# Patient Record
Sex: MALE | Race: WHITE | NOT HISPANIC OR LATINO | Employment: FULL TIME | URBAN - METROPOLITAN AREA
[De-identification: names, ages, dates, MRNs, and addresses within clinical notes are randomized per-mention and may not be internally consistent; named-entity substitution may affect disease eponyms.]

---

## 2018-08-08 ENCOUNTER — OFFICE VISIT CONVERTED (OUTPATIENT)
Dept: CARDIOLOGY | Facility: CLINIC | Age: 63
End: 2018-08-08
Attending: INTERNAL MEDICINE

## 2018-09-21 ENCOUNTER — CONVERSION ENCOUNTER (OUTPATIENT)
Dept: SURGERY | Facility: CLINIC | Age: 63
End: 2018-09-21

## 2018-09-21 ENCOUNTER — OFFICE VISIT CONVERTED (OUTPATIENT)
Dept: UROLOGY | Facility: CLINIC | Age: 63
End: 2018-09-21
Attending: UROLOGY

## 2018-10-04 ENCOUNTER — OFFICE VISIT CONVERTED (OUTPATIENT)
Dept: ONCOLOGY | Facility: HOSPITAL | Age: 63
End: 2018-10-04
Attending: INTERNAL MEDICINE

## 2018-10-04 ENCOUNTER — OFFICE VISIT CONVERTED (OUTPATIENT)
Dept: SURGERY | Facility: CLINIC | Age: 63
End: 2018-10-04
Attending: PHYSICIAN ASSISTANT

## 2018-10-05 ENCOUNTER — CONVERSION ENCOUNTER (OUTPATIENT)
Dept: SURGERY | Facility: CLINIC | Age: 63
End: 2018-10-05

## 2018-10-05 ENCOUNTER — OFFICE VISIT CONVERTED (OUTPATIENT)
Dept: SURGERY | Facility: CLINIC | Age: 63
End: 2018-10-05
Attending: SURGERY

## 2018-10-24 ENCOUNTER — OFFICE VISIT CONVERTED (OUTPATIENT)
Dept: ONCOLOGY | Facility: HOSPITAL | Age: 63
End: 2018-10-24
Attending: INTERNAL MEDICINE

## 2018-11-07 ENCOUNTER — OFFICE VISIT CONVERTED (OUTPATIENT)
Dept: ONCOLOGY | Facility: HOSPITAL | Age: 63
End: 2018-11-07
Attending: INTERNAL MEDICINE

## 2018-11-16 ENCOUNTER — OFFICE VISIT CONVERTED (OUTPATIENT)
Dept: ONCOLOGY | Facility: HOSPITAL | Age: 63
End: 2018-11-16
Attending: NURSE PRACTITIONER

## 2018-11-27 ENCOUNTER — OFFICE VISIT CONVERTED (OUTPATIENT)
Dept: ONCOLOGY | Facility: HOSPITAL | Age: 63
End: 2018-11-27
Attending: INTERNAL MEDICINE

## 2018-12-03 ENCOUNTER — CONVERSION ENCOUNTER (OUTPATIENT)
Dept: CARDIOLOGY | Facility: CLINIC | Age: 63
End: 2018-12-03

## 2018-12-03 ENCOUNTER — OFFICE VISIT CONVERTED (OUTPATIENT)
Dept: CARDIOLOGY | Facility: CLINIC | Age: 63
End: 2018-12-03
Attending: INTERNAL MEDICINE

## 2019-02-01 ENCOUNTER — HOSPITAL ENCOUNTER (OUTPATIENT)
Dept: OTHER | Facility: HOSPITAL | Age: 64
Discharge: HOME | End: 2019-02-01
Attending: INTERNAL MEDICINE

## 2019-02-13 ENCOUNTER — OFFICE VISIT CONVERTED (OUTPATIENT)
Dept: ONCOLOGY | Facility: HOSPITAL | Age: 64
End: 2019-02-13
Attending: INTERNAL MEDICINE

## 2019-02-13 ENCOUNTER — HOSPITAL ENCOUNTER (OUTPATIENT)
Dept: OTHER | Facility: HOSPITAL | Age: 64
Discharge: HOME | End: 2019-02-13
Attending: INTERNAL MEDICINE

## 2019-03-22 ENCOUNTER — HOSPITAL ENCOUNTER (OUTPATIENT)
Dept: OTHER | Facility: HOSPITAL | Age: 64
Discharge: HOME | End: 2019-03-22
Attending: NURSE PRACTITIONER

## 2019-04-10 ENCOUNTER — HOSPITAL ENCOUNTER (OUTPATIENT)
Dept: OTHER | Facility: HOSPITAL | Age: 64
Discharge: HOME | End: 2019-04-10
Attending: NURSE PRACTITIONER

## 2019-04-10 LAB
ANION GAP SERPL CALC-SCNC: 19 MMOL/L (ref 8–19)
BUN SERPL-MCNC: 32 MG/DL (ref 5–25)
BUN/CREAT SERPL: 16 {RATIO} (ref 6–20)
CALCIUM SERPL-MCNC: 9.4 MG/DL (ref 8.7–10.4)
CHLORIDE SERPL-SCNC: 96 MMOL/L (ref 99–111)
CONV CO2: 27 MMOL/L (ref 22–32)
CREAT UR-MCNC: 1.98 MG/DL (ref 0.7–1.2)
GFR SERPLBLD BASED ON 1.73 SQ M-ARVRAT: 35 ML/MIN/{1.73_M2}
GLUCOSE SERPL-MCNC: 90 MG/DL (ref 70–99)
OSMOLALITY SERPL CALC.SUM OF ELEC: 290 MOSM/KG (ref 273–304)
POTASSIUM SERPL-SCNC: 4.9 MMOL/L (ref 3.5–5.3)
SODIUM SERPL-SCNC: 137 MMOL/L (ref 135–147)

## 2019-04-12 ENCOUNTER — HOSPITAL ENCOUNTER (OUTPATIENT)
Dept: PET IMAGING | Facility: HOSPITAL | Age: 64
Discharge: HOME | End: 2019-04-12
Attending: INTERNAL MEDICINE

## 2019-04-17 ENCOUNTER — OFFICE VISIT CONVERTED (OUTPATIENT)
Dept: CARDIOLOGY | Facility: CLINIC | Age: 64
End: 2019-04-17
Attending: INTERNAL MEDICINE

## 2019-04-18 ENCOUNTER — HOSPITAL ENCOUNTER (OUTPATIENT)
Dept: OTHER | Facility: HOSPITAL | Age: 64
Discharge: HOME | End: 2019-04-18
Attending: NURSE PRACTITIONER

## 2019-04-18 LAB
BASOPHILS # BLD AUTO: 0.04 10*3/UL (ref 0–0.2)
BASOPHILS NFR BLD AUTO: 0.6 % (ref 0–3)
CONV ABS IMM GRAN: 0.02 10*3/UL (ref 0–0.2)
CONV IMMATURE GRAN: 0.3 % (ref 0–1.8)
DEPRECATED RDW RBC AUTO: 50.3 FL (ref 35.1–43.9)
EOSINOPHIL # BLD AUTO: 0.1 10*3/UL (ref 0–0.7)
EOSINOPHIL # BLD AUTO: 1.4 % (ref 0–7)
ERYTHROCYTE [DISTWIDTH] IN BLOOD BY AUTOMATED COUNT: 14.5 % (ref 11.6–14.4)
HBA1C MFR BLD: 10.7 G/DL (ref 14–18)
HCT VFR BLD AUTO: 33.4 % (ref 42–52)
LYMPHOCYTES # BLD AUTO: 1.88 10*3/UL (ref 1–5)
MCH RBC QN AUTO: 30.1 PG (ref 27–31)
MCHC RBC AUTO-ENTMCNC: 32 G/DL (ref 33–37)
MCV RBC AUTO: 93.8 FL (ref 80–96)
MONOCYTES # BLD AUTO: 0.72 10*3/UL (ref 0.2–1.2)
MONOCYTES NFR BLD AUTO: 10.2 % (ref 3–10)
NEUTROPHILS # BLD AUTO: 4.3 10*3/UL (ref 2–8)
NEUTROPHILS NFR BLD AUTO: 60.9 % (ref 30–85)
NRBC CBCN: 0 % (ref 0–0.7)
PLATELET # BLD AUTO: 262 10*3/UL (ref 130–400)
PMV BLD AUTO: 10.5 FL (ref 9.4–12.4)
RBC # BLD AUTO: 3.56 10*6/UL (ref 4.7–6.1)
T4 FREE SERPL-MCNC: 1.5 NG/DL (ref 0.9–1.8)
TSH SERPL-ACNC: 1.38 M[IU]/L (ref 0.27–4.2)
VARIANT LYMPHS NFR BLD MANUAL: 26.6 % (ref 20–45)
WBC # BLD AUTO: 7.06 10*3/UL (ref 4.8–10.8)

## 2019-04-26 ENCOUNTER — HOSPITAL ENCOUNTER (OUTPATIENT)
Dept: OTHER | Facility: HOSPITAL | Age: 64
Discharge: HOME | End: 2019-04-26
Attending: NURSE PRACTITIONER

## 2019-04-29 ENCOUNTER — HOSPITAL ENCOUNTER (OUTPATIENT)
Dept: ULTRASOUND IMAGING | Facility: HOSPITAL | Age: 64
Discharge: HOME | End: 2019-04-29
Attending: NURSE PRACTITIONER

## 2019-05-01 LAB — CONV LYMPHOMA/LEUKEMIA PROFILE SOLID TISSUE OR FLUID: NORMAL

## 2019-05-06 ENCOUNTER — OFFICE VISIT CONVERTED (OUTPATIENT)
Dept: ONCOLOGY | Facility: HOSPITAL | Age: 64
End: 2019-05-06
Attending: INTERNAL MEDICINE

## 2019-05-06 ENCOUNTER — HOSPITAL ENCOUNTER (OUTPATIENT)
Dept: OTHER | Facility: HOSPITAL | Age: 64
Discharge: HOME | End: 2019-05-06
Attending: INTERNAL MEDICINE

## 2019-06-07 ENCOUNTER — HOSPITAL ENCOUNTER (OUTPATIENT)
Dept: OTHER | Facility: HOSPITAL | Age: 64
Discharge: HOME | End: 2019-06-07
Attending: NURSE PRACTITIONER

## 2019-07-12 ENCOUNTER — HOSPITAL ENCOUNTER (OUTPATIENT)
Dept: OTHER | Facility: HOSPITAL | Age: 64
Discharge: HOME | End: 2019-07-12
Attending: NURSE PRACTITIONER

## 2019-07-26 ENCOUNTER — HOSPITAL ENCOUNTER (OUTPATIENT)
Dept: CT IMAGING | Facility: HOSPITAL | Age: 64
Discharge: HOME | End: 2019-07-26
Attending: UROLOGY

## 2019-07-26 LAB
CREAT BLD-MCNC: 1.5 MG/DL (ref 0.6–1.4)
GFR SERPLBLD BASED ON 1.73 SQ M-ARVRAT: 49 ML/MIN/{1.73_M2}

## 2019-08-16 ENCOUNTER — HOSPITAL ENCOUNTER (OUTPATIENT)
Dept: OTHER | Facility: HOSPITAL | Age: 64
Discharge: HOME | End: 2019-08-16
Attending: NURSE PRACTITIONER

## 2019-08-16 ENCOUNTER — HOSPITAL ENCOUNTER (OUTPATIENT)
Dept: OTHER | Facility: HOSPITAL | Age: 64
Discharge: HOME | End: 2019-08-16
Attending: INTERNAL MEDICINE

## 2019-08-16 LAB
ALBUMIN SERPL-MCNC: 4.1 G/DL (ref 3.5–5)
ALBUMIN/GLOB SERPL: 1.5 {RATIO} (ref 1.4–2.6)
ALP SERPL-CCNC: 80 U/L (ref 56–155)
ALT SERPL-CCNC: 11 U/L (ref 10–40)
ANION GAP SERPL CALC-SCNC: 15 MMOL/L (ref 8–19)
AST SERPL-CCNC: 18 U/L (ref 15–50)
BILIRUB SERPL-MCNC: 0.39 MG/DL (ref 0.2–1.3)
BUN SERPL-MCNC: 19 MG/DL (ref 5–25)
BUN/CREAT SERPL: 15 {RATIO} (ref 6–20)
CALCIUM SERPL-MCNC: 9.1 MG/DL (ref 8.7–10.4)
CHLORIDE SERPL-SCNC: 100 MMOL/L (ref 99–111)
CHOLEST SERPL-MCNC: 130 MG/DL (ref 107–200)
CHOLEST/HDLC SERPL: 2.2 {RATIO} (ref 3–6)
CONV CO2: 30 MMOL/L (ref 22–32)
CONV TOTAL PROTEIN: 6.8 G/DL (ref 6.3–8.2)
CREAT UR-MCNC: 1.25 MG/DL (ref 0.7–1.2)
GFR SERPLBLD BASED ON 1.73 SQ M-ARVRAT: >60 ML/MIN/{1.73_M2}
GLOBULIN UR ELPH-MCNC: 2.7 G/DL (ref 2–3.5)
GLUCOSE SERPL-MCNC: 107 MG/DL (ref 70–99)
HDLC SERPL-MCNC: 59 MG/DL (ref 40–60)
LDLC SERPL CALC-MCNC: 60 MG/DL (ref 70–100)
OSMOLALITY SERPL CALC.SUM OF ELEC: 293 MOSM/KG (ref 273–304)
POTASSIUM SERPL-SCNC: 5.2 MMOL/L (ref 3.5–5.3)
SODIUM SERPL-SCNC: 140 MMOL/L (ref 135–147)
TRIGL SERPL-MCNC: 55 MG/DL (ref 40–150)
VLDLC SERPL-MCNC: 11 MG/DL (ref 5–37)

## 2019-09-20 ENCOUNTER — HOSPITAL ENCOUNTER (OUTPATIENT)
Dept: OTHER | Facility: HOSPITAL | Age: 64
Discharge: HOME | End: 2019-09-20
Attending: NURSE PRACTITIONER

## 2019-10-25 ENCOUNTER — HOSPITAL ENCOUNTER (OUTPATIENT)
Dept: OTHER | Facility: HOSPITAL | Age: 64
Discharge: HOME | End: 2019-10-25
Attending: NURSE PRACTITIONER

## 2019-10-28 ENCOUNTER — HOSPITAL ENCOUNTER (OUTPATIENT)
Dept: CT IMAGING | Facility: HOSPITAL | Age: 64
Discharge: HOME | End: 2019-10-28
Attending: NURSE PRACTITIONER

## 2019-10-28 LAB
ALBUMIN SERPL-MCNC: 4.6 G/DL (ref 3.5–5)
ALBUMIN/GLOB SERPL: 1.5 {RATIO} (ref 1.4–2.6)
ALP SERPL-CCNC: 78 U/L (ref 56–155)
ALT SERPL-CCNC: 11 U/L (ref 10–40)
ANION GAP SERPL CALC-SCNC: 19 MMOL/L (ref 8–19)
AST SERPL-CCNC: 18 U/L (ref 15–50)
BASOPHILS # BLD AUTO: 0.05 10*3/UL (ref 0–0.2)
BASOPHILS NFR BLD AUTO: 0.6 % (ref 0–3)
BILIRUB SERPL-MCNC: 0.26 MG/DL (ref 0.2–1.3)
BUN SERPL-MCNC: 23 MG/DL (ref 5–25)
BUN/CREAT SERPL: 17 {RATIO} (ref 6–20)
CALCIUM SERPL-MCNC: 10 MG/DL (ref 8.7–10.4)
CHLORIDE SERPL-SCNC: 97 MMOL/L (ref 99–111)
CONV ABS IMM GRAN: 0.02 10*3/UL (ref 0–0.2)
CONV CO2: 30 MMOL/L (ref 22–32)
CONV IMMATURE GRAN: 0.2 % (ref 0–1.8)
CONV TOTAL PROTEIN: 7.6 G/DL (ref 6.3–8.2)
CREAT BLD-MCNC: 1.5 MG/DL (ref 0.6–1.4)
CREAT UR-MCNC: 1.36 MG/DL (ref 0.7–1.2)
DEPRECATED RDW RBC AUTO: 45.5 FL (ref 35.1–43.9)
EOSINOPHIL # BLD AUTO: 0.08 10*3/UL (ref 0–0.7)
EOSINOPHIL # BLD AUTO: 0.9 % (ref 0–7)
ERYTHROCYTE [DISTWIDTH] IN BLOOD BY AUTOMATED COUNT: 12.8 % (ref 11.6–14.4)
GFR SERPLBLD BASED ON 1.73 SQ M-ARVRAT: 49 ML/MIN/{1.73_M2}
GFR SERPLBLD BASED ON 1.73 SQ M-ARVRAT: 55 ML/MIN/{1.73_M2}
GLOBULIN UR ELPH-MCNC: 3 G/DL (ref 2–3.5)
GLUCOSE SERPL-MCNC: 88 MG/DL (ref 70–99)
HCT VFR BLD AUTO: 38.6 % (ref 42–52)
HGB BLD-MCNC: 12.4 G/DL (ref 14–18)
LYMPHOCYTES # BLD AUTO: 2.25 10*3/UL (ref 1–5)
LYMPHOCYTES NFR BLD AUTO: 26.2 % (ref 20–45)
MCH RBC QN AUTO: 31 PG (ref 27–31)
MCHC RBC AUTO-ENTMCNC: 32.1 G/DL (ref 33–37)
MCV RBC AUTO: 96.5 FL (ref 80–96)
MONOCYTES # BLD AUTO: 0.76 10*3/UL (ref 0.2–1.2)
MONOCYTES NFR BLD AUTO: 8.8 % (ref 3–10)
NEUTROPHILS # BLD AUTO: 5.43 10*3/UL (ref 2–8)
NEUTROPHILS NFR BLD AUTO: 63.3 % (ref 30–85)
NRBC CBCN: 0 % (ref 0–0.7)
OSMOLALITY SERPL CALC.SUM OF ELEC: 295 MOSM/KG (ref 273–304)
PLATELET # BLD AUTO: 219 10*3/UL (ref 130–400)
PMV BLD AUTO: 10 FL (ref 9.4–12.4)
POTASSIUM SERPL-SCNC: 4.7 MMOL/L (ref 3.5–5.3)
RBC # BLD AUTO: 4 10*6/UL (ref 4.7–6.1)
SODIUM SERPL-SCNC: 141 MMOL/L (ref 135–147)
WBC # BLD AUTO: 8.59 10*3/UL (ref 4.8–10.8)

## 2019-11-18 ENCOUNTER — HOSPITAL ENCOUNTER (OUTPATIENT)
Dept: OTHER | Facility: HOSPITAL | Age: 64
Discharge: HOME | End: 2019-11-18
Attending: INTERNAL MEDICINE

## 2019-11-18 ENCOUNTER — OFFICE VISIT CONVERTED (OUTPATIENT)
Dept: ONCOLOGY | Facility: HOSPITAL | Age: 64
End: 2019-11-18
Attending: INTERNAL MEDICINE

## 2019-11-22 ENCOUNTER — OFFICE VISIT CONVERTED (OUTPATIENT)
Dept: SURGERY | Facility: CLINIC | Age: 64
End: 2019-11-22
Attending: SURGERY

## 2019-12-26 ENCOUNTER — HOSPITAL ENCOUNTER (OUTPATIENT)
Dept: PERIOP | Facility: HOSPITAL | Age: 64
Setting detail: HOSPITAL OUTPATIENT SURGERY
Discharge: HOME | End: 2019-12-26
Attending: SURGERY

## 2020-02-05 ENCOUNTER — OFFICE VISIT CONVERTED (OUTPATIENT)
Dept: CARDIOLOGY | Facility: CLINIC | Age: 65
End: 2020-02-05
Attending: INTERNAL MEDICINE

## 2020-02-05 ENCOUNTER — CONVERSION ENCOUNTER (OUTPATIENT)
Dept: CARDIOLOGY | Facility: CLINIC | Age: 65
End: 2020-02-05

## 2020-02-21 ENCOUNTER — CONVERSION ENCOUNTER (OUTPATIENT)
Dept: CARDIOLOGY | Facility: CLINIC | Age: 65
End: 2020-02-21
Attending: INTERNAL MEDICINE

## 2020-02-24 ENCOUNTER — HOSPITAL ENCOUNTER (OUTPATIENT)
Dept: GENERAL RADIOLOGY | Facility: HOSPITAL | Age: 65
Discharge: HOME | End: 2020-02-24
Attending: FAMILY MEDICINE

## 2020-02-26 ENCOUNTER — HOSPITAL ENCOUNTER (OUTPATIENT)
Dept: OTHER | Facility: HOSPITAL | Age: 65
Discharge: HOME | End: 2020-02-26
Attending: INTERNAL MEDICINE

## 2020-02-26 ENCOUNTER — OFFICE VISIT CONVERTED (OUTPATIENT)
Dept: ONCOLOGY | Facility: HOSPITAL | Age: 65
End: 2020-02-26
Attending: INTERNAL MEDICINE

## 2020-02-26 LAB
BASE EXCESS BLD CALC-SCNC: 6.1 MMOL/L
COHGB MFR BLD: 1.1 % (ref 0–1.5)
CONV ALLEN'S TEST: ABNORMAL
CONV FHHB: 9 % (ref 0–5)
CONV FIO2: 21 % (ref 21–100)
CONV SITE: ABNORMAL
HBA1C MFR BLD: 12.7 % (ref 13.8–16.4)
HCO3 BLDA-SCNC: 31.2 MMOL/L (ref 22–26)
LABORATORY COMMENT REPORT: ABNORMAL
LITERS PER MINUTE: 0 L/MIN
Lab: ABNORMAL
METHGB MFR BLD: 0.3 % (ref 0–1.5)
OXYHGB MFR BLD: 89.6 % (ref 94–98)
PCO2 BLD: 47 MM[HG] (ref 35–45)
PH UR: 7.44 [PH] (ref 7.35–7.45)
PO2 BLD: 270 MM[HG] (ref 0–500)
PO2 BLD: 56.8 MM[HG] (ref 80–100)
SAO2 % BLDCOA: 90.9 % (ref 95–99)
SPECIMEN SOURCE: ABNORMAL
SPO2: 90 MM[HG] (ref 21–100)

## 2020-02-27 LAB — IGE SERPL-ACNC: 384 K[IU]/ML (ref 0–24)

## 2020-03-11 ENCOUNTER — CONVERSION ENCOUNTER (OUTPATIENT)
Dept: CARDIOLOGY | Facility: CLINIC | Age: 65
End: 2020-03-11
Attending: INTERNAL MEDICINE

## 2020-05-15 ENCOUNTER — HOSPITAL ENCOUNTER (OUTPATIENT)
Dept: CT IMAGING | Facility: HOSPITAL | Age: 65
Discharge: HOME | End: 2020-05-15
Attending: INTERNAL MEDICINE

## 2020-05-15 LAB
ALBUMIN SERPL-MCNC: 3.7 G/DL (ref 3.5–5)
ALBUMIN/GLOB SERPL: 1.7 {RATIO} (ref 1.4–2.6)
ALP SERPL-CCNC: 60 U/L (ref 56–155)
ALT SERPL-CCNC: 11 U/L (ref 10–40)
ANION GAP SERPL CALC-SCNC: 14 MMOL/L (ref 8–19)
AST SERPL-CCNC: 18 U/L (ref 15–50)
BASOPHILS # BLD AUTO: 0.02 10*3/UL (ref 0–0.2)
BASOPHILS NFR BLD AUTO: 0.4 % (ref 0–3)
BILIRUB SERPL-MCNC: 0.44 MG/DL (ref 0.2–1.3)
BUN SERPL-MCNC: 21 MG/DL (ref 5–25)
BUN/CREAT SERPL: 15 {RATIO} (ref 6–20)
CALCIUM SERPL-MCNC: 8.9 MG/DL (ref 8.7–10.4)
CHLORIDE SERPL-SCNC: 100 MMOL/L (ref 99–111)
CONV ABS IMM GRAN: 0.02 10*3/UL (ref 0–0.2)
CONV CO2: 27 MMOL/L (ref 22–32)
CONV IMMATURE GRAN: 0.4 % (ref 0–1.8)
CONV TOTAL PROTEIN: 5.9 G/DL (ref 6.3–8.2)
CREAT BLD-MCNC: 1.4 MG/DL (ref 0.6–1.4)
CREAT UR-MCNC: 1.36 MG/DL (ref 0.7–1.2)
DEPRECATED RDW RBC AUTO: 46.6 FL (ref 35.1–43.9)
EOSINOPHIL # BLD AUTO: 0.06 10*3/UL (ref 0–0.7)
EOSINOPHIL # BLD AUTO: 1.1 % (ref 0–7)
ERYTHROCYTE [DISTWIDTH] IN BLOOD BY AUTOMATED COUNT: 13.3 % (ref 11.6–14.4)
GFR SERPLBLD BASED ON 1.73 SQ M-ARVRAT: 53 ML/MIN/{1.73_M2}
GFR SERPLBLD BASED ON 1.73 SQ M-ARVRAT: 55 ML/MIN/{1.73_M2}
GLOBULIN UR ELPH-MCNC: 2.2 G/DL (ref 2–3.5)
GLUCOSE SERPL-MCNC: 95 MG/DL (ref 70–99)
HCT VFR BLD AUTO: 31.7 % (ref 42–52)
HGB BLD-MCNC: 10.4 G/DL (ref 14–18)
LYMPHOCYTES # BLD AUTO: 1.32 10*3/UL (ref 1–5)
LYMPHOCYTES NFR BLD AUTO: 24.3 % (ref 20–45)
MCH RBC QN AUTO: 31.1 PG (ref 27–31)
MCHC RBC AUTO-ENTMCNC: 32.8 G/DL (ref 33–37)
MCV RBC AUTO: 94.9 FL (ref 80–96)
MONOCYTES # BLD AUTO: 0.62 10*3/UL (ref 0.2–1.2)
MONOCYTES NFR BLD AUTO: 11.4 % (ref 3–10)
NEUTROPHILS # BLD AUTO: 3.39 10*3/UL (ref 2–8)
NEUTROPHILS NFR BLD AUTO: 62.4 % (ref 30–85)
NRBC CBCN: 0 % (ref 0–0.7)
OSMOLALITY SERPL CALC.SUM OF ELEC: 285 MOSM/KG (ref 273–304)
PLATELET # BLD AUTO: 176 10*3/UL (ref 130–400)
PMV BLD AUTO: 10.5 FL (ref 9.4–12.4)
POTASSIUM SERPL-SCNC: 4.6 MMOL/L (ref 3.5–5.3)
RBC # BLD AUTO: 3.34 10*6/UL (ref 4.7–6.1)
SODIUM SERPL-SCNC: 136 MMOL/L (ref 135–147)
WBC # BLD AUTO: 5.43 10*3/UL (ref 4.8–10.8)

## 2020-05-19 ENCOUNTER — HOSPITAL ENCOUNTER (OUTPATIENT)
Dept: PREADMISSION TESTING | Facility: HOSPITAL | Age: 65
Discharge: HOME | End: 2020-05-19
Attending: INTERNAL MEDICINE

## 2020-05-20 LAB — SARS-COV-2 RNA SPEC QL NAA+PROBE: NOT DETECTED

## 2020-05-22 ENCOUNTER — HOSPITAL ENCOUNTER (OUTPATIENT)
Dept: CARDIOLOGY | Facility: HOSPITAL | Age: 65
Discharge: HOME | End: 2020-05-22
Attending: INTERNAL MEDICINE

## 2020-05-28 ENCOUNTER — OFFICE VISIT CONVERTED (OUTPATIENT)
Dept: ONCOLOGY | Facility: HOSPITAL | Age: 65
End: 2020-05-28
Attending: INTERNAL MEDICINE

## 2020-05-28 ENCOUNTER — HOSPITAL ENCOUNTER (OUTPATIENT)
Dept: ONCOLOGY | Facility: HOSPITAL | Age: 65
Discharge: HOME | End: 2020-05-28
Attending: INTERNAL MEDICINE

## 2020-06-17 ENCOUNTER — HOSPITAL ENCOUNTER (OUTPATIENT)
Dept: FAMILY MEDICINE CLINIC | Facility: CLINIC | Age: 65
Discharge: HOME | End: 2020-06-17
Attending: FAMILY MEDICINE

## 2020-08-08 ENCOUNTER — HOSPITAL ENCOUNTER (OUTPATIENT)
Dept: OTHER | Facility: HOSPITAL | Age: 65
Discharge: HOME | End: 2020-08-08
Attending: NURSE PRACTITIONER

## 2020-08-08 LAB
ALBUMIN SERPL-MCNC: 4.2 G/DL (ref 3.5–5)
ALBUMIN/GLOB SERPL: 1.7 {RATIO} (ref 1.4–2.6)
ALP SERPL-CCNC: 66 U/L (ref 56–155)
ALT SERPL-CCNC: 14 U/L (ref 10–40)
ANION GAP SERPL CALC-SCNC: 15 MMOL/L (ref 8–19)
AST SERPL-CCNC: 20 U/L (ref 15–50)
BASOPHILS # BLD AUTO: 0.03 10*3/UL (ref 0–0.2)
BASOPHILS NFR BLD AUTO: 0.5 % (ref 0–3)
BILIRUB SERPL-MCNC: 0.4 MG/DL (ref 0.2–1.3)
BUN SERPL-MCNC: 23 MG/DL (ref 5–25)
BUN/CREAT SERPL: 15 {RATIO} (ref 6–20)
CALCIUM SERPL-MCNC: 9.7 MG/DL (ref 8.7–10.4)
CHLORIDE SERPL-SCNC: 101 MMOL/L (ref 99–111)
CHOLEST SERPL-MCNC: 128 MG/DL (ref 107–200)
CHOLEST/HDLC SERPL: 2.6 {RATIO} (ref 3–6)
CONV ABS IMM GRAN: 0.01 10*3/UL (ref 0–0.2)
CONV CO2: 29 MMOL/L (ref 22–32)
CONV IMMATURE GRAN: 0.2 % (ref 0–1.8)
CONV TOTAL PROTEIN: 6.7 G/DL (ref 6.3–8.2)
CREAT UR-MCNC: 1.53 MG/DL (ref 0.7–1.2)
DEPRECATED RDW RBC AUTO: 43.3 FL (ref 35.1–43.9)
EOSINOPHIL # BLD AUTO: 0.11 10*3/UL (ref 0–0.7)
EOSINOPHIL # BLD AUTO: 2 % (ref 0–7)
ERYTHROCYTE [DISTWIDTH] IN BLOOD BY AUTOMATED COUNT: 12.5 % (ref 11.6–14.4)
GFR SERPLBLD BASED ON 1.73 SQ M-ARVRAT: 47 ML/MIN/{1.73_M2}
GLOBULIN UR ELPH-MCNC: 2.5 G/DL (ref 2–3.5)
GLUCOSE SERPL-MCNC: 109 MG/DL (ref 70–99)
HCT VFR BLD AUTO: 36.8 % (ref 42–52)
HDLC SERPL-MCNC: 49 MG/DL (ref 40–60)
HGB BLD-MCNC: 12.3 G/DL (ref 14–18)
LDLC SERPL CALC-MCNC: 64 MG/DL (ref 70–100)
LYMPHOCYTES # BLD AUTO: 1.41 10*3/UL (ref 1–5)
LYMPHOCYTES NFR BLD AUTO: 25.4 % (ref 20–45)
MCH RBC QN AUTO: 31.7 PG (ref 27–31)
MCHC RBC AUTO-ENTMCNC: 33.4 G/DL (ref 33–37)
MCV RBC AUTO: 94.8 FL (ref 80–96)
MONOCYTES # BLD AUTO: 0.66 10*3/UL (ref 0.2–1.2)
MONOCYTES NFR BLD AUTO: 11.9 % (ref 3–10)
NEUTROPHILS # BLD AUTO: 3.33 10*3/UL (ref 2–8)
NEUTROPHILS NFR BLD AUTO: 60 % (ref 30–85)
NRBC CBCN: 0 % (ref 0–0.7)
OSMOLALITY SERPL CALC.SUM OF ELEC: 294 MOSM/KG (ref 273–304)
PLATELET # BLD AUTO: 192 10*3/UL (ref 130–400)
PMV BLD AUTO: 10.1 FL (ref 9.4–12.4)
POTASSIUM SERPL-SCNC: 4.7 MMOL/L (ref 3.5–5.3)
RBC # BLD AUTO: 3.88 10*6/UL (ref 4.7–6.1)
SODIUM SERPL-SCNC: 140 MMOL/L (ref 135–147)
TRIGL SERPL-MCNC: 73 MG/DL (ref 40–150)
VLDLC SERPL-MCNC: 15 MG/DL (ref 5–37)
WBC # BLD AUTO: 5.55 10*3/UL (ref 4.8–10.8)

## 2020-08-12 ENCOUNTER — OFFICE VISIT CONVERTED (OUTPATIENT)
Dept: CARDIOLOGY | Facility: CLINIC | Age: 65
End: 2020-08-12
Attending: INTERNAL MEDICINE

## 2020-09-11 ENCOUNTER — OFFICE VISIT CONVERTED (OUTPATIENT)
Dept: PULMONOLOGY | Facility: CLINIC | Age: 65
End: 2020-09-11
Attending: INTERNAL MEDICINE

## 2020-10-14 ENCOUNTER — HOSPITAL ENCOUNTER (OUTPATIENT)
Dept: URGENT CARE | Facility: CLINIC | Age: 65
Discharge: HOME | End: 2020-10-14
Attending: FAMILY MEDICINE

## 2020-10-16 LAB — SARS-COV-2 RNA SPEC QL NAA+PROBE: DETECTED

## 2020-10-21 ENCOUNTER — HOSPITAL ENCOUNTER (OUTPATIENT)
Dept: URGENT CARE | Facility: CLINIC | Age: 65
Discharge: HOME | End: 2020-10-21
Attending: FAMILY MEDICINE

## 2020-10-23 LAB — SARS-COV-2 RNA SPEC QL NAA+PROBE: NOT DETECTED

## 2020-11-13 ENCOUNTER — HOSPITAL ENCOUNTER (OUTPATIENT)
Dept: FAMILY MEDICINE CLINIC | Facility: CLINIC | Age: 65
Discharge: HOME | End: 2020-11-13
Attending: FAMILY MEDICINE

## 2020-11-13 LAB
BASOPHILS # BLD AUTO: 0.03 10*3/UL (ref 0–0.2)
BASOPHILS NFR BLD AUTO: 0.5 % (ref 0–3)
CONV ABS IMM GRAN: 0.02 10*3/UL (ref 0–0.2)
CONV IMMATURE GRAN: 0.3 % (ref 0–1.8)
DEPRECATED RDW RBC AUTO: 47.5 FL (ref 35.1–43.9)
EOSINOPHIL # BLD AUTO: 0.05 10*3/UL (ref 0–0.7)
EOSINOPHIL # BLD AUTO: 0.8 % (ref 0–7)
ERYTHROCYTE [DISTWIDTH] IN BLOOD BY AUTOMATED COUNT: 13.2 % (ref 11.6–14.4)
HCT VFR BLD AUTO: 38.3 % (ref 42–52)
HGB BLD-MCNC: 12.2 G/DL (ref 14–18)
LYMPHOCYTES # BLD AUTO: 1.26 10*3/UL (ref 1–5)
LYMPHOCYTES NFR BLD AUTO: 20.3 % (ref 20–45)
MCH RBC QN AUTO: 31.2 PG (ref 27–31)
MCHC RBC AUTO-ENTMCNC: 31.9 G/DL (ref 33–37)
MCV RBC AUTO: 98 FL (ref 80–96)
MONOCYTES # BLD AUTO: 0.53 10*3/UL (ref 0.2–1.2)
MONOCYTES NFR BLD AUTO: 8.5 % (ref 3–10)
NEUTROPHILS # BLD AUTO: 4.31 10*3/UL (ref 2–8)
NEUTROPHILS NFR BLD AUTO: 69.6 % (ref 30–85)
NRBC CBCN: 0 % (ref 0–0.7)
PLATELET # BLD AUTO: 226 10*3/UL (ref 130–400)
PMV BLD AUTO: 10.3 FL (ref 9.4–12.4)
RBC # BLD AUTO: 3.91 10*6/UL (ref 4.7–6.1)
WBC # BLD AUTO: 6.2 10*3/UL (ref 4.8–10.8)

## 2020-11-14 LAB
ALBUMIN SERPL-MCNC: 4.4 G/DL (ref 3.5–5)
ALBUMIN/GLOB SERPL: 1.7 {RATIO} (ref 1.4–2.6)
ALP SERPL-CCNC: 77 U/L (ref 56–155)
ALT SERPL-CCNC: 17 U/L (ref 10–40)
ANION GAP SERPL CALC-SCNC: 17 MMOL/L (ref 8–19)
AST SERPL-CCNC: 23 U/L (ref 15–50)
BILIRUB SERPL-MCNC: 0.36 MG/DL (ref 0.2–1.3)
BUN SERPL-MCNC: 21 MG/DL (ref 5–25)
BUN/CREAT SERPL: 15 {RATIO} (ref 6–20)
CALCIUM SERPL-MCNC: 9.2 MG/DL (ref 8.7–10.4)
CHLORIDE SERPL-SCNC: 97 MMOL/L (ref 99–111)
CONV CO2: 28 MMOL/L (ref 22–32)
CONV TOTAL PROTEIN: 7 G/DL (ref 6.3–8.2)
CREAT UR-MCNC: 1.38 MG/DL (ref 0.7–1.2)
GFR SERPLBLD BASED ON 1.73 SQ M-ARVRAT: 53 ML/MIN/{1.73_M2}
GLOBULIN UR ELPH-MCNC: 2.6 G/DL (ref 2–3.5)
GLUCOSE SERPL-MCNC: 103 MG/DL (ref 70–99)
OSMOLALITY SERPL CALC.SUM OF ELEC: 287 MOSM/KG (ref 273–304)
POTASSIUM SERPL-SCNC: 4.5 MMOL/L (ref 3.5–5.3)
SODIUM SERPL-SCNC: 137 MMOL/L (ref 135–147)
TSH SERPL-ACNC: 0.89 M[IU]/L (ref 0.27–4.2)

## 2020-12-04 ENCOUNTER — HOSPITAL ENCOUNTER (OUTPATIENT)
Dept: CT IMAGING | Facility: HOSPITAL | Age: 65
Discharge: HOME | End: 2020-12-04
Attending: INTERNAL MEDICINE

## 2020-12-04 ENCOUNTER — LAB REQUISITION (OUTPATIENT)
Dept: LAB | Facility: HOSPITAL | Age: 65
End: 2020-12-04

## 2020-12-04 DIAGNOSIS — Z00.00 ROUTINE GENERAL MEDICAL EXAMINATION AT A HEALTH CARE FACILITY: ICD-10-CM

## 2020-12-04 LAB
ALBUMIN SERPL-MCNC: 4.3 G/DL (ref 3.5–5)
ALBUMIN/GLOB SERPL: 1.6 {RATIO} (ref 1.4–2.6)
ALP SERPL-CCNC: 73 U/L (ref 56–155)
ALT SERPL-CCNC: 14 U/L (ref 10–40)
ANION GAP SERPL CALC-SCNC: 12 MMOL/L (ref 8–19)
AST SERPL-CCNC: 20 U/L (ref 15–50)
BASOPHILS # BLD AUTO: 0.03 10*3/UL (ref 0–0.2)
BASOPHILS NFR BLD AUTO: 0.5 % (ref 0–3)
BILIRUB SERPL-MCNC: 0.4 MG/DL (ref 0.2–1.3)
BUN SERPL-MCNC: 23 MG/DL (ref 5–25)
BUN/CREAT SERPL: 18 {RATIO} (ref 6–20)
CALCIUM SERPL-MCNC: 9.4 MG/DL (ref 8.7–10.4)
CHLORIDE SERPL-SCNC: 98 MMOL/L (ref 99–111)
CONV ABS IMM GRAN: 0.01 10*3/UL (ref 0–0.2)
CONV CO2: 32 MMOL/L (ref 22–32)
CONV IMMATURE GRAN: 0.2 % (ref 0–1.8)
CONV TOTAL PROTEIN: 7 G/DL (ref 6.3–8.2)
CREAT UR-MCNC: 1.25 MG/DL (ref 0.7–1.2)
DEPRECATED RDW RBC AUTO: 43.8 FL (ref 35.1–43.9)
EOSINOPHIL # BLD AUTO: 0.04 10*3/UL (ref 0–0.7)
EOSINOPHIL # BLD AUTO: 0.7 % (ref 0–7)
ERYTHROCYTE [DISTWIDTH] IN BLOOD BY AUTOMATED COUNT: 12.6 % (ref 11.6–14.4)
FOLATE SERPL-MCNC: 17.4 NG/ML (ref 4.78–24.2)
GFR SERPLBLD BASED ON 1.73 SQ M-ARVRAT: >60 ML/MIN/{1.73_M2}
GLOBULIN UR ELPH-MCNC: 2.7 G/DL (ref 2–3.5)
GLUCOSE SERPL-MCNC: 113 MG/DL (ref 70–99)
HCT VFR BLD AUTO: 37 % (ref 42–52)
HGB BLD-MCNC: 12.3 G/DL (ref 14–18)
LYMPHOCYTES # BLD AUTO: 1.29 10*3/UL (ref 1–5)
LYMPHOCYTES NFR BLD AUTO: 22.2 % (ref 20–45)
MCH RBC QN AUTO: 31.1 PG (ref 27–31)
MCHC RBC AUTO-ENTMCNC: 33.2 G/DL (ref 33–37)
MCV RBC AUTO: 93.7 FL (ref 80–96)
MONOCYTES # BLD AUTO: 0.63 10*3/UL (ref 0.2–1.2)
MONOCYTES NFR BLD AUTO: 10.9 % (ref 3–10)
NEUTROPHILS # BLD AUTO: 3.8 10*3/UL (ref 2–8)
NEUTROPHILS NFR BLD AUTO: 65.5 % (ref 30–85)
NRBC CBCN: 0 % (ref 0–0.7)
OSMOLALITY SERPL CALC.SUM OF ELEC: 288 MOSM/KG (ref 273–304)
PLATELET # BLD AUTO: 215 10*3/UL (ref 130–400)
PMV BLD AUTO: 10.1 FL (ref 9.4–12.4)
POTASSIUM SERPL-SCNC: 4.8 MMOL/L (ref 3.5–5.3)
RBC # BLD AUTO: 3.95 10*6/UL (ref 4.7–6.1)
SODIUM SERPL-SCNC: 137 MMOL/L (ref 135–147)
VIT B12 BLD-MCNC: 215 PG/ML (ref 211–946)
WBC # BLD AUTO: 5.8 10*3/UL (ref 4.8–10.8)

## 2020-12-04 PROCEDURE — 82607 VITAMIN B-12: CPT

## 2020-12-04 PROCEDURE — 82746 ASSAY OF FOLIC ACID SERUM: CPT

## 2020-12-07 ENCOUNTER — HOSPITAL ENCOUNTER (OUTPATIENT)
Dept: ONCOLOGY | Facility: HOSPITAL | Age: 65
Discharge: HOME | End: 2020-12-07
Attending: INTERNAL MEDICINE

## 2020-12-07 ENCOUNTER — OFFICE VISIT CONVERTED (OUTPATIENT)
Dept: PULMONOLOGY | Facility: CLINIC | Age: 65
End: 2020-12-07
Attending: INTERNAL MEDICINE

## 2021-03-13 ENCOUNTER — HOSPITAL ENCOUNTER (OUTPATIENT)
Dept: OTHER | Facility: HOSPITAL | Age: 66
Discharge: HOME | End: 2021-03-13
Attending: INTERNAL MEDICINE

## 2021-03-13 LAB
25(OH)D3 SERPL-MCNC: 45.4 NG/ML (ref 30–100)
ALBUMIN SERPL-MCNC: 4.1 G/DL (ref 3.5–5)
ALBUMIN/GLOB SERPL: 1.5 {RATIO} (ref 1.4–2.6)
ALP SERPL-CCNC: 66 U/L (ref 56–155)
ALT SERPL-CCNC: 15 U/L (ref 10–40)
ANION GAP SERPL CALC-SCNC: 11 MMOL/L (ref 8–19)
AST SERPL-CCNC: 19 U/L (ref 15–50)
BASOPHILS # BLD AUTO: 0.04 10*3/UL (ref 0–0.2)
BASOPHILS NFR BLD AUTO: 0.7 % (ref 0–3)
BILIRUB SERPL-MCNC: 0.31 MG/DL (ref 0.2–1.3)
BNP SERPL-MCNC: 274 PG/ML (ref 0–900)
BUN SERPL-MCNC: 23 MG/DL (ref 5–25)
BUN/CREAT SERPL: 17 {RATIO} (ref 6–20)
CALCIUM SERPL-MCNC: 9.7 MG/DL (ref 8.7–10.4)
CHLORIDE SERPL-SCNC: 99 MMOL/L (ref 99–111)
CHOLEST SERPL-MCNC: 126 MG/DL (ref 107–200)
CHOLEST/HDLC SERPL: 2.4 {RATIO} (ref 3–6)
CONV ABS IMM GRAN: 0.01 10*3/UL (ref 0–0.2)
CONV CO2: 33 MMOL/L (ref 22–32)
CONV IMMATURE GRAN: 0.2 % (ref 0–1.8)
CONV TOTAL PROTEIN: 6.8 G/DL (ref 6.3–8.2)
CREAT UR-MCNC: 1.37 MG/DL (ref 0.7–1.2)
DEPRECATED RDW RBC AUTO: 45.3 FL (ref 35.1–43.9)
EOSINOPHIL # BLD AUTO: 0.07 10*3/UL (ref 0–0.7)
EOSINOPHIL # BLD AUTO: 1.2 % (ref 0–7)
ERYTHROCYTE [DISTWIDTH] IN BLOOD BY AUTOMATED COUNT: 12.7 % (ref 11.6–14.4)
GFR SERPLBLD BASED ON 1.73 SQ M-ARVRAT: 54 ML/MIN/{1.73_M2}
GLOBULIN UR ELPH-MCNC: 2.7 G/DL (ref 2–3.5)
GLUCOSE SERPL-MCNC: 122 MG/DL (ref 70–99)
HCT VFR BLD AUTO: 38.3 % (ref 42–52)
HDLC SERPL-MCNC: 53 MG/DL (ref 40–60)
HGB BLD-MCNC: 12.6 G/DL (ref 14–18)
LDLC SERPL CALC-MCNC: 61 MG/DL (ref 70–100)
LYMPHOCYTES # BLD AUTO: 1.18 10*3/UL (ref 1–5)
LYMPHOCYTES NFR BLD AUTO: 19.6 % (ref 20–45)
MAGNESIUM SERPL-MCNC: 1.85 MG/DL (ref 1.6–2.3)
MCH RBC QN AUTO: 31.7 PG (ref 27–31)
MCHC RBC AUTO-ENTMCNC: 32.9 G/DL (ref 33–37)
MCV RBC AUTO: 96.2 FL (ref 80–96)
MONOCYTES # BLD AUTO: 0.69 10*3/UL (ref 0.2–1.2)
MONOCYTES NFR BLD AUTO: 11.5 % (ref 3–10)
NEUTROPHILS # BLD AUTO: 4.03 10*3/UL (ref 2–8)
NEUTROPHILS NFR BLD AUTO: 66.8 % (ref 30–85)
NRBC CBCN: 0 % (ref 0–0.7)
OSMOLALITY SERPL CALC.SUM OF ELEC: 291 MOSM/KG (ref 273–304)
PLATELET # BLD AUTO: 193 10*3/UL (ref 130–400)
PMV BLD AUTO: 9.9 FL (ref 9.4–12.4)
POTASSIUM SERPL-SCNC: 4.8 MMOL/L (ref 3.5–5.3)
PSA SERPL-MCNC: <0.01 NG/ML (ref 0–4)
RBC # BLD AUTO: 3.98 10*6/UL (ref 4.7–6.1)
SODIUM SERPL-SCNC: 138 MMOL/L (ref 135–147)
T4 FREE SERPL-MCNC: 1.2 NG/DL (ref 0.9–1.8)
TRIGL SERPL-MCNC: 60 MG/DL (ref 40–150)
TSH SERPL-ACNC: 0.7 M[IU]/L (ref 0.27–4.2)
VLDLC SERPL-MCNC: 12 MG/DL (ref 5–37)
WBC # BLD AUTO: 6.02 10*3/UL (ref 4.8–10.8)

## 2021-03-16 LAB — T3FREE SERPL-MCNC: 3.1 PG/ML (ref 2–4.4)

## 2021-03-19 ENCOUNTER — HOSPITAL ENCOUNTER (OUTPATIENT)
Dept: VACCINE CLINIC | Facility: HOSPITAL | Age: 66
Discharge: HOME | End: 2021-03-19
Attending: INTERNAL MEDICINE

## 2021-03-31 ENCOUNTER — OFFICE VISIT CONVERTED (OUTPATIENT)
Dept: CARDIOLOGY | Facility: CLINIC | Age: 66
End: 2021-03-31
Attending: NURSE PRACTITIONER

## 2021-04-16 ENCOUNTER — HOSPITAL ENCOUNTER (OUTPATIENT)
Dept: VACCINE CLINIC | Facility: HOSPITAL | Age: 66
Discharge: HOME | End: 2021-04-16
Attending: INTERNAL MEDICINE

## 2021-04-29 ENCOUNTER — OFFICE VISIT CONVERTED (OUTPATIENT)
Dept: PULMONOLOGY | Facility: CLINIC | Age: 66
End: 2021-04-29
Attending: NURSE PRACTITIONER

## 2021-05-11 NOTE — H&P
History and Physical      Patient Name: Toribio Hayes   Patient ID: [id number]   Sex: Male   YOB: 1955    Primary Care Provider: Rex Hayden MD   Referring Provider: Yasmany Rodrigues MD    Visit Date: November 22, 2019    Provider: Yasmany Rodrigues MD   Location[facility]   Location Address: [address] [place] [address]  [id number]   Location Phone: [phone]          Chief Complaint  · Outpatient History & Physical / Surgical Orders  · port removal consult      History Of Present Illness     Mr. Hayes came in today for evaluation. He is doing well from his chemotherapy for bladder cancer. He is without complaints today and would like to go ahead and have the port removed.       Past Medical History  Asthma; Cancer; Chronic Obstructive Pulmonary Disease; Congestive heart failure; Hypertension; Lung disease; Right knee osteoarthritis; Right medial meniscus tear; Shortness of Breath         Past Surgical History  Colonoscopy; Knee arthroscopy, diagnostic         Medication List  Advair Diskus 250-50 mcg/dose inhalation blister with device; Benicar 40 mg oral tablet; Crestor 5 mg oral tablet; Eliquis 5 mg oral tablet; Singulair 10 mg oral tablet; spironolacton-hydrochlorothiaz 25-25 mg oral tablet; Toprol XL 50 mg oral tablet extended release 24 hr         Allergy List  NO KNOWN DRUG ALLERGIES         Family Medical History  Cancer, Unspecified         Social History  Alcohol Use (Never); lives with spouse; .; Recreational Drug Use (Never); Tobacco (Former); Working         Review of Systems  · Constitutional  o Denies  o : fever  · Eyes  o Denies  o : yellowish discoloration of eyes  · HENT  o Denies  o : difficulty swallowing  · Cardiovascular  o Denies  o : chest pain on exertion  · Respiratory  o Admits  o : shortness of breath  · Gastrointestinal  o Denies  o : nausea, vomiting, diarrhea, constipation  · Integument  o Denies  o : rash  · Neurologic  o Denies  o : tingling or  "numbness  · Musculoskeletal  o Denies  o : joint pain  · Endocrine  o Denies  o : weight gain, weight loss      Vitals  Date Time BP Position Site L\R Cuff Size HR RR TEMP (F) WT  HT  BMI kg/m2 BSA m2 O2 Sat        11/22/2019 10:42 AM       18  200lbs 0oz 5'  11\" 27.89 2.13           Physical Examination  · Constitutional  o Appearance  o : well-nourished, well developed, alert, in no acute distress  · Head and Face  o Head  o :   § Inspection  § : atraumatic, normocephalic  · Neck  o Inspection/Palpation  o : supple, normal range of motion  · Respiratory  o Inspection of Chest  o : normal inspection  o Auscultation of Lungs  o : breath sounds normal, no distress, clear to ascultate bilaterally  · Cardiovascular  o Heart  o :   § Auscultation of Heart  § : regular rate and rhythm, no murmur, gallop or rub  · Gastrointestinal  o Abdominal Examination  o : normal bowel sounds, non-tender, soft          Assessment  · Pre-Surgical Orders     V72.84  · Port-A-Cath in place     V45.89/Z95.828       Bladder cancer. He is now doing well following treatment and would like to have his port removed.       Plan  · Orders  o Surgery Order (GENOR) - V72.84, V45.89/Z95.828 - 12/26/2019  · Medications  o Medications have been Reconciled  o Transition of Care or Provider Policy  · Instructions  o ****Surgical Orders****  o Outpatient  o RISK AND BENEFITS:  o Consent for surgery: Given these options, the patient has verbally expressed an understanding of the risks of surgery and finds these risks acceptable. We will proceed with surgery as soon as possible.  o Consult Anesthesia for any post-operative block, or any pain management procedure deemed necessary by the anestesiologist for adequate post-operative pain control.   o O.R. PREP: Per protocol  o PLEASE SIGN PERMIT FOR: Powerport removal  o *___The above History and Physical Examination has been completed within 30 days of admission.     We are going to go ahead and remove his " port. I have described the procedure to him as well as the risks and benefits and he is agreeable to proceeding.             Electronically Signed by: Valencia Dumont-, -Author on November 22, 2019 03:19:56 PM  Electronically Co-signed by: Yasmany Rodrigues MD -Reviewer on November 25, 2019 02:00:14 PM

## 2021-05-11 NOTE — PROCEDURES
"   Procedure Note      Patient Name: Toribio Hayes   Patient ID: [id number]   Sex: Male   YOB: 1955    Primary Care Provider: Rex Hayden MD   Referring Provider: Yasmany Rodrigues MD    Visit Date: February 21, 2020    Provider: Noelle Resendiz MD   Location: [facility]   Location Address: [address] [place] [address], [address]   [place]  [id number]   Location Phone: [phone]          FINAL REPORT   TRANSTHORACIC ECHOCARDIOGRAM REPORT    Diagnosis: Shortness of breath   Height: 5'11\" Weight: 204 B/P: 146/76 BSA: 2.1   Tech: JTW   MEASUREMENTS:  RVID (Diastole) : RVID. (NORMAL: 0.7 to 2.4 cm max)   LVID (Systole): 3.8 cm (Diastole): 5.6 cm . (NORMAL: 3.7 - 5.4 cm)   Posterior Wall Thickness (Diastole): 1.1 cm. (NORMAL: 0.8 - 1.1 cm)   Septal Thickness (Diastole): 1.1 cm. (NORMAL: 0.7 - 1.2 cm)   LAID (Systole): 3.3 cm. (NORMAL: 1.9 - 3.8 cm)   Aortic Root Diameter (Diastole): 3.8 cm. (NORMAL: 2.0 - 3.7 cm)   COMMENTS:  COMMENT: The patient underwent 2-D, M-Mode, and Doppler examination, including pulse-wave, continuous-wave, and color-flow Doppler analysis; the study is technically adequate. The following was observed:   FINDINGS:  MITRAL VALVE: Mild fibrocalcific changes noted with adequate opening of the leaflets.   AORTIC VALVE: Normal with three cusps. Normal central closure. No evidence of any obstruction.   TRICUSPID VALVE: Normal.   PULMONIC VALVE: Not well seen.   LEFT ATRIUM: Mildly enlarged, no masses seen. LA volume is 37 mL/m2.   AORTIC ROOT: Mildly dilated.   LEFT VENTRICLE: The left ventricular chamber size is normal. The left ventricular wall thickness is normal. The left ventricular systolic function is borderline with an estimated EF of 50%. No significant regional wall motion abnormalities are identified.   RIGHT ATRIUM: Normal.   RIGHT VENTRICLE: Normal size and function.   PERICARDIUM: No effusion.   INFERIOR VENA CAVA: Diameter is 2.1 cm with greater than 50% reduction " with inspiration.   DOPPLER: Pulse-wave, continuous wave, and color-flow Doppler evaluation was performed. E/A ratio is 1.2. DT= 176 msec. IVRT is 106 msec. E/E' is 13. There is mild mitral valve regurgitation present. There is tricuspid regurgitation present but the envelope is not complete and hence the estimated pulmonary artery systolic pressure could not be calculated.   Faxed: 03/03/2020      CONCLUSION:  1.  Borderline enlarged left ventricular chamber size with borderline left ventricular systolic function with an        estimated ejection fraction of 50%. No significant regional wall motion abnormalities are identified.   2.  Mild mitral valve regurgitation present.  3.  Mild left atrial enlargement.      Noelle Resendiz MD, [facility]  PM/pap    This note was transcribed by Sybil Vazquez.  pap/pm  The above service was transcribed by Sybil Vazquez, and I attest to the accuracy of the note.  PM                 Electronically Signed by: Emily Vazquez-, Other -Author on March 3, 2020 11:30:46 AM  Electronically Co-signed by: Noelle Resendiz MD -Reviewer on March 10, 2020 06:55:33 PM

## 2021-05-11 NOTE — PROCEDURES
Procedure Note      Patient Name: Toribio Hayes   Patient ID: [id number]   Sex: Male   YOB: 1955    Primary Care Provider: Rex Hayden MD   Referring Provider: Yasmany Rodrigues MD    Visit Date: March 11, 2020    Provider: Noelle Resendiz MD   Location: [facility]   Location Address: [address] [place] [address], [facility]   [place]  [id number]   Location Phone: [phone]          FINAL REPORT   CARDIAC MONITOR REPORT     CARDIAC EVENT MONITOR REPORT       MONITORING PERIOD:  02/07/2020 through 03/07/2020     INDICATION:  Paroxysmal atrial fibrillation    The patient underwent 30 days of cardiac event monitoring.  Minimum heart rate of 43 beats per minute occurred on 03/06/2020 at 2:01 PM.  Maximum heart rate of 53 beats per minute occurred on 02/27/2020 at 10:19 AM.  Average heart rate was 82 beats per minute.  There were no 3-second pauses noted.  The underlying rhythm was sinus. There were two episodes of sustained atrial flutter with rapid ventricular response of 150 beats per minute.  One of the episodes lasted over 10 minutes and occurred at 6:44 AM on 03/022020.  The other episode occurred on 02/17/2020 at 11:53 AM.      CONCLUSION:  Thirty days of event monitoring reveals sinus rhythm with episodes of sinus tachycardia.  Two episodes of sustained atrial flutter with rapid ventricular response, one of them lasting greater than 10 minutes.        Noelle Resendiz MD, [facility]  PM/pap    This note was transcribed by Sybil Vazquez.  pap/pm  The above service was transcribed by Sybil Vazquez, and I attest to the accuracy of the note.  PM                 Electronically Signed by: Emily Vazquez-, Other -Author on March 24, 2020 08:04:56 AM  Electronically Co-signed by: Noelle Resendiz MD -Reviewer on March 24, 2020 04:03:29 PM

## 2021-05-14 VITALS
SYSTOLIC BLOOD PRESSURE: 132 MMHG | HEART RATE: 66 BPM | WEIGHT: 223 LBS | HEIGHT: 72 IN | DIASTOLIC BLOOD PRESSURE: 68 MMHG | BODY MASS INDEX: 30.2 KG/M2

## 2021-05-14 NOTE — PROGRESS NOTES
Progress Note      Patient Name: Toribio Hayes   Patient ID: [id number]   Sex: Male   YOB: 1955    Primary Care Provider: Rex Hayden MD   Referring Provider: aYsmany Rodrigues MD    Visit Date: March 31, 2021    Provider: SARAH Tyler   Location: [facility]   Location Address: [address] [place] [address], [address]   [place]  [id number]   Location Phone: [phone]          Chief Complaint     Shortness of breath.       History Of Present Illness  REFERRING PROVIDER: Collin Cannon MD   Toribio Hayes is a 65 year old /White male who says he had COVID back in October and had one day when he was short of breath but that was it. He says shortness of breath overall is better; it is still mild with mild exertion and that is unchanged overall. He denies any chest pain. No palpitations, swelling, dizziness, syncope, PND, or orthopnea. He has had his first COVID vaccine and is due to have the next one in a few weeks. He has gained 5 pounds since his last visit.   PAST MEDICAL HISTORY: Positive for chronic diastolic heart failure; previous cardiomyopathy with chronic combined heart failure, resolved; hypertension; hyperlipidemia; chronic PVCs; previous atrial arrhythmias converted to sinus.   PSYCHOSOCIAL HISTORY: Denies alcohol use. Previous use of tobacco, but quit.   CURRENT MEDICATIONS: Olmesartan 5 mg daily; rosuvastatin 5 mg daily; carvedilol 25 mg b.i.d.; montelukast 10 mg daily; Eliquis 5 mg b.i.d.; Stiolto Respimat; QVAR inhaler.      ALLERGIES: No known drug allergies.       Review of Systems  · Cardiovascular  o Admits  o : shortness of breath while walking or lying flat  o Denies  o : palpitations (fast, fluttering, or skipping beats), swelling (feet, ankles, hands), chest pain or angina pectoris   · Respiratory  o Denies  o : chronic or frequent cough      Vitals  Date Time BP Position Site L\R Cuff Size HR RR TEMP (F) WT  HT  BMI kg/m2 BSA m2 O2 Sat FR L/min FiO2 HC        03/31/2021 12:14 /68 Sitting    66 - R   222lbs 16oz 6'   30.24 2.27             Physical Examination  · Constitutional  o Appearance  o : Awake, alert, in no acute distress.  · Eyes  o Conjunctivae  o : Conjunctivae normal.  · Ears, Nose, Mouth and Throat  o Oral Cavity  o :   § Oral Mucosa  § : Normal.  · Neck  o Jugular Veins  o : No JVD. Good carotid upstroke. No bruits noted.  · Respiratory  o Respiratory  o : Increased A-P diameter with diminished breath sounds. Prolonged expiration. Negative rales or rhonchi.   · Cardiovascular  o Heart  o : PMI is not well felt. S1, S2 regular. No S3. +S4. Negative systolic/diastolic murmur. No ectopic beats today.  o Peripheral Vascular System  o :   § Extremities  § : Good femoral and pedal pulses. No pedal edema.  · Gastrointestinal  o Abdominal Examination  o : Soft. No tenderness or masses felt. No hepatosplenomegaly. Abdominal aorta is not palpable.  · Labs  o Labs  o : . Hemoglobin 12.6, hematocrit 38.3, which is stable for him. BUN 23, creatinine 1.37, which is down for him. Total cholesterol 126, triglycerides 60, HDL 53, LDL 61. Thyroid was normal.           Assessment     1.  Paroxysmal atrial fibrillation, currently in sinus.   2.  Shortness of breath, stable.   3.  Chronic diastolic heart failure, stable.   4.  Hypertension, controlled.   5.  Hyperlipidemia, at goal.   6.  Chronic kidney disease, improving.          Plan     1.  Continue his carvedilol and Eliquis in view of his paroxysmal atrial fibrillation.   2.  Continue his rosuvastatin in view of his hyperlipidemia.   3.  Follow up in 8-9 months with EKG and labs or earlier if needed.     SARAH Herbert  JF/rt    CC: Darren Mejía             Electronically Signed by: Marimar Tay-, Other -Author on April 10, 2021 03:26:50 PM  Electronically Co-signed by: SARAH Tyler -Reviewer on April 12, 2021 07:50:25 AM

## 2021-05-14 NOTE — PROGRESS NOTES
Progress Note      Patient Name: Toribio Hayes   Patient ID: [id number]   Sex: Male   YOB: 1955    Primary Care Provider: Rex Hayden MD   Referring Provider: Yasmany Rodrigues MD    Visit Date: August 12, 2020    Provider: Noelle Resendiz MD   Location: [facility]   Location Address: [address] [place] [address], [facility]   [place]  [id number]   Location Phone: [phone]          Chief Complaint  · Follow-up of congestive heart failure       History Of Present Illness  REFERRING PROVIDER: Rex Hayden MD   Toribio Hayes is a 65-year-old gentleman with congestive heart failure, paroxysmal atrial fibrillation, hypertension, hyperlipidemia, who is doing well. He is denying any chest pain, palpitations, dizziness or syncope. He has mild shortness of breath on moderate exertion, which is stable over time.   PAST MEDICAL HISTORY: Positive for chronic diastolic heart failure; previous cardiomyopathy with chronic combined heart failure, resolved; hypertension; hyperlipidemia; chronic PVCs; previous atrial arrhythmias converted to sinus.   FAMILY HISTORY: Negative for diabetes, hypertension and heart disease.   PSYCHOSOCIAL HISTORY: No history of mood changes or depression. He never used alcohol. He previously used tobacco but quit.   CURRENT MEDICATIONS: include Olmesartan 5 mg daily; Rosuvastatin 5 mg daily; Carvedilol 12.5 mg 1 tablet in the morning, 2 at night; Montelukast 10 mg daily; Eliquis 5 mg b.i.d.; 2 new inhalers. The dosage and frequency of the medications were reviewed with the patient.       Review of Systems  · Cardiovascular  o Admits  o : shortness of breath while walking or lying flat  o Denies  o : palpitations (fast, fluttering, or skipping beats), swelling (feet, ankles, hands), chest pain or angina pectoris   · Respiratory  o Admits  o : asthma or wheezing  o Denies  o : chronic or frequent cough      Vitals  Date Time BP Position Site L\R Cuff Size HR RR TEMP (F) WT  HT   "BMI kg/m2 BSA m2 O2 Sat        08/12/2020 03:30 /64 Sitting    82 - R   217lbs 0oz 5'  11\" 30.27 2.22     08/12/2020 03:30 /66 Sitting    87 - R                 Physical Examination  · Constitutional  o Appearance  o : Awake, alert, in no acute distress.  · Eyes  o Conjunctivae  o : Conjunctivae normal.  · Ears, Nose, Mouth and Throat  o Oral Cavity  o :   § Oral Mucosa  § : Normal.  · Neck  o Jugular Veins  o : No JVD. Good carotid upstroke. No bruits noted.  · Respiratory  o Respiratory  o : Increased AP diameter with diminished breath sounds. Prolonged expiration. Negative rales or rhonchi.  · Cardiovascular  o Heart  o : PMI is not well felt. S1, S2 regular. No S3. +S4. Negative systolic/diastolic murmur. No ectopic beats today.   o Peripheral Vascular System  o :   § Extremities  § : Good femoral and pedal pulses. No pedal edema.  · Gastrointestinal  o Abdominal Examination  o : Soft. No tenderness or masses felt. No hepatosplenomegaly. Abdominal aorta is not palpable.     EKG was performed in the office today.  Indication:       paroxysmal atrial fibrillation, congestive heart failure.  Results:          sinus rhythm, non-specific intraventricular conduction delay, borderline ST elevation anterior                        leads.  Comparison:   No change in EKG.  The heart rate is faster than before.    CBC is normal, except for slightly lower hemoglobin of 12.3.  Chemistry panel:  BUN 23, creatinine 1.5.  HDL 49, LDL 64, TRG 73.           Assessment     1.  Chronic combined congestive heart failure with recovery of left ventricular function.  2.  Hypertension.  3.  Hyperlipidemia.  4.  Paroxysmal atrial fibrillation, currently in sinus rhythm, on anti-coagulation therapy because of        asymptomatic paroxysmal atrial fibrillation.         Plan     1.  Increase Carvedilol to 25 mg b.i.d.  2.  Start a 2-week blood pressure log starting next week.  3.  Follow up in 6 months.    Noelle Resendiz, " M.D., [facility]  pmm/dmd           This note was transcribed by Velvet Gutierrez.  dmd/pmm  The above service was transcribed by Velvet Gutierrez, and I attest to the accuracy of the note.  PMM               Electronically Signed by: Velvet Gutierrez-, -Author on August 13, 2020 11:02:42 AM  Electronically Co-signed by: Noelle Resendiz MD -Reviewer on August 14, 2020 04:53:13 PM

## 2021-05-14 NOTE — PROGRESS NOTES
Progress Note      Patient Name: Toribio Hayes   Patient ID: [id number]   Sex: Male   YOB: 1955    Primary Care Provider: Rex Hayden MD   Referring Provider: Rex Hayden MD    Visit Date: April 17, 2019    Provider: Noelle Resendiz MD   Location: [facility]   Location Address: [address] [place] [address], [facility]   [place]  [id number]   Location Phone: [phone]          Chief Complaint  · Fatigue.      History Of Present Illness  REFERRING PROVIDER: Rex Hayden MD   Toribio Hayes is a 63 year old male who states since he was the hospital in November, he has felt tired. He admits he has gone through a lot with his bladder cancer and chemotherapy. He also has a history of anemia. Shortness of breath is mild with moderate exertion and that is unchanged. Denies any chest pain, palpitations, swelling, dizziness, syncope, PND, or orthopnea. He has lost about 13 pounds since his last visit. He also states that he has some issues with his last scan with some of his lymph nodes. [facility].   PAST MEDICAL HISTORY: Chronic diastolic heart failure; previous cardiomyopathy with chronic combined heart failure, resolved; hypertension; hyperlipidemia; chronic PVCs.   FAMILY HISTORY: Negative for diabetes mellitus, hypertension, or heart disease.   PSYCHOSOCIAL HISTORY: Denies mood changes or depression. Denies alcohol use. Previously smoked, but quit.   CURRENT MEDICATIONS: include Eliquis 5 mg b.i.d.; olmesartan 5 mg daily; montelukast 10 mg daily; rosuvastatin 5 mg daily; carvedilol 12.5 mg 1.5 tablets b.i.d. The dosage and frequency of the medications were reviewed with the patient.       Review of Systems  · Cardiovascular  o Admits  o : shortness of breath while walking or lying flat  o Denies  o : palpitations (fast, fluttering, or skipping beats), swelling (feet, ankles, hands), chest pain or angina pectoris   · Respiratory  o Admits  o :  "asthma or wheezing  o Denies  o : chronic or frequent cough      Vitals  Date Time BP Position Site L\R Cuff Size HR RR TEMP (F) WT  HT  BMI kg/m2 BSA m2 O2 Sat HC       04/17/2019 03:24 /76 Sitting    74 - R   201lbs 0oz 5'  11\" 28.03 2.14           Physical Examination  · Constitutional  o Appearance  o : Awake, alert, in no acute distress.  · Eyes  o Conjunctivae  o : Conjunctivae normal.  · Ears, Nose, Mouth and Throat  o Oral Cavity  o :   § Oral Mucosa  § : Normal.  · Neck  o Jugular Veins  o : No JVD. Good carotid upstroke. No bruits noted.  · Respiratory  o Respiratory  o : Increased AP diameter with diminished breath sounds. Prolonged expiration. Negative rales or rhonchi.  · Cardiovascular  o Heart  o : PMI is not well felt. S1, S2 regular. No S3. Positive S4. Negative systolic/diastolic murmur. No ectopic beats today.  o Peripheral Vascular System  o :   § Extremities  § : Good femoral and pedal pulses. No pedal edema.  · Gastrointestinal  o Abdominal Examination  o : Soft. No tenderness or masses felt. No hepatosplenomegaly. Abdominal aorta is not palpable.  · Labs  o Labs  o : His last CBC that we have from December: His hemoglobin was 8.49 with a hematocrit of 24.7.          Assessment     1.  Fatigue.  2.  Anemia.  3.  Previous atrial arrhythmia, currently in sinus.  4.  Chronic diastolic heart failure, stage III, stable.  5.  Hypertension, controlled.  6.  Hyperlipidemia, unknown control.       Plan     1.  Instructed to take his carvedilol 12.5 mg one in the morning and two at night to see if it helps his fatigue.  2.  Check a CBC and a thyroid to see if the anemia or if hypothyroidism could be causing his fatigue.  3.  Follow up at his regular appointment in 2-3 months.          SARAH Herbert/Noelle Resendiz MD, [facility]  LEFTY/PMM    This note was transcribed by Arti Farley.  [facility]/LEFTY/PMM  The above service was transcribed by Arti Farley on my behalf and I attest to the accuracy of the " note.  JF/PMM             Electronically Signed by: Arti Farley-, Other -Author on April 23, 2019 11:33:13 AM  Electronically Co-signed by: SARAH Tyler -Reviewer on April 23, 2019 02:17:16 PM  Electronically Co-signed by: Noelle Resendiz MD -Reviewer on April 26, 2019 11:27:57 AM

## 2021-05-14 NOTE — PROGRESS NOTES
Progress Note      Patient Name: Toribio Hayes   Patient ID: [id number]   Sex: Male   YOB: 1955    Primary Care Provider: Rex Hayden MD   Referring Provider: Yasmany Rodrigues MD    Visit Date: February 5, 2020    Provider: Noelle Resendiz MD   Location: [facility]   Location Address: [address] [place] [address], [facility]   [address] [address]  [id number]   Location Phone: [phone]          Chief Complaint  · Shortness of breath       History Of Present Illness  REFERRING PROVIDER: Rex Hayden MD   Toribio Hayes is a 64 year old /White male who continues to be short of breath. It is mild with moderate exertion; that is long term and normal. He says his fatigue is greatly improved. He denies any chest pain. No palpitations, swelling, dizziness, syncope, PND or orthopnea. He is done with all his chemotherapy for his bladder cancer, and he gained 3 pounds since his last visit.   PAST MEDICAL HISTORY: Positive for chronic diastolic heart failure; previous cardiomyopathy with chronic combined heart failure, resolved; hypertension; hyperlipidemia; chronic PVCs; previous atrial arrhythmias converted to sinus.   FAMILY HISTORY: Positive for hypertension. Negative for diabetes and heart disease.   PSYCHOSOCIAL HISTORY: No history of mood changes or depression. He never used alcohol. He previously used tobacco but quit.   CURRENT MEDICATIONS: include Olmesartan 5 mg daily; Rosuvastatin 5 mg daily; Carvedilol 12.5 mg 1 tablet in the morning, 2 at night; Montelukast 10 mg daily; Eliquis 5 mg b.i.d.; Advair. The dosage and frequency of the medications were reviewed with the patient.       Review of Systems  · Cardiovascular  o Admits  o : shortness of breath while walking or lying flat  o Denies  o : palpitations (fast, fluttering, or skipping beats), swelling (feet, ankles, hands), chest pain or angina pectoris   · Respiratory  o Admits  o : asthma or wheezing  o Denies  o : chronic or frequent  "cough      Vitals  Date Time BP Position Site L\R Cuff Size HR RR TEMP (F) WT  HT  BMI kg/m2 BSA m2 O2 Sat HC       02/05/2020 03:04 /76 Sitting    80 - R   203lbs 16oz 5'  11\" 28.45 2.15     02/05/2020 03:04 /78 Sitting                     Physical Examination  · Constitutional  o Appearance  o : Awake, alert, in no acute distress.  · Eyes  o Conjunctivae  o : Conjunctivae normal.  · Ears, Nose, Mouth and Throat  o Oral Cavity  o :   § Oral Mucosa  § : Normal.  · Neck  o Jugular Veins  o : No JVD. Good carotid upstroke. No bruits noted.  · Respiratory  o Respiratory  o : Increased AP diameter with diminished breath sounds. Prolonged expiration. Negative rales or rhonchi.  · Cardiovascular  o Heart  o : PMI is not well felt. S1, S2 regular. No S3. +S4. Negative systolic/diastolic murmur. No ectopic beats today.   o Peripheral Vascular System  o :   § Extremities  § : Good femoral and pedal pulses. No pedal edema.  · Gastrointestinal  o Abdominal Examination  o : Soft. No tenderness or masses felt. No hepatosplenomegaly. Abdominal aorta is not palpable.     Labs last done in October:  Hemoglobin was back up to 12.4 with a hematocrit of 38.6.  BUN 23, creatinine 1.35.    In August:  , TRG 55, LDL 60, HDL 59.           Assessment     1.  Paroxysmal atrial fibrillation, currently in sinus.  2.  Anemia much improved.  3.  Chronic diastolic heart failure, Stage II, improving.  4.  Hypertension, fair control.  5.  Hyperlipidemia controlled in June.       Plan     1.  Will do an echocardiogram in the next 2 weeks to evaluate his heart failure.  2.  Will do a 30-day Event Monitor to evaluate for any atrial fibrillation.  With this information, we can decide       whether we can stop his Eliquis.  3.  He will do a blood pressure log, and we will adjust hypertensive medications if needed.  4.  Will follow up in 6 months with an EKG and labs or earlier if needed.    Noelle Pagan M.D., " FACC  Lefty/pmm/danial           This note was transcribed by Velvet Gutierrez.  dmd/lefty/leatha  The above service was transcribed by Velvet Gutierrez, and I attest to the accuracy of the note.  LEFTY/PMM               Electronically Signed by: Velvet Gutierrez-, -Author on March 4, 2020 07:16:23 AM  Electronically Co-signed by: Noelle Resendiz MD -Reviewer on March 6, 2020 11:06:00 PM

## 2021-05-14 NOTE — PROGRESS NOTES
Progress Note      Patient Name: Toribio Hayes   Patient ID: [id number]   Sex: Male   YOB: 1955    Primary Care Provider: Rex Hayden MD   Referring Provider: Rex Hayden MD    Visit Date: December 3, 2018    Provider: Noelle Resendiz MD   Location: [facility]   Location Address: [address] [place] [address], [facility]   [place]  [id number]   Location Phone: [phone]          Chief Complaint  · Follow up of recent hospitalization with sepsis and atrial fibrillation.      History Of Present Illness  REFERRING PROVIDER: Rex Hayden MD   Toribio Hayes is a 63 year old gentleman with congestive heart failure, hypertension, hyperlipidemia who was recently hospitalized with neutropenia and sepsis. He had supraventricular tachycardia, and subsequently, atrial flutter/fibrillation. He chemically cardioverted in the hospital. His renal function has not completely recovered to normal; however, he feels a lot better and denies any chest pain. He has mild palpitations and mild shortness of breath on exertion, but improved.   PAST MEDICAL HISTORY: Positive for chronic diastolic heart failure, previous cardiomyopathy with chronic combined heart failure resolved, hypertension, hyperlipidemia, chronic PVCs.   FAMILY HISTORY: Negative for diabetes mellitus, hypertension, or heart disease.   PSYCHO/SOCIAL HISTORY: Denies mood changes or depression. Denies alcohol use. Previously used tobacco, but quit.   CURRENT MEDICATIONS: include Crestor 5 mg daily; Singulair 10 mg daily; Eliquis 5 mg b.i.d.; carvedilol 6.25 mg two tablets b.i.d.; Advair inhaler. The dosage and frequency of the medications were reviewed with the patient.       Review of Systems  · Cardiovascular  o Admits  o : palpitations (fast, fluttering, or skipping beats), shortness of breath while walking or lying flat  o Denies  o : swelling (feet, ankles, hands), chest pain or angina pectoris   · Respiratory  o Admits  o : asthma or  "wheezing  o Denies  o : chronic or frequent cough      Vitals  Date Time BP Position Site L\R Cuff Size HR RR TEMP(F) WT  HT  BMI kg/m2 BSA m2 O2 Sat HC       12/03/2018 09:12 /84 Sitting    82 - R   214lbs 0oz 6'  0\" 29.02 2.22      12/03/2018 09:12 /76 Standing                      Physical Examination  · Constitutional  o Appearance  o : Awake, alert, in no acute distress.  · Eyes  o Conjunctivae  o : Conjunctivae normal.  · Ears, Nose, Mouth and Throat  o Oral Cavity  o :   § Oral Mucosa  § : Normal  · Respiratory  o Respiratory  o : Increased AP diameter with diminished breath sounds. Prolonged expiration. Negative rales or rhonchi.   · Cardiovascular  o Heart  o : PMI is not well felt. S1, S2 regular. No S3. Positive S4. Negative systolic/diastolic murmurs. No ectopic beats today.   o Peripheral Vascular System  o :   § Extremities  § : Good femoral and pedal pulses. No pedal edema.   · Gastrointestinal  o Abdominal Examination  o : Soft. No masses or tenderness felt. No hepatosplenomegaly. Abdominal aorta is not palpable.  · EKG  o EKG  o : Performed in the office today.  o Indications  o : Paroxysmal atrial fibrillation.  o Results  o : Sinus rhythm, left axis deviation, minimal ST elevation anterior leads.   o Comparison  o : Atrial flutter/fibrillation has resolved compared to the previous EKG.   · Labs  o Labs  o : CBC: hemoglobin 8.9, white count 10,000. BUN 11, creatinine 1.4, which is not his norm but stable.              Assessment     1.  Congestive heart failure with recovery of ejection fraction.  2.  Chronic diastolic heart failure.  3.  Hypertensive heart disease.  4.  Hyperlipidemia.       Plan     1.  We will restart him on Benicar, but only 5 mg a day, and do a two-week blood pressure log and BMP in two       weeks.  2.  Okay to proceed with surgery as needed.  3.  Home blood pressure and pulse monitoring and bring it to us in two weeks.  4.  May stop Eliquis for 48 hours prior to " the procedure.  5.  Follow up in five months.        Noelle Resendiz MD, [facility]  pm/vlm     This note was transcribed by Arti Farley.  vlm/pm  The above service was transcribed by Arti Farley, and I attest to the accuracy of the note.  PM             Electronically Signed by: Arti Farley-, Other -Author on December 5, 2018 11:10:13 AM  Electronically Co-signed by: Noelle Resendiz MD -Reviewer on December 6, 2018 02:49:01 PM

## 2021-05-15 VITALS
HEIGHT: 71 IN | HEART RATE: 82 BPM | BODY MASS INDEX: 30.38 KG/M2 | SYSTOLIC BLOOD PRESSURE: 140 MMHG | DIASTOLIC BLOOD PRESSURE: 64 MMHG | WEIGHT: 217 LBS

## 2021-05-15 VITALS
HEART RATE: 80 BPM | SYSTOLIC BLOOD PRESSURE: 146 MMHG | HEIGHT: 71 IN | BODY MASS INDEX: 28.56 KG/M2 | DIASTOLIC BLOOD PRESSURE: 76 MMHG | WEIGHT: 204 LBS

## 2021-05-15 VITALS
SYSTOLIC BLOOD PRESSURE: 132 MMHG | DIASTOLIC BLOOD PRESSURE: 76 MMHG | BODY MASS INDEX: 28.14 KG/M2 | HEART RATE: 74 BPM | WEIGHT: 201 LBS | HEIGHT: 71 IN

## 2021-05-15 VITALS — WEIGHT: 200 LBS | HEIGHT: 71 IN | BODY MASS INDEX: 28 KG/M2 | RESPIRATION RATE: 18 BRPM

## 2021-05-16 VITALS
BODY MASS INDEX: 30.61 KG/M2 | HEART RATE: 94 BPM | WEIGHT: 226 LBS | DIASTOLIC BLOOD PRESSURE: 70 MMHG | SYSTOLIC BLOOD PRESSURE: 114 MMHG | HEIGHT: 72 IN

## 2021-05-16 VITALS — HEIGHT: 72 IN | RESPIRATION RATE: 14 BRPM | BODY MASS INDEX: 30.48 KG/M2 | WEIGHT: 225 LBS

## 2021-05-16 VITALS
BODY MASS INDEX: 28.99 KG/M2 | DIASTOLIC BLOOD PRESSURE: 84 MMHG | HEIGHT: 72 IN | SYSTOLIC BLOOD PRESSURE: 148 MMHG | WEIGHT: 214 LBS | HEART RATE: 82 BPM

## 2021-05-16 VITALS — HEIGHT: 72 IN | RESPIRATION RATE: 12 BRPM | WEIGHT: 219.37 LBS | BODY MASS INDEX: 29.71 KG/M2

## 2021-05-16 VITALS — HEIGHT: 72 IN | RESPIRATION RATE: 16 BRPM | WEIGHT: 219 LBS | BODY MASS INDEX: 29.66 KG/M2

## 2021-05-28 VITALS
WEIGHT: 220.9 LBS | WEIGHT: 199.52 LBS | HEIGHT: 70 IN | TEMPERATURE: 97.8 F | BODY MASS INDEX: 29.64 KG/M2 | WEIGHT: 218.26 LBS | HEIGHT: 70 IN | HEIGHT: 70 IN | HEIGHT: 70 IN | BODY MASS INDEX: 31.47 KG/M2 | HEIGHT: 70 IN | TEMPERATURE: 98.2 F | HEART RATE: 72 BPM | BODY MASS INDEX: 30.26 KG/M2 | RESPIRATION RATE: 20 BRPM | HEART RATE: 105 BPM | TEMPERATURE: 97.6 F | OXYGEN SATURATION: 99 % | HEART RATE: 73 BPM | WEIGHT: 218.03 LBS | DIASTOLIC BLOOD PRESSURE: 64 MMHG | SYSTOLIC BLOOD PRESSURE: 114 MMHG | WEIGHT: 219.8 LBS | OXYGEN SATURATION: 99 % | OXYGEN SATURATION: 97 % | HEART RATE: 81 BPM | HEART RATE: 101 BPM | SYSTOLIC BLOOD PRESSURE: 120 MMHG | BODY MASS INDEX: 31.21 KG/M2 | OXYGEN SATURATION: 98 % | BODY MASS INDEX: 31.25 KG/M2 | RESPIRATION RATE: 18 BRPM | OXYGEN SATURATION: 97 % | TEMPERATURE: 97.5 F | DIASTOLIC BLOOD PRESSURE: 77 MMHG | DIASTOLIC BLOOD PRESSURE: 74 MMHG | HEART RATE: 103 BPM | RESPIRATION RATE: 18 BRPM | RESPIRATION RATE: 20 BRPM | OXYGEN SATURATION: 96 % | RESPIRATION RATE: 16 BRPM | SYSTOLIC BLOOD PRESSURE: 150 MMHG | TEMPERATURE: 98.9 F | BODY MASS INDEX: 31.62 KG/M2 | TEMPERATURE: 98.2 F | DIASTOLIC BLOOD PRESSURE: 72 MMHG | DIASTOLIC BLOOD PRESSURE: 67 MMHG | HEIGHT: 70 IN | SYSTOLIC BLOOD PRESSURE: 143 MMHG | HEART RATE: 54 BPM | HEART RATE: 104 BPM | OXYGEN SATURATION: 97 % | OXYGEN SATURATION: 98 % | DIASTOLIC BLOOD PRESSURE: 47 MMHG | HEIGHT: 70 IN | BODY MASS INDEX: 29.38 KG/M2 | DIASTOLIC BLOOD PRESSURE: 79 MMHG | WEIGHT: 205.25 LBS | BODY MASS INDEX: 28.56 KG/M2 | TEMPERATURE: 97.8 F | SYSTOLIC BLOOD PRESSURE: 152 MMHG | SYSTOLIC BLOOD PRESSURE: 149 MMHG | SYSTOLIC BLOOD PRESSURE: 152 MMHG | WEIGHT: 207.01 LBS | SYSTOLIC BLOOD PRESSURE: 136 MMHG | RESPIRATION RATE: 16 BRPM | WEIGHT: 216.93 LBS | DIASTOLIC BLOOD PRESSURE: 75 MMHG | RESPIRATION RATE: 18 BRPM | TEMPERATURE: 96.8 F

## 2021-05-28 VITALS
HEART RATE: 110 BPM | BODY MASS INDEX: 28.45 KG/M2 | DIASTOLIC BLOOD PRESSURE: 64 MMHG | TEMPERATURE: 98.4 F | OXYGEN SATURATION: 87 % | HEIGHT: 71 IN | SYSTOLIC BLOOD PRESSURE: 142 MMHG | WEIGHT: 203.25 LBS | RESPIRATION RATE: 16 BRPM

## 2021-05-28 VITALS
HEART RATE: 133 BPM | DIASTOLIC BLOOD PRESSURE: 84 MMHG | RESPIRATION RATE: 18 BRPM | TEMPERATURE: 98.6 F | SYSTOLIC BLOOD PRESSURE: 130 MMHG | WEIGHT: 207.23 LBS | BODY MASS INDEX: 29.67 KG/M2 | OXYGEN SATURATION: 99 % | HEIGHT: 70 IN

## 2021-05-28 VITALS
DIASTOLIC BLOOD PRESSURE: 64 MMHG | RESPIRATION RATE: 16 BRPM | OXYGEN SATURATION: 97 % | BODY MASS INDEX: 30.1 KG/M2 | WEIGHT: 215 LBS | SYSTOLIC BLOOD PRESSURE: 124 MMHG | HEIGHT: 71 IN | HEART RATE: 51 BPM | TEMPERATURE: 98.2 F

## 2021-05-28 VITALS
HEART RATE: 75 BPM | RESPIRATION RATE: 16 BRPM | OXYGEN SATURATION: 97 % | DIASTOLIC BLOOD PRESSURE: 70 MMHG | TEMPERATURE: 96.7 F | BODY MASS INDEX: 30.75 KG/M2 | WEIGHT: 220.46 LBS | SYSTOLIC BLOOD PRESSURE: 136 MMHG

## 2021-05-28 VITALS
RESPIRATION RATE: 16 BRPM | SYSTOLIC BLOOD PRESSURE: 115 MMHG | BODY MASS INDEX: 30.52 KG/M2 | TEMPERATURE: 97.6 F | WEIGHT: 218 LBS | HEART RATE: 68 BPM | HEIGHT: 71 IN | OXYGEN SATURATION: 97 % | DIASTOLIC BLOOD PRESSURE: 63 MMHG

## 2021-05-28 NOTE — PROGRESS NOTES
Patient: NIALL RIOS     Acct: [id number]     Report: #[id number]  UNIT #: [id number]     : 1955    Encounter Date:10/24/2018  PRIMARY CARE: Rex Hayden  ***Signed***  --------------------------------------------------------------------------------------------------------------------  Visit Type      Established Patient Visit            Chief Complaint      BLADDER CANCER            Referring Provider/Copies To      Referring Provider:  Bartolo Webber            Allergies      Coded Allergies:             NO KNOWN ALLERGIES (Unverified , 10/24/18)            Medications            Polyethylene Glycol (Miralax*) 17 Gm Packet      17 GM PO HS, #30 PKT 0 Refills         Reported         10/24/18       Prochlorperazine Maleate (Prochlorperazine Maleate) 10 Mg Tab      10 MG PO Q6H PRN for NAUSEA AND/OR VOMITING, #60 TAB 3 Refills         Prov: MARIANA KWON         10/4/18       Ondansetron HCl (Zofran) 8 Mg Tablet      8 MG PO Q8H PRN for NAUSEA, #30 TAB 3 Refills         Prov: MARIANA KWON         10/4/18       Rosuvastatin Calcium (Crestor*) 5 Mg Tab      5 MG PO HS, #30 TAB 0 Refills         Reported         10/12/16       Hctz/Spironolactone 25/25 Mg (ALDACTAZIDE 25/25) 1 Tab Tab      1 TAB PO HS         Reported         12       Olmesartan (Benicar) 40 Mg Tab      1 TAB PO QHS, TAB 0 Refills         Reported         09       Mdi-Advair (Advair 100/50 Diskus*) 28 Puff/Inh Inh      1 PUFF INH BID, 0 Refills         Reported         09       Metoprolol Succinate (Toprol XL*) 50 Mg Tabcr      75 TAB PO HS, TAB 0 Refills         Reported         09       Montelukast Sodium (Singulair*) 10 Mg Tab      1 TAB PO QHS, TAB 0 Refills         Reported         09            History and Present Illness      Past Oncology Illness History      Patient with hematuria. Cystoscopy reveals mass in the bladder. 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            HPI - Oncology  Interim      Patient returns today for ongoing therapy of his muscle invasive bladder cancer.    He is receiving dose dense MVAC. He has completed 1 cycle. He states that it     went fairly well. He had 1 episode of vomiting but was taking his anti-emetics     to good effect. He feels like his hematuria has improved, he still occasionally     sees some tinges of blood. He denies suprapubic or pelvic pain. He does report     increased heartburn happening most days. Taking Tums with some improvement but     transient. No new masses or lymphadenopathy. He reports adequate energy level.     He reports good appetite and his weight is maintained. He denies numbness and     tingling in hands or feet. He denies any issues from his port            Cancer Details            Bladder, urothelial carcinoma            Clinical Staging      Clinical stage II (T2, N0, M0)            Clinical Trial Participant      No            ECOG Performance Status      0            Most Recent Lab Findings            Item Value  Date Time             White Blood Count 13.40 10*3/uL H 10/24/18 1345             Red Blood Count 4.27 10*6/uL L 10/24/18 1345             Hemoglobin 13.70 g/dL L 10/24/18 1345             Hematocrit 38.6 % L 10/24/18 1345             Mean Corpuscular Volume 90.4 fL 10/24/18 1345             Mean Corpuscular Hemoglobin 32.1 pg H 10/24/18 1345             Mean Corpuscular Hemoglobin Concent 35.5 % 10/24/18 1345             Red Cell Distribution Width 11.9 % 10/24/18 1345             Platelet Count 226.00 10*3/uL 10/24/18 1345             Sodium Level 134 mmol/L L 10/24/18 1345             Potassium Level 3.9 mmol/L 10/24/18 1345             Chloride Level 92 mmol/L L 10/24/18 1345             Carbon Dioxide Level 32 mmol/L 10/24/18 1345             Anion Gap 14 mmol/L 10/24/18 1345             Blood Urea Nitrogen 20 mg/dL 10/24/18 1345             Creatinine 1.20 mg/dL 10/24/18 1345             BUN/Creatinine Ratio 17  {ratio} 10/24/18 1345             Glucose Level 108 mg/dL H 10/24/18 1345             Total Protein 6.7 g/dL 10/24/18 1345             Albumin 4.1 g/dL 10/24/18 1345             Globulin 2.6 g/dL 10/24/18 1345             Albumin/Globulin Ratio 1.6 {ratio} 10/24/18 1345             Alanine Aminotransferase (ALT/SGPT) 20 U/L 10/24/18 1345             Alkaline Phosphatase 107 U/L 10/24/18 1345             Aspartate Amino Transf (AST/SGOT) 15 U/L 10/24/18 1345            Laboratory Tests      10/11/18 13:45            PAST, FAMILY   Past Medical History      Past Medical History:  No Diabetes Type 1, No Diabetes Type 2, No Thyroid     Disease; COPD; No Emphysema, No Hypertension, No Stroke, No High Cholesterol, No    Heart Attack, No Bleeding Condition, No Low or High RBC Count, No Low or High     WBC Count, No Low or High Platelet Coun, No Hepatitis, No Kidney Disease, No De    pression, No Alzheimer's Disease, No Mental Disease, No Seizures, No Arthritis,     No Osteoporosis, No Osteopenia, No Short of Air, No Sleep apnea, No Liver     Disease, No STD, No Enlarged Prostate, No Other      Hematology/oncology:  REPORTS HX OF: Bladder Cancer, Lung cancer (DR. RAMIRES TOOK    CARE OF THAT); DENIES HX OF: Previous Treatment for CA, Anemia, Blood cancer,     Brain cancer, Breast cancer, Coagulopathy, Colon Cancer, Colorectal cancer,     Endocrine cancer, Eye cancer, GI cancer,  cancer, Kidney cancer, Leukemia,     Leukocytosis, Leukopenia, Liver cancer, Lymphoma, Musculoskeletal cancer,     Myeloma, Neurologic cancer, Oral cancer, Pancreatic Cancer, Prostate cancer,     Skin cancer, Stomach cancer, Testicular cancer, Thrombocytopenia, Thyroid     cancer, Other cancer history, Other hematologic history      Genetic/metabolic:  DENIES HX OF: Cystic fibrosis, Down syndrome, Other genetic     history, Other metabolic history            Past Surgical History      REPORTS HX OF: Lung biopsy, VAD Placement, Other Past Surgical Hx  (UPPER LEFT     LUNG REMOVED); DENIES HX OF: Cataract extraction, Thyroid surgery, CABG surgery,    Coronary stent, Valve replacement, Appendectomy, Cholecystectomy, Splenectomy,     Bladder surgery, Nephrectomy, Joint replacement, Frature repair, Skin cancer     removal, Melanoma excision, Spinal surgery, Breast biopsy, Lumpectomy,     Mastectomy, bilateral, Mastectomy, right, Mastectomy, left, Hysterectomy, Peg     Tube Placement, Biopsy            Family History      REPORTS HX OF: Prostate cancer (BROTHER); DENIES HX OF: Anemia, Blood disorders,    Blood Cancer, Breast cancer, Cervical cancer, Coagulopathy, Colorectal cancer,     Endocrine Cancer, Eye Cancer, GI Cancer,  Cancer, Kidney Cancer, Leukemia,     Leukocytosis, Leukopenia, Liver Cancer, Lung cancer, Lymphoma, Melanoma,     Musculoskeletal Cancer, Myeloma, Neurologic Cancer, Oral Cancer, Ovarian cancer,    Skin Cancer, Stomach Cancer, Testicular Cancer, Thrombocytopenia, Thyroid     cancer, Uterine cancer, Other Cancer History, Other Hematology History            Social History      Lives independently:  No            Tobacco Use      Smoking packs/day:  2      Quit status:  Quit date established (2008)            Alcohol Use      Alcohol intake:  None            Substance Use      Substance use:  Denies use            REVIEW OF SYSTEMS      General:  Denies: Appetite change, Excessive sweating, Fatigue, Fever, Night     sweats, Weight gain, Weight loss, Other      Eyes:  Denies: Blurred vision, Corrective lenses, Diplopia, Eye irritation, Eye     pain, Eye redness, Spots in vision, Vision loss, Other      Ears, nose, mouth, throat:  Denies: Headache, Seizures, Visual Changes, Hearing     loss, Sinus Congestion, Hoarseness, Sore throat, Other      Cardiovascular:  Denies: Chest pain, Irregular heartbeat, Palpitations, Swollen     ankles/legs, Other      Respiratory:  Denies: Chest pain, Shortness of Air, Productive cough, Coughing     blood, Other       Gastrointestinal:  Denies: Nausea, Vomiting, Problem swallowing, Frequent     heartburn, Constipation, Diarrhea, Tarry stools, Bloody stools, Unable to     control bowels, Other      Kidney/Bladder:  Denies: Painful Urination, Blood in Urine, Incontinence, F    requent Urination, Decreased Urine Stream, Other      Musculoskeletal:  Denies: New Back pain, Leg Cramps, Painful Joints, Swollen     Joints, Muscle Pain, Muscle weakness, Other      Skin:  DENIES: Bruises Easily, Hair changes, Lesions/changes in moles, Nail     changes, Pigment changes, Rash, Other      Neurological:  Denies: Dizziness, Fainting, Numbness\Tingling, Paralysis,     Seizures, Other      Psychiatric:  Complains of: AAO X 3; Denies: Anxiety, Panic attacks, Depression,    Memory loss, Other      Endocrine:  DiabetesThyroid DisorderOsteoporosisEndocrine Other      Hematologic/lymphatic:  Denies: Bruising, Bleeding, Enlarged Lymph Nodes,     Recurrent infections, Other            VITAL SIGNS,PAIN/FATIGUE SCORE      Vitals      Height 5 ft 10 in / 177.8 cm      Weight 218 lbs 4.086 oz / 99 kg      BSA 2.17 m2      BMI 31.3 kg/m2      Temperature 96.8 F / 36 C - Temporal      Pulse 101      Respirations 16      Blood Pressure 120/79 Sitting, Left Arm      Pulse Oximetry 97%, RM AIR            Pain Score      Experiencing any pain?:  No      Pain Scale Used:  Numerical      Pain Intensity:  0            Fatigue Score      Experiencing any fatigue?:  Yes      Fatigue (0-10 scale):  2            EXAM      General Appearance:  Alert, Oriented X3, Cooperative, No acute distress      Eyes:  Anicteric Sclerae, Moist Conjunctiva      HEENT:  Orophraynx clear, Other (no mucositis)      Neck:  Supple, No Masses or JVD      Respiratory:  CTAB, Normal Respiratory Effort      Chest:  Port in Place      Abdomen:  Bowel Sounds, Soft;          No Distention, No Hepatosplenomegaly, No Tenderness      Cardio:  RRR, No Murmur, No, Peripheral Edema      Psychiatric:   Appropriate Affect, Intact Judgement      Muscularskeletal:  Normal Gait and Station      Extremities:  No Digital Cyanosis (upper extremities), No Digital Ischemia     (upper extremities)      Lymphatic:  No Axillary, No Cervical, No Infraclavicular, No Supraclavicular            PREVENTION      Hx Influenza Vaccination:  Yes (2018)      Date Influenza Vaccine Given:  Oct 1, 2018      Influenza Vaccine Declined:  No      2 or More Falls Past Year?:  No      Fall Past Year with Injury?:  No      Hx Pneumococcal Vaccination:  Yes (2017)      Encouraged to follow-up with:  PCP regarding preventative exams.      Chart initiated by      CALVIN CACERES CMA            IMPRESSION/PLAN      Impression      Muscle invasive bladder cancer. On neoadjuvant treatment with dose dense MVAC.            Diagnosis      Bladder cancer         Malignant neoplasm of urinary bladder, unspecified site - C67.9         Bladder location: unspecified site      Patient is on neoadjuvant therapy. He is due for cycle 2, day 1 of dose dense     MVAC. Tolerating well. Lab work looks good. Proceed with treatment as planned.     RTC 2 weeks for OV, cycle 3 with labs prior.            Heartburn - R12      Begin Prilosec 20 mg daily.            Notes      New Medications      * OMEPRAZOLE (PriLOSEC) 10 MG CAPSULE.DR: 20 MG PO QDAY 30 Days #60      * OMEPRAZOLE (PriLOSEC*) 20 MG CAPSULE.DR: 20 MG PO QDAY 30 Days #30                 Disclaimer: Converted document may not contain table formatting or lab diagrams. Please see [employer] for the authenticated document.

## 2021-05-28 NOTE — PROGRESS NOTES
Patient: NIALL RIOS     Acct: [id number]     Report: #[id number]  UNIT #: [id number]     : 1955    Encounter Date:2020  PRIMARY CARE: Rex Hayden  ***Signed***  --------------------------------------------------------------------------------------------------------------------  TELEHEALTH NOTE      History of Present Illness      Chief Complaint: f/u, results            Niall Rios is presenting for evaluation via Telehealth visit by phone. Verbal    consent obtained before beginning visit.            Provider spent 18 minutes with the patient during telehealth visit.            The following staff were present during the visit: Ranjana SMITH, Kyara Vee MA            The patient is a 65 year old male patient of Dr. Phan's who has a diagnosis of     lung cancer in  and found to have a left upper lobe bronchial lesion. The     patient underwent a left upper lobectomy and later on had radiation therapy with    Dr. Oconnell and had adjuvant chemotherapy with Dr. Grande.  The patient also had    a diagnosis of prostate and bladder cancer in 2018 and at that time he was     treated with chemotherapy. The patient had a hospitalization for septic shock in    late 2018. The patient presents for Telehealth visit today. The patient states     since his last office visit he has been doing well, he states he does get short     of air that is moderate in severity, worse with exertion, relieved with rest.     The patient states at times he has intermittent wheezing. The patient denies any    cough, fever or chills, night sweats, hemoptysis,  purulent sputum production,     swollen glands in head and neck, unintentional weight loss, chest pain or chest     tightness, abdominal pain, nausea or vomiting or diarrhea. The patient states he    is taking Stiolto and Singulair everyday as prescribed and uses albuterol inha    ler as needed. The patient states he has not had to take any antibiotics  or     steroids since his last office visit and denies any hospitalizations since his     last office visit. The patient states he had a 6 minute walk test performed     since his last office visit and it came back normal not showing any significant     desaturations. The patient had overnight oximetry and had an SPO2 of less than     88% for 4 hours and 44 minutes and 15 seconds. The patient states he has already    been started on overnight oxygen at 2 liters per minute per nasal cannula. The     patient feels the overnight oxygen is helping him. The patient had a pulmonary     function test performed since his last office visit which showed severe     obstructive defect with FEV1 of 39%. No bronchodilator response was appreciated.    The patient's diffusion capacity was also reduced. The patient had an IgE level     drawn since his last office visit and it came back elevated at 384. CBC did not     show any peripheral eosinophilia. The patient states he is able to perform all     his activities of daily living without difficulty.  Repeat CT scan of the chest,    abdomen and pelvis ordered by Dr. Fernando on 05/15/2020 did not show any evidence    of metastatic disease within the chest,  abdomen and pelvis. There was no acute     process identified.             I reviewed the Review of Systems, medical, surgical and family history and agree    with those as entered.               Physical exam is deferred due to Telehealth visit.            I personally reviewed the patient's 6 minute walk test and pulmonary function     test.                                   [facility]                PACS RADIOLOGY REPORT            Patient: NIALL RIOS   Acct: #[id number]   Report: #[id number]            UNIT #: [id number]    DOS: 05/15/20 0945      INSURANCE:BLUE ACCESS NETWORK - O   ORDER #:[id number]      LOCATION:CT     : 1955            PROVIDERS       ADMITTING:     ATTENDING: ANNAMARIA HERNANDEZ      FAMILY:  Rex Hayden   ORDERING:  ANNAMARIA HERNANDEZ         OTHER:    DICTATING:  Jo Trevizo MD            REQ #:[id number]   EXAM:CTACPWC - CT ABD CHEST PEL w CONTR      REASON FOR EXAM:  BLADDER CA      REASON FOR VISIT:  BLADDER CA            *******Signed******         PROCEDURE:   CT ABDOMEN; CT CHEST; CT PELVIS WITH CONTRAST             COMPARISON:   [facility], CT, CT CHEST ABD PEL W CONTRAST,     10/28/2019, 15:26.             INDICATIONS:   BLADDER CA             TECHNIQUE:   After obtaining the patient's consent, CT images were obtained with    intravenous contrast       material.             FINDINGS:         Chest:  The lungs are clear.  No focal consolidations identified.  Posttreatment    changes are seen       within the medial aspect of the left lung extending along the perihilar region     which appears       unchanged as compared to the previous study with volume loss and scarring     present.  Emphysematous       changes are present.  No focal pulmonary nodule identified.  No pleural     effusion.  No focal       airspace consolidations identified.  Atherosclerotic calcifications are present.     Granulomatous       calcifications are present.  No suspicious adenopathy identified.  The     vasculature otherwise       appears grossly unremarkable.  No acute osseous abnormality identified.  The     thyroid gland appears       unremarkable.  No axillary adenopathy identified.             Abdomen and pelvis:             The liver, pancreas and spleen demonstrate no acute process.  The gallbladder     appears normal in       caliber.  Stones are present within the gallbladder lumen.  No inflammatory     changes identified.  No       evidence of biliary dilatation. The adrenal glands appear unremarkable.  The     kidneys appear normal       in size.  No evidence of nephrolithiasis.  No evidence of hydronephrosis.  No     focal lesions        identified.             No dilated  loops of bowel identified.  No evidence of obstruction.  No free     air.  There is       diverticulosis of the sigmoid colon extending to the descending colon.  No     significant inflammatory       changes identified.  Stable scarring is present likely related to previous     inflammatory process.        Significant atherosclerotic calcifications are present.  No mesenteric fluid     collections       identified.  The appendix appears unremarkable.  No suspicious adenopathy     identified.  No       significant free fluid.  Probable postsurgical changes are seen related to     cystectomy with       neobladder creation.  This appears similar as compared to the previous study.      No focal mass or       adenopathy identified.  No significant free fluid identified.  The pelvic organs    demonstrate no       acute process.  No acute osseous abnormality is identified.             CONCLUSION:         1. No evidence of metastatic disease within the chest, abdomen or pelvis.  No     acute process       identified..      2. Ancillary findings as described above.              MASSIMO BRYAN MD             Electronically Signed and Approved By: MASSIMO BRYAN MD on 5/15/2020 at 11:55                        Until signed, this is an unconfirmed preliminary report that may contain      errors and is subject to change.                                              KOGIL:      D:05/15/20 1155                         Past Med History      Emphysema, Bronchitis      Former smoker (1 ppd for 40 years quit 2008)      Flu-current      Pneumonia-current      Overview of Symptoms      Complaints-  none            Most Recent Lab Findings      Laboratory Tests      5/15/20 09:58            Allergies/Medications      Allergies:        Coded Allergies:             NO KNOWN ALLERGIES (Unverified , 2/26/20)      Medications    Last Reconciled on 5/28/20 08:45 by ADEOLA CLEMENT,       Beclomethasone  Dipropionate (Qvar 40 Redihaler 10.6 GM) 10.6 Gm Hfa.aeroba      2 PUFF INH RTBID, #1 INH 5 Refills         Prov: ADEOLA CLEMENT [facility]         5/28/20       Tiotropium Br/Olodaterol HCl (Stiolto Respimat Inhal Spray) 4 Gm Mist.inhal      2 PUFFS INH RTQDAY for 90 Days, #3 INH 0 Refills         Prov: Guanako Phan         5/6/20       MDI-Albuterol (Ventolin HFA) 18 Gm Hfa.aer.ad      2 PUFFS INH Q6H PRN for SHORTNESS OF BREATH, #1 MDI 4 Refills         Prov: Guanako Phan         2/26/20       NEB-Albuterol Sulf (Albuterol Sulfate) 5 Mg/1 Ml Solution      2.5 MG INH RTTID, #2 BOTTLE 0 Refills         Reported         2/26/20       Albuterol/Ipratropium (Duoneb) 3 Ml Ampul.neb      3 ML INH RTTID, #120 NEB 0 Refills         Reported         2/26/20       MDI-Albuterol (Ventolin HFA) 18 Gm Hfa.aer.ad      2 PUFFS INH Q4H PRN for SHORTNESS OF BREATH, #1 INH 0 Refills         Reported         2/26/20       Carvedilol (Coreg) 6.25 Mg Tablet      12.5 MG PO HS, #120 TAB 0 Refills         Reported         12/26/19       Olmesartan (Benicar) 5 Mg Tablet      5 MG PO QDAY, #30 TAB         Reported         12/26/19       Carvedilol (Carvedilol) 6.25 Mg Tablet      6.25 MG PO QDAY, #30 TAB 0 Refills         Reported         5/6/19       Apixaban (Eliquis) 5 Mg Tablet      5 MG PO BID for 30 Days, #60 TAB         Prov: Joseph Burton         11/22/18       Rosuvastatin Calcium (Crestor*) 5 Mg Tab      5 MG PO HS, #30 TAB 0 Refills         Reported         10/12/16       Montelukast Sodium (Singulair*) 10 Mg Tab      10 MG PO QHS, TAB 0 Refills         Reported         7/9/09            Plan/Instructions      Ambulatory Assessment/Plan:        Notes      New Medications      * Beclomethasone Dipropionate (Qvar 40 Redihaler 10.6 GM) 10.6 GM HFA.AEROBA: 2       PUFF INH RTBID #1      Discontinued Medications      * predniSONE (Deltasone) 10 MG TABLET: 10 MG PO ASDIR #45         Instructions: 49aug5s,27mrn2x,03ffi4e,75cmx6i,20hhj6p       * Tiotropium Br/Olodaterol HCl (Stiolto Respimat Inhal Spray) 4 GM MIST.INHAL          Sample - Qty 2         Instructions: 2 puffs qd         Dx: COPD (chronic obstructive pulmonary disease) - J44.9      Plan/Instructions      * Plan Of Care: ()            * Chronic conditions reviewed and taken into consideration for today's treatment       plan.      * Patient instructed to seek medical attention urgently for new or worsening       symptoms.      * Patient was educated/instructed on their diagnosis, treatment and medications       prior to discharge from the clinic today.            ASSESSMENT:      1. Severe chronic obstructive pulmonary disease with FEV1 of 39%.       2. History of recurrent persistent bronchitis.       3. Elevated IgE level of 384.       4. Nocturnal hypoxia. The patient is on oxygen at night at 2 liters per minute     per nasal cannula.       5. Tobacco abuse of cigarettes in remission.       6. History of lung cancer, prostate cancer and bladder cancer under the care of     Dr. Fernando status post surgery and adjuvant chemotherapy and radiation therapy.             PLAN:      1. Continue Stiolto everyday as prescribed.       2. For elevated IgE level, continue Singulair everyday as prescribed. I will     start the patient on qvar 40 mcg 2 puffs twice daily. The patient is advised     rinse his mouth after each use.  I will recheck an IgE level in 3 months.       3. Continue albuterol inhaler and DuoNeb as needed.       4. For nocturnal hypoxia, continue oxygen at bedtime at 2 liters per minute via     nasal cannula.       5. The patient reports he is up to date with  flu, Prevnar and Pneumovax.       6. The patient is advised to call the office, call 911 or go to the ER for any     new or worsening symptoms.       7. Follow up with Dr. Phan in 3 months sooner if needed.      Codes:  Phone Eval 11-20 mi [id number]            Electronically signed by ADEOLA CLEMENT [facility]  05/29/2020 11:16        Disclaimer: Converted document may not contain table formatting or lab diagrams. Please see [employer] for the authenticated document.

## 2021-05-28 NOTE — PROGRESS NOTES
Patient: NIALL HAYES     Acct: [id number]     Report: #[id number]  UNIT #: [id number]     : 1955    Encounter Date:2018  PRIMARY CARE: Rex Hayden  ***Signed***  --------------------------------------------------------------------------------------------------------------------  NURSE INTAKE      Visit Type      Established Patient Visit            Chief Complaint      BLADDER CANER -SICK VISIT            Referring Provider/Copies To      Referring Provider:  Bartolo Webber            History and Present Illness      Past Oncology Illness History      Mr. Hayes is a pleasant 64yo WM with hx hematuria. Cystoscopy revealed mass in     the bladder. 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            HPI - Oncology Interim      Presents with wife for urgent care visit for fevers.  History of stage II     urothelial bladder cancer. Scheduled for cycle 4 on . Started running    fevers yesterday. T max 101.6, /84, , o2 sats 99%.  Reports     thickened plegm production, (Plegm is dark brown and thick in color), increased     oral pain, sore throat, 10 pound weight loss this last week, and slight     dizziness on exam this morning. Received Cycle 3 of dd MVAC last week. He was     noted to elevated creatinine last week up to 1.64 likely related to Cisplatin.     Received Neulasta after last treatment.             Lungs on exam are clear but diminished. Oral exam with thickish sputum to roof     of mouth, reddened oral lesions under tongue and pharynx is reddened. No exudate    is noted. Denies any HA, runny nose, or thickened nasal secretions from nasal     passages. Denies any sinus pain or pressure. Does reports dysuria. Denies any     hematuria. Reports drank a large gatorade yesterday for fluid intake and does     not admit to any additional fluid intake. He does reports increased urination     going like every 30 minutes since diagnosis. Denies any loose stools.              Labs today: WBC 0.40, ANC count 50, Platelet count 82,000. Hemoglobin 11.30. BUN    48, Creatinine 2.20, Magnesium 1.09.            Cancer Details            Bladder, urothelial carcinoma            Clinical Staging      Clinical stage II (T2, N0, M0)            Clinical Trial Participant      No            ECOG Performance Status      0            Most Recent Lab Findings      Laboratory Tests      11/7/18 10:25            PAST, FAMILY   Past Medical History      Past Medical History:  Arthritis, COPD, Hypertension      Hematology/Oncology (M):  Bladder Cancer, Lung Cancer            Past Surgical History      Biopsy (lung left), Fracture Repair, Lung Biopsy            Family History      Family History:  Lung Cancer (father and 5 paternal uncles), Prostate Cancer     (brother)            Social History      Lives independently:  Yes      Occupation:  on leave            Tobacco Use      Tobacco status:  Former smoker      Quit status:  Quit date established (2008)            Alcohol Use      Alcohol intake:  None            Substance Use      Substance use:  Denies use            REVIEW OF SYSTEMS      General:  Admits: Appetite Change, Fever, Weight Loss      Eye:  Denies: Blurred Vision, Corrective Lenses, Diplopia      ENT:  Admits: Sore Throat;          Denies: Headache, Hearing Loss, Hoarseness      Cardiovascular:  Denies: Chest Pain, Edema Ankles, Edema Legs, Irregular     Heartbeat      Respiratory:  Admits: Shortness of Air;          Denies: Coughing Blood, Productive Cough      Gastrointestinal:  Admits: Nausea;          Denies: Bloody Stools, Constipation, Diarrhea      Genitourinary (male):  Admits: Frequent Urination, Painful Urination;          Denies: Blood in Urine, Decrease Urine Stream      Musculoskeletal:  Denies: Back Pain, Leg Cramps, Muscle Pain, Muscle Weakness,     Painful Joints, Swollen Joints      Integumentary:  Denies: Bleeds Easily, Bruises Easily      Neurologic:  Denies:  Dizziness, Fainting      Psychiatric:  Denies: Anxiety      Endocrine:  Denies: Cold Intolerance, Diabetes, Excessive Sweating      Hematologic/Lymphatic:  Denies: Bruising, Bleeding            VITAL SIGNS AND SCORES      Vitals      Height 5 ft 10.00 in / 177.8 cm      Weight 207 lbs 3.718 oz / 94.0 kg      BSA 2.12 m2      BMI 29.7 kg/m2      Temperature 98.6 F / 37 C - Temporal      Pulse 133      Respirations 18      Blood Pressure 130/84 Sitting, Left Arm      Pulse Oximetry 99%, RM AIR            Pain Score      Pain Scale Used:  Numerical      Pain Intensity:  0            Fatigue Score      Experiencing any fatigue?:  Yes      Fatigue (0-10 scale):  10            EXAM      General Appearance:  Positive for: Alert, Oriented x3, Cooperative      Eye:  Positive for: Moist Conjunctiva, PERRLA, Reactive to Light      HEENT:  Positive for: Oropharynx clear, Dentition (dentures), Erythema      Neck:  Positive for: Full ROM      Respiratory:  Positive for: Diminished Breath, Normal Respiratory Effort      Chest:  Port in Place      Abdomen/Gastro:  Positive for: Normal Active Bowel Sounds, Soft;          Negative for: Distention, Rebound, Tenderness      Cardiovascular:  Negative for: Click, Distant Heart Sounds, Gallop, Murmur      Skin:  Positive for: Normal Temperature, Normal Texture and Turgor, Normal Tone      Psychiatric:  Positive for: AAO X 3, Appropriate Affect, Intact Judgement      Neurologic:  Positive for: Cranial Ner II-XII Intact, Dizziness, Dysphagia;          Negative for: Deep Tendon Reflexes, Focal Sensory Deficit      Genitourinary:  Negative for: Bladder Distention, Guajardo Catheter, Prostate     Enlargement, Suprapubie Tenderness      Musculoskeletal:  Positive for: Full ROM Lower Extremety, Full ROM Upper     Extremety, Full Muscle Strength, Full Muscle Tone      Lower Extremities:  Positive for: Normal Gait and Station, Pedal Pulses Intact,     Pedal Pulses Symetrical;          Negative for:  Clubbing, Deformities, Digital Cyanosis, Edema      Lymphatic:  Negative for: Axillary, Cervical, Supraclavicular            PREVENTION      Hx Influenza Vaccination:  Yes (2018)      Date Influenza Vaccine Given:  Oct 1, 2018      Influenza Vaccine Declined:  No      2 or More Falls Past Year?:  No      Fall Past Year with Injury?:  No      Hx Pneumococcal Vaccination:  Yes (2017)      Encouraged to follow-up with:  PCP regarding preventative exams.      Chart initiated by      CALVIN CACERES [facility]            ALLERGY/MEDS      Allergies      Coded Allergies:             NO KNOWN ALLERGIES (Unverified , 11/16/18)            Medications      Last Reconciled on 11/16/18 10:54 by ANNETTE BATRES      Ciprofloxacin HCl (Cipro) 500 Mg Tablet      500 MG PO BID for 10 Days, #20 TAB 0 Refills         Prov: ANNETTE BATRES onc         11/16/18       Amoxicillin/Clavulanic Acid 875/125 (Augmentin 875/125) 1 Each Tablet      875 MG PO BID for 10 Days, #20 TAB 0 Refills         Prov: ANNETTE BATRES         11/16/18       Lidocaine (Xylocaine 2% Viscous) 100 Ml Solution      10 ML PO 6XD for mucositis for 30 Days, #300 ML 5 Refills         Prov: MARIANA KWON         11/14/18       Mouthwash (Magic Mouthwash) 1 Each Bottle      15 ML PO Q4H PRN for MOUTH DISCOMFORT, #240 ML 4 Refills         Prov: MARIANA KWON         10/31/18       Loratadine (Claritin) 10 Mg Tablet      10 MG PO QDAY, #30 TAB 0 Refills         Reported         10/25/18       Omeprazole (PriLOSEC*) 20 Mg Capsule.      20 MG PO QDAY for 30 Days, #30 CAP 3 Refills         Prov: MARIANA KWON         10/24/18       Polyethylene Glycol (Miralax*) 17 Gm Packet      17 GM PO HS, #30 PKT 0 Refills         Reported         10/24/18       Prochlorperazine Maleate (Prochlorperazine Maleate) 10 Mg Tab      10 MG PO Q6H PRN for NAUSEA AND/OR VOMITING, #60 TAB 3 Refills         Prov: MARIANA KWON         10/4/18       Ondansetron HCl (Zofran) 8 Mg  Tablet      8 MG PO Q8H PRN for NAUSEA, #30 TAB 3 Refills         Prov: MARIANA KWON         10/4/18       Rosuvastatin Calcium (Crestor*) 5 Mg Tab      5 MG PO HS, #30 TAB 0 Refills         Reported         10/12/16       Olmesartan (Benicar) 40 Mg Tab      1 TAB PO QHS, TAB 0 Refills         Reported         7/9/09       Mdi-Advair (Advair 100/50 Diskus*) 28 Puff/Inh Inh      1 PUFF INH BID, 0 Refills         Reported         7/9/09       Metoprolol Succinate (Toprol XL*) 50 Mg Tabcr      75 TAB PO HS, TAB 0 Refills         Reported         7/9/09       Montelukast Sodium (Singulair*) 10 Mg Tab      1 TAB PO QHS, TAB 0 Refills         Reported         7/9/09      Medications Reviewed:  Changes made to meds            IMPRESSION/PLAN      Impression      Clinical stage II (T2, N0, M0) bladder cancer.  On neoadjuvant dose dense MVAC            Bladder cancer         Malignant neoplasm of urinary bladder, unspecified site - C67.9         Bladder location: unspecified site      Patient is due for his next cycle of dose dense MVAC.  Tolerating reasonably     well.  He does have some mucositis which is responding to Magic mouthwash as     well as some fatigue but able to perform his normal ADLs.  Lab work demonstrates    an increased creatinine.  Dose reduction will be instituted for cisplatin.  Push    oral fluid intake 2-3 L/day if possible.  RTC 2 weeks for OV, chemo with labs p    rior.            Elevated serum creatinine - R79.89      This is a new finding.  Likely related to his chemotherapy.  Treatment as above.     Recheck next visit             Neutropenic Fevers      -ANC 50, WBC 0.40.       Normal saline 1 liter started.       Blood cultures, UA, pa and lateral CXR ordered.       Sent to ER for evaluation and admission.       Hold chemo cycle 4 for November 19      Return in 1 week for next treatment on November 26.            Diagnosis      Fever - R50.9            Notes      New Medications      *  Amoxicillin/Clavulanic Acid 875/125 (Augmentin 875/125) 1 EACH TABLET: 875 MG       PO BID 10 Days #20      * Ciprofloxacin HCl (Cipro) 500 MG TABLET: 500 MG PO BID 10 Days #20      New Diagnostics      * CMP Comp Metabolic Panel, Routine         Dx: Bladder cancer - C67.9      * Magnesium Serum, Routine         Dx: Bladder cancer - C67.9      * LDH, Routine         Dx: Bladder cancer - C67.9      * CBC With Auto Diff, Routine         Dx: Bladder cancer - C67.9      * Blood Culture, Routine         Dx: Bladder cancer - C67.9      * Urinalyis W/Micro, Routine         Dx: Bladder cancer - C67.9      * Chest 2 View, Routine         Dx: Bladder cancer - C67.9            Plan      Normal saline 1 liter over 2-3 hours today.       Labs today: CBC, CMP, Magnesium      Pa and lateral CXR today to evaluate for pneumonia      UA to evaluate for UTI      Blood cultures x 2.       Return in Monday to see Dr. Fernando for recheck.       Cipro 500 mg BID x 10 days, Augmentin 875 mg BID x 10 days sent to pharmacy.       Lidocaine Viscous 2% (120 ml) decadron (90ml), Hydrocodone/acetominophen     7.5/325/15 ml (90ml). Dispense 300 ml. Use 10 ml 10 minutes TID prior to meals     and swish and swallow. no refills. Signed by Dr. Biswas.       If not neutropenic with fevers, can try to manage at home using MASCC criteria.     Patient is neutropenic.       Patient to go to ER for evaluation and admitting. Spoke to JUANITA Lua.            Patient Education      Patient Education Provided:  Yes            Topics Patient Counseled on      fevers, hydration, worsening symptomatology go to ER.                 Disclaimer: Converted document may not contain table formatting or lab diagrams. Please see [employer] [facility] for the authenticated document.

## 2021-05-28 NOTE — PROGRESS NOTES
Patient: NIALL HAYES     Acct: [id number]     Report: #[id number]  UNIT #: [id number]     : 1955    Encounter Date:2020  PRIMARY CARE: Collin Cannon  ***Signed***  --------------------------------------------------------------------------------------------------------------------  NURSE INTAKE      Visit Type      Established Patient Visit            Chief Complaint      bladder/prostate ca f/u      Intent of Therapy:  Curative            Referring Provider/Copies To      Primary Care Provider:  Rex Hayden      Copies To:   Rex Hayden            History and Present Illness      Past Oncology Illness History      Mr. Hayes is a pleasant 64yo WM with hx hematuria. Cystoscopy revealed mass in     the bladder. 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 20Patient     returns for follow-up of his bladder cancer.            HPI - Oncology Interim      Patient returns for follow-up of his bladder and prostate cancer.  He is status     post treatment as outlined including neoadjuvant chemotherapy followed by     radical cystoprostatectomy with neobladder construction.  He has had his     Port-A-Cath removed.  He states he is feeling well.  He denies new masses     lymphadenopathy.  No unusual aches or pains.  He is eating drinking normally,     his weight is maintained.  He notes good energy and is working full-time.  He     reports normal urinary habits with no pain or hematuria.  Remote history of lung    cancer status post left upper lobectomy.  He denies shortness of breath, chest p    ain, cough or hemoptysis.            Cancer Details            Bladder, urothelial carcinoma      Prostate cancer, adenocarcinoma, 4+3 Eb score            Clinical Staging      Stage II ([id number])            Treatments      Chemotherapy      10/10/18 began DD MVAC      Surgeries      Left upper lobectomy .  Radical cystoprostatectomy with neobladder 2020 PORT REMOVED             Clinical Trial Participant      No            ECOG Performance Status      0            Most Recent Lab Findings      Laboratory Tests      20 07:58            Most Recent Imaging Findings      Patient: NIALL RIOS   Acct: #[id number]   Report: #[id number]            UNIT #: [id number]    DOS: 20 0751      INSURANCE:[employer] - P[employer]   ORDER #:[id number]      LOCATION:CT     : 1955            PROVIDERS      ADMITTING:     ATTENDING: ANNAMARIA HERNANDEZ      FAMILY:  Rex Hayden   ORDERING:  ANNAMARIA HERNANDEZ         OTHER:    DICTATING:  SHENG LARA MD            REQ #:[id number]   EXAM:CTACPWC - CT ABD CHEST PEL w CONTR      REASON FOR EXAM:  BLADDER CA      REASON FOR VISIT:  BLADDER CA            *******Signed******         PROCEDURE:   CT ABDOMEN; CT CHEST; CT PELVIS WITH CONTRAST             COMPARISON:   [facility], CT, CT CHEST ABD PEL W CONTRAST,     5/15/2020, 10:02.             INDICATIONS:   BLADDER CA./LUNG CA.             TECHNIQUE:   After obtaining the patient's consent, CT images were obtained with    intravenous contrast       material.             FINDINGS:         Chest:  Status post left upper lobectomy.  Centrilobular emphysema.  Stable     bandlike fibrotic       opacity in the posterior medial left chest extending through the left hilar     region.  No new opacity       or pulmonary nodule.  No adenopathy in the chest.  No aggressive appearing bone     change.  No       aggressive appearing bone change.             Abdomen:  2.2 cm cyst in the upper spleen.  Liver, adrenal glands, pancreas     unremarkable.        Uncomplicated cholelithiasis.  Bowel loops are nondilated.  Moderate colonic     stool.  Uncomplicated       sigmoid diverticulosis.             Pelvis:  No pelvic mass or fluid.  No aggressive appearing bone change.      Significant       atherosclerotic disease in the major pelvic arteries.             CONCLUSION:   Previous left upper  lobectomy with stable post treatment changes     in the left       hemithorax.             No convincing evidence for active malignancy in the chest, abdomen, or pelvis.              SHENG LARA MD             Electronically Signed and Approved By: SHENG LARA MD on 12/04/2020 at 8:37                               Until signed, this is an unconfirmed preliminary report that may contain      errors and is subject to change.                                              DOWER:      D:12/04/20 0837            PAST, FAMILY   Past Medical History      Past Medical History:  Arthritis, COPD, Hypertension, Kidney Disease      Hematology/Oncology (M):  Bladder Cancer, Lung Cancer, Prostate Cancer            Past Surgical History      Biopsy, Bladder Surgery, Fracture Repair, Lung Biopsy, VAD Placement (AND     REMOVAL)            Lobectomy 2009            Family History      Family History:  Lung Cancer, Prostate Cancer            Social History      Lives independently:  Yes      Number of Children:  3      Occupation:  [employer]            Tobacco Use      Tobacco status:  Former smoker (1 ppd for 40 years quit 2008)            Substance Use      Substance use:  Denies use            REVIEW OF SYSTEMS      General:  Admits: Weight Gain;          Denies: Fatigue      Eye:  Denies Vision Changes      ENT:  Denies Sore Throat      Respiratory:  Denies: Wheezing      Gastrointestinal:  Denies Diarrhea, Denies Problem Swallowing      Musculoskeletal:  Denies Muscle Pain, Denies Painful Joints      Neurologic:  Denies Seizures            VITAL SIGNS AND SCORES      Vitals      Weight 220 lbs 7.360 oz / 100 kg      Temperature 96.7 F / 35.94 C - Temporal      Pulse 75      Respirations 16      Blood Pressure 136/70 Sitting, Left Arm      Pulse Oximetry 97%, RM AIR            Pain Score      Pain Scale Used:  Numerical      Pain Intensity:  0            Fatigue Score      Fatigue (0-10 scale):  0 (none)            EXAM       General Appearance:  Positive for: Alert, Cooperative;          Negative for: Acute Distress      Eye:  Positive for: Anicteric Sclerae, Moist Conjunctiva      Neck:  Positive for: Supple;          Negative for: JVD, Masses      Respiratory:  Positive for: CTAB, Normal Respiratory Effort      Abdomen/Gastro:  Positive for: Normal Active Bowel Sounds, Soft;          Negative for: Distention, Hepatosplenomegaly, Tenderness      Other      Well-healed surgical incision      Cardiovascular:  Positive for: RRR;          Negative for: Gallop, Murmur, Peripheral Edema, Rub      Psychiatric:  Positive for: Appropriate Affect, Intact Judgement      Lymphatic:  Negative for: Cervical, Infraclavicular, Supraclavicular            PREVENTION      Hx Influenza Vaccination:  Yes      Date Influenza Vaccine Given:  Oct 1, 2019      Influenza Vaccine Declined:  No      2 or More Falls in Past Year?:  No      Fall Past Year with Injury?:  No      Hx Pneumococcal Vaccination:  Yes      Encouraged to follow-up with:  PCP regarding preventative exams.      Chart initiated by      EWA WITT MA            ALLERGY/MEDS      Allergies      Coded Allergies:             NO KNOWN ALLERGIES (Unverified , 12/7/20)            Medications      Last Reconciled on 12/7/20 13:04 by ANNAMARIA HERNANDEZ      Tiotropium Br/Olodaterol HCl (Stiolto Respimat Inhal Spray) 4 Gm Mist.inhal      2 PUFFS INH RTQDAY for 90 Days, #3 INH 3 Refills         Prov: Guanako Phan         12/1/20       Beclomethasone Dipropionate (Qvar 40 Redihaler 10.6 GM) 10.6 Gm Hfa.aeroba      2 PUFF INH RTBID, #1 INH 5 Refills         Prov: Guanako Phan         12/1/20       MDI-Albuterol (Ventolin HFA) 18 Gm Hfa.aer.ad      2 PUFFS INH Q6H PRN for SHORTNESS OF BREATH, #1 MDI 4 Refills         Prov: Guanako Phan         12/1/20       Albuterol/Ipratropium (Duoneb) 3 Ml Ampul.neb      3 ML INH RTTID, #120 NEB 0 Refills         Reported         2/26/20       Carvedilol (Coreg) 6.25 Mg  Tablet      12.5 MG PO HS, #120 TAB 0 Refills         Reported         12/26/19       Olmesartan (Benicar) 5 Mg Tablet      5 MG PO QDAY, #30 TAB         Reported         12/26/19       Carvedilol (Carvedilol) 6.25 Mg Tablet      6.25 MG PO QDAY, #30 TAB 0 Refills         Reported         5/6/19       Apixaban (Eliquis) 5 Mg Tablet      5 MG PO BID for 30 Days, #60 TAB         Prov: Badiwala,Anesh         11/22/18       Rosuvastatin Calcium (Crestor*) 5 Mg Tab      5 MG PO HS, #30 TAB 0 Refills         Reported         10/12/16       Montelukast Sodium (Singulair*) 10 Mg Tab      10 MG PO QHS, TAB 0 Refills         Reported         7/9/09      Medications Reviewed:  Changes made to meds            IMPRESSION/PLAN      Diagnosis      Bladder cancer         Malignant neoplasm of urinary bladder, unspecified site         Bladder location: unspecified site      Status post neoadjuvant chemotherapy followed by resection.  Patient is doing     well.  I see no evidence of disease recurrence by his history, physical     examination, lab work or scans.  RTC 6 months for ongoing surveillance with labs    and scans prior.            Prostate cancer - C61      Status post resection.  Doing well.  Check PSA every 6-12 months            History of lung cancer - Z85.118      2009.  Status post surgery.  CE.            Notes      New Diagnostics      * CT Abd/Pelvis/Chest W/Contrast, 6 Months         Dx: Bladder cancer - C67.9      * CBC With Auto Diff, 6 Months         Dx: Bladder cancer - C67.9      * CMP Comp Metabolic Panel, 6 Months         Dx: Bladder cancer - C67.9            Pain      Pain Zero Today            Advanced Care Plan Discussion      Declines Discussion [id number]            Patient Education            Eat Well, Exercise Well, Be Well: Dietary and Fitness Guidelines      Patient Education Provided:  Yes            Electronically signed by ANNAMARIA HERNANDEZ  12/07/2020 13:04       Disclaimer: Converted document may not  contain table formatting or lab diagrams. Please see [employer] for the authenticated document.

## 2021-05-28 NOTE — PROGRESS NOTES
Patient: NIALL HAYES     Acct: [id number]     Report: #[id number]  UNIT #: [id number]     : 1955    Encounter Date:2018  PRIMARY CARE: Rex Hayden  ***Signed***  --------------------------------------------------------------------------------------------------------------------  NURSE INTAKE      Visit Type      Established Patient Visit            Chief Complaint      bladder ca      Intent of Therapy:  Curative            Referring Provider/Copies To      Referring Provider:  Bartolo Webber            History and Present Illness      Past Oncology Illness History      Mr. Hayes is a pleasant 62yo WM with hx hematuria. Cystoscopy revealed mass in     the bladder. 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            HPI - Oncology Interim      Pt returns to clinic for f/u-after last tx developed mouth sores--Magic     Mouthwash helped.  Developed some upset stomach and used anti-emetic-no vomiting    noted.  He is urinating frequently, at times painful; however, denies hematuria     at this time.  Lost a couple of pounds; however, mouth issues resolved and     eating w/o issues.  He denies acute pain or discomfort.  Denies fever,     lymphadenopathy or distress at this time.  Will perform scans after next, 4th     treatment (today is 3).            Cancer Details            Bladder, urothelial carcinoma            Clinical Staging      Clinical stage II (T2, N0, M0)            Treatments      Chemotherapy      10/10/18 began DD [facility] [place]            Clinical Trial Participant      No            ECOG Performance Status      0            Most Recent Lab Findings            Item Value  Date Time             White Blood Count 16.60 10*3/uL H 18 1025             Red Blood Count 4.11 10*6/uL L 18 1025             Hemoglobin 12.70 g/dL L 18 1025             Hematocrit 36.3 % L 18 1025             Mean Corpuscular Volume 88.4 fL 18 1025             Mean Corpuscular  Hemoglobin 31.0 pg 11/7/18 1025             Mean Corpuscular Hemoglobin Concent 35.1 % 11/7/18 1025             Red Cell Distribution Width 12.2 % 11/7/18 1025             Platelet Count 357.00 10*3/uL 11/7/18 1025             Mean Platelet Volume 7.3 fL L 11/7/18 1025             Granulocytes (%) 73.7 % 11/7/18 1025             Lymphocytes (%) (Auto) 15.60 % L 11/7/18 1025             Monocytes (%) (Auto) 10.40 % H 11/7/18 1025             Sodium Level 137 mmol/L 11/7/18 1025             Potassium Level 3.7 mmol/L 11/7/18 1025             Chloride Level 93 mmol/L L 11/7/18 1025             Carbon Dioxide Level 31 mmol/L 11/7/18 1025             Anion Gap 17 mmol/L 11/7/18 1025             Blood Urea Nitrogen 21 mg/dL 11/7/18 1025             Creatinine 1.64 mg/dL H 11/7/18 1025             Glomerular Filtration Rate Calc 44 mL/min/{1.73_m2} L 11/7/18 1025             BUN/Creatinine Ratio 13 {ratio} 11/7/18 1025             Glucose Level 148 mg/dL H 11/7/18 1025             Serum Osmolality 290 11/7/18 1025             Calcium Level 10.2 mg/dL 11/7/18 1025             Total Bilirubin 0.19 mg/dL L 11/7/18 1025             Aspartate Amino Transf (AST/SGOT) 17 U/L 11/7/18 1025             Alanine Aminotransferase (ALT/SGPT) 24 U/L 11/7/18 1025             Alkaline Phosphatase 108 U/L 11/7/18 1025             Total Protein 6.9 g/dL 11/7/18 1025             Albumin 4.0 g/dL 11/7/18 1025             Globulin 2.9 g/dL 11/7/18 1025             Albumin/Globulin Ratio 1.4 {ratio} 11/7/18 1025            Laboratory Tests      10/24/18 13:45            PAST, FAMILY   Past Medical History      Past Medical History:  Arthritis, COPD, Hypertension      Hematology/Oncology (M):  Bladder Cancer, Lung Cancer            Past Surgical History      Biopsy (lung left), Fracture Repair, Lung Biopsy      upper left lung removed            Family History      Family History:  Lung Cancer (father and 5 paternal uncles), Prostate Cancer      (brother)            Social History      Lives independently:  Yes      Occupation:  on leave            Tobacco Use      Tobacco status:  Former smoker      Quit status:  Quit date established (2008)            Alcohol Use      Alcohol intake:  None            Substance Use      Substance use:  Denies use            REVIEW OF SYSTEMS      General:  Admits: Fatigue;          Denies: Appetite Change, Fever, Night Sweats, Weight Gain, Weight Loss      Eye:  Denies: Blurred Vision, Corrective Lenses, Diplopia, Eye Irritation, Eye     Pain, Eye Redness, Spots in Vision, Vision Loss      ENT:  Denies: Headache, Hearing Loss, Hoarseness, Seizures, Sinus Congestion,     Sore Throat      Cardiovascular:  Denies: Chest Pain, Edema Ankles, Edema Legs, Irregular     Heartbeat, Palpitations      Respiratory:  Denies: Coughing Blood, Productive Cough, Shortness of Air,     Wheezing      Gastrointestinal:  Denies: Bloody Stools, Constipation, Diarrhea, Frequent     Heartburn, Nausea, Problem Swallowing, Tarry Stools, Unable to Control Bowels,     Vomiting      Genitourinary (male):  Denies: Blood in Urine, Decrease Urine Stream, Frequent     Urination, Incontinence, Painful Urination      Musculoskeletal:  Denies: Back Pain, Leg Cramps, Muscle Pain, Muscle Weakness,     Painful Joints, Swollen Joints      Integumentary:  Denies: Bleeds Easily, Bruises Easily, Hair Changes, Jaundice,     Lesions, Mole Changes, Nail Changes, Pigment Changes, Rash, Skin Discoloration      Neurologic:  Denies: Dizziness, Fainting, Numbness\Tingling, Paralysis, Seizures      Psychiatric:  Denies: Anxiety, Confused, Depression, Disoriented, Memory Loss      Endocrine:  Denies: Cold Intolerance, Diabetes, Excessive Sweating, Excessive     Thirst, Excessive Urination, Heat Intolerance, Flushing, Hyperthyroidism,     Hypothyroidism      Hematologic/Lymphatic:  Denies: Bruising, Bleeding, Enlarged Lymph Nodes,     Recurrent Infections, Transfusions             VITAL SIGNS AND SCORES      Vitals      Height 5 ft 10 in / 177.8 cm      Weight 218 lbs 0.559 oz / 98.9 kg      BSA 2.17 m2      BMI 31.3 kg/m2      Temperature 98.2 F / 36.78 C - Temporal      Pulse 105      Respirations 16      Blood Pressure 114/47 Sitting      Pulse Oximetry 98%, rm air            Pain Score      Pain Scale Used:  Numerical      Pain Intensity:  1            Fatigue Score      Fatigue (0-10 scale):  5            EXAM      General Appearance:  Positive for: Alert, Oriented x3, Cooperative;          Negative for: Acute Distress      Eye:  Positive for: Anicteric Sclerae, Moist Conjunctiva      Respiratory:  Positive for: CTAB, Normal Respiratory Effort      Other      well healed thoracotomy incision      Chest:  Port in Place      Abdomen/Gastro:  Positive for: Normal Active Bowel Sounds, Soft;          Negative for: Distention, Hepatosplenomegaly, Tenderness      Cardiovascular:  Positive for: RRR;          Negative for: Gallop, Murmur, Peripheral Edema, Rub      Psychiatric:  Positive for: Appropriate Affect, Intact Judgement      Lower Extremities:  Negative for: Edema      Upper Extremities:  Negative for: Clubbing, Digital Cyanosis, Digital Ischemia      Lymphatic:  Negative for: Axillary, Cervical, Infraclavicular, Supraclavicular            PREVENTION      Hx Influenza Vaccination:  Yes (2018)      Date Influenza Vaccine Given:  Oct 1, 2018      Influenza Vaccine Declined:  No      2 or More Falls Past Year?:  No      Fall Past Year with Injury?:  No      Hx Pneumococcal Vaccination:  Yes (2017)      Encouraged to follow-up with:  PCP regarding preventative exams.      Chart initiated by      sarah camarillo ma            ALLERGY/MEDS      Allergies      Coded Allergies:             NO KNOWN ALLERGIES (Unverified , 11/7/18)            Medications            Mouthwash (Magic Mouthwash) 1 Each Bottle      15 ML PO Q4H PRN for MOUTH DISCOMFORT, #240 ML 4 Refills         Prov:  MARIANA KWON         10/31/18       Loratadine (Claritin) 10 Mg Tablet      10 MG PO QDAY, #30 TAB 0 Refills         Reported         10/25/18       Omeprazole (PriLOSEC*) 20 Mg Capsule.      20 MG PO QDAY for 30 Days, #30 CAP 3 Refills         Prov: MARIANA KWON         10/24/18       Polyethylene Glycol (Miralax*) 17 Gm Packet      17 GM PO HS, #30 PKT 0 Refills         Reported         10/24/18       Prochlorperazine Maleate (Prochlorperazine Maleate) 10 Mg Tab      10 MG PO Q6H PRN for NAUSEA AND/OR VOMITING, #60 TAB 3 Refills         Prov: MARIANA KWON         10/4/18       Ondansetron HCl (Zofran) 8 Mg Tablet      8 MG PO Q8H PRN for NAUSEA, #30 TAB 3 Refills         Prov: MARIANA KWON         10/4/18       Rosuvastatin Calcium (Crestor*) 5 Mg Tab      5 MG PO HS, #30 TAB 0 Refills         Reported         10/12/16       Hctz/Spironolactone 25/25 Mg (ALDACTAZIDE 25/25) 1 Tab Tab      1 TAB PO HS         Reported         1/16/12       Olmesartan (Benicar) 40 Mg Tab      1 TAB PO QHS, TAB 0 Refills         Reported         7/9/09       Mdi-Advair (Advair 100/50 Diskus*) 28 Puff/Inh Inh      1 PUFF INH BID, 0 Refills         Reported         7/9/09       Metoprolol Succinate (Toprol XL*) 50 Mg Tabcr      75 TAB PO HS, TAB 0 Refills         Reported         7/9/09       Montelukast Sodium (Singulair*) 10 Mg Tab      1 TAB PO QHS, TAB 0 Refills         Reported         7/9/09      Medications Reviewed:  No Changes made to meds            IMPRESSION/PLAN      Impression      Clinical stage II (T2, N0, M0) bladder cancer.  On neoadjuvant dose dense MVAC            Diagnosis      Bladder cancer         Malignant neoplasm of urinary bladder, unspecified site - C67.9         Bladder location: unspecified site      Patient is due for his next cycle of dose dense MVAC.  Tolerating reasonably     well.  He does have some mucositis which is responding to Magic mouthwash as     well as some fatigue but able to  perform his normal ADLs.  Lab work demonstrates    an increased creatinine.  Dose reduction will be instituted for cisplatin.  Push    oral fluid intake 2-3 L/day if possible.  RTC 2 weeks for OV, chemo with labs     prior.            Elevated serum creatinine - R79.89      This is a new finding.  Likely related to his chemotherapy.  Treatment as above.     Recheck next visit            Patient Education      Patient Education Provided:  Yes                 Disclaimer: Converted document may not contain table formatting or lab diagrams. Please see [employer] for the authenticated document.

## 2021-05-28 NOTE — PROGRESS NOTES
Patient: NIALL RIOS     Acct: [id number]     Report: #[id number]  UNIT #: [id number]     : 1955    Encounter Date:10/04/2018  PRIMARY CARE: Rex Hayden  ***Signed***  --------------------------------------------------------------------------------------------------------------------  Visit Type      New Patient Visit            Chief Complaint      BLADDER CANCER      Intent of Therapy:  Curative            Referring Provider/Copies To      Referring Provider:  Bartolo Webber            Allergies      Coded Allergies:             NO KNOWN ALLERGIES (Unverified , 10/4/18)            Medications            Rosuvastatin Calcium (Crestor*) 5 Mg Tab      5 MG PO HS, #30 TAB 0 Refills         Reported         10/12/16       Hctz/Spironolactone 25/25 Mg (Aldactazide 25/25 Mg) 1 Tab Tab      1 TAB PO HS         Reported         12       Olmesartan (Benicar) 40 Mg Tab      1 TAB PO QHS, TAB 0 Refills         Reported         09       Mdi-Advair (Advair 100/50 Diskus*) 28 Puff/Inh Inh      1 PUFF INH BID, 0 Refills         Reported         09       Metoprolol Succinate (Toprol XL*) 50 Mg Tabcr      75 TAB PO HS, TAB 0 Refills         Reported         09       Montelukast Sodium (Singulair*) 10 Mg Tab      1 TAB PO QHS, TAB 0 Refills         Reported         09      Medications Reviewed:  No Changes made to meds            History and Present Illness      HPI - Oncology Interim      63-year-old white male with history of lung cancer in  for which he underwe    nt resection followed by adjuvant chemotherapy and radiation. He states     approximate 2 months ago he began noticing some redness in his urine as well as     more frequent urination. He denies abdominal or pelvic pain. He denies new     masses or LAD. He reports normal appetite and energy level.       Because of the symptoms, he underwent urinary cytology which revealed malignant     cells. He subsequently was seen by   Akbar with urology and underwent TURBT    which showed approximate 75% of the bladder involved with tumor. 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 He had a catheter in the     postoperative setting which was removed yesterday. He notes that he has had no     further problems with urination thus far.      He presents today for discussion of neoadjuvant therapy            Cancer Details            Bladder, urothelial carcinoma            Clinical Staging      Clinical stage II (T2, N0, M0)            Clinical Trial Participant      No            ECOG Performance Status      0            Most Recent Lab Findings      Laboratory Tests      18 18:25            Most Recent Imaging Findings      Patient: NIALL RIOS   Acct: #[id number]   Report: #[id number]            UNIT #: [id number]    DOS: 18 1810      INSURANCE:BLUE ACCESS NETWORK - [facility]   ORDER #:[id number]      LOCATION:ER     : 1955            PROVIDERS      ADMITTING:     ATTENDING:       FAMILY:  Rex Hayden   ORDERING:  LAURIE FAITH         OTHER:    DICTATING:  Niall Tamayo MD, JR            REQ #:[id number]   EXAM:ABDPELW - CT ABDOMEN  PELVIS w CONTRAST      REASON FOR EXAM:  Abdominal Pain      REASON FOR VISIT:  PRESSURE IN PELVIC AREA            *******Signed******         PROCEDURE:   CT ABDOMEN PELVIS WITH CONTRAST             COMPARISONS:   [facility], CT, CT ABD CHEST PEL W CONTRAST,     2014, 15:37.           Other, CT, ABDOMEN PELVIS W/WO CONTRAST, 2018, 13:23.             INDICATIONS:   LOWER ABDOMINAL PAIN AND PRESSURE X 2 DAYS, PT HAD TURP DONE X 2     DAYS AGO.             TECHNIQUE:   After obtaining the patient's consent, multiplanar CT images were     created with non-ionic       intravenous contrast material.               PROTOCOL:     Standard imaging protocol performed                RADIATION:     DLP: 1332.5mGy*cm          Automated exposure control was  utilized to minimize radiation dose.       CONTRAST:   100cc Isovue 370 I.V.      LABS:     eGFR: >60ml/min/1.73m2             FINDINGS:   There is a urinary bladder catheter in place, new since the prior     9/20/2018 study.  The       urinary bladder is nondistended.  An air-fluid level is seen in the urinary     bladder.  The       previously seen urinary bladder masses are not as well appreciated on the     current CT exam as the       previous outside CT study from 9/20/2018.  There is nonspecific inflammatory     stranding in the lower       pelvic fat, which may be related to the recent surgery.  No pneumoperitoneum or     pneumatosis is       seen.  There are colonic diverticula without acute diverticulitis.  No acute     appendicitis.  No       bowel obstruction.  Cholelithiasis is seen without acute cholecystitis.  No     acute pancreatitis is       seen.  No choledocholithiasis or biliary ductal dilatation is suggested.  No     urinary bladder       calculi are identified.  Nonobstructing renal calyceal stones are seen     bilaterally as on prior       studies.  No obstructive uropathy.  No ureterolithiasis.  Renal cortical     scarring is suspected       bilaterally, as before.  No acute pyelonephritis is seen.  There is diffuse     hepatic steatosis.  No       hepatomegaly.  There is a small hiatal hernia.  There is suspected scarring in     the lung bases,       especially on the left, seen on prior exams.  Minimal, if any, free pelvic fluid    is seen.        Otherwise, no acute findings are seen.  The other findings are as seen on the     recent 9/20/2018       outside CT study.             CONCLUSION:     There is a urinary bladder catheter in place.  The urinary     bladder is nondistended.        There is increased attenuation of the lower pelvic fat, which is new since     9/20/2018.  It may be       related to the recent postoperative changes.  Otherwise, no significant interval    change is  seen       since the 09/20/2018 exam.               NIALL KENYON JR, MD             Electronically Signed and Approved By: NIALL KENYON JR, MD on 9/29/2018 at     19:22                        Until signed, this is an unconfirmed preliminary report that may contain      errors and is subject to change.                                              CHELITA:      D:09/29/18 1922            PAST, FAMILY   Past Medical History      Past Medical History:  No Diabetes Type 1, No Diabetes Type 2, No Thyroid     Disease; COPD; No Emphysema, No Hypertension, No Stroke, No High Cholesterol, No    Heart Attack, No Bleeding Condition, No Low or High RBC Count, No Low or High     WBC Count, No Low or High Platelet Coun, No Hepatitis, No Kidney Disease, No     Depression, No Alzheimer's Disease, No Mental Disease, No Seizures, No     Arthritis, No Osteoporosis, No Osteopenia, No Short of Air, No Sleep apnea, No     Liver Disease, No STD, No Enlarged Prostate, No Other      Hematology/oncology:  REPORTS HX OF: Bladder Cancer, Lung cancer (DR. RAMIRES TOOK    CARE OF THAT); DENIES HX OF: Previous Treatment for CA, Anemia, Blood cancer,     Brain cancer, Breast cancer, Coagulopathy, Colon Cancer, Colorectal cancer,     Endocrine cancer, Eye cancer, GI cancer,  cancer, Kidney cancer, Leukemia,     Leukocytosis, Leukopenia, Liver cancer, Lymphoma, Musculoskeletal cancer,     Myeloma, Neurologic cancer, Oral cancer, Pancreatic Cancer, Prostate cancer,     Skin cancer, Stomach cancer, Testicular cancer, Thrombocytopenia, Thyroid     cancer, Other cancer history, Other hematologic history      Genetic/metabolic:  DENIES HX OF: Cystic fibrosis, Down syndrome, Other genetic     history, Other metabolic history            Past Surgical History      REPORTS HX OF: Lung biopsy, VAD Placement, Other Past Surgical Hx (UPPER LEFT     LUNG REMOVED); DENIES HX OF: Cataract extraction, Thyroid surgery, CABG surgery,    Coronary stent, Valve  replacement, Appendectomy, Cholecystectomy, Splenectomy,     Bladder surgery, Nephrectomy, Joint replacement, Frature repair, Skin cancer     removal, Melanoma excision, Spinal surgery, Breast biopsy, Lumpectomy,     Mastectomy, bilateral, Mastectomy, right, Mastectomy, left, Hysterectomy, Peg     Tube Placement, Biopsy            Family History      REPORTS HX OF: Prostate cancer (BROTHER); DENIES HX OF: Anemia, Blood disorders,    Blood Cancer, Breast cancer, Cervical cancer, Coagulopathy, Colorectal cancer,     Endocrine Cancer, Eye Cancer, GI Cancer,  Cancer, Kidney Cancer, Leukemia,     Leukocytosis, Leukopenia, Liver Cancer, Lung cancer, Lymphoma, Melanoma,     Musculoskeletal Cancer, Myeloma, Neurologic Cancer, Oral Cancer, Ovarian cancer,    Skin Cancer, Stomach Cancer, Testicular Cancer, Thrombocytopenia, Thyroid     cancer, Uterine cancer, Other Cancer History, Other Hematology History            Social History      Lives independently:  No            Tobacco Use      Smoking packs/day:  2      Quit status:  Quit date established (2008)            Alcohol Use      Alcohol intake:  None            Substance Use      Substance use:  Denies use            REVIEW OF SYSTEMS      General:  Denies: Appetite change, Excessive sweating, Fatigue, Fever, Night     sweats, Weight gain, Weight loss, Other      Eyes:  Denies: Blurred vision, Corrective lenses, Diplopia, Eye irritation, Eye     pain, Eye redness, Spots in vision, Vision loss, Other      Ears, nose, mouth, throat:  Denies: Headache, Seizures, Visual Changes, Hearing     loss, Sinus Congestion, Hoarseness, Sore throat, Other      Cardiovascular:  Denies: Chest pain, Irregular heartbeat, Palpitations, Swollen     ankles/legs, Other      Respiratory:  Denies: Chest pain, Shortness of Air, Productive cough, Coughing     blood, Other      Gastrointestinal:  Denies: Nausea, Vomiting, Problem swallowing, Frequent     heartburn, Constipation, Diarrhea, Tarry  stools, Bloody stools, Unable to     control bowels, Other      Kidney/Bladder:  Complains of: Blood in Urine; Denies: Painful Urination,     Incontinence, Frequent Urination, Decreased Urine Stream, Other      Musculoskeletal:  Denies: New Back pain, Leg Cramps, Painful Joints, Swollen     Joints, Muscle Pain, Muscle weakness, Other      Skin:  COMPLAINS OF: Bruises Easily, Rash (ON THE RIGHT SIDE OF FACE); DENIES:     Hair changes, Lesions/changes in moles, Nail changes, Pigment changes, Other      Neurological:  Denies: Dizziness, Fainting, Numbness\Tingling, Paralysis,     Seizures, Other      Psychiatric:  Complains of: AAO X 3; Denies: Anxiety, Panic attacks, Depression,    Memory loss, Other      Endocrine:  DiabetesThyroid DisorderOsteoporosisEndocrine Other      Hematologic/lymphatic:  Denies: Bruising, Bleeding, Enlarged Lymph Nodes,     Recurrent infections, Other            VITAL SIGNS,PAIN/FATIGUE SCORE      Vitals      Height 5 ft 10.47 in / 179 cm      Weight 220 lbs 14.415 oz / 100.2 kg      BSA 2.19 m2      BMI 31.3 kg/m2      Temperature 97.8 F / 36.56 C - Temporal      Pulse 103      Blood Pressure 136/67 Sitting, Left Arm      Pulse Oximetry 99%, ROOM AIR            Pain Score      Experiencing any pain?:  No      Pain Scale Used:  Numerical      Pain Intensity:  0            Fatigue Score      Experiencing any fatigue?:  No      Fatigue (0-10 scale):  0 (none)            EXAM      General Appearance:  Alert, Oriented X3, Cooperative, No acute distress      Eyes:  Anicteric Sclerae, Moist Conjunctiva      HEENT:  Orophraynx clear, No Erythema      Neck:  Supple, No Masses or JVD      Respiratory:  Diminished Breath, Normal Respiratory Effort, Other (well-healed     thoracotomy incision)      Abdomen:  Bowel Sounds, Soft, Other (no suprapubic tenderness);          No Distention, No Hepatosplenomegaly, No Tenderness      Cardio:  RRR, No Murmur, No, Peripheral Edema      Psychiatric:  Appropriate  Affect, Intact Judgement      Muscularskeletal:  Normal Gait and Station      Extremities:  No Digital Cyanosis (upper extremities), No Digital Ischemia     (upper extremities)      Lymphatic:  No Axillary, No Cervical, No Infraclavicular, No Supraclavicular            PREVENTION      Hx Influenza Vaccination:  Yes      Date Influenza Vaccine Given:  Oct 1, 2018      Influenza Vaccine Declined:  No      2 or More Falls Past Year?:  No      Fall Past Year with Injury?:  No      Hx Pneumococcal Vaccination:  Yes      Encouraged to follow-up with:  PCP regarding preventative exams.      Chart initiated by      LUI OLMEDO CMA            IMPRESSION/PLAN      Impression      Clinical stage II urothelial carcinoma of the bladder            Diagnosis      Bladder cancer         Malignant neoplasm of overlapping sites of bladder - C67.8         Bladder location: overlapping sites      Patient is biopsy-proven bladder cancer which is muscle invasive. Based on the     NCCN guidelines, I would recommend neoadjuvant chemotherapy followed by radical     cystectomy. He is in good health with no prior end organ damage. I would     recommend dose dense MVAC consisting of methotrexate 30 mg/m ² on day 1,     vinblastine 3 mg/m ² day 2, Adriamycin 30 mg/m ² day 2, cisplatin 70 mg/m ² day 2     with the cycle repeated every 2 weeks. I would plan 3-4 cycles based upon     response. He will receive Neulasta support given a dose dense regimen. Side     effects, risk and benefits discussed in detail with patient and his wife. He is     agreeable to therapy. Recent lab work looked very good.      Prescriptions for Zofran and Compazine will be provided as needed for nausea and    vomiting. We discussed the need to maximize nutrition and fluid intake. He is     seeing surgery tomorrow for discussion of Port-A-Cath. Tentatively plan his     first cycle next week. I will see him back cycle 2 day 1 with labs prior            Heart failure          Heart failure, unspecified HF chronicity, unspecified heart failure type -        I50.9         Heart failure type: unspecified         Heart failure chronicity: unspecified      Apparently well compensated. Echocardiogram prior to Adriamycin therapy      New Diagnostics      * Echo Complete, SCHEDULED PROCEDURE            History of lung cancer - Z85.118      Status post treatment including surgery and adjuvant chemotherapy RT. CE. Given    the new cancer, I will obtain a formal CT of the chest      New Diagnostics      * Chest W/ Cont CT, SCHEDULED PROCEDURE            Notes      New Medications      * Ondansetron HCl (Zofran) 8 MG TABLET: 8 MG PO Q8H PRN NAUSEA #30      * Prochlorperazine Maleate 10 MG TAB: 10 MG PO Q6H PRN NAUSEA AND/OR VOMITING       #60            Patient Education            Cisplatin Injection      Doxorubicin      Methotrexate      Pegfilgrastim Injection      Vinblastine      Patient Education Provided:  Yes                 Disclaimer: Converted document may not contain table formatting or lab diagrams. Please see [employer] for the authenticated document.

## 2021-05-28 NOTE — PROGRESS NOTES
Patient: NIALL HAYES     Acct: [id number]     Report: #[id number]  UNIT #: [id number]     : 1955    Encounter Date:2019  PRIMARY CARE: Rex Hayden  ***Signed***  --------------------------------------------------------------------------------------------------------------------  NURSE INTAKE      Visit Type      Established Patient Visit            Chief Complaint      BLADDER CA      Intent of Therapy:  Curative            Referring Provider/Copies To      Referring Provider:  Bartolo Webber            History and Present Illness      Past Oncology Illness History      Mr. Hayes is a pleasant 64yo WM with hx hematuria. Cystoscopy revealed mass in     the bladder. 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            HPI - Oncology Interim      Patient returns today for follow-up.  Since his last visit, he has completed     radical cystoprostatectomy with neobladder construction.  Patient states that     the procedure went well.  He did require 1 additional trip to the operating room    for catheter malfunction but states that he is almost back to his normal health.     He is eating and drinking well.  His weight is maintained.  He notes his energy    level is good.  He denies any issues from his Port-A-Cath and is having it     flushed routinely.  He denies any hematuria or changes to the urine.  No new     masses or lymphadenopathy.  Overall he feels great.            Cancer Details            Bladder, urothelial carcinoma            Clinical Staging      Clinical stage II (T2, N0, M0), YPT0, N0, M0 after neoadjuvant chemotherapy     followed by resection            Clinical Trial Participant      No            ECOG Performance Status      0            PAST, FAMILY   Past Medical History      Past Medical History:  Arthritis, COPD, Hypertension, Kidney Disease (kidney     failure)      Hematology/Oncology (M):  Bladder Cancer, Lung Cancer            Past Surgical History      Biopsy  (lung left), Fracture Repair, Lung Biopsy, VAD Placement            Family History      Family History:  Lung Cancer (father and 5 paternal uncles), Prostate Cancer     (brother)            Social History      Lives independently:  Yes      Occupation:  on leave            Tobacco Use      Tobacco status:  Former smoker      Quit status:  Quit date established (2008)            Alcohol Use      Alcohol intake:  None            Substance Use      Substance use:  Denies use            REVIEW OF SYSTEMS      General:  Admits: Fatigue;          Denies: Appetite Change, Fever, Night Sweats, Weight Gain, Weight Loss      Eye:  Denies: Blurred Vision, Corrective Lenses, Diplopia, Eye Irritation, Eye     Pain, Eye Redness, Spots in Vision, Vision Loss      ENT:  Denies: Headache, Hearing Loss, Hoarseness, Seizures, Sinus Congestion,     Sore Throat      Cardiovascular:  Denies: Chest Pain, Edema Ankles, Edema Legs, Irregular     Heartbeat, Palpitations      Respiratory:  Admits: Shortness of Air;          Denies: Coughing Blood, Productive Cough, Wheezing      Gastrointestinal:  Denies: Bloody Stools, Constipation, Diarrhea, Frequent     Heartburn, Nausea, Problem Swallowing, Tarry Stools, Unable to Control Bowels,     Vomiting      Genitourinary (male):  Denies: Blood in Urine, Decrease Urine Stream, Frequent     Urination, Incontinence, Painful Urination      Musculoskeletal:  Denies: Back Pain, Leg Cramps, Muscle Pain, Muscle Weakness,     Painful Joints, Swollen Joints      Integumentary:  Denies: Bleeds Easily, Bruises Easily, Hair Changes, Jaundice,     Lesions, Mole Changes, Nail Changes, Pigment Changes, Rash, Skin Discoloration      Neurologic:  Denies: Dizziness, Fainting, Numbness\Tingling, Paralysis, Seizures      Psychiatric:  Denies: Anxiety, Confused, Depression, Disoriented, Memory Loss      Endocrine:  Denies: Cold Intolerance, Diabetes, Excessive Sweating, Excessive     Thirst, Excessive Urination, Heat  Intolerance, Flushing, Hyperthyroidism,     Hypothyroidism      Hematologic/Lymphatic:  Denies: Bruising, Bleeding, Enlarged Lymph Nodes,     Recurrent Infections, Transfusions            VITAL SIGNS AND SCORES      Vitals      Height 5 ft 10 in / 177.8 cm      Weight 207 lbs 0.191 oz / 93.9 kg      BSA 2.12 m2      BMI 29.7 kg/m2      Temperature 98.2 F / 36.78 C - Temporal      Pulse 104      Respirations 20      Blood Pressure 152/72 Sitting, Left Arm      Pulse Oximetry 96%, ROOM AIR            Pain Score      Pain Scale Used:  Numerical      Pain Intensity:  0            Fatigue Score      Fatigue (0-10 scale):  4            EXAM      General Appearance:  Positive for: Alert, Oriented x3, Cooperative;          Negative for: Acute Distress      Neck:  Positive for: Supple;          Negative for: JVD, Masses      Respiratory:  Positive for: CTAB, Normal Respiratory Effort      Chest:  Port in Place      Abdomen/Gastro:  Positive for: Normal Active Bowel Sounds, Soft;          Negative for: Distention, Hepatosplenomegaly, Tenderness      Cardiovascular:  Positive for: RRR;          Negative for: Gallop, Murmur, Peripheral Edema, Rub      Psychiatric:  Positive for: Appropriate Affect, Intact Judgement      Lymphatic:  Negative for: Axillary, Cervical, Infraclavicular, Supraclavicular            PREVENTION      Hx Influenza Vaccination:  Yes      Date Influenza Vaccine Given:  Oct 1, 2018      Influenza Vaccine Declined:  No      2 or More Falls Past Year?:  No      Fall Past Year with Injury?:  No      Encouraged to follow-up with:  PCP regarding preventative exams.      Chart initiated by      THA GOLDMAN [facility]            ALLERGY/MEDS      Allergies      Coded Allergies:             NO KNOWN ALLERGIES (Unverified , 2/13/19)            Medications      Last Reconciled on 11/27/18 13:01 by SARAH TAVERAS      Apixaban (Eliquis) 5 Mg Tablet      5 MG PO BID for 30 Days, #60 TAB         Prov: Joseph Burton          11/22/18       Carvedilol (Carvedilol) 6.25 Mg Tablet      12.5 MG PO BID for 30 Days, #120 TAB         Prov: Sangeetha Burtonsh         11/22/18       MDI-Advair 250/50 (Advair 250/50 Diskus) 1 Each Blst.w.dev      1 PUFF INH RTBID PRN for COPD, #1 INH 0 Refills         Reported         11/16/18       Polyethylene Glycol (Miralax*) 17 Gm Packet      17 GM PO HS, #30 PKT 0 Refills         Reported         10/24/18       Rosuvastatin Calcium (Crestor*) 5 Mg Tab      5 MG PO HS, #30 TAB 0 Refills         Reported         10/12/16       Montelukast Sodium (Singulair*) 10 Mg Tab      10 MG PO QHS, TAB 0 Refills         Reported         7/9/09      Medications Reviewed:  Changes made to meds            IMPRESSION/PLAN      Impression      Bladder cancer, muscle invasive, stage II status post neoadjuvant chemotherapy     with dose dense MVAC x3 followed by resection with complete pathologic response            Diagnosis      Bladder cancer         Malignant neoplasm of urinary bladder, unspecified site - C67.9         Bladder location: unspecified site      Patient has completed resection.  He had complete pathologic response on his     specimen.  Clinically doing great.  I will see him back in April with PET scan     and lab work prior.  Port flush monthly.  Medical records from his surgery will     be requested            Anemia due to chemotherapy - D64.81, T45.1X5A      Repeat CBC at next visit to ensure resolution            SAMARA (acute kidney injury) - N17.9      At his last visit, creatinine was improving.  I will plan to repeat labs at his     next visit.            Notes      Discontinued Medications      * Prochlorperazine Maleate 10 MG TAB: 10 MG PO Q6H PRN NAUSEA AND/OR VOMITING       #60      * Cephalexin Monohydrate (Keflex*) 500 MG CAPSULE: 500 MG PO Q12HR 7 Days #14      * Nystatin (Nystatin*) 100,000 UNIT/1 ML ORAL.SUSP: 500,000 UNITS SWISH.SWAL QID      #1      * AQUAPHOR (AQUAPHOR WITH NATURAL HEALING)  50 GM OINT...G.: 1 APL TOPICAL BID #1      New Diagnostics      * CBC With Auto Diff, 4 Months         Dx: Bladder cancer - C67.9      * CMP Comp Metabolic Panel, 4 Months         Dx: Bladder cancer - C67.9            Patient Education      Patient Education Provided:  Yes                 Disclaimer: Converted document may not contain table formatting or lab diagrams. Please see [employer] for the authenticated document.

## 2021-05-28 NOTE — PROGRESS NOTES
Patient: NIALL RIOS     Acct: [id number]     Report: #[id number]  UNIT #: [id number]     : 1955    Encounter Date:2020  PRIMARY CARE: Rex Hayden  ***Signed***  --------------------------------------------------------------------------------------------------------------------  Chief Complaint      Encounter Date      2020            Primary Care Provider      Rex Hayden            Referring Provider      Rex Hayden            Patient Complaint      Patient is complaining of      hx of lung cancer/ post lobectomy            VITALS      Height 5 ft 11 in / 180.34 cm      Weight 203 lbs 4 oz / 92.040200 kg      BSA 2.12 m2      BMI 28.3 kg/m2      Temperature 98.4 F / 36.89 C - Oral      Pulse 110      Respirations 16      Blood Pressure 142/64 Sitting, Right Arm      Pulse Oximetry 87%, room air      Initial Exhaled Nitrous Oxide      Date:  2020      Exhaled Nitrous Oxide Results:  17            HPI      The patient is a 64 year old male who has a diagnosis of lung cancer in ,     found to have left upper lobe endobronchial lesion. He underwent left upper     lobectomy and later on he had radiation therapy with Dr. Oconnell and had     adjuvant chemotherapy through Dr. Grande. He also has a diagnosis of prostate     and bladder cancer in 2018. At that time he was treated with chemotherapy. He ha    d hospitalization with septic shock in late 2018. He used to be a smoker, smoked    1 pack per day for 40 years but quit smoking in  when he was diagnosed with     lung cancer. He has shortness of breath with activities, he has cough but dos     not produce much phlegm. He was recently seen in the ER and was diagnosed with     bronchitis when he had fever to 101.5 and was having cough and some phlegm at     that time, his flu swab was negative. He has been on prednisone and is on     nebulizer and Levaquin and his symptoms are slowly improving but not     significantly. His  fever is improving, he has still has some wheezing and chest     tightness.            ROS      Constitutional:  Complains of: Fatigue; Denies: Fever, Weight gain, Weight loss,    Chills, Insomnia, Other      Respiratory/Breathing:  Complains of: Shortness of air, Wheezing, Cough; Denies:    Hemoptysis, Pleuritic pain, Other      Endocrine:  Denies: Polydipsia, Polyuria, Heat/cold intolerance, Diabetes, Other      Eyes:  Denies: Blurred vision, Vision Changes, Other      Ears, nose, mouth, throat:  Denies: Mouth lesions, Thrush, Throat pain,     Hoarseness, Allergies/Hay Fever, Post Nasal Drip, Headaches, Recent Head Injury,    Nose Bleeding, Neck Stiffness, Thyroid Mass, Hearing Loss, Ear Fullness, Dry     Mouth, Nasal or Sinus Pain, Dry Lips, Nasal discharge, Nasal congestion, Other      Cardiovascular:  Denies: Palpitations, Syncope, Claudication, Chest Pain, Wake     up Gasping for air, Leg Swelling, Irregular Heart Rate, Cyanosis, Dyspnea on     Exertion, Other      Gastrointestinal:  Denies: Nausea, Constipation, Diarrhea, Abdominal pain,     Vomiting, Difficulty Swallowing, Reflux/Heartburn, Dysphagia, Jaundice,     Bloating, Melena, Bloody stools, Other      Genitourinary:  Denies: Urinary frequency, Incontinence, Hematuria, Urgency,     Nocturia, Dysuria, Testicular problems, Other      Musculoskeletal:  Denies: Joint Pain, Joint Stiffness, Joint Swelling, Myalgias,    Other      Hematologic/lymphatic:  DENIES: Lymphadenopathy, Bruising, Bleeding tendencies,     Other      Neurological:  Denies: Headache, Numbness, Weakness, Seizures, Other      Psychiatric:  Denies: Anxiety, Appropriate Effect, Depression, Other      Sleep:  No: Excessive daytime sleep, Morning Headache?, Snoring, Insomnia?, Stop    breathing at sleep?, Other      Integumentary:  Denies: Rash, Dry skin, Skin Warm to Touch, Other      Immunologic/Allergic:  Denies: Latex allergy, Seasonal allergies, Asthma,     Urticaria, Eczema, Other       Immunization status:  No: Up to date            FAMILY/SOCIAL/MEDICAL HX      Surgical History:  Yes: Bladder Surgery (TURP /  BLADDER REMOVAL 12/2018), Bowel    Surgery (COLONOSCOPY), Orthopedic Surgery (RIGHT THUMB SX, RIGHT KNEE     ARTHROSCOPY MENISCUS REPAIR ), Vascular Surgery (PORT PLACEMENT/REMOVAL 2008 AND    2018 ), Other Surgeries (RIGHT THUMB INFECTION/DEBRIDEMENT 2004); No: AAA     Repair, Abdominal Surgery, Adenoids, Angioplasty, Appendectomy, Back Surgery,     Breast Surgery, CABG, Carotid Stenosis, Cholecystectomy, Ear Surgery, Eye     Surgery, Head Surgery, Hernia Surgery, Kidney Surgery, Nose Surgery, Oral     Surgery, Prostatectomy, Rectal Surgery, Spinal Surgery, Testicular Surgery,     Throat Surgery, Tonsils, Valve Replacement      Other Family Medical History:  Father (lung)      Is Father Still Living?:  No      Is Mother Still Living?:  No       Family History:  Yes      Social History:  No Tobacco Use, No Alcohol Use, No Recreational Drug use      Smoking status:  Former smoker (1 ppd for 40 years quit 2008)      Anticoagulation Therapy:  No      Antibiotic Prophylaxis:  No      Medical History:  Yes: Arthritis (RIGHT THUMB), Chemotherapy/Cancer ( BLADDER CA    2018, H/O LUNG [id number]), Chronic Bronchitis/COPD (INHALER), Emphysema, Congestive    Heart Failu, High Blood Pressure (CONTROLLED WITH MEDS), Shortness Of Breath (HX    L UPPER LOBECTOMY,2008, SOA WITH EXERTION); No: Alcoholism, Allergies, Anemia,     Asthma, Atrial Fibrillation, Blood Disease, Broken Bones, Cataracts, Chemical     Dependency, Chronic Liver Disease, Colon Trouble, Colitis, Diverticulitis,     Deafness or Ringing Ears, Convulsions, Depression, Anxiety, Bipolar Disorder,     PTSD, Diabetes, Epilepsy, Seizures, Forgetfullness, Glaucoma, Gall Stones, Gout,    Head Injury, Heart Attack, Heart Murmur, GERD, Hemorrhoids/Rectal Prob,     Hepatitis, Hiatal Hernia, High Cholesterol, HIV (Do not ask - volu, Jaundice,      Kidney or Bladder Disease, Kidney Stones, Migrane Headaches, Mitral Valve     Prolapse, Night sweats, Phlebitis, Psychiatric Care, Reflux Disease, Rheumatic     Fever, Sexually Transmitted Dis, Sinus Trouble, Skin Disease/Psoriais/Ecz,     Stroke, Thyroid Problem, Tuberculosis or Pos TB Te, Miscellaneous Medical/oth      Psychiatric History      none            PREVENTION      Hx Influenza Vaccination:  Yes      Date Influenza Vaccine Given:  Oct 1, 2019      Influenza Vaccine Declined:  No      2 or More Falls Past Year?:  No      Fall Past Year with Injury?:  No      Hx Pneumococcal Vaccination:  Yes      Encouraged to follow-up with:  PCP regarding preventative exams.      Chart initiated by      joe katz/ ma            ALLERGIES/MEDICATIONS      Allergies:        Coded Allergies:             NO KNOWN ALLERGIES (Unverified , 2/26/20)      Medications    Last Reconciled on 2/26/20 17:30 by GUANAKO PHAN MD      Tiotropium Br/Olodaterol HCl (Stiolto Respimat Inhal Spray) 4 Gm Mist.inhal               Prov: Guanako Phan         2/26/20       MDI-Albuterol (Ventolin HFA) 18 Gm Hfa.aer.ad      2 PUFFS INH Q6H PRN for SHORTNESS OF BREATH, #1 MDI 4 Refills         Prov: Guanako Phan         2/26/20       Tiotropium Br/Olodaterol HCl (Stiolto Respimat Inhal Spray) 4 Gm Mist.inhal      2 PUFFS INH RTQDAY, #1 INH 4 Refills         Prov: Guanako Phan         2/26/20       predniSONE (Deltasone) 10 Mg Tablet      10 MG PO ASDIR, #45 TAB 0 Refills         Prov: Guanako Phan         2/26/20       NEB-Albuterol Sulf (Albuterol Sulfate) 5 Mg/1 Ml Solution      2.5 MG INH RTTID, #2 BOTTLE 0 Refills         Reported         2/26/20       Albuterol/Ipratropium (Duoneb) 3 Ml Ampul.neb      3 ML INH RTTID, #120 NEB 0 Refills         Reported         2/26/20       methylPREDNISolone (Medrol) 2 Mg Tablet      4 MG PO QID, TAB 0 Refills         Reported         2/26/20       levoFLOXacin (levoFLOXacin) 750 Mg Tablet      750 MG PO  QDAY, TAB 0 Refills         Reported         2/26/20       MDI-Albuterol (Ventolin HFA) 18 Gm Hfa.aer.ad      2 PUFFS INH Q4H PRN for SHORTNESS OF BREATH, #1 INH 0 Refills         Reported         2/26/20       Carvedilol (Coreg) 6.25 Mg Tablet      12.5 MG PO HS, #120 TAB 0 Refills         Reported         12/26/19       Olmesartan (Benicar) 5 Mg Tablet      5 MG PO QDAY, #30 TAB         Reported         12/26/19       Carvedilol (Carvedilol) 6.25 Mg Tablet      6.25 MG PO QDAY, #30 TAB 0 Refills         Reported         5/6/19       Apixaban (Eliquis) 5 Mg Tablet      5 MG PO BID for 30 Days, #60 TAB         Prov: Josephine,Sangeethash         11/22/18       MDI-Advair 250/50 (Advair 250/50 Diskus) 1 Each Blst.w.dev      1 PUFF INH RTBID PRN for COPD, #1 INH 0 Refills         Reported         11/16/18       Rosuvastatin Calcium (Crestor*) 5 Mg Tab      5 MG PO HS, #30 TAB 0 Refills         Reported         10/12/16       Montelukast Sodium (Singulair*) 10 Mg Tab      10 MG PO QHS, TAB 0 Refills         Reported         7/9/09      Current Medications      Current Medications Reviewed 2/26/20            EXAM      CONSTITUTIONAL: Pleasant male in no acute distress,  normal conversant.       EYES : Pink conjunctive, no ptosis, PERRL.       ENMT : Nose and ears appear normal, normal dentition, mild posterior pharyngeal     wall erythema, no sinus tenderness. Mallampati classification II      Neck: Nontender, no masses, no thyromegaly, no nodules.      Resp : Bilateral diminished breath sounds, no wheezing, crackles or rhonchi.      Resonant to percussion bilaterally.      CVS  : No carotid bruits, s1s2 nl, RRR, no murmur, rubs or gallop, no peripheral    edema       Chest wall: Normal rise with inspiration, nontender on palpation.      GI   : Abdomen soft, with no masses, no hepatosplenomegaly, no hernias, BS+      MSK  : Normal gait and station, no digital cyanosis or clubbing       Skin : No rashes, ulcerations or lesions,  normal turgor and temperature      Neuro: CN II - XII intact, no sensory deficits, DTRs intact and symmetrical, no     motor weakness      Psych: Appropriate affect, A   Vtials      Vitals:             Height 5 ft 11 in / 180.34 cm           Weight 203 lbs 4 oz / 92.263349 kg           BSA 2.12 m2           BMI 28.3 kg/m2           Temperature 98.4 F / 36.89 C - Oral           Pulse 110           Respirations 16           Blood Pressure 142/64 Sitting, Right Arm           Pulse Oximetry 87%, room air            REVIEW      Results Reviewed      PCCS Results Reviewed?:  Yes Prev Lab Results, Yes Prev Radiology Results, Yes     Previous Mec[facility] Records      Lab Results      The patient's recent ER visit was reviewed. The patient's recent blood work     showed normal white blood cell and mildly decreased hemoglobin at 11.8 ,     platelet count is 166, normal renal profile.      Radiographic Results               [facility]                PACS RADIOLOGY REPORT            Patient: NIALL RIOS   Acct: #[id number]   Report: #[id number]            UNIT #: [id number]    DOS: 10/28/19 1516      INSURANCE:[facility]   ORDER #:[id number]      LOCATION:CT     : 1955            PROVIDERS      ADMITTING:     ATTENDING: MARIANA KWON      FAMILY:  Rex Hayden   ORDERING:  MARIANA KWON         OTHER:    DICTATING:  Rustam Blair MD            REQ #:[id number]   EXAM:CTACPWC - CT ABD CHEST PEL w CONTR      REASON FOR EXAM:  BLADDER CA      REASON FOR VISIT:  BLADDER CA            *******Signed******         PROCEDURE:   CT ABDOMEN; CT CHEST; CT PELVIS WITH CONTRAST             COMPARISON:   [facility], PET, SKULL BASE TO MID THIGH SUBSEQ,     2019, 12:08.        [facility], CT, ABDOMEN PELVIS W/WO CONTRAST, 2019, 15:41.             INDICATIONS:   BLADDER CA, LUNG CA.             TECHNIQUE:   After  obtaining the patient's consent, CT images were obtained with    intravenous contrast       material.      PROTOCOL:     Standard imaging protocol performed                RADIATION:     DLP: 2144mGy*cm          Automated exposure control was utilized to minimize radiation dose.       CONTRAST:   100cc Isovue 370 I.V.      LABS:     eGFR: >60ml/min/1.73m2             FINDINGS:         Chest:             There are changes from left upper lobectomy, and the remainder of the central     tracheobronchial tree       is clear.  There is moderate centrilobular emphysema.  There is a stable     consolidation in the mid       left lung compatible with post-treatment changes.  No discrete pulmonary nodule     or mass is       identified.  There is no pleural effusion.             A right internal jugular port has its tip at the lower SVC.  The heart size     appears normal, with       partially calcified atherosclerotic disease within the coronary arteries.  The     great vessels are       normal in caliber.  No abnormally enlarged lymph nodes are identified.             No aggressive osseous lesions are identified.             Abdomen/pelvis:             The liver, adrenal glands, kidneys, spleen, and pancreas are unremarkable.      There is a bulky stone       within the gallbladder.             The stomach appears normal.  The small bowel appears normal in caliber and     configuration.  There is       sigmoid diverticulosis.  The appendix appears normal.  There is no ascites or     loculated collection.        No abnormally enlarged lymph nodes are identified.             The rectum is unremarkable.  The prostate and urinary bladder are surgically     absent, with creation       of a neobladder.             No aggressive osseous lesions are identified.             CONCLUSION:         1. No convincing evidence of metastatic disease within chest/abdomen/pelvis.      2. Stable postsurgical changes within left mid lung.       3. Moderate emphysema.      4. Cholelithiasis.      5. Sigmoid diverticulosis.              JOSH HELM MD             Electronically Signed and Approved By: JOSH HELM MD on 10/28/2019 at     16:16                        Until signed, this is an unconfirmed preliminary report that may contain      errors and is subject to change.                                              RODD1:      D:10/28/19 1606                     [facility]                PACS RADIOLOGY REPORT            Patient: NIALL RIOS   Acct: #[id number]   Report: #[id number]            UNIT #: [id number]    DOS: 20 1715      INSURANCE:[employer] [facility]   ORDER #:[id number]      LOCATION:[facility]     : 1955            PROVIDERS      ADMITTING:     ATTENDING: Rex Hayden      FAMILY:  NONE,MD   ORDERING:  Rex Hayden         OTHER:    DICTATING:  Yury Adler MD, IV            REQ #:[id number]   EXAM:CXR2 - CHEST 2V AP PA LAT      REASON FOR EXAM:        REASON FOR VISIT:  DYSPNEA            *******Signed******         PROCEDURE:   CHEST AP/PA AND LATERAL             COMPARISON:   [facility], CT, CT CHEST ABD PEL W CONTRAST,     10/28/2019, 15:26.  [facility], CR, CHEST AP/PA 1 VIEW, 2020, 8:51.             INDICATIONS:   DYSPNEA AND HYPOXIA FOR AB2-3 DAYS.             FINDINGS:                The lungs are well-expanded. The heart and pulmonary vasculature are within     normal limits. No       pleural effusions are identified. There are no active appearing infiltrates.      Stable postoperative       changes in the left hemithorax.  There is a precordial loop recorder.             IMPRESSION: No active disease.             YURY ADLER MD             Electronically Signed and Approved By: YURY ADLER MD on 2020 at 17:28                           Until signed, this is an unconfirmed preliminary  report that may contain      errors and is subject to change.                                              ELADIO:      D:02/24/20 1728            Assessment      COPD (chronic obstructive pulmonary disease)         Centrilobular emphysema - J43.2         Emphysema type: centrilobular            Notes      New Medications      * MDI-Albuterol (Ventolin HFA) 18 GM HFA.AER.AD: 2 PUFFS INH Q4H PRN SHORTNESS       OF BREATH #1      * levoFLOXacin 750 MG TABLET: 750 MG PO QDAY      * methylPREDNISolone (Medrol) 2 MG TABLET: 4 MG PO QID      * Albuterol/Ipratropium (Duoneb) 3 ML AMPUL.NEB: 3 ML INH RTTID #120         Instructions: DIAGNOSIS CODE REQUIRED PRIOR TO PRESCRIBING.      * NEB-Albuterol Sulf (Albuterol Sulfate) 5 MG/1 ML SOLUTION: 2.5 MG INH RTTID #2         Instructions: DIAGNOSIS CODE REQUIRED PRIOR TO PRESCRIBING.      * predniSONE (Deltasone) 10 MG TABLET: 10 MG PO ASDIR #45         Instructions: [id number],59eot9d,07esl4c,78ukn8k,58edm0z      * Tiotropium Br/Olodaterol HCl (Stiolto Respimat Inhal [place]) 4 GM MIST.INHAL: 2      PUFFS INH RTQDAY #1      * MDI-Albuterol (Ventolin HFA) 18 GM HFA.AER.AD: 2 PUFFS INH Q6H PRN SHORTNESS       OF BREATH #1      * Tiotropium Br/Olodaterol HCl (Stiolto Respimat Inhal Spray) 4 GM MIST.INHAL          Sample - Qty 2         Instructions: 2 puffs qd         Dx: COPD (chronic obstructive pulmonary disease) - J44.9      New Diagnostics      * PFT-Comp, PrePost,DLCO,BodyBox, Week         Dx: COPD (chronic obstructive pulmonary disease) - J44.9      * 6 Min Walk w O2 Titration Test, Routine         Dx: COPD (chronic obstructive pulmonary disease) - J44.9      * Immunoglobulin  E (I, Week         Dx: COPD (chronic obstructive pulmonary disease) - J44.9      * Overnight Oximetry, Routine         Dx: COPD (chronic obstructive pulmonary disease) - J44.9      * ABGS, Routine         Dx: COPD (chronic obstructive pulmonary disease) - J44.9      PLAN:      The patient is a 64 year old  male with history of chronic heavy smoking in the     past, quit smoking in 2008 when he was diagnosed with left upper lobe lung     cancer status post surgery and adjuvant chemotherapy and radiation therapy. He     has shortness of breath with exertion and recurrent and persistent bronchitis.             1. Recurrent and persistent bronchitis. I will check 6 minute walk test, serum     IgE level, overnight oximetry. He may need oxygen with sleep. I will check     arterial blood gas. He feels some better but still has chest tightness. I will     give him Stiolto inhaler to use 2 puffs daily and use Ventolin as needed. I have    also started on prednisone taper 50 mg daily and slowly taper 10 mg every 3 days    to finish in 15 days. I have given him levofloxacin for 7-10 days.       2. He is currently active and working doing heavy physical exertion and I     advised him not to do heavy exertion for now.       3. He has hypoxia today with oxygen saturation to 87% after exertion. He may     need to be on oxygen in the future.       4. I will follow up with him in 6-8 weeks, earlier if needed.            Patient Education      Education resources provided:  Yes      Patient Education Provided:  Acute Bronchitis            Patient Education:        Chronic Obstructive Pulmonary Disease            Electronically signed by Guanako Phan  03/22/2020 12:16       Disclaimer: Converted document may not contain table formatting or lab diagrams. Please see [employer] for the authenticated document.

## 2021-05-28 NOTE — PROGRESS NOTES
Patient: NIALL HAYES     Acct: [id number]     Report: #[id number]  UNIT #: [id number]     : 1955    Encounter Date:2018  PRIMARY CARE: Rex Hayden  ***Signed***  --------------------------------------------------------------------------------------------------------------------  NURSE INTAKE      Visit Type      Established Patient Visit            Chief Complaint      bladder ca      Intent of Therapy:  Curative            Referring Provider/Copies To      Referring Provider:  Bartolo Webber            History and Present Illness      Past Oncology Illness History      Mr. Hayes is a pleasant 64yo WM with hx hematuria. Cystoscopy revealed mass in     the bladder. 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            HPI - Oncology Interim      Pt returns to clinic since d/c from acute care for neutropenic fever and SAMARA.      Pt informs he feels much better. He reports his mouth has completely healed and     he is eating/drinking without issues.  He is trying to drink extra water to help    kidneys.  Lab work 18 showed normal WBC, RBC 2.81, HH 8.86/25.4 with plt     ct 559 and creatinine sl improved to 1.43.    No fever, lymphadenopathy or     distress noted.            Cancer Details            Bladder, urothelial carcinoma            Clinical Staging      Clinical stage II (T2, N0, M0)            Treatments      Chemotherapy      10/10/18 began DD MVAC (goal to complete 4)            Clinical Trial Participant      No            ECOG Performance Status      0            Most Recent Lab Findings      Laboratory Tests      18 07:15            Laboratory Tests            Test       18      04:01             Magnesium Level       1.63 mg/dL      (1.60-2.30)            PAST, FAMILY   Past Medical History      Past Medical History:  Arthritis, COPD, Hypertension, Kidney Disease (kidney     failure)      Hematology/Oncology (M):  Bladder Cancer, Lung Cancer            Past  Surgical History      Biopsy (lung left), Fracture Repair, Lung Biopsy, VAD Placement            Family History      Family History:  Lung Cancer (father and 5 paternal uncles), Prostate Cancer     (brother)            Social History      Lives independently:  Yes      Occupation:  on leave            Tobacco Use      Tobacco status:  Former smoker      Quit status:  Quit date established (2008)            Alcohol Use      Alcohol intake:  None            Substance Use      Substance use:  Denies use            REVIEW OF SYSTEMS      General:  Admits: Fatigue;          Denies: Appetite Change, Fever, Night Sweats, Weight Gain, Weight Loss      Eye:  Denies: Blurred Vision, Corrective Lenses, Diplopia, Eye Irritation, Eye     Pain, Eye Redness, Spots in Vision, Vision Loss      ENT:  Denies: Headache, Hearing Loss, Hoarseness, Seizures, Sinus Congestion,     Sore Throat      Cardiovascular:  Denies: Chest Pain, Edema Ankles, Edema Legs, Irregular     Heartbeat, Palpitations      Respiratory:  Admits: Shortness of Air;          Denies: Coughing Blood, Productive Cough, Wheezing      Gastrointestinal:  Denies: Bloody Stools, Constipation, Diarrhea, Frequent     Heartburn, Nausea, Problem Swallowing, Tarry Stools, Unable to Control Bowels,     Vomiting      Genitourinary (male):  Denies: Blood in Urine, Decrease Urine Stream, Frequent     Urination, Incontinence, Painful Urination      Musculoskeletal:  Denies: Back Pain, Leg Cramps, Muscle Pain, Muscle Weakness,     Painful Joints, Swollen Joints      Integumentary:  Denies: Bleeds Easily, Bruises Easily, Hair Changes, Jaundice,     Lesions, Mole Changes, Nail Changes, Pigment Changes, Rash, Skin Discoloration      Neurologic:  Denies: Dizziness, Fainting, Numbness\Tingling, Paralysis, Seizures      Psychiatric:  Denies: Anxiety, Confused, Depression, Disoriented, Memory Loss      Endocrine:  Denies: Cold Intolerance, Diabetes, Excessive Sweating, Excessive     Thirst,  Excessive Urination, Heat Intolerance, Flushing, Hyperthyroidism,     Hypothyroidism            VITAL SIGNS AND SCORES      Vitals      Height 5 ft 10 in / 177.8 cm      Weight 219 lbs 12.778 oz / 99.7 kg      BSA 2.17 m2      BMI 31.5 kg/m2      Temperature 97.5 F / 36.39 C - Temporal      Pulse 73      Respirations 18      Blood Pressure 143/75 Sitting, Left Arm      Pulse Oximetry 97%, rm air            Pain Score      Pain Scale Used:  Numerical      Pain Intensity:  0            Fatigue Score      Fatigue (0-10 scale):  9            EXAM      General Appearance:  Positive for: Alert, Oriented x3, Cooperative;          Negative for: Acute Distress      HEENT:  Positive for: Oropharynx clear      Neck:  Negative for: JVD, Masses, Supple      Respiratory:  Positive for: CTAB, Normal Respiratory Effort      Chest:  Port in Place      Abdomen/Gastro:  Positive for: Normal Active Bowel Sounds, Soft;          Negative for: Distention, Hepatosplenomegaly, Tenderness      Cardiovascular:  Positive for: RRR;          Negative for: Gallop, Murmur, Peripheral Edema, Rub      Psychiatric:  Positive for: Appropriate Affect, Intact Judgement      Upper Extremities:  Negative for: Clubbing, Digital Cyanosis, Digital Ischemia      Lymphatic:  Negative for: Axillary, Cervical, Infraclavicular, Supraclavicular            PREVENTION      Hx Influenza Vaccination:  Yes (2018)      Date Influenza Vaccine Given:  Oct 1, 2018      Influenza Vaccine Declined:  No      2 or More Falls Past Year?:  No      Fall Past Year with Injury?:  No      Hx Pneumococcal Vaccination:  Yes (2017)      Encouraged to follow-up with:  PCP regarding preventative exams.      Chart initiated by      sarah camarillo ma            ALLERGY/MEDS      Allergies      Coded Allergies:             NO KNOWN ALLERGIES (Unverified , 11/27/18)            Medications      Last Reconciled on 11/27/18 13:01 by SARAH TAVERAS      Apixaban (Eliquis) 5 Mg Tablet       5 MG PO BID for 30 Days, #60 TAB         Prov: Joseph Burton         11/22/18       Aquaphor (AQUAPHOR WITH NATURAL HEALING) 50 Gm Oint...g.      1 APL TOPICAL BID, #1 TUBE         Prov: Joseph Burton         11/22/18       Carvedilol (Carvedilol) 6.25 Mg Tablet      12.5 MG PO BID for 30 Days, #120 TAB         Prov: Joseph Burton         11/22/18       Nystatin (Nystatin*) 100,000 Unit/1 Ml Oral.susp      [id number] UNITS SWISH.SWAL QID, #1 BOTTLE         Prov: Joseph Burton         11/22/18       Cephalexin Monohydrate (Keflex*) 500 Mg Capsule      500 MG PO Q12HR for 7 Days, #14 CAP 0 Refills         Prov: Joseph Burton         11/22/18       MDI-Advair 250/50 (Advair 250/50 Diskus) 1 Each Blst.w.dev      1 PUFF INH RTBID PRN for COPD, #1 INH 0 Refills         Reported         11/16/18       Polyethylene Glycol (Miralax*) 17 Gm Packet      17 GM PO HS, #30 PKT 0 Refills         Reported         10/24/18       Prochlorperazine Maleate (Prochlorperazine Maleate) 10 Mg Tab      10 MG PO Q6H PRN for NAUSEA AND/OR VOMITING, #60 TAB 3 Refills         Prov: CHERMARIANA         10/4/18       Rosuvastatin Calcium (Crestor*) 5 Mg Tab      5 MG PO HS, #30 TAB 0 Refills         Reported         10/12/16       Montelukast Sodium (Singulair*) 10 Mg Tab      10 MG PO QHS, TAB 0 Refills         Reported         7/9/09      Medications Reviewed:  No Changes made to meds            IMPRESSION/PLAN      Impression      Muscle invasive bladder cancer            Diagnosis      Bladder cancer         Malignant neoplasm of urinary bladder, unspecified site - C67.9         Bladder location: unspecified site      Patient is on dose dense MVAC in a neoadjuvant fashion.  He has completed 3     cycles, the last complicated by pancytopenia, neutropenic fever and acute kidney    injury.  He is recovering and feeling much better.  I discussed the case with     his urologist, Dr. Webber.  He would like the patient to complete all 4  cycles    of chemotherapy.  Patient needs additional time to recuperate and will certainly    need dose reduction for cycle.  I would plan a 25% dose reduction given the     above problems.  After cycle 4, I would plan restaging scans with anticipation     of resection.  I will plan cycle 4 next week.            SAMARA (acute kidney injury) - N17.9      slowly improving. monitor            Anemia due to chemotherapy - D64.81, T45.1X5A      better after transfusion            Notes      Discontinued Medications      * Ondansetron HCl (Zofran) 8 MG TABLET: 8 MG PO Q8H PRN NAUSEA #30      * OMEPRAZOLE (PriLOSEC*) 20 MG CAPSULE.DR: 20 MG PO QDAY 30 Days #30      * Lidocaine (Xylocaine 2% Viscous) 100 ML SOLUTION: 15 ML SWISH.SPIT TIDAC #1            Patient Education      Patient Education Provided:  Yes                 Disclaimer: Converted document may not contain table formatting or lab diagrams. Please see [employer] for the authenticated document.

## 2021-05-28 NOTE — PROGRESS NOTES
Patient: NIALL HAYES     Acct: [id number]     Report: #[id number]  UNIT #: [id number]     : 1955    Encounter Date:2019  PRIMARY CARE: Rex Hayden  ***Signed***  --------------------------------------------------------------------------------------------------------------------  NURSE INTAKE      Visit Type      Established Patient Visit            Chief Complaint      BLADDER CA HERE FOR 6 MONTH CHECK UP            Referring Provider/Copies To      Referring Provider:  Bartolo Webber      Primary Care Provider:  Rxe Hayden            History and Present Illness      Past Oncology Illness History      Mr. Hayes is a pleasant 64yo WM with hx hematuria. Cystoscopy revealed mass in     the bladder. 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            HPI - Oncology Interim      Patient returns today for ongoing surveillance of his his cancers.  He had lung     cancer in 2900 dense left upper lobectomy.   he had bladder cancer as well     as incidentally identified prostate cancer at the time of radical     cystoprostatectomy.  Patient states he has been doing well since his last visit.     He is back to working full-time.  His energy level continues to improve.  He     denies any masses lymphadenopathy.  No unusual aches or pains.  He reports good     function from his neobladder.  He is eating drinking well, his weight is     maintained.  He states his Port-A-Cath is working well and he has it flushed     routinely.  He did receive a flu shot this year.            Cancer Details            Bladder, urothelial carcinoma      Prostate cancer, adenocarcinoma, 4+3 [place] score            Clinical Staging      Stage II ([id number])            Treatments      Chemotherapy      10/10/18 began DD MVAC      Surgeries      Left upper lobectomy .  Radical cystoprostatectomy with neobladder             Clinical Trial Participant      No            ECOG Performance Status      0             Most Recent Lab Findings            Item Value  Date Time             White Blood Count 8.59 10*3/uL 10/28/19 1520             Red Blood Count 4.00 10*6/uL L 10/28/19 1520             Hemoglobin 12.4 g/dL L 10/28/19 1520             Hematocrit 38.6 % L 10/28/19 1520             Mean Corpuscular Volume 96.5 fL H 10/28/19 1520             Mean Corpuscular Hemoglobin 31.0 pg 10/28/19 1520             Red Cell Distribution Width 12.8 % 10/28/19 1520             Mean Corpuscular Hemoglobin Concent 32.1 L 10/28/19 1520             RDW Standard Deviation 45.5 fL H 10/28/19 1520             Platelet Count 219 10*3/uL 10/28/19 1520             Mean Platelet Volume 10.0 fL 10/28/19 1520             Granulocytes (%) 63.3 % 10/28/19 1520             Sodium Level 141 mmol/L 10/28/19 1520             Potassium Level 4.7 mmol/L 10/28/19 1520             Chloride Level 97 mmol/L L 10/28/19 1520             Carbon Dioxide Level 30 mmol/L 10/28/19 1520             Anion Gap 19 mmol/L 10/28/19 1520             Blood Urea Nitrogen 23 mg/dL 10/28/19 1520             Creatinine 1.36 mg/dL H 10/28/19 1520             Bedside Creatinine 1.5 mg/dL H 10/28/19 1530             Bedside Glomerular Filtration Rate 49 mL/min/{1.73_m2} L 10/28/19 1530             Glomerular Filtration Rate Calc 55 mL/min/{1.73_m2} L 10/28/19 1520             BUN/Creatinine Ratio 17 {ratio} 10/28/19 1520             Glucose Level 88 mg/dL 10/28/19 1520             Serum Osmolality 295 10/28/19 1520             Calcium Level 10.0 mg/dL 10/28/19 1520             Total Bilirubin 0.26 mg/dL 10/28/19 1520             Aspartate Amino Transf (AST/SGOT) 18 U/L 10/28/19 1520             Alanine Aminotransferase (ALT/SGPT) 11 U/L 10/28/19 1520             Alkaline Phosphatase 78 U/L 10/28/19 1520            Most Recent Imaging Findings      Patient: NIALL RIOS   Acct: #[id number]   Report: #[id number]            UNIT #: [id number]    DOS: 10/28/19 1516       INSURANCE:[employer] - P[employer]   ORDER #:[id number]      LOCATION:CT     : 1955            PROVIDERS      ADMITTING:     ATTENDING: MARIANA KWON      FAMILY:  Rex Hayden   ORDERING:  MARIANA KWON         OTHER:    DICTATING:  Rustam Blair MD            REQ #:[id number]   EXAM:CTACPWC - CT ABD CHEST PEL w CONTR      REASON FOR EXAM:  BLADDER CA      REASON FOR VISIT:  BLADDER CA            *******Signed******         PROCEDURE:   CT ABDOMEN; CT CHEST; CT PELVIS WITH CONTRAST             COMPARISON:   [facility], PET, SKULL BASE TO MID THIGH SUBSEQ,     2019, 12:08.        [facility], CT, ABDOMEN PELVIS W/WO CONTRAST, 2019, 15:41.             INDICATIONS:   BLADDER CA, LUNG CA.             TECHNIQUE:   After obtaining the patient's consent, CT images were obtained with    intravenous contrast       material.      PROTOCOL:     Standard imaging protocol performed                RADIATION:     DLP: 2144mGy*cm          Automated exposure control was utilized to minimize radiation dose.       CONTRAST:   100cc Isovue 370 I.V.      LABS:     eGFR: >60ml/min/1.73m2             FINDINGS:         Chest:             There are changes from left upper lobectomy, and the remainder of the central     tracheobronchial tree       is clear.  There is moderate centrilobular emphysema.  There is a stable c    onsolidation in the mid       left lung compatible with post-treatment changes.  No discrete pulmonary nodule     or mass is       identified.  There is no pleural effusion.             A right internal jugular port has its tip at the lower SVC.  The heart size     appears normal, with       partially calcified atherosclerotic disease within the coronary arteries.  The     great vessels are       normal in caliber.  No abnormally enlarged lymph nodes are identified.             No aggressive osseous lesions are identified.             Abdomen/pelvis:             The  liver, adrenal glands, kidneys, spleen, and pancreas are unremarkable.      There is a bulky stone       within the gallbladder.             The stomach appears normal.  The small bowel appears normal in caliber and     configuration.  There is       sigmoid diverticulosis.  The appendix appears normal.  There is no ascites or     loculated collection.        No abnormally enlarged lymph nodes are identified.             The rectum is unremarkable.  The prostate and urinary bladder are surgically     absent, with creation       of a neobladder.             No aggressive osseous lesions are identified.             CONCLUSION:         1. No convincing evidence of metastatic disease within chest/abdomen/pelvis.      2. Stable postsurgical changes within left mid lung.      3. Moderate emphysema.      4. Cholelithiasis.      5. Sigmoid diverticulosis.              JOSH HELM MD             Electronically Signed and Approved By: JOSH HELM MD on 10/28/2019 at     16:16                        Until signed, this is an unconfirmed preliminary report that may contain      errors and is subject to change.                                              RODD1:      D:10/28/19 1606            PAST, FAMILY   Past Medical History      Past Medical History:  Arthritis, COPD, Hypertension, Kidney Disease (kidney     failure)      Hematology/Oncology (M):  Bladder Cancer, Lung Cancer            Past Surgical History      Biopsy (lung left), Bladder Surgery (BLADDER REMOVED), Fracture Repair, Lung B    iopsy, VAD Placement            Lobectomy 2009            Family History      Family History:  Lung Cancer (father and 5 paternal uncles), Prostate Cancer     (brother)            Social History      Marital Status:        Lives independently:  Yes      Number of Children:  3      Occupation:  [employer]            Tobacco Use      Tobacco status:  Former smoker      Quit status:  Quit date established (2008)             Alcohol Use      Alcohol intake:  None            Substance Use      Substance use:  Denies use            REVIEW OF SYSTEMS      General:  Admits: Fatigue      Eye:  Denies Blurred Vision      ENT:  Denies Headache      Cardiovascular:  Denies Chest Pain      Respiratory:  Denies: Productive Cough      Gastrointestinal:  Denies Bloody Stools      Genitourinary:  Denies Blood in Urine      Musculoskeletal:  Denies Back Pain      Integumentary:  Denies Itching      Neurologic:  Denies Dizziness      Psychiatric:  Denies Anxiety      Endocrine:  Denies Cold Intolerance            VITAL SIGNS AND SCORES      Vitals      Height 5 ft 10.00 in / 177.8 cm      Weight 205 lbs 3.972 oz / 93.1 kg      BSA 2.11 m2      BMI 29.5 kg/m2      Temperature 97.6 F / 36.44 C - Temporal      Pulse 72      Respirations 20      Blood Pressure 149/74 Sitting, Left Arm      Pulse Oximetry 98%, ROOM AIR            Pain Score      Pain Scale Used:  Numerical      Pain Intensity:  0            Fatigue Score      Fatigue (0-10 scale):  5            EXAM      General Appearance:  Positive for: Alert, Oriented x3, Cooperative;          Negative for: Acute Distress      Eye:  Positive for: Anicteric Sclerae, Moist Conjunctiva      Neck:  Positive for: Supple;          Negative for: JVD, Masses      Respiratory:  Positive for: CTAB, Normal Respiratory Effort      Chest:  Port in Place      Abdomen/Gastro:  Positive for: Normal Active Bowel Sounds, Soft;          Negative for: Distention, Hepatosplenomegaly, Tenderness      Cardiovascular:  Positive for: RRR;          Negative for: Gallop, Murmur, Peripheral Edema, Rub      Psychiatric:  Positive for: Appropriate Affect, Intact Judgement      Lymphatic:  Negative for: Cervical, Infraclavicular, Supraclavicular            PREVENTION      Hx Influenza Vaccination:  Yes      Date Influenza Vaccine Given:  Oct 1, 2019      Influenza Vaccine Declined:  No      2 or More Falls Past Year?:  No      Fall  Past Year with Injury?:  No      Encouraged to follow-up with:  PCP regarding preventative exams.      Chart initiated by      THA AGUDELO            ALLERGY/MEDS      Allergies      Coded Allergies:             NO KNOWN ALLERGIES (Unverified , 5/6/19)            Medications      Last Reconciled on 11/18/19 08:38 by ANNAMARIA HERNANDEZ      Carvedilol (Carvedilol) 6.25 Mg Tablet      6.25 MG PO QDAY, #30 TAB 0 Refills         Reported         5/6/19       Apixaban (Eliquis) 5 Mg Tablet      5 MG PO BID for 30 Days, #60 TAB         Prov: Badiwala,Anesh         11/22/18       MDI-Advair 250/50 (Advair 250/50 Diskus) 1 Each Blst.w.dev      1 PUFF INH RTBID PRN for COPD, #1 INH 0 Refills         Reported         11/16/18       Rosuvastatin Calcium (Crestor*) 5 Mg Tab      5 MG PO HS, #30 TAB 0 Refills         Reported         10/12/16       Montelukast Sodium (Singulair*) 10 Mg Tab      10 MG PO QHS, TAB 0 Refills         Reported         7/9/09      Medications Reviewed:  No Changes made to meds            IMPRESSION/PLAN      Diagnosis      Bladder cancer         Malignant neoplasm of urinary bladder, unspecified site         Bladder location: unspecified site      Status post neoadjuvant chemotherapy followed by surgery.  Patient is doing     well.  I see no evidence of disease recurrence by his history, physical     examination or recent scans.  Lab work shows only slight increase in the     creatinine at 1.3.  We discussed maximizing fluid intake.  He is interested in     having his Port-A-Cath removed.  He is now more than a year out from     chemotherapy and it would be reasonable to remove the port.  He will follow-up     with a surgeon in that regard.  Follow-up with urology as already scheduled.  I     will see him back in 6 months for ongoing surveillance with labs and scans     prior.            Prostate cancer - C61      Noted incidentally at the time of radical cystoprostatectomy.  Asymptomatic.       Monitor            History of lung cancer - Z85.118      Status post left upper lobectomy in 2009.  Recent CT chest shows postoperative     changes but no evidence of recurrence.            Notes      New Diagnostics      * CT Abd/Pelvis/Chest W/Contrast, 6 Months         Dx: Bladder cancer - C67.9      * CBC With Auto Diff, 6 Months         Dx: Bladder cancer - C67.9      * CMP Comp Metabolic Panel, 6 Months         Dx: Bladder cancer - C67.9      * LDH, 6 Months         Dx: Bladder cancer - C67.9            Patient Education      Patient Education Provided:  Yes            Electronically signed by ANNAMARIA HERNANDEZ  11/18/2019 08:38       Disclaimer: Converted document may not contain table formatting or lab diagrams. Please see [employer] for the authenticated document.

## 2021-05-28 NOTE — PROGRESS NOTES
Patient: NIALL RIOS     Acct: [id number]     Report: #[id number]  UNIT #: [id number]     : 1955    Encounter Date:2020  PRIMARY CARE: Collin Cannon  ***Signed***  --------------------------------------------------------------------------------------------------------------------  Chief Complaint      Encounter Date      Sep 11, 2020            Primary Care Provider      Rex Hayden            Referring Provider      Rex Hayden            Patient Complaint      Patient is complaining of      3 months f/u/soa            VITALS      Height 5 ft 11 in / 180.34 cm      Weight 215 lbs 0 oz / 97.892702 kg      BSA 2.17 m2      BMI 30.0 kg/m2      Temperature 98.2 F / 36.78 C - Temporal      Pulse 51      Respirations 16      Blood Pressure 124/64 Sitting, Right Arm      Pulse Oximetry 97%, room air      Initial Exhaled Nitrous Oxide      Date:  2020            HPI      The patient is a 65 year old male with a history of chronic heavy smoking in the    past who quit smoking in  when he was diagnosed with left upper lobe     adenocarcinoma, status post surgery on adjuvant chemo and radiation. He has     shortness of breath with exertion and recurrent persistent bronchitis. Overall     he has severe COPD, is doing well with Stiolto, Qvar and Ventolin. He has not     needed a rescue inhaler for the last six months. No need of antibiotics or     steroids, no hospitalizations.  He uses oxygen with sleep and overall is doing     well.  He follows Dr. Fernando and had a CT scan of the chest, abdomen and pelvis     for his bladder cancer surveillance back in .  Overall, he is doing well.            ROS      Constitutional:  Complains of: Fatigue; Denies: Fever, Weight gain, Weight loss,    Chills, Insomnia, Other      Respiratory/Breathing:  Complains of: Shortness of air, Wheezing; Denies: Cough,    Hemoptysis, Pleuritic pain, Other      Endocrine:  Denies: Polydipsia, Polyuria, Heat/cold  intolerance, Diabetes, Other      Eyes:  Denies: Blurred vision, Vision Changes, Other      Ears, nose, mouth, throat:  Denies: Mouth lesions, Thrush, Throat pain,     Hoarseness, Allergies/Hay Fever, Post Nasal Drip, Headaches, Recent Head Injury,    Nose Bleeding, Neck Stiffness, Thyroid Mass, Hearing Loss, Ear Fullness, Dry     Mouth, Nasal or Sinus Pain, Dry Lips, Nasal discharge, Nasal congestion, Other      Cardiovascular:  Denies: Palpitations, Syncope, Claudication, Chest Pain, Wake     up Gasping for air, Leg Swelling, Irregular Heart Rate, Cyanosis, Dyspnea on     Exertion, Other      Gastrointestinal:  Denies: Nausea, Constipation, Diarrhea, Abdominal pain,     Vomiting, Difficulty Swallowing, Reflux/Heartburn, Dysphagia, Jaundice,     Bloating, Melena, Bloody stools, Other      Genitourinary:  Denies: Urinary frequency, Incontinence, Hematuria, Urgency,     Nocturia, Dysuria, Testicular problems, Other      Musculoskeletal:  Denies: Joint Pain, Joint Stiffness, Joint Swelling, Myalgias,    Other      Hematologic/lymphatic:  DENIES: Lymphadenopathy, Bruising, Bleeding tendencies,     Other      Neurological:  Denies: Headache, Numbness, Weakness, Seizures, Other      Psychiatric:  Denies: Anxiety, Appropriate Effect, Depression, Other      Sleep:  No: Excessive daytime sleep, Morning Headache?, Snoring, Insomnia?, Stop    breathing at sleep?, Other      Integumentary:  Denies: Rash, Dry skin, Skin Warm to Touch, Other      Immunologic/Allergic:  Denies: Latex allergy, Seasonal allergies, Asthma,     Urticaria, Eczema, Other      Immunization status:  No: Up to date            FAMILY/SOCIAL/MEDICAL HX      Surgical History:  Yes: Bladder Surgery (TURP /  BLADDER REMOVAL 12/2018), Bowel    Surgery (COLONOSCOPY), Orthopedic Surgery (RIGHT THUMB SX, RIGHT KNEE     ARTHROSCOPY MENISCUS REPAIR ), Vascular Surgery (PORT PLACEMENT/REMOVAL 2008 AND    2018 ), Other Surgeries (RIGHT THUMB INFECTION/DEBRIDEMENT  2004); No: AAA     Repair, Abdominal Surgery, Adenoids, Angioplasty, Appendectomy, Back Surgery,     Breast Surgery, CABG, Carotid Stenosis, Cholecystectomy, Ear Surgery, Eye     Surgery, Head Surgery, Hernia Surgery, Kidney Surgery, Nose Surgery, Oral     Surgery, Prostatectomy, Rectal Surgery, Spinal Surgery, Testicular Surgery,     Throat Surgery, Tonsils, Valve Replacement      Other Family Medical History:  Father      Is Father Still Living?:  No      Is Mother Still Living?:  No       Family History:  Yes      Social History:  No Tobacco Use, No Alcohol Use, No Recreational Drug use      Smoking status:  Former smoker (1 ppd for 40 years quit 2008)      Anticoagulation Therapy:  No      Medical History:  Yes: Arthritis (RIGHT THUMB), Chemotherapy/Cancer ( BLADDER CA    2018, H/O LUNG [id number]), Chronic Bronchitis/COPD (INHALER), Emphysema, Congestive    Heart Failu, High Blood Pressure (CONTROLLED WITH MEDS), Shortness Of Breath (HX    L UPPER LOBECTOMY,2008, SOA WITH EXERTION); No: Anemia, Asthma, Blood Disease,     Broken Bones, Cataracts, Chemical Dependency, Chronic Liver Disease, Colon     Trouble, Colitis, Diverticulitis, Deafness or Ringing Ears, Depression, Anxiety,    Bipolar Disorder, PTSD, Diabetes, Epilepsy, Seizures, Glaucoma, Gall Stones,     Gout, Head Injury, Heart Attack, Heart Murmur, GERD, Hemorrhoids/Rectal Prob,     Hepatitis, Hiatal Hernia, HIV (Do not ask - volu, Kidney or Bladder Disease,     Kidney Stones, Migrane Headaches, Mitral Valve Prolapse, Psychiatric Care,     Reflux Disease, Rheumatic Fever, Sexually Transmitted Dis, Sinus Trouble, Skin     Disease/Psoriais/Ecz, Stroke, Thyroid Problem, Tuberculosis or Pos TB Te,     Miscellaneous Medical/oth      Psychiatric History      none            PREVENTION      Hx Influenza Vaccination:  Yes      Date Influenza Vaccine Given:  Oct 1, 2019      Influenza Vaccine Declined:  No      2 or More Falls in Past Year?:  No      Fall Past Year  with Injury?:  No      Hx Pneumococcal Vaccination:  Yes      Encouraged to follow-up with:  PCP regarding preventative exams.      Chart initiated by      joe katz ma            ALLERGIES/MEDICATIONS      Allergies:        Coded Allergies:             NO KNOWN ALLERGIES (Unverified , 9/11/20)      Medications    Last Reconciled on 5/28/20 12:47 by ANNAMARIA HERNANDEZ      Tiotropium Br/Olodaterol HCl (Stiolto Respimat Inhal Spray) 4 Gm Mist.inhal      2 PUFFS INH RTQDAY for 90 Days, #3 INH 3 Refills         Prov: Guanako Phan         7/30/20       Beclomethasone Dipropionate (Qvar 40 Redihaler 10.6 GM) 10.6 Gm Hfa.aeroba      2 PUFF INH RTBID, #1 INH 5 Refills         [facility]         5/28/20       MDI-Albuterol (Ventolin HFA) 18 Gm Hfa.aer.ad      2 PUFFS INH Q6H PRN for SHORTNESS OF BREATH, #1 MDI 4 Refills         Prov: Guanako Phan         2/26/20       NEB-Albuterol Sulf (Albuterol Sulfate) 5 Mg/1 Ml Solution      2.5 MG INH RTTID, #2 BOTTLE 0 Refills         Reported         2/26/20       Albuterol/Ipratropium (Duoneb) 3 Ml Ampul.neb      3 ML INH RTTID, #120 NEB 0 Refills         Reported         2/26/20       MDI-Albuterol (Ventolin HFA) 18 Gm Hfa.aer.ad      2 PUFFS INH Q4H PRN for SHORTNESS OF BREATH, #1 INH 0 Refills         Reported         2/26/20       Carvedilol (Coreg) 6.25 Mg Tablet      12.5 MG PO HS, #120 TAB 0 Refills         Reported         12/26/19       Olmesartan (Benicar) 5 Mg Tablet      5 MG PO QDAY, #30 TAB         Reported         12/26/19       Carvedilol (Carvedilol) 6.25 Mg Tablet      6.25 MG PO QDAY, #30 TAB 0 Refills         Reported         5/6/19       Apixaban (Eliquis) 5 Mg Tablet      5 MG PO BID for 30 Days, #60 TAB         Prov: Joseph Burton         11/22/18       Rosuvastatin Calcium (Crestor*) 5 Mg Tab      5 MG PO HS, #30 TAB 0 Refills         Reported         10/12/16       Montelukast Sodium (Singulair*) 10 Mg Tab      10 MG PO QHS, TAB 0 Refills          Reported         7/9/09      Current Medications      Current Medications Reviewed 9/11/20            EXAM      CONSTITUTIONAL: Pleasant male in no acute distress,  normal conversant.       EYES : Pink conjunctive, no ptosis, PERRL.       ENMT : Nose and ears appear normal, normal dentition, mild posterior pharyngeal     wall erythema, no sinus tenderness. Mallampati classification II      Neck: Nontender, no masses, no thyromegaly, no nodules.      Resp : Bilateral diminished breath sounds, no wheezing, crackles or rhonchi.      Resonant to percussion bilaterally.      CVS  : No carotid bruits, s1s2 nl, RRR, no murmur, rubs or gallop, no peripheral    edema       Chest wall: Normal rise with inspiration, nontender on palpation.      GI   : Abdomen soft, with no masses, no hepatosplenomegaly, no hernias, BS+      MSK  : Normal gait and station, no digital cyanosis or clubbing       Skin : No rashes, ulcerations or lesions, normal turgor and temperature      Neuro: CN II - XII intact, no sensory deficits, DTRs intact and symmetrical, no     motor weakness      Psych: Appropriate affect, A   Vtials      Vitals:             Height 5 ft 11 in / 180.34 cm           Weight 215 lbs 0 oz / 97.223609 kg           BSA 2.17 m2           BMI 30.0 kg/m2           Temperature 98.2 F / 36.78 C - Temporal           Pulse 51           Respirations 16           Blood Pressure 124/64 Sitting, Right Arm           Pulse Oximetry 97%, room air            REVIEW      Results Reviewed      PCCS Results Reviewed?:  Yes Prev Lab Results, Yes Prev Radiology Results, Yes     Previous Mecial Records      Radiographic Results               [facility]                PACS RADIOLOGY REPORT            Patient: NIALL RIOS   Acct: #[id number]   Report: #[id number]            UNIT #: [id number]    DOS: 05/15/20 0945      INSURANCE:[employer]WORK - O   ORDER #:[id number]       LOCATION:CT     : 1955            PROVIDERS      ADMITTING:     ATTENDING: ANNAMARIA HERNANDEZ      FAMILY:  JackelynRex   ORDERING:  ANNAMARIA HERNANDEZ         OTHER:    DICTATING:  Jo Trevizo MD            REQ #:[id number]   EXAM:CTACPWC - CT ABD CHEST PEL w CONTR      REASON FOR EXAM:  BLADDER CA      REASON FOR VISIT:  BLADDER CA            *******Signed******         PROCEDURE:   CT ABDOMEN; CT CHEST; CT PELVIS WITH CONTRAST             COMPARISON:   [facility], CT, CT CHEST ABD PEL W CONTRAST,     10/28/2019, 15:26.             INDICATIONS:   BLADDER CA             TECHNIQUE:   After obtaining the patient's consent, CT images were obtained with    intravenous contrast       material.             FINDINGS:         Chest:  The lungs are clear.  No focal consolidations identified.  Posttreatment    changes are seen       within the medial aspect of the left lung extending along the perihilar region     which appears       unchanged as compared to the previous study with volume loss and scarring     present.  Emphysematous       changes are present.  No focal pulmonary nodule identified.  No pleural     effusion.  No focal       airspace consolidations identified.  Atherosclerotic calcifications are present.     Granulomatous       calcifications are present.  No suspicious adenopathy identified.  The     vasculature otherwise       appears grossly unremarkable.  No acute osseous abnormality identified.  The     thyroid gland appears       unremarkable.  No axillary adenopathy identified.             Abdomen and pelvis:             The liver, pancreas and spleen demonstrate no acute process.  The gallbladder     appears normal in       caliber.  Stones are present within the gallbladder lumen.  No inflammatory     changes identified.  No       evidence of biliary dilatation. The adrenal glands appear unremarkable.  The     kidneys appear normal       in size.  No evidence of nephrolithiasis.  No  evidence of hydronephrosis.  No     focal lesions       identified.             No dilated  loops of bowel identified.  No evidence of obstruction.  No free     air.  There is       diverticulosis of the sigmoid colon extending to the descending colon.  No     significant inflammatory       changes identified.  Stable scarring is present likely related to previous     inflammatory process.        Significant atherosclerotic calcifications are present.  No mesenteric fluid     collections       identified.  The appendix appears unremarkable.  No suspicious adenopathy     identified.  No       significant free fluid.  Probable postsurgical changes are seen related to     cystectomy with       neobladder creation.  This appears similar as compared to the previous study.      No focal mass or       adenopathy identified.  No significant free fluid identified.  The pelvic organs    demonstrate no       acute process.  No acute osseous abnormality is identified.             CONCLUSION:         1. No evidence of metastatic disease within the chest, abdomen or pelvis.  No     acute process       identified..      2. Ancillary findings as described above.              MASSIMO BRYAN MD             Electronically Signed and Approved By: MASSIMO BRYAN MD on 5/15/2020 at 11:55                        Until signed, this is an unconfirmed preliminary report that may contain      errors and is subject to change.                                              KOGIL:      D:05/15/20 1155      PFT Results      Patient: NIALL RIOS   Acct: #[id number]   Report: #[id number]            UNIT #: [id number]    DOS:       LOCATION:[facility]     : 1955            PROVIDERS      ADMITTING:     FAMILY:  Rex Hayden         OTHER:       DICTATING:  Guanako Phan MD            REASON FOR VISIT:  COPD            *******Signed******                                    [facility]                          Health Information Management  [facility]                            [place]  [id number]               __________________________________________________________________________             Patient Name:                   Attending Physician:      Toribio Hayes M.D.             Patient Visit # MR #            Admit Date  Disch Date     Location      [id number]    [id number]      05/22/2020                 CVS- -             Date of Birth      1955      __________________________________________________________________________      0821 - DIAGNOSTIC REPORT             PULMONARY FUNCTION TEST             SPIROMETRY:      Spirometry shows severe obstructive defect.      FEV1/FVC is 47.      FEV1 is 1.40 L, 39% of predicted.      FVC is 3.01 L, 62% of predicted.             BRONCHODILATOR RESPONSE:      There is no significant response to bronchodilator administration.             LUNG VOLUMES:      Lung volumes show air trapping.      Total lung capacity is 7.82 L, 108% of predicted.      Residual volume is 4.15 L, 175% of predicted.             DIFFUSION:      Diffusion capacity is severely decreased, 36% of predicted.             FLOW VOLUME LOOP:      Flow volume loop is suggestive of obstructive process.             CONCLUSION:      Low FEV1/FVC with low FEV1 and FVC with air trapping and low diffusion      capacity.  It suggests severe obstructive airway disease likely emphysema.      Please correlate clinically.             To be electronically signed in [employer]      [id number] GUANAKO PHAN M.D.             NK:ts      D:  06/02/2020 17:03      T:  06/02/2020 17:24      #[id number]             Until signed, this is an unconfirmed preliminary report that may contain      errors and is subject to change.                   Until signed, this is an unconfirmed preliminary report that may contain      errors and is subject to change.                     <Electronically signed by Guanako Phan MD>                 06/04/20 1610               Guanako Phan MD:TRS      D:06/02/20 1698            Assessment      Notes      New Office Procedures      * Flu Vacc Fluarix Quadrivalent, As Soon As Possible         Flu Vacc (6Mos Up)/Pf (Fluarix Quadrivalent Syringe) 60 MCG/0.5 ML SYRINGE:        60 MICROGRAM INTRAMUSCULARLY Qty 1 SYRINGE      PLAN:  The patient is a 65 year old male with severe COPD, chronic hypoxic     respiratory failure, left upper lobe adenocarcinoma, status post adjuvant chemo    therapy and radiation therapy.      1.  Severe COPD.  Continue with Stiolto and Qvar. Continue with albuterol as     needed. Prednisone taper if needed.  COPD exacerbation action plan was discussed    in detail.  He has not needed any antibiotics or steroids since last office     visit, he is currently active and doing heavy physical exertion.      2.  Continue oxygen with sleep.      3. We will give him a flu shot today.      4. Follow up in six months, earlier if needed.       5. He will need yearly low dose lung cancer screening if he was not going to get    any surveillance CT scan, but has repeat CT scan of the chest, abdomen and     pelvis ordered for Dr. Fernando.      6. Follow up in six months, earlier if needed.            Electronically signed by Guanako Phan  09/21/2020 17:46       Disclaimer: Converted document may not contain table formatting or lab diagrams. Please see "[employer] System for the authenticated document.

## 2021-05-28 NOTE — PROGRESS NOTES
Patient: NIALL RIOS     Acct: [id number]     Report: #[id number]  UNIT #: [id number]     : 1955    Encounter Date:2021  PRIMARY CARE: Collin Cannon  ***Signed***  --------------------------------------------------------------------------------------------------------------------  Chief Complaint      Encounter Date      2021            Primary Care Provider      Collin Cannon            Referring Provider      Rex Hayden            Patient Complaint      Patient is complaining of      PT here today for F/U, COPD            VITALS      Height 71 in / 180.34 cm      Weight 218 lbs  / 98.202057 kg      BSA 2.19 m2      BMI 30.4 kg/m2      Temperature 97.6 F / 36.44 C - Temporal      Pulse 68      Respirations 16      Blood Pressure 115/63 Sitting, Left Arm      Pulse Oximetry 97%, room air      Initial Exhaled Nitrous Oxide      Date:  2020            HPI      The patient is a 65 year old male patient of Dr. Rogers with a history of heavy    smoking in the past who quit smoking back in  when he was diagnosed with     left upper lobe adenocarcinoma, status post surgery and adjuvant chemo and     radiation.  The patient also has a history of bladder cancer.  The patient     presents for follow up visit today. The patient states that since last visit his    breathing is doing well.  The patient currently denies any increase in cough,     dyspnea or wheezing.  The patient states that since last visit his breathing is     doing well.  The patient currently denies any increase in cough, dyspnea or     wheezing.  The patient also denies any fever, chills, night sweats, hemoptysis,     purulent sputum production, chest pain, chest tightness, swollen glands in the     head and neck, abdominal pain, nausea, vomiting or diarrhea.   The patient     denies any headaches, myalgias, sore throat, changes in sense of taste and/or     smell or any other coronavirus or flu-like symptoms.  The  patient denies any leg    swelling, paroxysmal nocturnal dyspnea or orthopnea.  The patient also denies     any unintentional weight loss.  The patient states that he has not had to take     any antibiotics or steroids since last visit and denies any hospitalizations     since last visit. The patient states he is on oxygen at bedtime at 2 liters per     minute via nasal cannula.  The patient states he is under the care of Dr. Fernando    and is scheduled to have a follow up CT scan of the chest, abdomen and pelvis in    the beginning of 06/2021.  The patient states he is able to perform ADLs without    difficulty.            I have personally reviewed the review of systems, past family, social, surgical     and medical histories and I agree with those as entered in the chart.      Copies To:   Guanako Phan      Constitutional:  Denies: Fatigue, Fever, Weight gain, Weight loss, Chills,     Insomnia, Other      Respiratory/Breathing:  Complains of: Shortness of air, Wheezing; Denies: Cough,    Hemoptysis, Pleuritic pain, Other      Endocrine:  Denies: Polydipsia, Polyuria, Heat/cold intolerance, Diabetes, Other      Eyes:  Denies: Blurred vision, Vision Changes, Other      Ears, nose, mouth, throat:  Denies: Congestion, Dysphagia, Hearing Changes, Nose    Bleeding, Nasal Discharge, Throat pain, Tinnitus, Other      Cardiovascular:  Denies: Chest Pain, Exertional dyspnea, Peripheral Edema,     Palpitations, Syncope, Wake up Gasping for air, Orthopnea, Tachycardia, Other      Gastrointestinal:  Denies: Abdominal pain/cramping, Bloody stools, Constipation,    Diarrhea, Melena, Nausea, Vomiting, Other      Genitourinary:  Denies: Dysuria, Urinary frequency, Incontinence, Hematuria,     Urgency, Other      Musculoskeletal:  Denies: Joint Pain, Joint Stiffness, Joint Swelling, Myalgias,    Other      Hematologic/lymphatic:  DENIES: Lymphadenopathy, Bruising, Bleeding tendencies,     Other      Neurologic:   Denies: Headache, Numbness, Weakness, Seizures, Other      Psychiatric:  Denies: Anxiety, Appropriate Effect, Depression, Other      Sleep:  No: Excessive daytime sleep, Morning Headache?, Snoring, Insomnia?, Stop    breathing at sleep?, Other      Integumentary:  Denies: Rash, Dry skin, Skin Warm to Touch, Other            FAMILY/SOCIAL/MEDICAL HX      Surgical History:  Yes: Bladder Surgery (TURP /  BLADDER REMOVAL 12/2018), Bowel    Surgery (COLONOSCOPY), Orthopedic Surgery (RIGHT THUMB SX, RIGHT KNEE     ARTHROSCOPY MENISCUS REPAIR ), Vascular Surgery (PORT PLACEMENT/REMOVAL 2008 AND    2018 ), Other Surgeries (RIGHT THUMB INFECTION/DEBRIDEMENT 2004); No: AAA     Repair, Abdominal Surgery, Adenoids, Angioplasty, Appendectomy, Back Surgery,     Breast Surgery, CABG, Carotid Stenosis, Cholecystectomy, Ear Surgery, Eye     Surgery, Head Surgery, Hernia Surgery, Kidney Surgery, Nose Surgery, Oral     Surgery, Prostatectomy, Rectal Surgery, Spinal Surgery, Testicular Surgery,     Throat Surgery, Tonsils, Valve Replacement      Other Family Medical History:  Father      Is Father Still Living?:  No      Is Mother Still Living?:  No       Family History:  Yes      Social History:  No Tobacco Use, No Alcohol Use, No Recreational Drug use      Smoking status:  Former smoker (1 ppd for 40 years quit 2008)      Anticoagulation Therapy:  No      Antibiotic Prophylaxis:  No      Medical History:  Yes: Arthritis (RIGHT THUMB), Chemotherapy/Cancer ( BLADDER CA    2018, H/O LUNG [id number]), Chronic Bronchitis/COPD (INHALER), Emphysema, Congestive    Heart Failu, High Blood Pressure (CONTROLLED WITH MEDS), Shortness Of Breath (HX    L UPPER LOBECTOMY,2008, SOA WITH EXERTION); No: Anemia, Asthma, Blood Disease,     Broken Bones, Cataracts, Chemical Dependency, Chronic Liver Disease, Colon     Trouble, Colitis, Diverticulitis, Deafness or Ringing Ears, Depression, Anxiety,    Bipolar Disorder, PTSD, Diabetes, Epilepsy, Seizures,  Glaucoma, Gall Stones, Go    ut, Head Injury, Heart Attack, Heart Murmur, GERD, Hemorrhoids/Rectal Prob,     Hepatitis, Hiatal Hernia, HIV (Do not ask - volu, Kidney or Bladder Disease,     Kidney Stones, Migrane Headaches, Mitral Valve Prolapse, Psychiatric Care,     Reflux Disease, Rheumatic Fever, Sexually Transmitted Dis, Sinus Trouble, Skin     Disease/Psoriais/Ecz, Stroke, Thyroid Problem, Tuberculosis or Pos TB Te, Mis    cellaneous Medical/oth      Psychiatric History      none            PREVENTION      Hx Influenza Vaccination:  Yes      Date Influenza Vaccine Given:  Oct 1, 2020      Influenza Vaccine Declined:  No      2 or More Falls in Past Year?:  No      Fall Past Year with Injury?:  No      Hx Pneumococcal Vaccination:  Yes      Encouraged to follow-up with:  PCP regarding preventative exams.      Chart initiated by      Araceli Parra CMA            ALLERGIES/MEDICATIONS      Allergies:        Coded Allergies:             NO KNOWN ALLERGIES (Unverified , 4/29/21)      Medications    Last Reconciled on 4/29/21 08:37 by ADEOLA CLEMENT       Albuterol/Ipratropium (Duoneb) 3 Ml Ampul.neb      3 ML INH Q4H PRN for SHORTNESS OF BREATH for 90 Days, #540 NEB 3 Refills         Prov: ADEOLA CLEMENT [facility]         4/29/21       Tiotropium Br/Olodaterol HCl (Stiolto Respimat Inhal Spray) 4 Gm Mist.inhal      2 PUFFS INH RTQDAY for 90 Days, #3 INH 3 Refills         Prov: ADEOLA CLEMENT [facility]         4/29/21       Beclomethasone Dipropionate (Qvar 40 Redihaler 10.6 GM) 10.6 Gm Hfa.aeroba      2 PUFF INH RTBID for 90 Days, #3 INH 3 Refills         Prov: ADEOLA CLEMENT [facility]         4/29/21       MDI-Albuterol (Ventolin HFA) 18 Gm Hfa.aer.ad      2 PUFFS INH Q6H PRN for SHORTNESS OF BREATH for 90 Days, #3 MDI 3 Refills         Prov: ADEOLA CLEMENT [facility]         4/29/21       Albuterol/Ipratropium (Duoneb) 3 Ml Ampul.neb      3 ML INH Q4-6H PRN for COUGH for 90 Days, #360 NEB 0 Refills         Prov:  Guanako Phan         1/22/21       Carvedilol (Coreg) 6.25 Mg Tablet      12.5 MG PO HS, #120 TAB 0 Refills         Reported         12/26/19       Olmesartan (Benicar) 5 Mg Tablet      5 MG PO QDAY, #30 TAB         Reported         12/26/19       Carvedilol (Carvedilol) 6.25 Mg Tablet      6.25 MG PO QDAY, #30 TAB 0 Refills         Reported         5/6/19       Apixaban (Eliquis) 5 Mg Tablet      5 MG PO BID for 30 Days, #60 TAB         Prov: Badiwala,Anesh         11/22/18       Rosuvastatin Calcium (Crestor*) 5 Mg Tab      5 MG PO HS, #30 TAB 0 Refills         Reported         10/12/16       Montelukast Sodium (Singulair*) 10 Mg Tab      10 MG PO QHS, TAB 0 Refills         Reported         7/9/09      Current Medications      Current Medications Reviewed 4/29/21            EXAM      Vital Signs Reviewed.      General:  WDWN, Alert, NAD.      HEENT: PERRL, EOMI.  OP, nares clear, no sinus tenderness.      Neck: Supple, no JVD, no thyromegaly.      Lymph: No axillary, cervical, supraclavicular lymphadenopathy noted bilaterally.      Chest: Bilateral diminished breath sounds, no wheezes, rales or rhonchi     appreciated.  Normal work of breathing noted.  Patient is able to speak full     sentences without difficulty.        CV: RRR, no MGR, pulses 2+, equal.        Abd: Soft, NT, ND, +BS, no HSM.      EXT: No clubbing, no cyanosis, no edema, no joint tenderness.        Neuro:  A  Skin: No rashes or lesions.      Vitals      Vitals:             Height 71 in / 180.34 cm           Weight 218 lbs  / 98.889576 kg           BSA 2.19 m2           BMI 30.4 kg/m2           Temperature 97.6 F / 36.44 C - Temporal           Pulse 68           Respirations 16           Blood Pressure 115/63 Sitting, Left Arm           Pulse Oximetry 97%, room air            REVIEW      Results Reviewed      PCCS Results Reviewed?:  Yes Prev Lab Results, Yes Prev Radiology Results, Yes P    revious [facility] Records      Lab Results      I reviewed  Dr. Phan's last office visit note.            Assessment      COPD (chronic obstructive pulmonary disease) - J44.9            Notes      New Medications      * Albuterol/Ipratropium (Duoneb) 3 ML AMPUL.NEB: 3 ML INH Q4H PRN SHORTNESS OF       BREATH 90 Days #540         Instructions: J44.9         Dx: COPD (chronic obstructive pulmonary disease) - J44.9      Renewed Medications      * MDI-Albuterol (Ventolin HFA) 18 GM HFA.AER.AD:         From: 2 PUFFS INH Q6H PRN SHORTNESS OF BREATH #1         To: 2 PUFFS INH Q6H PRN SHORTNESS OF BREATH 90 Days #3      * Beclomethasone Dipropionate (Qvar 40 Redihaler 10.6 GM) 10.6 GM HFA.AEROBA:         From: 2 PUFF INH RTBID #1         To: 2 PUFF INH RTBID 90 Days #3      * Tiotropium Br/Olodaterol HCl (Stiolto Respimat Inhal [place]) 4 GM MIST.INHAL: 2      PUFFS INH RTQDAY 90 Days #3      ASSESSMENT:       1.  Severe COPD. The patient is on triple inhaler therapy.      2. Chronic hypoxic respiratory failure, patient on oxygen at bedtime.      3.  Left upper lobe adenocarcinoma, status post adjuvant chemotherapy and     radiation therapy, patient under the care of Dr. Fernando.      4. History of bladder cancer.  The patient is under the care of Dr. Fernando.      5. Tobacco abuse with cigarettes in remission.  The patient reports he quit     smoking back in 2008.            PLAN:      1. The patient to continue Stiolto and Qvar everyday as describes and rinse his     mouth out after each use.      2. The patient to continue albuterol inhaler and DuoNebs as needed.      3. The patient to continue oxygen at bedtime with sleep.      4. The patient reports he is up-to-date with his flu and pneumonia and did     receive his COVID vaccines.  The patient is advised to continue to follow CDC     recommendations of social distancing, wearing a mask and washing hands for at     least 20 seconds.        5.  The patient states that he is scheduled to have a repeat CT scan of the     chest,  abdomen and pelvis in the beginning of 06/2021.  The patient is advised     to have a copy of that report sent to our office and to follow up with Dr. Fernando as scheduled.      6. Follow up with Dr. Phan in six months, sooner if needed.            Patient Education      Patient Education Provided:  COPD      Time Spent:  > 50% /Coord Care            Patient Education:        Chronic Obstructive Pulmonary Disease            Electronically signed by ADEOLA CLEMENT [facility]  05/03/2021 14:11       Disclaimer: Converted document may not contain table formatting or lab diagrams. Please see [employer] for the authenticated document.

## 2021-05-28 NOTE — PROGRESS NOTES
Patient: NIALL HAYES     Acct: [id number]     Report: #[id number]  UNIT #: [id number]     : 1955    Encounter Date:2019  PRIMARY CARE: Rex Hayden  ***Signed***  --------------------------------------------------------------------------------------------------------------------  NURSE INTAKE      Visit Type      Established Patient Visit            Chief Complaint      bladder ca      Intent of Therapy:  Curative            Referring Provider/Copies To      Referring Provider:  Bartolo Webber      Primary Care Provider:  Rex Hayden            History and Present Illness      Past Oncology Illness History      Mr. Hayes is a pleasant 64yo WM with hx hematuria. Cystoscopy revealed mass in     the bladder. 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            HPI - Oncology Interim      F/u bladder cancer--bx left inguinal node completed--pt feeling better-he is     working FT [place] [employer].  His stamina is very slow to return.  He reports a    good appetite; wt is increasing.  He denies hematuria or difficulty voiding at     this time.            Cancer Details            Bladder, urothelial carcinoma            Clinical Staging      Stage II ([id number])            Treatments      Chemotherapy      10/10/18 began DD [facility] [place]            Clinical Trial Participant      No            ECOG Performance Status      0            PAST, FAMILY   Past Medical History      Past Medical History:  Arthritis, COPD, Hypertension, Kidney Disease (kidney     failure)      Hematology/Oncology (M):  Bladder Cancer, Lung Cancer            Past Surgical History      Biopsy (lung left), Bladder Surgery (BLADDER REMOVED), Fracture Repair, Lung     Biopsy, VAD Placement      PROSTATECTOMY            Family History      Family History:  Lung Cancer (father and 5 paternal uncles), Prostate Cancer     (brother)            Social History      Marital Status:        Lives independently:  Yes      Number of  Children:  3      Occupation:  [employer]            Tobacco Use      Tobacco status:  Former smoker      Quit status:  Quit date established (2008)            Alcohol Use      Alcohol intake:  None            Substance Use      Substance use:  Denies use            REVIEW OF SYSTEMS      General:  Admits: Fatigue;          Denies: Appetite Change, Fever, Night Sweats, Weight Gain, Weight Loss      Eye:  Denies: Blurred Vision, Corrective Lenses, Diplopia, Eye Irritation, Eye     Pain, Eye Redness, Spots in Vision, Vision Loss      ENT:  Denies: Headache, Hearing Loss, Hoarseness, Seizures, Sinus Congestion,     Sore Throat      Cardiovascular:  Denies: Chest Pain, Edema Ankles, Edema Legs, Irregular     Heartbeat, Palpitations      Respiratory:  Denies: Coughing Blood, Productive Cough, Shortness of Air,     Wheezing      Gastrointestinal:  Denies: Bloody Stools, Constipation, Diarrhea, Frequent     Heartburn, Nausea, Problem Swallowing, Tarry Stools, Unable to Control Bowels,     Vomiting      Genitourinary (male):  Denies: Blood in Urine, Decrease Urine Stream, Frequent     Urination, Incontinence, Painful Urination      Musculoskeletal:  Denies: Back Pain, Leg Cramps, Muscle Pain, Muscle Weakness,     Painful Joints, Swollen Joints      Integumentary:  Denies: Bleeds Easily, Bruises Easily, Hair Changes, Jaundice,     Lesions, Mole Changes, Nail Changes, Pigment Changes, Rash, Skin Discoloration      Neurologic:  Denies: Dizziness, Fainting, Numbness\Tingling, Paralysis, Seizures      Psychiatric:  Denies: Anxiety, Confused, Depression, Disoriented, Memory Loss      Endocrine:  Denies: Cold Intolerance, Diabetes, Excessive Sweating, Excessive     Thirst, Excessive Urination, Heat Intolerance, Flushing, Hyperthyroidism,     Hypothyroidism      Hematologic/Lymphatic:  Denies: Bruising, Bleeding, Enlarged Lymph Nodes,     Recurrent Infections, Transfusions            VITAL SIGNS AND SCORES      Vitals      Height 5 ft  10.00 in / 177.8 cm      Weight 199 lbs 8.261 oz / 90.5 kg      BSA 2.09 m2      BMI 28.6 kg/m2      Temperature 98.9 F / 37.17 C - Temporal      Pulse 81      Respirations 18      Blood Pressure 152/64 Sitting, Left Arm      Pulse Oximetry 97%, RM AIR            Pain Score      Pain Scale Used:  Numerical      Pain Intensity:  0            Fatigue Score      Fatigue (0-10 scale):  8            EXAM      General Appearance:  Positive for: Alert, Oriented x3, Cooperative;          Negative for: Acute Distress      Neck:  Positive for: Supple;          Negative for: JVD, Masses      Respiratory:  Positive for: CTAB, Normal Respiratory Effort      Chest:  Port in Place      Abdomen/Gastro:  Positive for: Normal Active Bowel Sounds, Soft;          Negative for: Distention, Hepatosplenomegaly, Tenderness      Cardiovascular:  Positive for: RRR;          Negative for: Gallop, Murmur, Peripheral Edema, Rub      Psychiatric:  Positive for: Appropriate Affect, Intact Judgement            PREVENTION      Hx Influenza Vaccination:  Yes      Date Influenza Vaccine Given:  Oct 1, 2018      Influenza Vaccine Declined:  No      2 or More Falls Past Year?:  No      Fall Past Year with Injury?:  No      Encouraged to follow-up with:  PCP regarding preventative exams.      Chart initiated by      EWA WITT MA            ALLERGY/MEDS      Allergies      Coded Allergies:             NO KNOWN ALLERGIES (Unverified , 5/6/19)            Medications      Last Reconciled on 5/6/19 13:57 by SARAH TAVERAS      Carvedilol (Carvedilol) 6.25 Mg Tablet      6.25 MG PO QDAY, #30 TAB 0 Refills         Reported         5/6/19       Apixaban (Eliquis) 5 Mg Tablet      5 MG PO BID for 30 Days, #60 TAB         Prov: Joseph Burton         11/22/18       MDI-Advair 250/50 (Advair 250/50 Diskus) 1 Each Blst.w.dev      1 PUFF INH RTBID PRN for COPD, #1 INH 0 Refills         Reported         11/16/18       Rosuvastatin Calcium (Crestor*) 5 Mg  Tab      5 MG PO HS, #30 TAB 0 Refills         Reported         10/12/16       Montelukast Sodium (Singulair*) 10 Mg Tab      10 MG PO QHS, TAB 0 Refills         Reported         7/9/09      Medications Reviewed:  No Changes made to meds            IMPRESSION/PLAN      Impression      Bladder cancer.            Diagnosis      Bladder cancer         Malignant neoplasm of urinary bladder, unspecified site - C67.9         Bladder location: unspecified site      Status post treatment as outlined above.  Recent biopsy of a groin lymph node     demonstrates only benign lymphoid tissue.  No evidence of metastatic or     recurrent disease.  Clinically he is doing well.  He does continue to have some     fatigue.  We discussed physical therapy but he is not interested at this time.      He will see [facility] urology in July for surveillance.  Patient     reports scans at that visit as well.  I will plan to see him back in 6 months     time with lab work and scans prior or based on symptoms.  He should have port     flush every 4 to 6 weeks.      New Diagnostics      * CT Abd/Pelvis/Chest W/Contrast, 6 Months      * CBC With Auto Diff, 6 Months      * CMP Comp Metabolic Panel, 6 Months            Notes      New Medications      * Carvedilol 6.25 MG TABLET: 6.25 MG PO QDAY #30      Discontinued Medications      * Carvedilol 6.25 MG TABLET: 12.5 MG PO BID 30 Days #120            Patient Education      Patient Education Provided:  Yes            Topics Patient Counseled on      Left inguinal node results                 Disclaimer: Converted document may not contain table formatting or lab diagrams. Please see [employer] for the authenticated document.

## 2021-06-02 RX ORDER — PROCHLORPERAZINE MALEATE 10 MG
10 TABLET ORAL EVERY 6 HOURS PRN
COMMUNITY
End: 2021-12-06 | Stop reason: SDUPTHER

## 2021-06-02 RX ORDER — MONTELUKAST SODIUM 10 MG/1
10 TABLET ORAL NIGHTLY
COMMUNITY
End: 2021-10-22 | Stop reason: SDUPTHER

## 2021-06-02 RX ORDER — ONDANSETRON 8 MG/1
8 TABLET, ORALLY DISINTEGRATING ORAL EVERY 8 HOURS PRN
COMMUNITY
End: 2022-01-05 | Stop reason: ALTCHOICE

## 2021-06-02 RX ORDER — ALBUTEROL SULFATE 1.25 MG/3ML
1 SOLUTION RESPIRATORY (INHALATION) EVERY 6 HOURS PRN
COMMUNITY
End: 2023-02-17

## 2021-06-02 RX ORDER — ATORVASTATIN CALCIUM 10 MG/1
10 TABLET, FILM COATED ORAL DAILY
COMMUNITY
End: 2021-06-02 | Stop reason: ALTCHOICE

## 2021-06-02 RX ORDER — FUROSEMIDE 20 MG/1
20 TABLET ORAL 2 TIMES DAILY
COMMUNITY
End: 2021-12-06 | Stop reason: SDUPTHER

## 2021-06-02 RX ORDER — HYDROCODONE BITARTRATE AND ACETAMINOPHEN 5; 325 MG/1; MG/1
1 TABLET ORAL EVERY 6 HOURS PRN
COMMUNITY
End: 2021-12-06 | Stop reason: SDUPTHER

## 2021-06-02 RX ORDER — CARVEDILOL 6.25 MG/1
6.25 TABLET ORAL 2 TIMES DAILY WITH MEALS
COMMUNITY
End: 2021-08-19

## 2021-06-02 RX ORDER — ROSUVASTATIN CALCIUM 5 MG/1
5 TABLET, COATED ORAL DAILY
COMMUNITY
End: 2022-07-06 | Stop reason: SDUPTHER

## 2021-06-02 RX ORDER — ASPIRIN 81 MG/1
81 TABLET, CHEWABLE ORAL DAILY
COMMUNITY
End: 2022-01-05

## 2021-06-02 RX ORDER — OLMESARTAN MEDOXOMIL 5 MG/1
5 TABLET ORAL DAILY
COMMUNITY
End: 2022-01-05 | Stop reason: SDUPTHER

## 2021-06-04 ENCOUNTER — HOSPITAL ENCOUNTER (OUTPATIENT)
Dept: CT IMAGING | Facility: HOSPITAL | Age: 66
Discharge: HOME | End: 2021-06-04
Attending: INTERNAL MEDICINE

## 2021-06-04 LAB
ALBUMIN SERPL-MCNC: 4.4 G/DL (ref 3.5–5)
ALBUMIN/GLOB SERPL: 1.7 {RATIO} (ref 1.4–2.6)
ALP SERPL-CCNC: 59 U/L (ref 56–155)
ALT SERPL-CCNC: 15 U/L (ref 10–40)
ANION GAP SERPL CALC-SCNC: 17 MMOL/L (ref 8–19)
AST SERPL-CCNC: 18 U/L (ref 15–50)
BASOPHILS # BLD AUTO: 0.04 10*3/UL (ref 0–0.2)
BASOPHILS NFR BLD AUTO: 0.7 % (ref 0–3)
BILIRUB SERPL-MCNC: 0.48 MG/DL (ref 0.2–1.3)
BUN SERPL-MCNC: 27 MG/DL (ref 5–25)
BUN/CREAT SERPL: 19 {RATIO} (ref 6–20)
CALCIUM SERPL-MCNC: 9.5 MG/DL (ref 8.7–10.4)
CHLORIDE SERPL-SCNC: 100 MMOL/L (ref 99–111)
CONV ABS IMM GRAN: 0.01 10*3/UL (ref 0–0.2)
CONV CO2: 26 MMOL/L (ref 22–32)
CONV IMMATURE GRAN: 0.2 % (ref 0–1.8)
CONV TOTAL PROTEIN: 7 G/DL (ref 6.3–8.2)
CREAT BLD-MCNC: 1.5 MG/DL (ref 0.6–1.4)
CREAT UR-MCNC: 1.39 MG/DL (ref 0.7–1.2)
DEPRECATED RDW RBC AUTO: 44.4 FL (ref 35.1–43.9)
EOSINOPHIL # BLD AUTO: 0.07 10*3/UL (ref 0–0.7)
EOSINOPHIL # BLD AUTO: 1.1 % (ref 0–7)
ERYTHROCYTE [DISTWIDTH] IN BLOOD BY AUTOMATED COUNT: 12.7 % (ref 11.6–14.4)
GFR SERPLBLD BASED ON 1.73 SQ M-ARVRAT: 48 ML/MIN/{1.73_M2}
GFR SERPLBLD BASED ON 1.73 SQ M-ARVRAT: 53 ML/MIN/{1.73_M2}
GLOBULIN UR ELPH-MCNC: 2.6 G/DL (ref 2–3.5)
GLUCOSE SERPL-MCNC: 111 MG/DL (ref 70–99)
HCT VFR BLD AUTO: 37.2 % (ref 42–52)
HGB BLD-MCNC: 12.6 G/DL (ref 14–18)
LYMPHOCYTES # BLD AUTO: 1.45 10*3/UL (ref 1–5)
LYMPHOCYTES NFR BLD AUTO: 23.6 % (ref 20–45)
MCH RBC QN AUTO: 31.9 PG (ref 27–31)
MCHC RBC AUTO-ENTMCNC: 33.9 G/DL (ref 33–37)
MCV RBC AUTO: 94.2 FL (ref 80–96)
MONOCYTES # BLD AUTO: 0.59 10*3/UL (ref 0.2–1.2)
MONOCYTES NFR BLD AUTO: 9.6 % (ref 3–10)
NEUTROPHILS # BLD AUTO: 3.99 10*3/UL (ref 2–8)
NEUTROPHILS NFR BLD AUTO: 64.8 % (ref 30–85)
NRBC CBCN: 0 % (ref 0–0.7)
OSMOLALITY SERPL CALC.SUM OF ELEC: 294 MOSM/KG (ref 273–304)
PLATELET # BLD AUTO: 171 10*3/UL (ref 130–400)
PMV BLD AUTO: 10.6 FL (ref 9.4–12.4)
POTASSIUM SERPL-SCNC: 4.3 MMOL/L (ref 3.5–5.3)
RBC # BLD AUTO: 3.95 10*6/UL (ref 4.7–6.1)
SODIUM SERPL-SCNC: 139 MMOL/L (ref 135–147)
WBC # BLD AUTO: 6.15 10*3/UL (ref 4.8–10.8)

## 2021-06-07 ENCOUNTER — OFFICE VISIT (OUTPATIENT)
Dept: ONCOLOGY | Facility: HOSPITAL | Age: 66
End: 2021-06-07

## 2021-06-07 VITALS
HEART RATE: 67 BPM | SYSTOLIC BLOOD PRESSURE: 123 MMHG | DIASTOLIC BLOOD PRESSURE: 67 MMHG | TEMPERATURE: 97.2 F | RESPIRATION RATE: 18 BRPM | BODY MASS INDEX: 30.69 KG/M2 | WEIGHT: 220.02 LBS | OXYGEN SATURATION: 95 %

## 2021-06-07 DIAGNOSIS — C67.9 MALIGNANT NEOPLASM OF URINARY BLADDER, UNSPECIFIED SITE (HCC): Primary | ICD-10-CM

## 2021-06-07 PROCEDURE — G0463 HOSPITAL OUTPT CLINIC VISIT: HCPCS | Performed by: INTERNAL MEDICINE

## 2021-06-07 PROCEDURE — 99213 OFFICE O/P EST LOW 20 MIN: CPT | Performed by: INTERNAL MEDICINE

## 2021-06-07 NOTE — ASSESSMENT & PLAN NOTE
Patient is status post neoadjuvant therapy followed by resection.  He is doing well.  I see no evidence of disease recurrence past history, physical examination, lab work or recent scans.  RTC 6 months for ongoing surveillance with labs and scans prior.  Follow-up with urology as scheduled

## 2021-06-07 NOTE — PROGRESS NOTES
Chief Complaint  Bladder Cancer    Collin Cannon MD Houk, Brandon Lyle, MD    Subjective          Toribio Hayes presents to [facility] for follow-up of his bladder cancer.  He status post treatments with neoadjuvant chemotherapy followed by resection.  On return today, he states he is feeling well.  He reports good urine stream without dysuria, hematuria or hesitancy.  He denies masses or adenopathy.  No unusual aches or pains.  He reports great appetite.  His weight is maintained.  He notes good energy level.    Cancer Staging  No matching staging information was found for the patient.    Oncology/Hematology History    No history exists.       Review of Systems   Constitutional: Positive for fatigue (3/10).     Current Outpatient Medications on File Prior to Visit   Medication Sig Dispense Refill   • albuterol (ACCUNEB) 1.25 MG/3ML nebulizer solution Take 1 ampule by nebulization Every 6 (Six) Hours As Needed.     • apixaban (ELIQUIS) 5 MG tablet tablet Take 5 mg by mouth 2 (Two) Times a Day.     • aspirin 81 MG chewable tablet Chew 81 mg Daily.     • carvedilol (COREG) 6.25 MG tablet Take 6.25 mg by mouth 2 (Two) Times a Day With Meals.     • furosemide (LASIX) 20 MG tablet Take 20 mg by mouth 2 (Two) Times a Day.     • HYDROcodone-acetaminophen (NORCO) 5-325 MG per tablet Take 1 tablet by mouth Every 6 (Six) Hours As Needed.     • montelukast (SINGULAIR) 10 MG tablet Take 10 mg by mouth Every Night.     • olmesartan (BENICAR) 5 MG tablet Take 5 mg by mouth Daily.     • ondansetron ODT (ZOFRAN-ODT) 8 MG disintegrating tablet Place 8 mg on the tongue Every 8 (Eight) Hours As Needed.     • prochlorperazine (COMPAZINE) 10 MG tablet Take 10 mg by mouth Every 6 (Six) Hours As Needed.     • rosuvastatin (CRESTOR) 5 MG tablet Take 5 mg by mouth Daily.     • sacubitril-valsartan (ENTRESTO) 24-26 MG tablet Take 1 tablet by mouth 2 (Two) Times a Day.     • sodium chloride  (OCEAN) 0.65 % nasal spray 1 spray into the nostril(s) as directed by provider As Needed.       No current facility-administered medications on file prior to visit.       No Known Allergies  Past Medical History:   Diagnosis Date   • A-fib (CMS/[facility])     DR KHALIL   • Arthritis     RIGHT THUMB   • Bladder cancer (CMS/[facility])        • COPD (chronic obstructive pulmonary disease) (CMS/[facility])     INHALER   • Hyperlipidemia     ON MEDS   • Hypertension     CONTROLLED W MEDS   • Lung cancer (CMS/[facility])     DR RAMIRES TOOK CARE OF THAT ()   • Mouth sore secondary to chemotherapy    • Throat soreness     FROM CHEMO     Past Surgical History:   Procedure Laterality Date   • COLONOSCOPY     • CYSTECTOMY     • FINGER DEBRIDEMENT Right     RIGHT THUMB   • ILEAL LOOP NEOBLADDER      BLADDER MADE FROM HIS INTESTINES   • LUNG REMOVAL, PARTIAL Left     LEFT UPPER -  - SO W EXERTION   • PORTOCAVAL SHUNT PLACEMENT       AND    • TURP / TRANSURETHRAL INCISION / DRAINAGE PROSTATE       Social History     Socioeconomic History   • Marital status:      Spouse name: Not on file   • Number of children: Not on file   • Years of education: Not on file   • Highest education level: Not on file   Tobacco Use   • Smoking status: Former Smoker     Quit date: 10/21/2008     Years since quittin.6     Family History   Problem Relation Age of Onset   • Prostate cancer Brother        Objective   Physical Exam    Vitals:    21 1143   BP: 123/67   Pulse: 67   Resp: 18   Temp: 97.2 °F (36.2 °C)   TempSrc: Temporal   SpO2: 95%   Weight: 99.8 kg (220 lb 0.3 oz)   PainSc: 0-No pain     ECOG score: 0         ECOG: (0) Fully Active - Able to Carry On All Pre-disease Performance Without Restriction  PHQ-9 Total Score: 0       The patient is not  experiencing fatigue. Fatigue score: 0    PT/OT Functional Screening:   Speech Functional Screening:   Rehab to be ordered:         Result Review :   The following data was reviewed by:  Jeff Fernando MD on 06/07/2021:  Lab Results   Component Value Date    HGB 12.6 (L) 06/04/2021    HCT 37.2 (L) 06/04/2021    MCV 94.2 06/04/2021     06/04/2021    WBC 6.15 06/04/2021    NEUTROABS 3.99 06/04/2021    LYMPHSABS 1.45 06/04/2021    MONOSABS 0.59 06/04/2021    EOSABS 0.07 06/04/2021    BASOSABS 0.04 06/04/2021     Lab Results   Component Value Date    BUN 27 (H) 06/04/2021    CREATININE 1.39 (H) 06/04/2021     06/04/2021    K 4.3 06/04/2021     06/04/2021    CO2 26 06/04/2021    CALCIUM 9.5 06/04/2021    PROTEINTOT 7.0 06/04/2021    ALBUMIN 4.4 06/04/2021    BILITOT 0.48 06/04/2021    ALKPHOS 59 06/04/2021    AST 18 06/04/2021    ALT 15 06/04/2021          Assessment and Plan    Diagnoses and all orders for this visit:    1. Malignant neoplasm of urinary bladder, unspecified site (CMS/HCC) (Primary)  Assessment & Plan:  Patient is status post neoadjuvant therapy followed by resection.  He is doing well.  I see no evidence of disease recurrence past history, physical examination, lab work or recent scans.  RTC 6 months for ongoing surveillance with labs and scans prior.  Follow-up with urology as scheduled    Orders:  -     Comprehensive Metabolic Panel; Future  -     CT chest w contrast; Future  -     CT abdomen pelvis w contrast; Future    Other orders  -     CBC & Differential; Standing          Patient Follow Up: RTC 6 months for OV with labs and scans prior    Patient was given instructions and counseling regarding his condition or for health maintenance advice. Please see specific information pulled into the AVS if appropriate.     Jeff Fernando MD    6/7/2021

## 2021-07-16 RX ORDER — APIXABAN 5 MG/1
TABLET, FILM COATED ORAL
Qty: 180 TABLET | Refills: 2 | Status: SHIPPED | OUTPATIENT
Start: 2021-07-16 | End: 2022-06-13

## 2021-08-19 RX ORDER — CARVEDILOL 25 MG/1
TABLET ORAL
Qty: 180 TABLET | Refills: 2 | Status: SHIPPED | OUTPATIENT
Start: 2021-08-19 | End: 2022-07-06 | Stop reason: SDUPTHER

## 2021-09-30 ENCOUNTER — TELEPHONE (OUTPATIENT)
Dept: PULMONOLOGY | Facility: CLINIC | Age: 66
End: 2021-09-30

## 2021-10-05 NOTE — TELEPHONE ENCOUNTER
I'm not sure if this has been addressed or not yet and I am just now seeing this message. I attempted to call this patient back at the number listed on the chart, and I did not get an answer nor was I able to leave a voicemail.

## 2021-10-22 DIAGNOSIS — R06.00 DYSPNEA, UNSPECIFIED TYPE: ICD-10-CM

## 2021-10-22 DIAGNOSIS — R06.00 DYSPNEA, UNSPECIFIED TYPE: Primary | ICD-10-CM

## 2021-10-22 RX ORDER — TIOTROPIUM BROMIDE AND OLODATEROL 3.124; 2.736 UG/1; UG/1
2 SPRAY, METERED RESPIRATORY (INHALATION)
Qty: 4 G | Refills: 5 | Status: SHIPPED | OUTPATIENT
Start: 2021-10-22 | End: 2021-10-22 | Stop reason: SDUPTHER

## 2021-10-22 RX ORDER — MONTELUKAST SODIUM 10 MG/1
10 TABLET ORAL NIGHTLY
Qty: 1 TABLET | Refills: 5 | Status: SHIPPED | OUTPATIENT
Start: 2021-10-22 | End: 2021-10-22 | Stop reason: SDUPTHER

## 2021-10-22 RX ORDER — TIOTROPIUM BROMIDE AND OLODATEROL 3.124; 2.736 UG/1; UG/1
2 SPRAY, METERED RESPIRATORY (INHALATION)
Qty: 4 G | Refills: 5 | Status: SHIPPED | OUTPATIENT
Start: 2021-10-22 | End: 2022-02-09 | Stop reason: SDUPTHER

## 2021-10-22 RX ORDER — MONTELUKAST SODIUM 10 MG/1
10 TABLET ORAL NIGHTLY
Qty: 1 TABLET | Refills: 5 | Status: SHIPPED | OUTPATIENT
Start: 2021-10-22 | End: 2022-01-03

## 2021-11-19 ENCOUNTER — HOSPITAL ENCOUNTER (OUTPATIENT)
Dept: CT IMAGING | Facility: HOSPITAL | Age: 66
Discharge: HOME | End: 2021-11-19

## 2021-11-19 ENCOUNTER — LAB (OUTPATIENT)
Dept: LAB | Facility: HOSPITAL | Age: 66
End: 2021-11-19

## 2021-11-19 DIAGNOSIS — C67.9 MALIGNANT NEOPLASM OF URINARY BLADDER, UNSPECIFIED SITE (HCC): ICD-10-CM

## 2021-11-19 LAB
ALBUMIN SERPL-MCNC: 4.3 G/DL (ref 3.5–5.2)
ALBUMIN/GLOB SERPL: 1.7 G/DL
ALP SERPL-CCNC: 67 U/L (ref 39–117)
ALT SERPL W P-5'-P-CCNC: 15 U/L (ref 1–41)
ANION GAP SERPL CALCULATED.3IONS-SCNC: 5.6 MMOL/L (ref 5–15)
AST SERPL-CCNC: 23 U/L (ref 1–40)
BILIRUB SERPL-MCNC: 0.3 MG/DL (ref 0–1.2)
BUN SERPL-MCNC: 22 MG/DL (ref 8–23)
BUN/CREAT SERPL: 17.1 (ref 7–25)
CALCIUM SPEC-SCNC: 10 MG/DL (ref 8.6–10.5)
CHLORIDE SERPL-SCNC: 98 MMOL/L (ref 98–107)
CO2 SERPL-SCNC: 33.4 MMOL/L (ref 22–29)
CREAT BLDA-MCNC: 1.4 MG/DL
CREAT SERPL-MCNC: 1.29 MG/DL (ref 0.76–1.27)
GFR SERPL CREATININE-BSD FRML MDRD: 56 ML/MIN/1.73
GLOBULIN UR ELPH-MCNC: 2.5 GM/DL
GLUCOSE SERPL-MCNC: 123 MG/DL (ref 65–99)
POTASSIUM SERPL-SCNC: 4.8 MMOL/L (ref 3.5–5.2)
PROT SERPL-MCNC: 6.8 G/DL (ref 6–8.5)
SODIUM SERPL-SCNC: 137 MMOL/L (ref 136–145)

## 2021-11-19 PROCEDURE — 80053 COMPREHEN METABOLIC PANEL: CPT

## 2021-11-19 PROCEDURE — 71260 CT THORAX DX C+: CPT

## 2021-11-19 PROCEDURE — 36415 COLL VENOUS BLD VENIPUNCTURE: CPT

## 2021-11-19 PROCEDURE — 74177 CT ABD & PELVIS W/CONTRAST: CPT

## 2021-11-19 PROCEDURE — 82565 ASSAY OF CREATININE: CPT

## 2021-11-19 PROCEDURE — 0 IOPAMIDOL PER 1 ML: Performed by: INTERNAL MEDICINE

## 2021-11-19 RX ADMIN — IOPAMIDOL 100 ML: 755 INJECTION, SOLUTION INTRAVENOUS at 08:25

## 2021-12-06 ENCOUNTER — OFFICE VISIT (OUTPATIENT)
Dept: ONCOLOGY | Facility: HOSPITAL | Age: 66
End: 2021-12-06

## 2021-12-06 VITALS
SYSTOLIC BLOOD PRESSURE: 145 MMHG | BODY MASS INDEX: 31.36 KG/M2 | OXYGEN SATURATION: 96 % | DIASTOLIC BLOOD PRESSURE: 68 MMHG | HEART RATE: 71 BPM | TEMPERATURE: 97.4 F | WEIGHT: 224.87 LBS | RESPIRATION RATE: 18 BRPM

## 2021-12-06 DIAGNOSIS — C67.9 MALIGNANT NEOPLASM OF URINARY BLADDER, UNSPECIFIED SITE (HCC): Primary | ICD-10-CM

## 2021-12-06 DIAGNOSIS — J43.2 CENTRILOBULAR EMPHYSEMA (HCC): ICD-10-CM

## 2021-12-06 DIAGNOSIS — R79.89 ELEVATED SERUM CREATININE: ICD-10-CM

## 2021-12-06 PROBLEM — J98.4 LUNG DISEASE: Status: ACTIVE | Noted: 2021-12-06

## 2021-12-06 PROBLEM — I50.9 CONGESTIVE HEART FAILURE: Status: ACTIVE | Noted: 2021-12-06

## 2021-12-06 PROBLEM — J45.909 ASTHMA: Status: ACTIVE | Noted: 2021-12-06

## 2021-12-06 PROBLEM — J44.9 CHRONIC OBSTRUCTIVE PULMONARY DISEASE: Status: ACTIVE | Noted: 2021-12-06

## 2021-12-06 PROBLEM — R06.02 SHORTNESS OF BREATH: Status: ACTIVE | Noted: 2021-12-06

## 2021-12-06 PROBLEM — I10 HYPERTENSION: Status: ACTIVE | Noted: 2021-12-06

## 2021-12-06 PROCEDURE — G0463 HOSPITAL OUTPT CLINIC VISIT: HCPCS | Performed by: INTERNAL MEDICINE

## 2021-12-06 PROCEDURE — G0463 HOSPITAL OUTPT CLINIC VISIT: HCPCS

## 2021-12-06 PROCEDURE — 99214 OFFICE O/P EST MOD 30 MIN: CPT | Performed by: INTERNAL MEDICINE

## 2021-12-06 RX ORDER — ROSUVASTATIN CALCIUM 5 MG/1
TABLET, COATED ORAL
COMMUNITY
Start: 2021-05-21 | End: 2021-12-06 | Stop reason: SDUPTHER

## 2021-12-06 RX ORDER — SPIRONOLACTONE AND HYDROCHLOROTHIAZIDE 25; 25 MG/1; MG/1
TABLET ORAL
COMMUNITY
End: 2021-12-06 | Stop reason: SDUPTHER

## 2021-12-06 RX ORDER — BECLOMETHASONE DIPROPIONATE HFA 40 UG/1
AEROSOL, METERED RESPIRATORY (INHALATION)
COMMUNITY
Start: 2021-10-27 | End: 2022-01-05

## 2021-12-06 NOTE — PROGRESS NOTES
Chief Complaint  Bladder Cancer and Follow-up    Collin Cannon MD Houk, Brandon Lyle, MD    Subjective          Toribio Hayes presents to [facility] for follow-up of his bladder cancer.  He status post neoadjuvant chemotherapy followed by resection with neobladder creation.  On return today, he states he is feeling well.  He reports good urinary stream without dysuria, hematuria or hesitancy.  He denies any masses or adenopathy.  No unusual aches or pains.  He continues to work full-time.  He reports good appetite and energy level.    Oncology/Hematology History Overview Note   Bladder, urothelial carcinoma      Prostate cancer, adenocarcinoma, 4+3 Eb score            Clinical Staging      Stage II ([id number])            Treatments      Chemotherapy      10/10/18 began DD MVAC      Surgeries      Left upper lobectomy 2009.  Radical cystoprostatectomy with neobladder 12/18.        Malignant neoplasm of urinary bladder (HCC)   12/29/2020 Initial Diagnosis    Malignant neoplasm of urinary bladder (HCC)         Review of Systems   Constitutional: Negative for appetite change, diaphoresis, fatigue, fever, unexpected weight gain and unexpected weight loss.   HENT: Negative for hearing loss, mouth sores, sore throat, swollen glands, trouble swallowing and voice change.    Eyes: Negative for blurred vision.   Respiratory: Negative for cough, shortness of breath and wheezing.    Cardiovascular: Negative for chest pain and palpitations.   Gastrointestinal: Negative for abdominal pain, blood in stool, constipation, diarrhea, nausea and vomiting.   Endocrine: Negative for cold intolerance and heat intolerance.   Genitourinary: Negative for difficulty urinating, dysuria, frequency, hematuria and urinary incontinence.   Musculoskeletal: Negative for arthralgias, back pain and myalgias.   Skin: Negative for rash, skin lesions and bruise.   Neurological: Negative for dizziness,  seizures, weakness, numbness and headache.   Hematological: Does not bruise/bleed easily.   Psychiatric/Behavioral: Negative for depressed mood. The patient is not nervous/anxious.      Current Outpatient Medications on File Prior to Visit   Medication Sig Dispense Refill   • albuterol (ACCUNEB) 1.25 MG/3ML nebulizer solution Take 1 ampule by nebulization Every 6 (Six) Hours As Needed.     • aspirin 81 MG chewable tablet Chew 81 mg Daily.     • beclomethasone (Qvar) 40 MCG/ACT inhaler Inhale 2 puffs 2 (Two) Times a Day. 8.7 g 6   • carvedilol (COREG) 25 MG tablet TAKE 1 TABLET BY MOUTH TWICE A  tablet 2   • Eliquis 5 MG tablet tablet TAKE 1 TABLET BY MOUTH TWICE A  tablet 2   • fluticasone-salmeterol (Advair Diskus) 250-50 MCG/DOSE DISKUS Advair Diskus 250-50 mcg/dose inhalation blister with device inhale 1 puff by inhalation route 2 times per day in the morning and evening approximately 12 hours apart   Active     • montelukast (SINGULAIR) 10 MG tablet Take 1 tablet by mouth Every Night. 1 tablet 5   • olmesartan (BENICAR) 5 MG tablet Take 5 mg by mouth Daily.     • ondansetron ODT (ZOFRAN-ODT) 8 MG disintegrating tablet Place 8 mg on the tongue Every 8 (Eight) Hours As Needed.     • Qvar RediHaler 40 MCG/ACT inhaler      • rosuvastatin (CRESTOR) 5 MG tablet Take 5 mg by mouth Daily.     • sacubitril-valsartan (ENTRESTO) 24-26 MG tablet Take 1 tablet by mouth 2 (Two) Times a Day.     • sodium chloride (OCEAN) 0.65 % nasal spray 1 spray into the nostril(s) as directed by provider As Needed.     • tiotropium bromide-olodaterol (Stiolto Respimat) 2.5-2.5 MCG/ACT aerosol solution inhaler Inhale 2 puffs Daily. 4 g 5   • [DISCONTINUED] rosuvastatin (CRESTOR) 5 MG tablet ROSUVASTATIN CALCIUM 5 MG TAB TAKE 1 TABLET BY MOUTH AT BEDTIME 5/21/2021  Active     • [DISCONTINUED] furosemide (LASIX) 20 MG tablet Take 20 mg by mouth 2 (Two) Times a Day.     • [DISCONTINUED] HYDROcodone-acetaminophen (NORCO) 5-325 MG  per tablet Take 1 tablet by mouth Every 6 (Six) Hours As Needed.     • [DISCONTINUED] prochlorperazine (COMPAZINE) 10 MG tablet Take 10 mg by mouth Every 6 (Six) Hours As Needed.     • [DISCONTINUED] spironolactone-hydrochlorothiazide (ALDACTAZIDE) 25-25 MG tablet spironolacton-hydrochlorothiaz 25-25 mg oral tablet take 1 tablet by oral route once daily   Active       No current facility-administered medications on file prior to visit.       No Known Allergies  Past Medical History:   Diagnosis Date   • A-fib ([facility])     DR KHALIL   • Arthritis     RIGHT THUMB   • Bladder cancer ([facility])        • COPD (chronic obstructive pulmonary disease) ([facility])     INHALER   • Elevated serum creatinine 2021   • Hyperlipidemia     ON MEDS   • Hypertension     CONTROLLED W MEDS   • Lung cancer ([facility])     DR RAMIRES TOOK CARE OF THAT ()   • Mouth sore secondary to chemotherapy    • Throat soreness     FROM CHEMO     Past Surgical History:   Procedure Laterality Date   • COLONOSCOPY     • CYSTECTOMY     • FINGER DEBRIDEMENT Right     RIGHT THUMB   • ILEAL LOOP NEOBLADDER      BLADDER MADE FROM HIS INTESTINES   • LUNG REMOVAL, PARTIAL Left     LEFT UPPER - 2008 - SOA W EXERTION   • PORTOCAVAL SHUNT PLACEMENT       AND    • TURP / TRANSURETHRAL INCISION / DRAINAGE PROSTATE       Social History     Socioeconomic History   • Marital status:    Tobacco Use   • Smoking status: Former Smoker     Quit date: 10/21/2008     Years since quittin.1     Family History   Problem Relation Age of Onset   • Prostate cancer Brother        Objective   Physical Exam  Vitals reviewed. Exam conducted with a chaperone present.   Constitutional:       General: He is not in acute distress.     Appearance: Normal appearance.   Cardiovascular:      Rate and Rhythm: Normal rate and regular rhythm.      Heart sounds: Normal heart sounds. No murmur heard.  No gallop.    Pulmonary:      Effort: Pulmonary effort is normal.      Breath sounds:  Normal breath sounds.   Abdominal:      General: Abdomen is flat. Bowel sounds are normal. There is no distension.      Palpations: Abdomen is soft.      Tenderness: There is no abdominal tenderness.   Musculoskeletal:      Cervical back: Neck supple.      Right lower leg: No edema.      Left lower leg: No edema.   Lymphadenopathy:      Cervical: No cervical adenopathy.   Neurological:      Mental Status: He is alert and oriented to person, place, and time.   Psychiatric:         Mood and Affect: Mood normal.         Behavior: Behavior normal.         Vitals:    12/06/21 0801   BP: 145/68   Pulse: 71   Resp: 18   Temp: 97.4 °F (36.3 °C)   SpO2: 96%   Weight: 102 kg (224 lb 13.9 oz)   PainSc: 0-No pain     ECOG score: 0         PHQ-9 Total Score: 0                  Result Review :   The following data was reviewed by: Jeff Fernando MD on 12/06/2021:  Lab Results   Component Value Date    HGB 12.6 (L) 06/04/2021    HCT 37.2 (L) 06/04/2021    MCV 94.2 06/04/2021     06/04/2021    WBC 6.15 06/04/2021    NEUTROABS 3.99 06/04/2021    LYMPHSABS 1.45 06/04/2021    MONOSABS 0.59 06/04/2021    EOSABS 0.07 06/04/2021    BASOSABS 0.04 06/04/2021     Lab Results   Component Value Date    GLUCOSE 123 (H) 11/19/2021    BUN 22 11/19/2021    CREATININE 1.40 11/19/2021     11/19/2021    K 4.8 11/19/2021    CL 98 11/19/2021    CO2 33.4 (H) 11/19/2021    CALCIUM 10.0 11/19/2021    PROTEINTOT 6.8 11/19/2021    ALBUMIN 4.30 11/19/2021    BILITOT 0.3 11/19/2021    ALKPHOS 67 11/19/2021    AST 23 11/19/2021    ALT 15 11/19/2021           Assessment and Plan    Diagnoses and all orders for this visit:    1. Malignant neoplasm of urinary bladder, unspecified site (HCC) (Primary)  Assessment & Plan:  Status post neoadjuvant chemotherapy with dose dense MVAC followed by surgery with complete pathologic response.  Patient is doing well.  I see no evidence of disease recurrence by his history, physical examination, lab work or  scans.  RTC 6 months for continued surveillance with lab work and scans prior.    Orders:  -     CBC & Differential; Future  -     Comprehensive Metabolic Panel; Future  -     CT chest w contrast; Future  -     CT abdomen pelvis w contrast; Future    2. Centrilobular emphysema (HCC)  Assessment & Plan:  Noted on recent CT of the chest.  Patient denies symptoms.  He should follow-up with his PCP.        3. Elevated serum creatinine  Assessment & Plan:  Creatinine elevated at 1.2, reviewing his records his creatinine has trended from 1.2-1.5.  Overall fairly stable.  Repeat lab work at next visit.            Patient Follow Up: 6 months    Patient was given instructions and counseling regarding his condition or for health maintenance advice. Please see specific information pulled into the AVS if appropriate.     Jeff Fernando MD    12/6/2021

## 2021-12-06 NOTE — ASSESSMENT & PLAN NOTE
Creatinine elevated at 1.2, reviewing his records his creatinine has trended from 1.2-1.5.  Overall fairly stable.  Repeat lab work at next visit.

## 2021-12-06 NOTE — ASSESSMENT & PLAN NOTE
Status post neoadjuvant chemotherapy with dose dense MVAC followed by surgery with complete pathologic response.  Patient is doing well.  I see no evidence of disease recurrence by his history, physical examination, lab work or scans.  RTC 6 months for continued surveillance with lab work and scans prior.

## 2021-12-29 ENCOUNTER — TELEPHONE (OUTPATIENT)
Dept: CARDIOLOGY | Facility: CLINIC | Age: 66
End: 2021-12-29

## 2021-12-29 DIAGNOSIS — E78.5 HYPERLIPIDEMIA LDL GOAL <70: ICD-10-CM

## 2021-12-29 DIAGNOSIS — Z51.81 MEDICATION MONITORING ENCOUNTER: ICD-10-CM

## 2021-12-29 DIAGNOSIS — I50.9 CONGESTIVE HEART FAILURE, UNSPECIFIED HF CHRONICITY, UNSPECIFIED HEART FAILURE TYPE (HCC): Primary | ICD-10-CM

## 2021-12-29 DIAGNOSIS — I10 HYPERTENSION, UNSPECIFIED TYPE: ICD-10-CM

## 2021-12-30 ENCOUNTER — LAB (OUTPATIENT)
Dept: LAB | Facility: HOSPITAL | Age: 66
End: 2021-12-30

## 2021-12-30 DIAGNOSIS — E78.5 HYPERLIPIDEMIA LDL GOAL <70: ICD-10-CM

## 2021-12-30 DIAGNOSIS — I10 HYPERTENSION, UNSPECIFIED TYPE: ICD-10-CM

## 2021-12-30 DIAGNOSIS — I50.9 CONGESTIVE HEART FAILURE, UNSPECIFIED HF CHRONICITY, UNSPECIFIED HEART FAILURE TYPE: ICD-10-CM

## 2021-12-30 DIAGNOSIS — Z51.81 MEDICATION MONITORING ENCOUNTER: ICD-10-CM

## 2021-12-30 LAB
ALBUMIN SERPL-MCNC: 4.4 G/DL (ref 3.5–5.2)
ALBUMIN/GLOB SERPL: 1.7 G/DL
ALP SERPL-CCNC: 71 U/L (ref 39–117)
ALT SERPL W P-5'-P-CCNC: 21 U/L (ref 1–41)
ANION GAP SERPL CALCULATED.3IONS-SCNC: 10.3 MMOL/L (ref 5–15)
AST SERPL-CCNC: 19 U/L (ref 1–40)
BASOPHILS # BLD AUTO: 0.06 10*3/MM3 (ref 0–0.2)
BASOPHILS NFR BLD AUTO: 0.8 % (ref 0–1.5)
BILIRUB SERPL-MCNC: 0.3 MG/DL (ref 0–1.2)
BUN SERPL-MCNC: 21 MG/DL (ref 8–23)
BUN/CREAT SERPL: 14.8 (ref 7–25)
CALCIUM SPEC-SCNC: 9.9 MG/DL (ref 8.6–10.5)
CHLORIDE SERPL-SCNC: 98 MMOL/L (ref 98–107)
CHOLEST SERPL-MCNC: 137 MG/DL (ref 0–200)
CO2 SERPL-SCNC: 31.7 MMOL/L (ref 22–29)
CREAT SERPL-MCNC: 1.42 MG/DL (ref 0.76–1.27)
DEPRECATED RDW RBC AUTO: 42.2 FL (ref 37–54)
EOSINOPHIL # BLD AUTO: 0.11 10*3/MM3 (ref 0–0.4)
EOSINOPHIL NFR BLD AUTO: 1.5 % (ref 0.3–6.2)
ERYTHROCYTE [DISTWIDTH] IN BLOOD BY AUTOMATED COUNT: 12.5 % (ref 12.3–15.4)
GFR SERPL CREATININE-BSD FRML MDRD: 50 ML/MIN/1.73
GLOBULIN UR ELPH-MCNC: 2.6 GM/DL
GLUCOSE SERPL-MCNC: 119 MG/DL (ref 65–99)
HCT VFR BLD AUTO: 42.2 % (ref 37.5–51)
HDLC SERPL-MCNC: 52 MG/DL (ref 40–60)
HGB BLD-MCNC: 14.1 G/DL (ref 13–17.7)
IMM GRANULOCYTES # BLD AUTO: 0.02 10*3/MM3 (ref 0–0.05)
IMM GRANULOCYTES NFR BLD AUTO: 0.3 % (ref 0–0.5)
LDLC SERPL CALC-MCNC: 68 MG/DL (ref 0–100)
LDLC/HDLC SERPL: 1.3 {RATIO}
LYMPHOCYTES # BLD AUTO: 1.63 10*3/MM3 (ref 0.7–3.1)
LYMPHOCYTES NFR BLD AUTO: 21.6 % (ref 19.6–45.3)
MCH RBC QN AUTO: 31.1 PG (ref 26.6–33)
MCHC RBC AUTO-ENTMCNC: 33.4 G/DL (ref 31.5–35.7)
MCV RBC AUTO: 93.2 FL (ref 79–97)
MONOCYTES # BLD AUTO: 0.8 10*3/MM3 (ref 0.1–0.9)
MONOCYTES NFR BLD AUTO: 10.6 % (ref 5–12)
NEUTROPHILS NFR BLD AUTO: 4.92 10*3/MM3 (ref 1.7–7)
NEUTROPHILS NFR BLD AUTO: 65.2 % (ref 42.7–76)
NRBC BLD AUTO-RTO: 0 /100 WBC (ref 0–0.2)
PLATELET # BLD AUTO: 230 10*3/MM3 (ref 140–450)
PMV BLD AUTO: 10.7 FL (ref 6–12)
POTASSIUM SERPL-SCNC: 4.8 MMOL/L (ref 3.5–5.2)
PROT SERPL-MCNC: 7 G/DL (ref 6–8.5)
RBC # BLD AUTO: 4.53 10*6/MM3 (ref 4.14–5.8)
SODIUM SERPL-SCNC: 140 MMOL/L (ref 136–145)
TRIGL SERPL-MCNC: 87 MG/DL (ref 0–150)
VLDLC SERPL-MCNC: 17 MG/DL (ref 5–40)
WBC NRBC COR # BLD: 7.54 10*3/MM3 (ref 3.4–10.8)

## 2021-12-30 PROCEDURE — 85025 COMPLETE CBC W/AUTO DIFF WBC: CPT

## 2021-12-30 PROCEDURE — 36415 COLL VENOUS BLD VENIPUNCTURE: CPT

## 2021-12-30 PROCEDURE — 80061 LIPID PANEL: CPT

## 2021-12-30 PROCEDURE — 80053 COMPREHEN METABOLIC PANEL: CPT

## 2021-12-31 DIAGNOSIS — R06.00 DYSPNEA, UNSPECIFIED TYPE: ICD-10-CM

## 2022-01-03 RX ORDER — MONTELUKAST SODIUM 10 MG/1
TABLET ORAL
Qty: 90 TABLET | Refills: 2 | Status: SHIPPED | OUTPATIENT
Start: 2022-01-03 | End: 2022-08-25

## 2022-01-05 ENCOUNTER — OFFICE VISIT (OUTPATIENT)
Dept: CARDIOLOGY | Facility: CLINIC | Age: 67
End: 2022-01-05

## 2022-01-05 VITALS
SYSTOLIC BLOOD PRESSURE: 127 MMHG | HEIGHT: 71 IN | HEART RATE: 80 BPM | DIASTOLIC BLOOD PRESSURE: 74 MMHG | BODY MASS INDEX: 31.36 KG/M2 | WEIGHT: 224 LBS

## 2022-01-05 DIAGNOSIS — E78.5 HYPERLIPIDEMIA, UNSPECIFIED HYPERLIPIDEMIA TYPE: ICD-10-CM

## 2022-01-05 DIAGNOSIS — I50.42 CHRONIC COMBINED SYSTOLIC (CONGESTIVE) AND DIASTOLIC (CONGESTIVE) HEART FAILURE: Primary | ICD-10-CM

## 2022-01-05 DIAGNOSIS — I48.0 PAROXYSMAL ATRIAL FIBRILLATION: ICD-10-CM

## 2022-01-05 DIAGNOSIS — N18.31 STAGE 3A CHRONIC KIDNEY DISEASE: ICD-10-CM

## 2022-01-05 DIAGNOSIS — I10 PRIMARY HYPERTENSION: ICD-10-CM

## 2022-01-05 PROBLEM — I50.32 CHRONIC DIASTOLIC CONGESTIVE HEART FAILURE (HCC): Chronic | Status: RESOLVED | Noted: 2021-12-06 | Resolved: 2022-01-05

## 2022-01-05 PROBLEM — I42.9 CARDIOMYOPATHY: Status: ACTIVE | Noted: 2022-01-05

## 2022-01-05 PROBLEM — I50.32 CHRONIC DIASTOLIC CONGESTIVE HEART FAILURE: Chronic | Status: ACTIVE | Noted: 2021-12-06

## 2022-01-05 PROCEDURE — 93000 ELECTROCARDIOGRAM COMPLETE: CPT | Performed by: INTERNAL MEDICINE

## 2022-01-05 PROCEDURE — 99214 OFFICE O/P EST MOD 30 MIN: CPT | Performed by: INTERNAL MEDICINE

## 2022-01-05 RX ORDER — OLMESARTAN MEDOXOMIL 5 MG/1
10 TABLET ORAL DAILY
Qty: 180 TABLET | Refills: 3 | Status: SHIPPED | OUTPATIENT
Start: 2022-01-05 | End: 2022-06-30

## 2022-01-05 NOTE — ASSESSMENT & PLAN NOTE
Patient CHF is stable with class II symptoms and mild reduction in ejection fraction based on his echocardiogram 2 and half years ago.  I am going to increase his Benicar to 10 mg a day and have him monitor his blood pressure.  We will repeat his echocardiogram in the next few weeks

## 2022-01-05 NOTE — PROGRESS NOTES
Office Visit    Chief Complaint  Congestive Heart Failure, Hypertension, and Hyperlipidemia    Subjective            Toribio Hayes presents to [facility] CARDIOLOGY  Toribio is a 66 years old gentleman with congestive heart failure hypertension hyperlipidemia who is doing well.  He states that his shortness of breath is my and occurs only with moderate to heavy exertion.  He denies any chest pain      Past Medical History:   Diagnosis Date   • A-fib ([facility])     DR KHALIL   • Arthritis     RIGHT THUMB   • Bladder cancer ([facility])     2018   • Chronic diastolic congestive heart failure ([facility]) 2021   • COPD (chronic obstructive pulmonary disease) ([facility])     INHALER   • Elevated serum creatinine 2021   • Hyperlipidemia     ON MEDS   • Hypertension     CONTROLLED W MEDS   • Lung cancer ([facility])     DR RAMIRES TOOK CARE OF THAT ()   • Mouth sore secondary to chemotherapy    • Stage 3a chronic kidney disease ([facility]) 2022   • Throat soreness     FROM CHEMO       No Known Allergies     Past Surgical History:   Procedure Laterality Date   • COLONOSCOPY     • CYSTECTOMY     • FINGER DEBRIDEMENT Right     RIGHT THUMB   • ILEAL LOOP NEOBLADDER      BLADDER MADE FROM HIS INTESTINES   • LUNG REMOVAL, PARTIAL Left     LEFT UPPER - 2008 SO W EXERTION   • PORTOCAVAL SHUNT PLACEMENT       AND    • TURP / TRANSURETHRAL INCISION / DRAINAGE PROSTATE          Social History     Tobacco Use   • Smoking status: Former Smoker     Quit date: 10/21/2008     Years since quittin.2   • Smokeless tobacco: Never Used   Vaping Use   • Vaping Use: Never used   Substance Use Topics   • Alcohol use: Never   • Drug use: Never       Family History   Problem Relation Age of Onset   • Prostate cancer Brother         Prior to Admission medications    Medication Sig Start Date End Date Taking? Authorizing Provider   albuterol (ACCUNEB) 1.25 MG/3ML nebulizer solution Take 1 ampule by nebulization Every 6 (Six) Hours As  "Needed.   Yes Ronn Paula MD   beclomethasone (Qvar) 40 MCG/ACT inhaler Inhale 2 puffs 2 (Two) Times a Day. 10/22/21  Yes Guanako Phan MD   carvedilol (COREG) 25 MG tablet TAKE 1 TABLET BY MOUTH TWICE A DAY 8/19/21  Yes Noelle Resendiz MD   Eliquis 5 MG tablet tablet TAKE 1 TABLET BY MOUTH TWICE A DAY 7/16/21  Yes Sveta Guerrero APRN   montelukast (SINGULAIR) 10 MG tablet TAKE 1 TABLET BY MOUTH AT BEDTIME 1/3/22  Yes Guanako Phan MD   olmesartan (BENICAR) 5 MG tablet Take 5 mg by mouth Daily.   Yes Ronn Paula MD   rosuvastatin (CRESTOR) 5 MG tablet Take 5 mg by mouth Daily.   Yes Ronn Paula MD   tiotropium bromide-olodaterol (Stiolto Respimat) 2.5-2.5 MCG/ACT aerosol solution inhaler Inhale 2 puffs Daily. 10/22/21  Yes Guanako Phan MD   aspirin 81 MG chewable tablet Chew 81 mg Daily.    ProviderRonn MD   fluticasone-salmeterol (Advair Diskus) 250-50 MCG/DOSE DISKUS Advair Diskus 250-50 mcg/dose inhalation blister with device inhale 1 puff by inhalation route 2 times per day in the morning and evening approximately 12 hours apart   Active    ProviderRonn MD   ondansetron ODT (ZOFRAN-ODT) 8 MG disintegrating tablet Place 8 mg on the tongue Every 8 (Eight) Hours As Needed.    ProviderRonn MD   sacubitril-valsartan (ENTRESTO) 24-26 MG tablet Take 1 tablet by mouth 2 (Two) Times a Day.    ProviderRonn MD   Qvar RediHaler 40 MCG/ACT inhaler  10/27/21 1/5/22  Ronn Paula MD   sodium chloride (OCEAN) 0.65 % nasal spray 1 spray into the nostril(s) as directed by provider As Needed.  1/5/22  Ronn Paula MD        Review of Systems   Constitutional: Negative for fatigue.   Respiratory: Negative for cough and shortness of breath.    Cardiovascular: Negative for chest pain, palpitations and leg swelling.   Neurological: Negative for dizziness.        Objective     /74   Pulse 80   Ht 180.3 cm (71\")   Wt 102 kg (224 lb)  "  BMI 31.24 kg/m²       Physical Exam  Constitutional:       General: He is awake.      Appearance: Normal appearance.   Neck:      Thyroid: No thyromegaly.      Vascular: No carotid bruit or JVD.   Cardiovascular:      Rate and Rhythm: Normal rate and regular rhythm.      Chest Wall: PMI is not displaced.      Pulses: Normal pulses.      Heart sounds: Normal heart sounds, S1 normal and S2 normal. No murmur heard.  No friction rub. No gallop. No S3 or S4 sounds.    Pulmonary:      Effort: Pulmonary effort is normal.      Breath sounds: Normal breath sounds and air entry. No wheezing, rhonchi or rales.   Abdominal:      General: Bowel sounds are normal.      Palpations: Abdomen is soft. There is no mass.      Tenderness: There is no abdominal tenderness.   Musculoskeletal:      Cervical back: Neck supple.      Right lower leg: No edema.      Left lower leg: No edema.   Neurological:      Mental Status: He is alert and oriented to person, place, and time.   Psychiatric:         Mood and Affect: Mood normal.         Behavior: Behavior is cooperative.       Lab Results   Component Value Date     03/13/2021     02/16/2020     CMP    CMP 6/4/21 11/19/21 11/19/21 12/30/21     0800 0821    Glucose 111 (A) 123 (A)  119 (A)   BUN 27 (A) 22  21   Creatinine 1.39 (A) 1.29 (A) 1.40 1.42 (A)   eGFR Non  Am  56 (A)  50 (A)   Sodium 139 137  140   Potassium 4.3 4.8  4.8   Chloride 100 98  98   Calcium 9.5 10.0  9.9   Albumin 4.4 4.30  4.40   Total Bilirubin 0.48 0.3  0.3   Alkaline Phosphatase 59 67  71   AST (SGOT) 18 23  19   ALT (SGPT) 15 15  21   (A) Abnormal value       Comments are available for some flowsheets but are not being displayed.           CBC w/diff    CBC w/Diff 3/13/21 6/4/21 12/30/21   WBC 6.02 6.15 7.54   RBC 3.98 (A) 3.95 (A) 4.53   Hemoglobin 12.6 (A) 12.6 (A) 14.1   Hematocrit 38.3 (A) 37.2 (A) 42.2   MCV 96.2 (A) 94.2 93.2   MCH 31.7 (A) 31.9 (A) 31.1   MCHC 32.9 (A) 33.9 33.4   RDW  12.7 12.7 12.5   Platelets 193 171 230   Neutrophil Rel % 66.8 64.8 65.2   Immature Granulocyte Rel %   0.3   Lymphocyte Rel % 19.6 (A) 23.6 21.6   Monocyte Rel % 11.5 (A) 9.6 10.6   Eosinophil Rel % 1.2 1.1 1.5   Basophil Rel % 0.7 0.7 0.8   (A) Abnormal value             Lipid Panel    Lipid Panel 3/13/21 12/30/21   Total Cholesterol  137   Total Cholesterol 126    Triglycerides 60 87   HDL Cholesterol 53 52   VLDL Cholesterol 12 17   LDL Cholesterol  61 (A) 68   LDL/HDL Ratio  1.30   (A) Abnormal value       Comments are available for some flowsheets but are not being displayed.            Lab Results   Component Value Date    TSH 0.702 03/13/2021    TSH 0.885 11/13/2020    TSH 1.380 04/18/2019      Lab Results   Component Value Date    FREET4 1.2 03/13/2021    FREET4 1.5 04/18/2019      No results found for: DDIMERQUANT  Magnesium   Date Value Ref Range Status   03/13/2021 1.85 1.60 - 2.30 mg/dL Final      No results found for: DIGOXIN              Result Review :                    ECG 12 Lead    Date/Time: 1/5/2022 9:08 AM  Performed by: Noelle Resendiz MD  Authorized by: Noelle Resendiz MD   Comments: Sinus rhythm, nonspecific interventricular conduction delay, nondiagnostic anterior ST elevation.  No change compared to EKG of 8/12/2020                Assessment and Plan        Diagnoses and all orders for this visit:    1. Chronic combined systolic (congestive) and diastolic (congestive) heart failure (HCC) (Primary)  Assessment & Plan:  Patient CHF is stable with class II symptoms and mild reduction in ejection fraction based on his echocardiogram 2 and half years ago.  I am going to increase his Benicar to 10 mg a day and have him monitor his blood pressure.  We will repeat his echocardiogram in the next few weeks    Orders:  -     Lipid Panel; Future  -     Comprehensive Metabolic Panel; Future  -     Magnesium; Future  -     Adult Transthoracic Echo Complete W/ Cont if Necessary Per Protocol; Future  -      ECG 12 Lead    2. Primary hypertension  -     Lipid Panel; Future  -     Comprehensive Metabolic Panel; Future  -     Magnesium; Future  -     Adult Transthoracic Echo Complete W/ Cont if Necessary Per Protocol; Future  -     ECG 12 Lead    3. Hyperlipidemia, unspecified hyperlipidemia type  -     Lipid Panel; Future  -     Comprehensive Metabolic Panel; Future  -     Magnesium; Future  -     Adult Transthoracic Echo Complete W/ Cont if Necessary Per Protocol; Future  -     ECG 12 Lead    4. Stage 3a chronic kidney disease (HCC)  -     Lipid Panel; Future  -     Comprehensive Metabolic Panel; Future  -     Magnesium; Future  -     Adult Transthoracic Echo Complete W/ Cont if Necessary Per Protocol; Future  -     ECG 12 Lead    5. Paroxysmal atrial fibrillation (HCC)  Assessment & Plan:  He has asymptomatic paroxysmal atrial fibrillation.  He is on Eliquis for prevention of stroke    Orders:  -     ECG 12 Lead    Other orders  -     olmesartan (BENICAR) 5 MG tablet; Take 2 tablets by mouth Daily.  Dispense: 180 tablet; Refill: 3      His lipids are under control and his kidney function is stable.  He will continue rest of his medications in the current dosage    Follow Up     Return in about 6 months (around 7/5/2022) for echo 2-3 weeks.    Patient was given instructions and counseling regarding his condition or for health maintenance advice. Please see specific information pulled into the AVS if appropriate.     Noelle Resendiz MD  01/05/22 08:43 EST

## 2022-02-09 ENCOUNTER — OFFICE VISIT (OUTPATIENT)
Dept: PULMONOLOGY | Facility: CLINIC | Age: 67
End: 2022-02-09

## 2022-02-09 VITALS
HEART RATE: 66 BPM | RESPIRATION RATE: 17 BRPM | SYSTOLIC BLOOD PRESSURE: 153 MMHG | BODY MASS INDEX: 31.36 KG/M2 | OXYGEN SATURATION: 95 % | TEMPERATURE: 98.4 F | DIASTOLIC BLOOD PRESSURE: 64 MMHG | HEIGHT: 71 IN | WEIGHT: 224 LBS

## 2022-02-09 DIAGNOSIS — J43.9 PULMONARY EMPHYSEMA, UNSPECIFIED EMPHYSEMA TYPE: Primary | ICD-10-CM

## 2022-02-09 DIAGNOSIS — R06.00 DYSPNEA, UNSPECIFIED TYPE: ICD-10-CM

## 2022-02-09 DIAGNOSIS — I50.31 DIASTOLIC CHF, ACUTE: ICD-10-CM

## 2022-02-09 DIAGNOSIS — C34.92 ADENOCARCINOMA OF LEFT LUNG: ICD-10-CM

## 2022-02-09 DIAGNOSIS — I10 ESSENTIAL HYPERTENSION: ICD-10-CM

## 2022-02-09 DIAGNOSIS — I48.20 ATRIAL FIBRILLATION, CHRONIC: ICD-10-CM

## 2022-02-09 DIAGNOSIS — N18.31 STAGE 3A CHRONIC KIDNEY DISEASE: ICD-10-CM

## 2022-02-09 PROCEDURE — 99203 OFFICE O/P NEW LOW 30 MIN: CPT | Performed by: INTERNAL MEDICINE

## 2022-02-09 RX ORDER — TIOTROPIUM BROMIDE AND OLODATEROL 3.124; 2.736 UG/1; UG/1
2 SPRAY, METERED RESPIRATORY (INHALATION)
Qty: 4 G | Refills: 5 | Status: SHIPPED | OUTPATIENT
Start: 2022-02-09 | End: 2022-06-24 | Stop reason: SDUPTHER

## 2022-02-10 NOTE — PROGRESS NOTES
Primary Care Provider  Collin Cannon MD   Referring Provider  No ref. provider found      Patient Complaint  Asthma, COPD, Shortness of Breath, and Follow-up (6 Month/FMLA)      Subjective          Toribio Hayes presents to [facility]      History of Presenting Illness  Toribio Hayes is a 66 y.o. male here for follow-up for COPD and lung adenocarcinoma    2021 last pulmonary follow-up    Now, chronic stable dyspnea on exertion mild able to walk 2 flights of stairs and three quarters of a mile flat  No chronic cough no productive phlegm    Oxygen 2 L/minute using only during sleep and not during the day  Albuterol use never  Albuterol nebulizer in the morning seems to help    Adenocarcinoma of lung with left upper lobe resection followed by chemotherapy and radiation therapy in remission followed by oncology      Denies coughing, wheezing, headaches, chest pain, weight loss or hemoptysis  No productive phlegm  No edema, orthopnea, exertional chest pain   Denies fevers, sweats, chills   No sinus congestion  No acid reflux    I have personally reviewed the review of systems, past family, social, medical and surgical histories; and agree with their findings.      Review of Systems  Constitutional symptoms:  Denied complaints   Ear, nose, throat: Denied complaints  Cardiovascular:   Cardiology consultation 2022 okay  Respiratory: Denied complaints  Gastrointestinal: Denied complaints  Musculoskeletal: Denied complaints  Genitourinary: History of bladder cancer  Sleep: Denies any history of snoring, witnessed apneas and no daytime hypersomnolence whatsoever        Family History   Problem Relation Age of Onset   • Prostate cancer Brother    • No Known Problems Mother         Mother    • No Known Problems Father         Father  54 years old lung cancer/smoker        Social History     Socioeconomic History   • Marital status:    Tobacco Use    • Smoking status: Former Smoker     Packs/day: 1.50     Years: 39.00     Pack years: 58.50     Quit date: 10/21/2008     Years since quittin.3   • Smokeless tobacco: Never Used   Vaping Use   • Vaping Use: Never used   Substance and Sexual Activity   • Alcohol use: Never   • Drug use: Never   • Sexual activity: Defer   Smoked 1 to 2 packs/day for 39 years from 15 to 53 years of age quitting in     Past Medical History:   Diagnosis Date   • A-fib ([facility])     DR KHALIL   • Arthritis     RIGHT THUMB   • Bladder cancer ([facility])     2018   • Chronic diastolic congestive heart failure ([facility]) 2021   • COPD (chronic obstructive pulmonary disease) ([facility])     INHALER   • Elevated serum creatinine 2021   • Hyperlipidemia     ON MEDS   • Hypertension     CONTROLLED W MEDS   • Lung cancer ([facility])     DR RAMIRES TOOK CARE OF THAT ()   • Mouth sore secondary to chemotherapy    • Stage 3a chronic kidney disease ([facility]) 2022   • Throat soreness     FROM CHEMO        Immunization History   Administered Date(s) Administered   • COVID-19 (MODERNA) 1st, 2nd, 3rd Dose Only 2021, 2021   • Influenza, Unspecified 2020         No Known Allergies       Current Outpatient Medications:   •  albuterol (ACCUNEB) 1.25 MG/3ML nebulizer solution, Take 1 ampule by nebulization Every 6 (Six) Hours As Needed., Disp: , Rfl:   •  beclomethasone (Qvar) 40 MCG/ACT inhaler, Inhale 2 puffs 2 (Two) Times a Day., Disp: 8.7 g, Rfl: 6  •  carvedilol (COREG) 25 MG tablet, TAKE 1 TABLET BY MOUTH TWICE A DAY, Disp: 180 tablet, Rfl: 2  •  Eliquis 5 MG tablet tablet, TAKE 1 TABLET BY MOUTH TWICE A DAY, Disp: 180 tablet, Rfl: 2  •  montelukast (SINGULAIR) 10 MG tablet, TAKE 1 TABLET BY MOUTH AT BEDTIME, Disp: 90 tablet, Rfl: 2  •  olmesartan (BENICAR) 5 MG tablet, Take 2 tablets by mouth Daily., Disp: 180 tablet, Rfl: 3  •  rosuvastatin (CRESTOR) 5 MG tablet, Take 5 mg by mouth Daily., Disp: , Rfl:   •  tiotropium bromide-olodaterol  "(Stiolto Respimat) 2.5-2.5 MCG/ACT aerosol solution inhaler, Inhale 2 puffs Daily., Disp: 4 g, Rfl: 5         Objective     Vital Signs:   /64 (BP Location: Right arm, Patient Position: Sitting, Cuff Size: Large Adult)   Pulse 66   Temp 98.4 °F (36.9 °C) (Temporal)   Resp 17   Ht 180.3 cm (71\")   Wt 102 kg (224 lb)   SpO2 95% Comment: room air  BMI 31.24 kg/m²     Physical Exam  Vital Signs Reviewed   WDWN, Alert, NAD.    HEENT:  mallampati index 2/4; PERRL, EOMI.  OP, nares clear  Neck:  Supple, no JVD, no thyromegaly  Chest:  good aeration, clear to auscultation bilaterally, moderate prolonged expiration/hyperinflation, no pleural friction rubs, no work of breathing noted  CV: RRR, no MGR, pulses 2+, equal.  Abd:  Soft, NT, ND, + BS, no HSM  EXT:  no clubbing, no cyanosis, no edema  Neuro:  A&O x 3, CN grossly intact, no focal deficits.  Skin: No rashes or lesions noted       Result Review :     Lab 12/30/2021: Hematocrit 42%; WBC 7540; creatinine 1.42 with GFR 50 cc/minute; IgE 384    CXR 2/24/2020: NAD    CT chest/abdomen/pelvis 11/19/2021: Centrilobular emphysema with postoperative thoracotomy findings but no acute disease    Pet scan 4/12/2019:  SUV uptake 4.0 left inguinal node 2.5 cm with subsequent biopsy 4/29/2019 reportedly benign    I have personally reviewed the above    BNP   Date Value Ref Range Status   03/13/2021 274 0 - 900 pg/mL Final     Comment:     For Adults <50 NT-proBNP values >450 pg/ml are consistent  with CHF.  For adults 50 to 75 a diagnostic NT-proBNP cutoff of 900 pg/ml  has been suggested in the absence of renal failure.  For adults over 75 a diagnostic NT-proBNP cutoff of 1800 pg/ml  has been suggested in absence of renal failure.  In addition, a NT-proBNP of <300 pg/mL effectively rules out  acute congestive failure with 99% negative predictive value.  A cutoff of 1200 pg/ml for patients with an eGFR <60 yields a  diagnostic sensitivity and specificity of 89% and 72% " for  acute CHF.          Site   Date Value Ref Range Status   02/26/2020 LEFT Radial NA Final     Gordo's Test   Date Value Ref Range Status   02/26/2020 ACCEPTED NA Final     HCO3, Arterial   Date Value Ref Range Status   02/26/2020 31.2 (H) 22.0 - 26.0 Final     Oxyhemoglobin   Date Value Ref Range Status   02/26/2020 89.6 (L) 94.0 - 98.0 % Final     Carboxyhemoglobin   Date Value Ref Range Status   02/26/2020 1.1 0.0 - 1.5 % Final     Comment:     Non Smoker <1.5% of tHb  Smoker  1.5-5.0% of tHb  Heavy Smoker  5.0-9.0% of tHb       FIO2   Date Value Ref Range Status   02/26/2020 21.00 21.00 - 100.00 % Final                      Pulmonary Functions Testing Results:    PFT 5/22/2020: FEV1 1.40 (39%) and FVC 3.01 (62%) with FEV1 percent 47%; DLCO 36%; RV 75%; %                 Assessment and Plan      Patient Active Problem List   Diagnosis   • Asthma   • Malignant neoplasm of urinary bladder (HCC)   • Chronic combined systolic (congestive) and diastolic (congestive) heart failure (HCC)   • Chronic obstructive pulmonary disease (HCC)   • Hypertension   • Lung disease   • Shortness of breath   • Primary localized osteoarthrosis, lower leg   • Tear of medial cartilage or meniscus of knee, current   • Elevated serum creatinine   • Hyperlipidemia   • Stage 3a chronic kidney disease (HCC)   • Paroxysmal atrial fibrillation (HCC)           Impression:    1.  COPD: 60-pack-year smoking; clinically severe with FEV1 1.40 (39%) and FEV1%=47%  DLCO 36%    2.  Adenocarcinoma of the lung 2008: In remission s/p adjuvant chemotherapy and radiation therapy  BRIEN lobectomy 2008    3.  CHF: Echo data pending    4.  Atrial fibrillation: CVR with chads vasc 3    5.  Chronic renal insufficiency: Creatinine 1.42 with GFR 50 cc/minute probably hypertensive nephropathy    6.  Hypertension    Plan:    PFT  Chest CT follow-up planned 5/19/2022  Echocardiogram schedule 2/11/2022  BNP with next labs  Pneumococcal vaccine if not  done/patient will check on this    Continue Stiolto  Continue albuterol nebulizer treatments as needed    Weight loss    No need for PSG in the absence of symptoms    Pulmonary rehab done 2008    6 months    Medications personally reviewed    Follow Up   No follow-ups on file.  Patient was given instructions and counseling regarding his condition or for health maintenance advice. Please see specific information pulled into the AVS if appropriate.

## 2022-04-22 ENCOUNTER — OFFICE VISIT (OUTPATIENT)
Dept: SURGERY | Facility: CLINIC | Age: 67
End: 2022-04-22

## 2022-04-22 VITALS — RESPIRATION RATE: 15 BRPM | HEIGHT: 71 IN | WEIGHT: 223 LBS | BODY MASS INDEX: 31.22 KG/M2

## 2022-04-22 DIAGNOSIS — C43.72 MALIGNANT MELANOMA OF LEFT LOWER EXTREMITY: Primary | ICD-10-CM

## 2022-04-22 PROCEDURE — 99202 OFFICE O/P NEW SF 15 MIN: CPT | Performed by: SURGERY

## 2022-04-22 RX ORDER — SODIUM HYPOCHLORITE 2.5 MG/ML
SOLUTION TOPICAL ONCE
Qty: 473 ML | Refills: 0 | Status: SHIPPED | OUTPATIENT
Start: 2022-04-22 | End: 2022-04-22

## 2022-04-22 NOTE — PROGRESS NOTES
Chief Complaint:  Melanoma    Primary Care Provider: Collin Cannon MD    Referring Provider: Júnior Ortez MD    History of Present Illness  Toribio Hayes is a 66 y.o. male referred by Júnior Ortez MD after the patient had a shave biopsy of a skin lesion on his left anterior thigh about a week or 2 ago and pathology showed the following: Malignant melanoma, 1.1 mm thick, no ulceration, peripheral margins involved by melanoma in situ, deep margins uninvolved, no lymphovascular invasion, no perineural invasion.      Allergies: Patient has no known allergies.    Outpatient Medications Marked as Taking for the 4/22/22 encounter (Office Visit) with Dominic Ruiz MD   Medication Sig Dispense Refill   • albuterol (ACCUNEB) 1.25 MG/3ML nebulizer solution Take 1 ampule by nebulization Every 6 (Six) Hours As Needed.     • beclomethasone (Qvar) 40 MCG/ACT inhaler Inhale 2 puffs 2 (Two) Times a Day. 8.7 g 6   • carvedilol (COREG) 25 MG tablet TAKE 1 TABLET BY MOUTH TWICE A  tablet 2   • Eliquis 5 MG tablet tablet TAKE 1 TABLET BY MOUTH TWICE A  tablet 2   • montelukast (SINGULAIR) 10 MG tablet TAKE 1 TABLET BY MOUTH AT BEDTIME 90 tablet 2   • olmesartan (BENICAR) 5 MG tablet Take 2 tablets by mouth Daily. 180 tablet 3   • rosuvastatin (CRESTOR) 5 MG tablet Take 5 mg by mouth Daily.     • tiotropium bromide-olodaterol (Stiolto Respimat) 2.5-2.5 MCG/ACT aerosol solution inhaler Inhale 2 puffs Daily. 4 g 5   • triamcinolone (KENALOG) 0.1 % ointment APPLY TWICE A DAY TO RASH ON LOWER LEG         Past Medical History:   • A-fib ([facility])    DR KHALIL   • Arthritis    RIGHT THUMB   • Bladder cancer ([facility])    2018   • Chronic diastolic congestive heart failure ([facility])   • COPD (chronic obstructive pulmonary disease) ([facility])    INHALER   • Elevated serum creatinine   • Hyperlipidemia    ON MEDS   • Hypertension    CONTROLLED W MEDS   • Lung cancer ([facility])    DR RAMIRES TOOK CARE OF THAT (2008)   • Mouth sore  "secondary to chemotherapy   • Stage 3a chronic kidney disease (HCC)   • Throat soreness    FROM CHEMO        Past Surgical History:   • COLONOSCOPY   • CYSTECTOMY   • FINGER DEBRIDEMENT    RIGHT THUMB   • ILEAL LOOP NEOBLADDER    BLADDER MADE FROM HIS INTESTINES   • LUNG REMOVAL, PARTIAL    LEFT UPPER - 2008 - SOA W EXERTION   • PORTOCAVAL SHUNT PLACEMENT     AND    • TURP / TRANSURETHRAL INCISION / DRAINAGE PROSTATE       Family History:   Family History   Problem Relation Age of Onset   • Prostate cancer Brother    • No Known Problems Mother         Mother    • No Known Problems Father         Father  54 years old lung cancer/smoker        Social History:  Social History     Tobacco Use   • Smoking status: Former Smoker     Packs/day: 1.50     Years: 39.00     Pack years: 58.50     Quit date: 10/21/2008     Years since quittin.5   • Smokeless tobacco: Never Used   Substance Use Topics   • Alcohol use: Never       Objective     Vital Signs:  Resp 15   Ht 180.3 cm (71\")   Wt 101 kg (223 lb)   BMI 31.10 kg/m²   • Constitutional: healthy appearing, alert, no acute distress, reliable historian  • HENT:  NCAT, no visible deformities or lesions  • Eyes:  sclerae clear, conjunctivae clear, EOMI  • Neck:  normal appearance, no masses, trachea midline  • Respiratory:  breathing not labored, respiratory effort appears normal  • Cardiovascular:  heart regular rate  • Abdomen:  nondistended    • Skin and subcutaneous tissue: At the left anterior lateral thigh, there is a wound that contained some fibrinous exudate but there is no drainage and there is no erythema surrounding the wound.  • Musculoskeletal: moving all extremities symmetrically and purposefully  • Neurologic:  no obvious motor or sensory deficits, normal gait, able to stand without difficulty, cerebellar function without any obvious abnormalities, alert & oriented x 3, speech clear  • Psychiatric:  judgment and insight intact, mood normal, " affect appropriate, cooperative    Result Review :     []  Laboratory  []  Radiology  [x]  Pathology    Assessment:  Malignant melanoma of left lower extremity    Plan:  Excision of left thigh melanoma with sentinel lymph node biopsy but will defer surgery until biopsy site has healed more satisfactorily.  Will have patient clean biopsy site with Dakin's  solution daily and then follow-up with me in 2 weeks to schedule a surgery day.    Dominic Ruiz MD  04/22/2022    Electronically signed by Dominic Ruiz MD, 04/22/22, 3:18 PM EDT.

## 2022-05-06 ENCOUNTER — OFFICE VISIT (OUTPATIENT)
Dept: SURGERY | Facility: CLINIC | Age: 67
End: 2022-05-06

## 2022-05-06 ENCOUNTER — PREP FOR SURGERY (OUTPATIENT)
Dept: OTHER | Facility: HOSPITAL | Age: 67
End: 2022-05-06

## 2022-05-06 VITALS — RESPIRATION RATE: 16 BRPM | BODY MASS INDEX: 31.05 KG/M2 | WEIGHT: 221.8 LBS | HEIGHT: 71 IN

## 2022-05-06 DIAGNOSIS — C43.72 MALIGNANT MELANOMA OF LEFT LOWER EXTREMITY: Primary | ICD-10-CM

## 2022-05-06 PROCEDURE — 99212 OFFICE O/P EST SF 10 MIN: CPT | Performed by: SURGERY

## 2022-05-06 RX ORDER — SODIUM HYPOCHLORITE 2.5 MG/ML
SOLUTION TOPICAL
COMMUNITY
Start: 2022-04-22 | End: 2022-06-21

## 2022-05-06 RX ORDER — CEFAZOLIN SODIUM 2 G/100ML
2 INJECTION, SOLUTION INTRAVENOUS ONCE
Status: CANCELLED | OUTPATIENT
Start: 2022-05-06 | End: 2022-05-06

## 2022-05-06 NOTE — PROGRESS NOTES
Patient is here for another follow-up appointment regarding his melanoma biopsy site.  See my previous office note for details.  The wound is healing up nicely and is almost at the point we can proceed with surgery.  I told the patient to place a small amount of antibiotic ointment on the wound 1 or 2 times daily and to keep it covered with a Band-Aid and the see me again in 2 weeks and we will schedule surgery.  A surgery date was already given to the patient today and we will go over the details when he sees me again in 2 weeks.

## 2022-05-09 PROBLEM — C43.72 MALIGNANT MELANOMA OF LEFT LOWER EXTREMITY: Status: ACTIVE | Noted: 2022-05-09

## 2022-05-20 ENCOUNTER — OFFICE VISIT (OUTPATIENT)
Dept: SURGERY | Facility: CLINIC | Age: 67
End: 2022-05-20

## 2022-05-20 ENCOUNTER — HOSPITAL ENCOUNTER (OUTPATIENT)
Dept: RESPIRATORY THERAPY | Facility: HOSPITAL | Age: 67
Discharge: HOME | End: 2022-05-20
Admitting: INTERNAL MEDICINE

## 2022-05-20 VITALS — BODY MASS INDEX: 31.39 KG/M2 | HEIGHT: 71 IN | WEIGHT: 224.2 LBS | RESPIRATION RATE: 14 BRPM

## 2022-05-20 DIAGNOSIS — C43.72 MALIGNANT MELANOMA OF LEFT LOWER EXTREMITY: Primary | ICD-10-CM

## 2022-05-20 PROCEDURE — 94060 EVALUATION OF WHEEZING: CPT

## 2022-05-20 PROCEDURE — 99214 OFFICE O/P EST MOD 30 MIN: CPT | Performed by: SURGERY

## 2022-05-20 PROCEDURE — 94060 EVALUATION OF WHEEZING: CPT | Performed by: INTERNAL MEDICINE

## 2022-05-20 PROCEDURE — 94729 DIFFUSING CAPACITY: CPT | Performed by: INTERNAL MEDICINE

## 2022-05-20 PROCEDURE — 94729 DIFFUSING CAPACITY: CPT

## 2022-05-20 PROCEDURE — 94726 PLETHYSMOGRAPHY LUNG VOLUMES: CPT | Performed by: INTERNAL MEDICINE

## 2022-05-20 PROCEDURE — 94726 PLETHYSMOGRAPHY LUNG VOLUMES: CPT

## 2022-05-20 RX ORDER — ALBUTEROL SULFATE 2.5 MG/3ML
2.5 SOLUTION RESPIRATORY (INHALATION) ONCE
Status: COMPLETED | OUTPATIENT
Start: 2022-05-20 | End: 2022-05-20

## 2022-05-20 RX ADMIN — ALBUTEROL SULFATE 2.5 MG: 2.5 SOLUTION RESPIRATORY (INHALATION) at 07:02

## 2022-05-20 NOTE — PROGRESS NOTES
Chief Complaint:  Follow-up    Primary Care Provider: Collin Cannon MD    Referring Provider: Júnior Ortez MD    History of Present Illness  Toribio Hayes is a 67 y.o. male referred by Dr. Ortez for a left thigh melanoma.  I previously saw my patient but the biopsy site was not healing well at the time so I wanted him to follow wound care provided and then see me again today for another biopsy site check.  The biopsy site is now healing well enough to proceed with surgery.  The patient takes Eliquis and the indication is atrial fibrillation.  He has stopped taking Eliquis briefly to have other procedures performed.  Copy and paste of relevant information from prior office visit is available below    Copy and Paste of HPI from Prior Office Visit on 4/22/22  Toribio Hayes is a 66 y.o. male referred by Júnior Ortez MD after the patient had a shave biopsy of a skin lesion on his left anterior thigh about a week or 2 ago and pathology showed the following: Malignant melanoma, 1.1 mm thick, no ulceration, peripheral margins involved by melanoma in situ, deep margins uninvolved, no lymphovascular invasion, no perineural invasion    Allergies: Patient has no known allergies.    Outpatient Medications Marked as Taking for the 5/20/22 encounter (Office Visit) with Dominic Ruiz MD   Medication Sig Dispense Refill   • albuterol (ACCUNEB) 1.25 MG/3ML nebulizer solution Take 1 ampule by nebulization Every 6 (Six) Hours As Needed.     • beclomethasone (Qvar) 40 MCG/ACT inhaler Inhale 2 puffs 2 (Two) Times a Day. 8.7 g 6   • carvedilol (COREG) 25 MG tablet TAKE 1 TABLET BY MOUTH TWICE A  tablet 2   • Eliquis 5 MG tablet tablet TAKE 1 TABLET BY MOUTH TWICE A  tablet 2   • montelukast (SINGULAIR) 10 MG tablet TAKE 1 TABLET BY MOUTH AT BEDTIME 90 tablet 2   • olmesartan (BENICAR) 5 MG tablet Take 2 tablets by mouth Daily. 180 tablet 3   • rosuvastatin (CRESTOR) 5 MG tablet Take 5 mg by mouth  "Daily.     • tiotropium bromide-olodaterol (Stiolto Respimat) 2.5-2.5 MCG/ACT aerosol solution inhaler Inhale 2 puffs Daily. 4 g 5   • triamcinolone (KENALOG) 0.1 % ointment APPLY TWICE A DAY TO RASH ON LOWER LEG         Past Medical History:   • A-fib (HCC)    DR KHALIL   • Arthritis    RIGHT THUMB   • Bladder cancer ([facility])    2018   • Chronic diastolic congestive heart failure ([facility])   • COPD (chronic obstructive pulmonary disease) ([facility])    INHALER   • Elevated serum creatinine   • Hyperlipidemia    ON MEDS   • Hypertension    CONTROLLED W MEDS   • Lung cancer ([facility])    DR RAMIRES TOOK CARE OF THAT ()   • Mouth sore secondary to chemotherapy   • Stage 3a chronic kidney disease ([facility])   • Throat soreness    FROM CHEMO        Past Surgical History:   • COLONOSCOPY   • CYSTECTOMY   • FINGER DEBRIDEMENT    RIGHT THUMB   • ILEAL LOOP NEOBLADDER    BLADDER MADE FROM HIS INTESTINES   • LUNG REMOVAL, PARTIAL    LEFT UPPER - 2008 - SOA W EXERTION   • PORTOCAVAL SHUNT PLACEMENT     AND    • TURP / TRANSURETHRAL INCISION / DRAINAGE PROSTATE       Family History:   Family History   Problem Relation Age of Onset   • Prostate cancer Brother    • No Known Problems Mother         Mother    • No Known Problems Father         Father  54 years old lung cancer/smoker        Social History:  Social History     Tobacco Use   • Smoking status: Former Smoker     Packs/day: 1.50     Years: 39.00     Pack years: 58.50     Quit date: 10/21/2008     Years since quittin.5   • Smokeless tobacco: Never Used   Substance Use Topics   • Alcohol use: Never       Objective     Vital Signs:  Resp 14   Ht 180.3 cm (71\")   Wt 102 kg (224 lb 3.2 oz)   BMI 31.27 kg/m²   • Constitutional: healthy appearing, alert, no acute distress, reliable historian  • HENT:  NCAT, no visible deformities or lesions  • Eyes:  sclerae clear, conjunctivae clear, EOMI  • Neck:  normal appearance, no masses, trachea midline  • Respiratory:  breathing not " labored, respiratory effort appears normal  • Cardiovascular:  heart regular rate  • Abdomen:  soft, nontender, nondistended    • Skin and subcutaneous tissue:  Left thigh biopsy site without redness or drainage.  • Musculoskeletal: moving all extremities symmetrically and purposefully  • Neurologic:  no obvious motor or sensory deficits, normal gait, able to stand without difficulty, cerebellar function without any obvious abnormalities, alert & oriented x 3, speech clear  • Psychiatric:  judgment and insight intact, mood normal, affect appropriate, cooperative    Assessment:  Left thigh melanoma    Plan:  Excision of melanoma from left thigh with sentinel lymph node biopsy.    Discussion: Indications, options, risks, benefits, and expected outcomes of planned surgery were discussed with the patient and he agrees to proceed.  Patient was instructed to stop taking Eliquis 2 days before the day of surgery.    Dominic Ruiz MD  05/20/2022    Electronically signed by Dominic Ruiz MD, 05/20/22, 2:18 PM EDT.

## 2022-06-03 ENCOUNTER — HOSPITAL ENCOUNTER (OUTPATIENT)
Dept: CT IMAGING | Facility: HOSPITAL | Age: 67
Discharge: HOME | End: 2022-06-03
Admitting: INTERNAL MEDICINE

## 2022-06-03 DIAGNOSIS — C67.9 MALIGNANT NEOPLASM OF URINARY BLADDER, UNSPECIFIED SITE: ICD-10-CM

## 2022-06-03 LAB
ALBUMIN SERPL-MCNC: 4.2 G/DL (ref 3.5–5.2)
ALBUMIN/GLOB SERPL: 1.7 G/DL
ALP SERPL-CCNC: 67 U/L (ref 39–117)
ALT SERPL W P-5'-P-CCNC: 15 U/L (ref 1–41)
ANION GAP SERPL CALCULATED.3IONS-SCNC: 8.5 MMOL/L (ref 5–15)
AST SERPL-CCNC: 19 U/L (ref 1–40)
BASOPHILS # BLD AUTO: 0.03 10*3/MM3 (ref 0–0.2)
BASOPHILS NFR BLD AUTO: 0.5 % (ref 0–1.5)
BILIRUB SERPL-MCNC: 0.3 MG/DL (ref 0–1.2)
BUN SERPL-MCNC: 27 MG/DL (ref 8–23)
BUN/CREAT SERPL: 20.3 (ref 7–25)
CALCIUM SPEC-SCNC: 9.1 MG/DL (ref 8.6–10.5)
CHLORIDE SERPL-SCNC: 101 MMOL/L (ref 98–107)
CO2 SERPL-SCNC: 27.5 MMOL/L (ref 22–29)
CREAT BLDA-MCNC: 1.4 MG/DL
CREAT SERPL-MCNC: 1.33 MG/DL (ref 0.76–1.27)
DEPRECATED RDW RBC AUTO: 45.1 FL (ref 37–54)
EGFRCR SERPLBLD CKD-EPI 2021: 55.4 ML/MIN/1.73
EGFRCR SERPLBLD CKD-EPI 2021: 59 ML/MIN/1.73
EOSINOPHIL # BLD AUTO: 0.07 10*3/MM3 (ref 0–0.4)
EOSINOPHIL NFR BLD AUTO: 1.2 % (ref 0.3–6.2)
ERYTHROCYTE [DISTWIDTH] IN BLOOD BY AUTOMATED COUNT: 13.1 % (ref 12.3–15.4)
GLOBULIN UR ELPH-MCNC: 2.5 GM/DL
GLUCOSE SERPL-MCNC: 118 MG/DL (ref 65–99)
HCT VFR BLD AUTO: 37.3 % (ref 37.5–51)
HGB BLD-MCNC: 12.4 G/DL (ref 13–17.7)
IMM GRANULOCYTES # BLD AUTO: 0.02 10*3/MM3 (ref 0–0.05)
IMM GRANULOCYTES NFR BLD AUTO: 0.3 % (ref 0–0.5)
LYMPHOCYTES # BLD AUTO: 1.2 10*3/MM3 (ref 0.7–3.1)
LYMPHOCYTES NFR BLD AUTO: 19.8 % (ref 19.6–45.3)
MCH RBC QN AUTO: 31.2 PG (ref 26.6–33)
MCHC RBC AUTO-ENTMCNC: 33.2 G/DL (ref 31.5–35.7)
MCV RBC AUTO: 93.7 FL (ref 79–97)
MONOCYTES # BLD AUTO: 0.67 10*3/MM3 (ref 0.1–0.9)
MONOCYTES NFR BLD AUTO: 11 % (ref 5–12)
NEUTROPHILS NFR BLD AUTO: 4.08 10*3/MM3 (ref 1.7–7)
NEUTROPHILS NFR BLD AUTO: 67.2 % (ref 42.7–76)
NRBC BLD AUTO-RTO: 0 /100 WBC (ref 0–0.2)
PLATELET # BLD AUTO: 199 10*3/MM3 (ref 140–450)
PMV BLD AUTO: 10.6 FL (ref 6–12)
POTASSIUM SERPL-SCNC: 4.2 MMOL/L (ref 3.5–5.2)
PROT SERPL-MCNC: 6.7 G/DL (ref 6–8.5)
RBC # BLD AUTO: 3.98 10*6/MM3 (ref 4.14–5.8)
SODIUM SERPL-SCNC: 137 MMOL/L (ref 136–145)
WBC NRBC COR # BLD: 6.07 10*3/MM3 (ref 3.4–10.8)

## 2022-06-03 PROCEDURE — 82565 ASSAY OF CREATININE: CPT

## 2022-06-03 PROCEDURE — 71260 CT THORAX DX C+: CPT

## 2022-06-03 PROCEDURE — 80053 COMPREHEN METABOLIC PANEL: CPT | Performed by: INTERNAL MEDICINE

## 2022-06-03 PROCEDURE — 0 IOPAMIDOL PER 1 ML: Performed by: INTERNAL MEDICINE

## 2022-06-03 PROCEDURE — 85025 COMPLETE CBC W/AUTO DIFF WBC: CPT | Performed by: INTERNAL MEDICINE

## 2022-06-03 PROCEDURE — 74177 CT ABD & PELVIS W/CONTRAST: CPT

## 2022-06-03 RX ADMIN — IOPAMIDOL 100 ML: 755 INJECTION, SOLUTION INTRAVENOUS at 08:05

## 2022-06-06 ENCOUNTER — OFFICE VISIT (OUTPATIENT)
Dept: ONCOLOGY | Facility: HOSPITAL | Age: 67
End: 2022-06-06

## 2022-06-06 VITALS
RESPIRATION RATE: 18 BRPM | BODY MASS INDEX: 31.09 KG/M2 | WEIGHT: 222.88 LBS | OXYGEN SATURATION: 96 % | SYSTOLIC BLOOD PRESSURE: 135 MMHG | HEART RATE: 68 BPM | TEMPERATURE: 97 F | DIASTOLIC BLOOD PRESSURE: 69 MMHG

## 2022-06-06 DIAGNOSIS — C67.3 MALIGNANT NEOPLASM OF ANTERIOR WALL OF URINARY BLADDER: Primary | ICD-10-CM

## 2022-06-06 DIAGNOSIS — C43.72 MALIGNANT MELANOMA OF LEFT LOWER EXTREMITY: ICD-10-CM

## 2022-06-06 PROCEDURE — G0463 HOSPITAL OUTPT CLINIC VISIT: HCPCS | Performed by: INTERNAL MEDICINE

## 2022-06-06 PROCEDURE — 99214 OFFICE O/P EST MOD 30 MIN: CPT | Performed by: INTERNAL MEDICINE

## 2022-06-06 PROCEDURE — G0463 HOSPITAL OUTPT CLINIC VISIT: HCPCS

## 2022-06-06 NOTE — PROGRESS NOTES
Chief Complaint  Bladder Cancer    Collin Cannon MD Houk, Brandon Lyle, MD    Subjective          Toribio Hayes presents to [facility] for follow-up of his bladder cancer.  He is now approaching 4 years out from his diagnosis and treatment.  Patient states he is doing well.  He denies new masses or adenopathy, no unusual aches or pains.  He does report that he was recently found to have melanoma on his left thigh.  He has upcoming surgery for resection and sentinel node biopsy.  He notes good appetite and energy level.  He has no other complaints.    Oncology/Hematology History Overview Note   Bladder, urothelial carcinoma      Prostate cancer, adenocarcinoma, 4+3 Yale score            Clinical Staging      Stage II ([id number])            Treatments      Chemotherapy      10/10/18 began DD MVAC      Surgeries      Left upper lobectomy 2009.  Radical cystoprostatectomy with neobladder 12/18.        Malignant neoplasm of urinary bladder (HCC)   12/29/2020 Initial Diagnosis    Malignant neoplasm of urinary bladder (HCC)         Review of Systems   Constitutional: Negative for appetite change, diaphoresis, fatigue, fever, unexpected weight gain and unexpected weight loss.   HENT: Negative for hearing loss, mouth sores, sore throat, swollen glands, trouble swallowing and voice change.    Eyes: Negative for blurred vision.   Respiratory: Positive for shortness of breath. Negative for cough and wheezing.    Cardiovascular: Negative for chest pain and palpitations.   Gastrointestinal: Negative for abdominal pain, blood in stool, constipation, diarrhea, nausea and vomiting.   Endocrine: Negative for cold intolerance and heat intolerance.   Genitourinary: Negative for difficulty urinating, dysuria, frequency, hematuria and urinary incontinence.   Musculoskeletal: Negative for arthralgias, back pain and myalgias.   Skin: Negative for rash, skin lesions and wound.   Neurological:  Negative for dizziness, seizures, weakness, numbness and headache.   Hematological: Does not bruise/bleed easily.   Psychiatric/Behavioral: Negative for depressed mood. The patient is not nervous/anxious.      Current Outpatient Medications on File Prior to Visit   Medication Sig Dispense Refill   • albuterol (ACCUNEB) 1.25 MG/3ML nebulizer solution Take 1 ampule by nebulization Every 6 (Six) Hours As Needed.     • beclomethasone (Qvar) 40 MCG/ACT inhaler Inhale 2 puffs 2 (Two) Times a Day. 8.7 g 6   • carvedilol (COREG) 25 MG tablet TAKE 1 TABLET BY MOUTH TWICE A  tablet 2   • Eliquis 5 MG tablet tablet TAKE 1 TABLET BY MOUTH TWICE A  tablet 2   • montelukast (SINGULAIR) 10 MG tablet TAKE 1 TABLET BY MOUTH AT BEDTIME 90 tablet 2   • olmesartan (BENICAR) 5 MG tablet Take 2 tablets by mouth Daily. 180 tablet 3   • rosuvastatin (CRESTOR) 5 MG tablet Take 5 mg by mouth Daily.     • sodium hypochlorite (DAKIN'S) 0.25 % topical solution PLEASE SEE ATTACHED FOR DETAILED DIRECTIONS     • tiotropium bromide-olodaterol (Stiolto Respimat) 2.5-2.5 MCG/ACT aerosol solution inhaler Inhale 2 puffs Daily. 4 g 5   • triamcinolone (KENALOG) 0.1 % ointment APPLY TWICE A DAY TO RASH ON LOWER LEG       No current facility-administered medications on file prior to visit.       No Known Allergies  Past Medical History:   Diagnosis Date   • A-fib ([facility])     DR KHALIL   • Arthritis     RIGHT THUMB   • Bladder cancer ([facility])     2018   • Chronic diastolic congestive heart failure ([facility]) 12/6/2021   • COPD (chronic obstructive pulmonary disease) ([facility])     INHALER   • Elevated serum creatinine 12/6/2021   • Hyperlipidemia     ON MEDS   • Hypertension     CONTROLLED W MEDS   • Lung cancer ([facility])     DR RAMIRES TOOK CARE OF THAT (2008)   • Mouth sore secondary to chemotherapy    • Stage 3a chronic kidney disease ([facility]) 1/5/2022   • Throat soreness     FROM CHEMO     Past Surgical History:   Procedure Laterality Date   • COLONOSCOPY     •  CYSTECTOMY     • FINGER DEBRIDEMENT Right     RIGHT THUMB   • ILEAL LOOP NEOBLADDER      BLADDER MADE FROM HIS INTESTINES   • LUNG REMOVAL, PARTIAL Left     LEFT UPPER - 2008 - SOA W EXERTION   • PORTOCAVAL SHUNT PLACEMENT       AND    • TURP / TRANSURETHRAL INCISION / DRAINAGE PROSTATE       Social History     Socioeconomic History   • Marital status:    Tobacco Use   • Smoking status: Former Smoker     Packs/day: 1.50     Years: 39.00     Pack years: 58.50     Quit date: 10/21/2008     Years since quittin.6   • Smokeless tobacco: Never Used   Vaping Use   • Vaping Use: Never used   Substance and Sexual Activity   • Alcohol use: Never   • Drug use: Never   • Sexual activity: Defer     Family History   Problem Relation Age of Onset   • Prostate cancer Brother    • No Known Problems Mother         Mother    • No Known Problems Father         Father  54 years old lung cancer/smoker       Objective   Physical Exam  Vitals reviewed. Exam conducted with a chaperone present.   Constitutional:       General: He is not in acute distress.     Appearance: Normal appearance.   Cardiovascular:      Rate and Rhythm: Normal rate and regular rhythm.      Heart sounds: Normal heart sounds. No murmur heard.    No gallop.   Pulmonary:      Effort: Pulmonary effort is normal.   Abdominal:      General: Abdomen is flat. Bowel sounds are normal.      Palpations: Abdomen is soft.   Musculoskeletal:      Cervical back: Neck supple.      Right lower leg: No edema.      Left lower leg: No edema.   Lymphadenopathy:      Cervical: No cervical adenopathy.   Neurological:      Mental Status: He is alert and oriented to person, place, and time.   Psychiatric:         Mood and Affect: Mood normal.         Behavior: Behavior normal.         Vitals:    22 0800   BP: 135/69   Pulse: 68   Resp: 18   Temp: 97 °F (36.1 °C)   SpO2: 96%   Weight: 101 kg (222 lb 14.2 oz)   PainSc: 0-No pain     ECOG score: 0         PHQ-9  Total Score:                    Result Review :   The following data was reviewed by: Jeff Fernando MD on 06/06/2022:  Lab Results   Component Value Date    HGB 12.4 (L) 06/03/2022    HCT 37.3 (L) 06/03/2022    MCV 93.7 06/03/2022     06/03/2022    WBC 6.07 06/03/2022    NEUTROABS 4.08 06/03/2022    LYMPHSABS 1.20 06/03/2022    MONOSABS 0.67 06/03/2022    EOSABS 0.07 06/03/2022    BASOSABS 0.03 06/03/2022     Lab Results   Component Value Date    GLUCOSE 118 (H) 06/03/2022    BUN 27 (H) 06/03/2022    CREATININE 1.33 (H) 06/03/2022     06/03/2022    K 4.2 06/03/2022     06/03/2022    CO2 27.5 06/03/2022    CALCIUM 9.1 06/03/2022    PROTEINTOT 6.7 06/03/2022    ALBUMIN 4.20 06/03/2022    BILITOT 0.3 06/03/2022    ALKPHOS 67 06/03/2022    AST 19 06/03/2022    ALT 15 06/03/2022     Lab Results   Component Value Date    MG 1.85 03/13/2021    FREET4 1.2 03/13/2021    TSH 0.702 03/13/2021       Data reviewed: Radiologic studies CT chest, abdomen, pelvis reviewed demonstrating postoperative changes to the bladder/prostate area with neobladder.  No evidence of new or progressive disease.      Assessment and Plan    Diagnoses and all orders for this visit:    1. Malignant neoplasm of anterior wall of urinary bladder (HCC) (Primary)  Assessment & Plan:  Patient doing well.  I see no evidence of disease recurrence by his history, physical examination, lab work or recent scans.  RTC 6 months for continued surveillance with labs scheduled prior.    Orders:  -     CT chest w contrast; Future  -     CT abdomen pelvis w contrast; Future    2. Malignant melanoma of left lower extremity (HCC)  Assessment & Plan:  Recently found to have melanoma on the left thigh.  Patient has upcoming OR appointment for wide local excision with sentinel lymph node biopsy.  Consider adjuvant treatments based on his final pathology report and stage.            Patient Follow Up 6 months    Patient was given instructions and  counseling regarding his condition or for health maintenance advice. Please see specific information pulled into the AVS if appropriate.     Jeff Fernando MD    6/6/2022

## 2022-06-06 NOTE — ASSESSMENT & PLAN NOTE
Patient doing well.  I see no evidence of disease recurrence by his history, physical examination, lab work or recent scans.  RTC 6 months for continued surveillance with labs scheduled prior.

## 2022-06-06 NOTE — ASSESSMENT & PLAN NOTE
Recently found to have melanoma on the left thigh.  Patient has upcoming OR appointment for wide local excision with sentinel lymph node biopsy.  Consider adjuvant treatments based on his final pathology report and stage.

## 2022-06-13 RX ORDER — APIXABAN 5 MG/1
TABLET, FILM COATED ORAL
Qty: 180 TABLET | Refills: 0 | Status: SHIPPED | OUTPATIENT
Start: 2022-06-13 | End: 2022-07-06 | Stop reason: SDUPTHER

## 2022-06-21 NOTE — PRE-PROCEDURE INSTRUCTIONS
Patient instructed to have no food past midnight, clears up to 2 hours prior to arrival time. Patient instructed to wear no lotions, jewelry or piercing's day of surgery. Patient to shower with surgical soap am of surgery.  Patient to take Coreg and Crestor am of surgery.

## 2022-06-22 ENCOUNTER — ANESTHESIA EVENT (OUTPATIENT)
Dept: PERIOP | Facility: HOSPITAL | Age: 67
End: 2022-06-22

## 2022-06-23 ENCOUNTER — HOSPITAL ENCOUNTER (OUTPATIENT)
Dept: NUCLEAR MEDICINE | Facility: HOSPITAL | Age: 67
Discharge: HOME | End: 2022-06-23

## 2022-06-23 ENCOUNTER — HOSPITAL ENCOUNTER (OUTPATIENT)
Facility: HOSPITAL | Age: 67
Setting detail: HOSPITAL OUTPATIENT SURGERY
Discharge: HOME | End: 2022-06-23
Attending: SURGERY | Admitting: SURGERY

## 2022-06-23 ENCOUNTER — ANESTHESIA (OUTPATIENT)
Dept: PERIOP | Facility: HOSPITAL | Age: 67
End: 2022-06-23

## 2022-06-23 VITALS
TEMPERATURE: 97 F | RESPIRATION RATE: 16 BRPM | OXYGEN SATURATION: 98 % | DIASTOLIC BLOOD PRESSURE: 66 MMHG | HEART RATE: 65 BPM | BODY MASS INDEX: 32.1 KG/M2 | HEIGHT: 70 IN | SYSTOLIC BLOOD PRESSURE: 119 MMHG | WEIGHT: 224.21 LBS

## 2022-06-23 DIAGNOSIS — C43.72 MALIGNANT MELANOMA OF LEFT LOWER EXTREMITY: ICD-10-CM

## 2022-06-23 PROCEDURE — 88307 TISSUE EXAM BY PATHOLOGIST: CPT | Performed by: SURGERY

## 2022-06-23 PROCEDURE — 25010000002 KETOROLAC TROMETHAMINE PER 15 MG: Performed by: NURSE ANESTHETIST, CERTIFIED REGISTERED

## 2022-06-23 PROCEDURE — 88342 IMHCHEM/IMCYTCHM 1ST ANTB: CPT | Performed by: SURGERY

## 2022-06-23 PROCEDURE — 25010000002 CEFAZOLIN IN DEXTROSE 2-4 GM/100ML-% SOLUTION: Performed by: SURGERY

## 2022-06-23 PROCEDURE — 78195 LYMPH SYSTEM IMAGING: CPT

## 2022-06-23 PROCEDURE — 25010000002 ONDANSETRON PER 1 MG: Performed by: NURSE ANESTHETIST, CERTIFIED REGISTERED

## 2022-06-23 PROCEDURE — 25010000002 HYDROMORPHONE 1 MG/ML SOLUTION: Performed by: NURSE ANESTHETIST, CERTIFIED REGISTERED

## 2022-06-23 PROCEDURE — 0 TECHETIUM TC99M TILMANOCEPT: Performed by: SURGERY

## 2022-06-23 PROCEDURE — 88305 TISSUE EXAM BY PATHOLOGIST: CPT | Performed by: SURGERY

## 2022-06-23 PROCEDURE — 25010000002 DEXAMETHASONE PER 1 MG: Performed by: NURSE ANESTHETIST, CERTIFIED REGISTERED

## 2022-06-23 PROCEDURE — 25010000002 PROPOFOL 10 MG/ML EMULSION: Performed by: NURSE ANESTHETIST, CERTIFIED REGISTERED

## 2022-06-23 PROCEDURE — 25010000002 MIDAZOLAM PER 1 MG: Performed by: ANESTHESIOLOGY

## 2022-06-23 PROCEDURE — A9520 TC99 TILMANOCEPT DIAG 0.5MCI: HCPCS | Performed by: SURGERY

## 2022-06-23 PROCEDURE — 11606 EXC TR-EXT MAL+MARG >4 CM: CPT | Performed by: SURGERY

## 2022-06-23 PROCEDURE — 38500 BIOPSY/REMOVAL LYMPH NODES: CPT | Performed by: SURGERY

## 2022-06-23 PROCEDURE — 25010000002 FENTANYL CITRATE (PF) 50 MCG/ML SOLUTION: Performed by: NURSE ANESTHETIST, CERTIFIED REGISTERED

## 2022-06-23 RX ORDER — OXYCODONE HYDROCHLORIDE 5 MG/1
5 TABLET ORAL
Status: DISCONTINUED | OUTPATIENT
Start: 2022-06-23 | End: 2022-06-23 | Stop reason: HOSPADM

## 2022-06-23 RX ORDER — PROMETHAZINE HYDROCHLORIDE 25 MG/1
25 SUPPOSITORY RECTAL ONCE AS NEEDED
Status: DISCONTINUED | OUTPATIENT
Start: 2022-06-23 | End: 2022-06-23 | Stop reason: HOSPADM

## 2022-06-23 RX ORDER — HYDROCODONE BITARTRATE AND ACETAMINOPHEN 5; 325 MG/1; MG/1
1-2 TABLET ORAL EVERY 4 HOURS PRN
Qty: 20 TABLET | Refills: 0 | Status: SHIPPED | OUTPATIENT
Start: 2022-06-23 | End: 2022-08-12

## 2022-06-23 RX ORDER — GLYCOPYRROLATE 0.2 MG/ML
INJECTION INTRAMUSCULAR; INTRAVENOUS AS NEEDED
Status: DISCONTINUED | OUTPATIENT
Start: 2022-06-23 | End: 2022-06-23 | Stop reason: SURG

## 2022-06-23 RX ORDER — MAGNESIUM HYDROXIDE 1200 MG/15ML
LIQUID ORAL AS NEEDED
Status: DISCONTINUED | OUTPATIENT
Start: 2022-06-23 | End: 2022-06-23 | Stop reason: HOSPADM

## 2022-06-23 RX ORDER — ONDANSETRON 2 MG/ML
4 INJECTION INTRAMUSCULAR; INTRAVENOUS ONCE AS NEEDED
Status: DISCONTINUED | OUTPATIENT
Start: 2022-06-23 | End: 2022-06-23 | Stop reason: HOSPADM

## 2022-06-23 RX ORDER — EPHEDRINE SULFATE 50 MG/ML
INJECTION, SOLUTION INTRAVENOUS AS NEEDED
Status: DISCONTINUED | OUTPATIENT
Start: 2022-06-23 | End: 2022-06-23 | Stop reason: SURG

## 2022-06-23 RX ORDER — DEXAMETHASONE SODIUM PHOSPHATE 4 MG/ML
INJECTION, SOLUTION INTRA-ARTICULAR; INTRALESIONAL; INTRAMUSCULAR; INTRAVENOUS; SOFT TISSUE AS NEEDED
Status: DISCONTINUED | OUTPATIENT
Start: 2022-06-23 | End: 2022-06-23 | Stop reason: SURG

## 2022-06-23 RX ORDER — CEFAZOLIN SODIUM 2 G/100ML
2 INJECTION, SOLUTION INTRAVENOUS ONCE
Status: COMPLETED | OUTPATIENT
Start: 2022-06-23 | End: 2022-06-23

## 2022-06-23 RX ORDER — BUPIVACAINE HYDROCHLORIDE 2.5 MG/ML
INJECTION, SOLUTION EPIDURAL; INFILTRATION; INTRACAUDAL AS NEEDED
Status: DISCONTINUED | OUTPATIENT
Start: 2022-06-23 | End: 2022-06-23 | Stop reason: HOSPADM

## 2022-06-23 RX ORDER — FENTANYL CITRATE 50 UG/ML
INJECTION, SOLUTION INTRAMUSCULAR; INTRAVENOUS AS NEEDED
Status: DISCONTINUED | OUTPATIENT
Start: 2022-06-23 | End: 2022-06-23 | Stop reason: SURG

## 2022-06-23 RX ORDER — PROMETHAZINE HYDROCHLORIDE 12.5 MG/1
25 TABLET ORAL ONCE AS NEEDED
Status: DISCONTINUED | OUTPATIENT
Start: 2022-06-23 | End: 2022-06-23 | Stop reason: HOSPADM

## 2022-06-23 RX ORDER — MEPERIDINE HYDROCHLORIDE 25 MG/ML
12.5 INJECTION INTRAMUSCULAR; INTRAVENOUS; SUBCUTANEOUS
Status: DISCONTINUED | OUTPATIENT
Start: 2022-06-23 | End: 2022-06-23 | Stop reason: HOSPADM

## 2022-06-23 RX ORDER — KETOROLAC TROMETHAMINE 30 MG/ML
INJECTION, SOLUTION INTRAMUSCULAR; INTRAVENOUS AS NEEDED
Status: DISCONTINUED | OUTPATIENT
Start: 2022-06-23 | End: 2022-06-23 | Stop reason: SURG

## 2022-06-23 RX ORDER — MIDAZOLAM HYDROCHLORIDE 1 MG/ML
2 INJECTION INTRAMUSCULAR; INTRAVENOUS ONCE
Status: COMPLETED | OUTPATIENT
Start: 2022-06-23 | End: 2022-06-23

## 2022-06-23 RX ORDER — PROPOFOL 10 MG/ML
VIAL (ML) INTRAVENOUS AS NEEDED
Status: DISCONTINUED | OUTPATIENT
Start: 2022-06-23 | End: 2022-06-23 | Stop reason: SURG

## 2022-06-23 RX ORDER — SODIUM CHLORIDE, SODIUM LACTATE, POTASSIUM CHLORIDE, CALCIUM CHLORIDE 600; 310; 30; 20 MG/100ML; MG/100ML; MG/100ML; MG/100ML
9 INJECTION, SOLUTION INTRAVENOUS CONTINUOUS PRN
Status: DISCONTINUED | OUTPATIENT
Start: 2022-06-23 | End: 2022-06-23 | Stop reason: HOSPADM

## 2022-06-23 RX ORDER — LIDOCAINE HYDROCHLORIDE 20 MG/ML
INJECTION, SOLUTION EPIDURAL; INFILTRATION; INTRACAUDAL; PERINEURAL AS NEEDED
Status: DISCONTINUED | OUTPATIENT
Start: 2022-06-23 | End: 2022-06-23 | Stop reason: SURG

## 2022-06-23 RX ADMIN — PROPOFOL 100 MG: 10 INJECTION, EMULSION INTRAVENOUS at 11:24

## 2022-06-23 RX ADMIN — FENTANYL CITRATE 100 MCG: 50 INJECTION, SOLUTION INTRAMUSCULAR; INTRAVENOUS at 11:23

## 2022-06-23 RX ADMIN — TILMANOCEPT 1 DOSE: KIT at 08:10

## 2022-06-23 RX ADMIN — DEXAMETHASONE SODIUM PHOSPHATE 4 MG: 4 INJECTION, SOLUTION INTRA-ARTICULAR; INTRALESIONAL; INTRAMUSCULAR; INTRAVENOUS; SOFT TISSUE at 11:33

## 2022-06-23 RX ADMIN — EPHEDRINE SULFATE 5 MG: 50 INJECTION INTRAVENOUS at 12:40

## 2022-06-23 RX ADMIN — EPHEDRINE SULFATE 10 MG: 50 INJECTION INTRAVENOUS at 11:30

## 2022-06-23 RX ADMIN — LIDOCAINE HYDROCHLORIDE 100 MG: 20 INJECTION, SOLUTION EPIDURAL; INFILTRATION; INTRACAUDAL; PERINEURAL at 11:23

## 2022-06-23 RX ADMIN — GLYCOPYRROLATE 0.2 MG: 0.2 INJECTION INTRAMUSCULAR; INTRAVENOUS at 11:31

## 2022-06-23 RX ADMIN — HYDROMORPHONE HYDROCHLORIDE 1 MG: 1 INJECTION, SOLUTION INTRAMUSCULAR; INTRAVENOUS; SUBCUTANEOUS at 12:34

## 2022-06-23 RX ADMIN — SODIUM CHLORIDE, POTASSIUM CHLORIDE, SODIUM LACTATE AND CALCIUM CHLORIDE 9 ML/HR: 600; 310; 30; 20 INJECTION, SOLUTION INTRAVENOUS at 11:14

## 2022-06-23 RX ADMIN — MIDAZOLAM HYDROCHLORIDE 2 MG: 1 INJECTION, SOLUTION INTRAMUSCULAR; INTRAVENOUS at 11:14

## 2022-06-23 RX ADMIN — EPHEDRINE SULFATE 5 MG: 50 INJECTION INTRAVENOUS at 12:25

## 2022-06-23 RX ADMIN — ONDANSETRON 4 MG: 2 INJECTION INTRAMUSCULAR; INTRAVENOUS at 11:33

## 2022-06-23 RX ADMIN — CEFAZOLIN SODIUM 2 G: 2 INJECTION, SOLUTION INTRAVENOUS at 11:28

## 2022-06-23 RX ADMIN — KETOROLAC TROMETHAMINE 30 MG: 30 INJECTION, SOLUTION INTRAMUSCULAR; INTRAVENOUS at 11:33

## 2022-06-23 RX ADMIN — GLYCOPYRROLATE 0.1 MG: 0.2 INJECTION INTRAMUSCULAR; INTRAVENOUS at 13:01

## 2022-06-23 NOTE — ANESTHESIA PREPROCEDURE EVALUATION
Anesthesia Evaluation     Patient summary reviewed and Nursing notes reviewed   no history of anesthetic complications:  NPO Solid Status: > 8 hours  NPO Liquid Status: > 2 hours           Airway   Mallampati: II  TM distance: >3 FB  Neck ROM: full  No difficulty expected  Dental    (+) edentulous    Pulmonary - normal exam    breath sounds clear to auscultation  (+) a smoker Former, lung cancer, COPD, asthma,home oxygen,   Cardiovascular - normal exam  Exercise tolerance: good (4-7 METS)    Rhythm: regular  Rate: normal    (+) hypertension, dysrhythmias Atrial Fib, CHF ,       Neuro/Psych- negative ROS  GI/Hepatic/Renal/Endo    (+)   renal disease CRI,     Musculoskeletal     (+) neck pain,   Abdominal    Substance History - negative use     OB/GYN negative ob/gyn ROS         Other - negative ROS                       Anesthesia Plan    ASA 4     general       Anesthetic plan, risks, benefits, and alternatives have been provided, discussed and informed consent has been obtained with: patient and spouse/significant other.        CODE STATUS:

## 2022-06-23 NOTE — DISCHARGE INSTRUCTIONS
DISCHARGE INSTRUCTIONS  SURGICAL / AMBULATORY  PROCEDURES      For your surgery you had:  General anesthesia (you may have a sore throat for the first 24 hours)  IV sedation.  Local anesthesia  Monitored anesthesia Care  You received a medicated patch for nausea prevention today (behind your ear). It is recommended that you remove it 24-48 hours post-operatively. It must be removed within 72 hours.   You have received an anesthesia medication today that can cause hormonal forms of birth control to be ineffective. You should use a different form of birth control (to prevent pregnancy) for 7 days.   You may experience dizziness, drowsiness, or light-headedness for several hours following surgery/procedure.  Do not stay alone today or tonight.  Limit your activity for 24 hours.  Resume your diet slowly.  Follow whatever special dietary instructions you may have been given by your doctor.  You should not drive or operate machinery, drink alcohol, or sign legally binding documents for 24 hours or while you are taking pain medication.  Last dose of pain medication was given at:   .  NOTIFY YOUR DOCTOR IF YOU EXPERIENCE ANY OF THE FOLLOWING:  Temperature greater than 101 degrees Fahrenheit  Shaking Chills  Redness or excessive drainage from incision  Nausea, vomiting and/or pain that is not controlled by prescribed medications  Increase in bleeding or bleeding that is excessive  Unable to urinate in 6 hours after surgery  If unable to reach your doctor, please go to the closest Emergency Room  You may begin dressing changes:     [] in 24 hours   [] in 48 hours   [] Other:    You may remove Band-Aid/dressing tomorrow.  You may shower or bathe   .  Apply an ice pack 24-48 hours.  Medications per physician instructions as indicated on Discharge Medication Information Sheet.  You should see   for follow-up care   on   .  Phone number:       SPECIAL INSTRUCTIONS:                                  Dr. Ruiz's Instructions             DIET  Resume your regular diet.     WOUND CARE & SHOWERING/BATHING  Remove the dressings in 2 days, blot off any excess lube/old blood using sterile gauze and sterile water/saline, use the tip of a sterile q-tip to lightly coat the wound with triple antibiotic ointment or neosporin, and then cover the wound with sterile gauze taped in place.  Repeat this process daily until you see Dr. Ruiz again.     You can shower beginning 2 days after your surgery but keep both keep the wound covered with a bandage while doing so.  Wait four weeks after your surgery before taking any tub baths or getting into a swimming pool or lake water.     HOME MEDICATIONS  Resume all your normal home medications.     PAIN CONTROL  You will receive a narcotic pain medicine prescription before you are discharged home.  Be sure to take the narcotic pain medication with some food so as not to upset your stomach.  Do not drive while you are taking the prescription pain medication.  Take Tylenol or Motrin for mild pain.     BOWEL MOVEMENTS  It is not unusual for narcotic pain medications to cause constipation.  Also, the medications and anesthesia you received for your surgery can have a constipating effect.  To help avoid constipation, drink at least four eight-ounce glasses of water each day and use over-the-counter laxatives/stool softeners (dulcolax, milk of magnesia, senokot, etc.).  I recommend drinking the aforementioned amount of water daily and taking 30 ml of milk of magnesia two times each day while you are using the prescribed narcotic pain medication.  If your bowel movements become too loose or too frequent, then simply stop following these recommendations unless you start to feel constipated.     FOLLOW-UP VISIT  Call Dr. Ruiz´s office at [phone] and schedule a follow-up appointment with Dr. Ruiz on Tuesday, July 5.  Note, the appointment should be scheduled as my last appointment for my morning clinic that  day.  Should you have any questions or concerns then please call Dr. Ruiz´s office.

## 2022-06-23 NOTE — OP NOTE
Operative Report    Patient Name:  Toribio Hayes  YOB: 1955    Date of Surgery:  6/23/2022     Pre-op Diagnosis:   Malignant melanoma of left lower extremity (HCC) [C43.72]    Pre-Op Diagnosis Codes:     * Malignant melanoma of left lower extremity (HCC) [C43.72]       Post-op Diagnosis:   Post-Op Diagnosis Codes:     * Malignant melanoma of left lower extremity (HCC) [C43.72]    Procedure(s):  Excision of left thigh melanoma with sentinel lymph node biopsy    Staff:  Surgeon(s):  Dominic Ruiz MD    Assistant: Arya Rand CSA    Anesthesia: General    Estimated Blood Loss: 100ml    Complications:  None    Drains:  None    Packing:  None    Implants:    Nothing was implanted during the procedure    Specimen:          Specimens     ID Source Type Tests Collected By Collected At Frozen?    A Allison Lymph Node Tissue · TISSUE PATHOLOGY EXAM   Dominic Ruiz MD 6/23/22 1213 No    Description: LEFT GROIN SENTINEL LYMPH NODE.     B Thigh, Left Tissue · TISSUE PATHOLOGY EXAM   Dominic Ruiz MD 6/23/22 1218     Description: LEFT THIGH MELANOMA.     Comment: LONG STITCH AT 12 O'CLOCK SHORT STITCH AT 3 O'CLOCK.            Indications:   Toribio Hayes is a 67 y.o. male y.o. male referred by Dr. Ortez for a left thigh melanoma after the patient had a shave biopsy of a skin lesion on his left anterior thigh and pathology showed the following: Malignant melanoma, 1.1 mm thick, no ulceration, peripheral margins involved by melanoma in situ, deep margins uninvolved, no lymphovascular invasion, no perineural invasion,    Findings:   Left thigh melanoma excised with at least 2 cm grossly normal margins circumferentially.  Ellipse of tissue removed encompassing the melanoma was about 5 cm wide x 17 cm long.  Resultant left thigh wound was closed with a Keystone flap.  A left inguinal sentinel lymph node was identified and excised with ex vivo gamma counts of 1,201 and about 5 gamma counts in the  wound bed after the node was excised.     Description of Procedure: Patient was taken the operating placed supine on the procedure table.  Timeout was performed.  General anesthesia was administered.  The patient's left thigh and left inguinal areas were prepped and draped in the usual fashion.  Lymphoscintigraphy was reviewed before taking the patient to the operating room.  An elliptical incision was marked around the shave biopsy site providing appropriately grossly clear margins.  See above findings section.  A Keystone flap was then marked as well.  A knife was used to make an elliptical incision.  The incision was deepened into the subcutaneous tissue and an ellipse of skin and subcutaneous tissue was removed encompassing the shave biopsy site.  Short stitch was placed at the 12 o'clock position and a long spit at the 3 o'clock position and the tissue was sent to pathology.  There was adequate hemostasis.  The Keystone flap incisions were then cut with a knife and then this allowed for closure of the wound without undue tensio.  The wound was then closed with multiple deep dermal absorbable sutures and then multiple interrupted nylon sutures in the skin.  The skin was noted to be well perfused.  There was no undue tension on the wound.     Attention was then focused at the left inguinal area.  The neoprobe was used to determine the area of maximum activity in the left groin and an incision was made with a knife over this area.  The incision was deepened in the subcutaneous tissue using the neoprobe to guide the direction of the dissection.  A sentinel lymph node was identified and excised.  The node was located just superficial to the left femoral vein.  Ex vivo count with the gamma probe was 1,201.  The wound bed was evaluated with the gamma probe after the node was excised and there was minimal activity.  Adequate hemostasis.  Wound was closed appropriately with buried absorbable suture.    Appropriate  dressings were placed.  Patient did well during the procedure and was transported to the recovery area in stable condition.  No complications.     Assistant: Arya Rand CSA  was responsible for performing the following activities: Suction, Suturing, Closing and Placing Dressing and his skilled assistance was necessary for the success of this case.       Dominic Ruiz MD     Date: 6/23/2022  Time: 13:53 EDT

## 2022-06-23 NOTE — ANESTHESIA POSTPROCEDURE EVALUATION
Patient: Toribio Hayes    Procedure Summary     Date: 06/23/22 Room / Location: [facility] OR 02 / [facility] MAIN OR    Anesthesia Start: 1118 Anesthesia Stop: 1348    Procedure: Excision of left thigh melanoma with sentinel lymph node biopsy (N/A ) Diagnosis:       Malignant melanoma of left lower extremity (HCC)      (Malignant melanoma of left lower extremity (HCC) [C43.72])    Surgeons: Dominic Ruiz MD Provider: Rebecca Franco MD    Anesthesia Type: general ASA Status: 4          Anesthesia Type: general    Vitals  Vitals Value Taken Time   /59 06/23/22 1403   Temp     Pulse 79 06/23/22 1405   Resp     SpO2 97 % 06/23/22 1405   Vitals shown include unvalidated device data.        Post Anesthesia Care and Evaluation    Patient location during evaluation: bedside  Patient participation: complete - patient participated  Level of consciousness: awake  Pain score: 0  Pain management: adequate    Airway patency: patent  Anesthetic complications: No anesthetic complications  PONV Status: none  Cardiovascular status: acceptable and stable  Respiratory status: acceptable and room air  Hydration status: acceptable    Comments: An Anesthesiologist personally participated in the most demanding procedures (including induction and emergence if applicable) in the anesthesia plan, monitored the course of anesthesia administration at frequent intervals and remained physically present and available for immediate diagnosis and treatment of emergencies.

## 2022-06-23 NOTE — H&P
Chief Complaint:  Follow-up    Primary Care Provider: Collin Cannon MD    Referring Provider: Júnior Ortez MD    History of Present Illness  Toribio Hayes is a 67 y.o. male referred by Dr. Ortez for a left thigh melanoma.  I previously saw my patient but the biopsy site was not healing well at the time so I wanted him to follow wound care provided and then see me again today for another biopsy site check.  The biopsy site healed well enough to proceed with surgery.  The patient takes Eliquis and the indication is atrial fibrillation.  He has stopped taking Eliquis briefly to have other procedures performed.  Copy and paste of relevant information from prior office visit is available below.    Copy and Paste of HPI from Prior Office Visit on 4/22/22  Toribio Hayes is a 66 y.o. male referred by Júnior Ortez MD after the patient had a shave biopsy of a skin lesion on his left anterior thigh about a week or 2 ago and pathology showed the following: Malignant melanoma, 1.1 mm thick, no ulceration, peripheral margins involved by melanoma in situ, deep margins uninvolved, no lymphovascular invasion, no perineural invasion    Allergies: Patient has no known allergies.    Outpatient Medications Marked as Taking for the 5/20/22 encounter (Office Visit) with Dominic Ruiz MD   Medication Sig Dispense Refill   • albuterol (ACCUNEB) 1.25 MG/3ML nebulizer solution Take 1 ampule by nebulization Every 6 (Six) Hours As Needed.     • beclomethasone (Qvar) 40 MCG/ACT inhaler Inhale 2 puffs 2 (Two) Times a Day. 8.7 g 6   • carvedilol (COREG) 25 MG tablet TAKE 1 TABLET BY MOUTH TWICE A  tablet 2   • Eliquis 5 MG tablet tablet TAKE 1 TABLET BY MOUTH TWICE A  tablet 2   • montelukast (SINGULAIR) 10 MG tablet TAKE 1 TABLET BY MOUTH AT BEDTIME 90 tablet 2   • olmesartan (BENICAR) 5 MG tablet Take 2 tablets by mouth Daily. 180 tablet 3   • rosuvastatin (CRESTOR) 5 MG tablet Take 5 mg by mouth Daily.  "    • tiotropium bromide-olodaterol (Stiolto Respimat) 2.5-2.5 MCG/ACT aerosol solution inhaler Inhale 2 puffs Daily. 4 g 5   • triamcinolone (KENALOG) 0.1 % ointment APPLY TWICE A DAY TO RASH ON LOWER LEG         Past Medical History:   • A-fib ([facility])    DR KHALIL   • Arthritis    RIGHT THUMB   • Bladder cancer ([facility])    2018   • Chronic diastolic congestive heart failure ([facility])   • COPD (chronic obstructive pulmonary disease) ([facility])    INHALER   • Elevated serum creatinine   • Hyperlipidemia    ON MEDS   • Hypertension    CONTROLLED W MEDS   • Lung cancer ([facility])    DR RAMIRES TOOK CARE OF THAT ()   • Mouth sore secondary to chemotherapy   • Stage 3a chronic kidney disease ([facility])   • Throat soreness    FROM CHEMO        Past Surgical History:   • COLONOSCOPY   • CYSTECTOMY   • FINGER DEBRIDEMENT    RIGHT THUMB   • ILEAL LOOP NEOBLADDER    BLADDER MADE FROM HIS INTESTINES   • LUNG REMOVAL, PARTIAL    LEFT UPPER - 2008 - SOA W EXERTION   • PORTOCAVAL SHUNT PLACEMENT     AND    • TURP / TRANSURETHRAL INCISION / DRAINAGE PROSTATE       Family History:   Family History   Problem Relation Age of Onset   • Prostate cancer Brother    • No Known Problems Mother         Mother    • No Known Problems Father         Father  54 years old lung cancer/smoker        Social History:  Social History     Tobacco Use   • Smoking status: Former Smoker     Packs/day: 1.50     Years: 39.00     Pack years: 58.50     Quit date: 10/21/2008     Years since quittin.5   • Smokeless tobacco: Never Used   Substance Use Topics   • Alcohol use: Never       Objective     Vital Signs:  Resp 14   Ht 180.3 cm (71\")   Wt 102 kg (224 lb 3.2 oz)   BMI 31.27 kg/m²   • Respiratory:  breathing not labored, respiratory effort appears normal  • Cardiovascular:  heart regular rate  • Musculoskeletal: moving all extremities symmetrically and purposefully  • Neurologic:  no obvious motor or sensory deficits, alert & oriented  • Psychiatric:  " judgment and insight intact    Assessment:  Left thigh melanoma    Plan:  Excision of melanoma from left thigh with sentinel lymph node biopsy.    Discussion: Indications, options, risks, benefits, and expected outcomes of planned surgery were discussed with the patient and he agrees to proceed.     Dominic Ruiz MD    Electronically signed by Dominic Ruiz MD, 06/23/22, 7:36 AM EDT.

## 2022-06-24 DIAGNOSIS — J43.9 PULMONARY EMPHYSEMA, UNSPECIFIED EMPHYSEMA TYPE: ICD-10-CM

## 2022-06-24 DIAGNOSIS — R06.00 DYSPNEA, UNSPECIFIED TYPE: ICD-10-CM

## 2022-06-24 DIAGNOSIS — I50.31 DIASTOLIC CHF, ACUTE: ICD-10-CM

## 2022-06-24 RX ORDER — TIOTROPIUM BROMIDE AND OLODATEROL 3.124; 2.736 UG/1; UG/1
2 SPRAY, METERED RESPIRATORY (INHALATION)
Qty: 3 EACH | Refills: 5 | Status: SHIPPED | OUTPATIENT
Start: 2022-06-24 | End: 2023-02-17 | Stop reason: SDUPTHER

## 2022-06-28 LAB
CYTO UR: NORMAL
LAB AP CASE REPORT: NORMAL
LAB AP CLINICAL INFORMATION: NORMAL
LAB AP DIAGNOSIS COMMENT: NORMAL
LAB AP SPECIAL STAINS: NORMAL
LAB AP SYNOPTIC CHECKLIST: NORMAL
PATH REPORT.FINAL DX SPEC: NORMAL
PATH REPORT.GROSS SPEC: NORMAL

## 2022-06-30 RX ORDER — OLMESARTAN MEDOXOMIL 5 MG/1
TABLET ORAL
Qty: 90 TABLET | Refills: 0 | Status: SHIPPED | OUTPATIENT
Start: 2022-06-30 | End: 2022-07-06 | Stop reason: SDUPTHER

## 2022-07-01 ENCOUNTER — LAB (OUTPATIENT)
Dept: LAB | Facility: HOSPITAL | Age: 67
End: 2022-07-01

## 2022-07-01 DIAGNOSIS — I10 PRIMARY HYPERTENSION: ICD-10-CM

## 2022-07-01 DIAGNOSIS — E78.5 HYPERLIPIDEMIA, UNSPECIFIED HYPERLIPIDEMIA TYPE: ICD-10-CM

## 2022-07-01 DIAGNOSIS — N18.31 STAGE 3A CHRONIC KIDNEY DISEASE: ICD-10-CM

## 2022-07-01 DIAGNOSIS — I50.42 CHRONIC COMBINED SYSTOLIC (CONGESTIVE) AND DIASTOLIC (CONGESTIVE) HEART FAILURE: ICD-10-CM

## 2022-07-01 LAB
ALBUMIN SERPL-MCNC: 4.4 G/DL (ref 3.5–5.2)
ALBUMIN/GLOB SERPL: 1.6 G/DL
ALP SERPL-CCNC: 72 U/L (ref 39–117)
ALT SERPL W P-5'-P-CCNC: 14 U/L (ref 1–41)
ANION GAP SERPL CALCULATED.3IONS-SCNC: 13.5 MMOL/L (ref 5–15)
AST SERPL-CCNC: 20 U/L (ref 1–40)
BILIRUB SERPL-MCNC: 0.3 MG/DL (ref 0–1.2)
BUN SERPL-MCNC: 23 MG/DL (ref 8–23)
BUN/CREAT SERPL: 14.9 (ref 7–25)
CALCIUM SPEC-SCNC: 10.3 MG/DL (ref 8.6–10.5)
CHLORIDE SERPL-SCNC: 93 MMOL/L (ref 98–107)
CHOLEST SERPL-MCNC: 133 MG/DL (ref 0–200)
CO2 SERPL-SCNC: 29.5 MMOL/L (ref 22–29)
CREAT SERPL-MCNC: 1.54 MG/DL (ref 0.76–1.27)
EGFRCR SERPLBLD CKD-EPI 2021: 49.4 ML/MIN/1.73
GLOBULIN UR ELPH-MCNC: 2.8 GM/DL
GLUCOSE SERPL-MCNC: 113 MG/DL (ref 65–99)
HDLC SERPL-MCNC: 52 MG/DL (ref 40–60)
LDLC SERPL CALC-MCNC: 62 MG/DL (ref 0–100)
LDLC/HDLC SERPL: 1.16 {RATIO}
MAGNESIUM SERPL-MCNC: 1.9 MG/DL (ref 1.6–2.4)
POTASSIUM SERPL-SCNC: 4.9 MMOL/L (ref 3.5–5.2)
PROT SERPL-MCNC: 7.2 G/DL (ref 6–8.5)
SODIUM SERPL-SCNC: 136 MMOL/L (ref 136–145)
TRIGL SERPL-MCNC: 104 MG/DL (ref 0–150)
VLDLC SERPL-MCNC: 19 MG/DL (ref 5–40)

## 2022-07-01 PROCEDURE — 80061 LIPID PANEL: CPT

## 2022-07-01 PROCEDURE — 83735 ASSAY OF MAGNESIUM: CPT

## 2022-07-01 PROCEDURE — 80053 COMPREHEN METABOLIC PANEL: CPT

## 2022-07-01 PROCEDURE — 36415 COLL VENOUS BLD VENIPUNCTURE: CPT

## 2022-07-05 ENCOUNTER — OFFICE VISIT (OUTPATIENT)
Dept: SURGERY | Facility: CLINIC | Age: 67
End: 2022-07-05

## 2022-07-05 VITALS — RESPIRATION RATE: 16 BRPM | BODY MASS INDEX: 31.16 KG/M2 | WEIGHT: 222.6 LBS | HEIGHT: 71 IN

## 2022-07-05 DIAGNOSIS — Z09 ENCOUNTER FOR FOLLOW-UP: Primary | ICD-10-CM

## 2022-07-05 PROCEDURE — 99024 POSTOP FOLLOW-UP VISIT: CPT | Performed by: SURGERY

## 2022-07-05 RX ORDER — BECLOMETHASONE DIPROPIONATE HFA 40 UG/1
2 AEROSOL, METERED RESPIRATORY (INHALATION) 2 TIMES DAILY
COMMUNITY
Start: 2022-06-24 | End: 2022-07-06 | Stop reason: ALTCHOICE

## 2022-07-05 NOTE — PROGRESS NOTES
Patient is here for follow-up after removal of a melanoma from his left lower extremity along with a sentinel lymph node biopsy on 6/23/22.  Margins were adequate and sentinel lymph node was negative.  The incision is healing well.  I removed all of the sutures today and discussed the pathology result.  Patient seems pleased with his progress thus far.  I will have him see me again in 1 month.

## 2022-07-06 ENCOUNTER — OFFICE VISIT (OUTPATIENT)
Dept: CARDIOLOGY | Facility: CLINIC | Age: 67
End: 2022-07-06

## 2022-07-06 VITALS
HEIGHT: 71 IN | SYSTOLIC BLOOD PRESSURE: 148 MMHG | WEIGHT: 223 LBS | BODY MASS INDEX: 31.22 KG/M2 | DIASTOLIC BLOOD PRESSURE: 75 MMHG | HEART RATE: 72 BPM

## 2022-07-06 DIAGNOSIS — J43.2 CENTRILOBULAR EMPHYSEMA: ICD-10-CM

## 2022-07-06 DIAGNOSIS — I10 PRIMARY HYPERTENSION: Chronic | ICD-10-CM

## 2022-07-06 DIAGNOSIS — I48.0 PAROXYSMAL ATRIAL FIBRILLATION: ICD-10-CM

## 2022-07-06 DIAGNOSIS — E78.5 HYPERLIPIDEMIA, UNSPECIFIED HYPERLIPIDEMIA TYPE: Chronic | ICD-10-CM

## 2022-07-06 DIAGNOSIS — N18.32 STAGE 3B CHRONIC KIDNEY DISEASE: ICD-10-CM

## 2022-07-06 DIAGNOSIS — I50.42 CHRONIC COMBINED SYSTOLIC (CONGESTIVE) AND DIASTOLIC (CONGESTIVE) HEART FAILURE: Primary | ICD-10-CM

## 2022-07-06 PROCEDURE — 99214 OFFICE O/P EST MOD 30 MIN: CPT | Performed by: INTERNAL MEDICINE

## 2022-07-06 RX ORDER — CARVEDILOL 25 MG/1
25 TABLET ORAL 2 TIMES DAILY
Qty: 180 TABLET | Refills: 3 | Status: SHIPPED | OUTPATIENT
Start: 2022-07-06

## 2022-07-06 RX ORDER — ROSUVASTATIN CALCIUM 5 MG/1
5 TABLET, COATED ORAL DAILY
Qty: 90 TABLET | Refills: 3 | Status: SHIPPED | OUTPATIENT
Start: 2022-07-06

## 2022-07-06 RX ORDER — OLMESARTAN MEDOXOMIL 5 MG/1
5 TABLET ORAL DAILY
Qty: 90 TABLET | Refills: 3 | Status: SHIPPED | OUTPATIENT
Start: 2022-07-06

## 2022-07-06 NOTE — PROGRESS NOTES
Office Visit    Chief Complaint  Hypertension and Atrial Fibrillation    Subjective            Toribio Hayes presents to [facility] CARDIOLOGY  Mr. Hayes is a 67 years old gentleman with chronic combined CHF with reduced ejection fraction who is now improved tremendously with recovery of LV function and is doing well.  He denies any chest pain palpitation shortness of breath dizziness syncope.  He has gained weight instead of losing weight.  Does not do much in way of exercise.      Past Medical History:   Diagnosis Date   • A-fib ([facility])     DR KHALIL   • Arthritis     RIGHT THUMB   • Bladder cancer ([facility])        • Chronic diastolic congestive heart failure ([facility]) 2021   • COPD (chronic obstructive pulmonary disease) ([facility])     INHALER   • Elevated serum creatinine 2021   • Hyperlipidemia     ON MEDS   • Hypertension     CONTROLLED W MEDS   • Lung cancer ([facility])     DR RAMIRES TOOK CARE OF THAT ()   • Mouth sore secondary to chemotherapy    • Stage 3a chronic kidney disease ([facility]) 2022   • Throat soreness     FROM CHEMO       No Known Allergies     Past Surgical History:   Procedure Laterality Date   • COLONOSCOPY     • CYSTECTOMY     • FINGER DEBRIDEMENT Right     RIGHT THUMB   • ILEAL LOOP NEOBLADDER      BLADDER MADE FROM HIS INTESTINES   • LUNG REMOVAL, PARTIAL Left     LEFT UPPER - 2008 - SOA W EXERTION   • PORTOCAVAL SHUNT PLACEMENT       AND    • SENTINEL NODE BIOPSY N/A 2022    Procedure: Excision of left thigh melanoma with sentinel lymph node biopsy;  Surgeon: Dominic Ruiz MD;  Location: [facility];  Service: General;  Laterality: N/A;   • TURP / TRANSURETHRAL INCISION / DRAINAGE PROSTATE          Social History     Tobacco Use   • Smoking status: Former Smoker     Packs/day: 1.50     Years: 39.00     Pack years: 58.50     Types: Cigarettes     Quit date: 10/21/2008     Years since quittin.7   • Smokeless tobacco: Never Used   Vaping Use   •  "Vaping Use: Never used   Substance Use Topics   • Alcohol use: Never   • Drug use: Never       Family History   Problem Relation Age of Onset   • No Known Problems Mother         Mother    • No Known Problems Father         Father  54 years old lung cancer/smoker   • Prostate cancer Brother    • Malig Hyperthermia Neg Hx         Prior to Admission medications    Medication Sig Start Date End Date Taking? Authorizing Provider   albuterol (ACCUNEB) 1.25 MG/3ML nebulizer solution Take 1 ampule by nebulization Every 6 (Six) Hours As Needed.   Yes Ronn Paula MD   beclomethasone (Qvar) 40 MCG/ACT inhaler Inhale 2 puffs 2 (Two) Times a Day. 22  Yes Mecca Judd APRN   carvedilol (COREG) 25 MG tablet TAKE 1 TABLET BY MOUTH TWICE A DAY 21  Yes Noelle Resendiz MD   Eliquis 5 MG tablet tablet TAKE 1 TABLET BY MOUTH TWICE A DAY 22  Yes Sveta Guerrero APRN   HYDROcodone-acetaminophen (Norco) 5-325 MG per tablet Take 1-2 tablets by mouth Every 4 (Four) Hours As Needed for Moderate Pain . 22  Yes Dominic Ruiz MD   montelukast (SINGULAIR) 10 MG tablet TAKE 1 TABLET BY MOUTH AT BEDTIME 1/3/22  Yes Guanako Phan MD   olmesartan (BENICAR) 5 MG tablet TAKE 1 TABLET BY MOUTH EVERY DAY 22  Yes Sveta Guerrero APRN   rosuvastatin (CRESTOR) 5 MG tablet Take 5 mg by mouth Daily.   Yes Ronn Paula MD   tiotropium bromide-olodaterol (Stiolto Respimat) 2.5-2.5 MCG/ACT aerosol solution inhaler Inhale 2 puffs Daily. 22  Yes Mecca Judd APRN   Qvar RediHaler 40 MCG/ACT inhaler Inhale 2 puffs 2 (Two) Times a Day. 22   Ronn Paula MD        Review of Systems   Constitutional: Negative for fatigue.   Respiratory: Negative for cough and shortness of breath.    Cardiovascular: Negative for chest pain, palpitations and leg swelling.   Neurological: Negative for dizziness.        Objective     /75   Pulse 72   Ht 180.3 cm (71\")   Wt 101 kg (223 lb)   " BMI 31.10 kg/m²       Physical Exam      Result Review :                           Assessment and Plan        Diagnoses and all orders for this visit:    1. Chronic combined systolic (congestive) and diastolic (congestive) heart failure (HCC) (Primary)  Assessment & Plan:  His CHF is very well compensated.  His ejection fraction is almost normal according to the most recent echo.  He will continue his carvedilol or losartan in the current dosage.  He is not on spironolactone because of issues with hyperkalemia in the past.  His ejection fraction has improved enough that I do not think we need to try SGLT2 inhibitors at this stage.    Orders:  -     Lipid Panel; Future  -     Comprehensive Metabolic Panel; Future  -     Magnesium; Future    2. Primary hypertension  -     Lipid Panel; Future  -     Comprehensive Metabolic Panel; Future  -     Magnesium; Future    3. Hyperlipidemia, unspecified hyperlipidemia type  Assessment & Plan:  His lipids are at goal on low-dose statin.  We will continue the Crestor in the current dosage    Orders:  -     Lipid Panel; Future  -     Comprehensive Metabolic Panel; Future  -     Magnesium; Future    4. Paroxysmal atrial fibrillation (HCC)  -     Lipid Panel; Future  -     Comprehensive Metabolic Panel; Future  -     Magnesium; Future    5. Centrilobular emphysema (HCC)  -     Lipid Panel; Future  -     Comprehensive Metabolic Panel; Future  -     Magnesium; Future    6. Stage 3b chronic kidney disease (HCC)  Assessment & Plan:  He has chronic kidney disease stage IIIb more than likely related to chronic hypertensive nephropathy and issues related to his bladder cancer.  I have encouraged him to drink lots of fluids and we will get a another CMP by his oncologist in the next 1 month.(His creatinine is slightly worse this time)      Other orders  -     olmesartan (BENICAR) 5 MG tablet; Take 1 tablet by mouth Daily.  Dispense: 90 tablet; Refill: 3  -     rosuvastatin (CRESTOR) 5 MG  tablet; Take 1 tablet by mouth Daily.  Dispense: 90 tablet; Refill: 3  -     carvedilol (COREG) 25 MG tablet; Take 1 tablet by mouth 2 (Two) Times a Day.  Dispense: 180 tablet; Refill: 3  -     apixaban (Eliquis) 5 MG tablet tablet; Take 1 tablet by mouth 2 (Two) Times a Day.  Dispense: 180 tablet; Refill: 3          Follow Up     Return in about 9 months (around 4/6/2023) for WITH DR SCHMID.    Patient was given instructions and counseling regarding his condition or for health maintenance advice. Please see specific information pulled into the AVS if appropriate.     Noelle Resendiz MD  07/06/22 14:37 EDT

## 2022-07-06 NOTE — ASSESSMENT & PLAN NOTE
He has chronic kidney disease stage IIIb more than likely related to chronic hypertensive nephropathy and issues related to his bladder cancer.  I have encouraged him to drink lots of fluids and we will get a another CMP by his oncologist in the next 1 month.(His creatinine is slightly worse this time)

## 2022-07-06 NOTE — ASSESSMENT & PLAN NOTE
His CHF is very well compensated.  His ejection fraction is almost normal according to the most recent echo.  He will continue his carvedilol or losartan in the current dosage.  He is not on spironolactone because of issues with hyperkalemia in the past.  His ejection fraction has improved enough that I do not think we need to try SGLT2 inhibitors at this stage.

## 2022-08-01 ENCOUNTER — OFFICE VISIT (OUTPATIENT)
Dept: SURGERY | Facility: CLINIC | Age: 67
End: 2022-08-01

## 2022-08-01 VITALS — HEIGHT: 71 IN | BODY MASS INDEX: 31.78 KG/M2 | WEIGHT: 227 LBS | RESPIRATION RATE: 16 BRPM

## 2022-08-01 DIAGNOSIS — Z09 ENCOUNTER FOR FOLLOW-UP: Primary | ICD-10-CM

## 2022-08-01 PROCEDURE — 99024 POSTOP FOLLOW-UP VISIT: CPT | Performed by: SURGERY

## 2022-08-01 NOTE — PROGRESS NOTES
Patient is here because I wanted him to follow-up with me 1 more time after I excised a melanoma from his left thigh and had a sentinel lymph node biopsy.  Patient has no complaints.  Incision has completely healed.  It is healed satisfactorily.  Patient seems pleased with his progress.  He has a follow-up appointment with his dermatologist sometime later this year.  No new issues to address.  Patient can return to work on August 8 without any restrictions.  He will see me PRN.

## 2022-08-11 NOTE — PROGRESS NOTES
Primary Care Provider  Collin Cannon MD   Referring Provider  No ref. provider found    Patient Complaint  Asthma, COPD, Lung disease , Follow-up (6 Month ), Shortness of Breath, and Wheezing      SUBJECTIVE    History of Presenting Illness  Toribio Hayes is a pleasant 67 y.o. male who presents to [facility] for followup appointment. Patient last saw Dr. Clifford 2022. Patient has a diagnoses of COPD, Adenocarcinoma of lung with left upper lobe resection  followed by chemotherapy and radiation therapy in remission followed by oncology. He also has had bladder cancer  and melanoma . He dyspnea on exertion mild able to walk 2 flights of stairs and three quarters of a mile flat. He recently had skin cancer melanoma removed from left anterior upper thigh. He has returned to work this week he states. Patient states overall her feels good. He has 02 at home for sleep which he states he received 2 years ago. Patient does not feel he needs 02 at this time.     Denies coughing, wheezing, headaches, chest pain, weight loss or hemoptysis. Denies fevers, chills and night sweats. Toribio Hayes is able to perform ADLs without difficulties and denies any swollen glands/lymph nodes in the head or neck.    I have personally reviewed the review of systems, past family, social, medical and surgical histories; and agree with their findings.    Review of Systems   Constitutional: Negative.  Negative for chills, fatigue, fever and unexpected weight change.   HENT: Negative.    Eyes: Negative.    Respiratory: Positive for shortness of breath. Negative for cough and wheezing.    Cardiovascular: Negative.  Negative for chest pain, palpitations and leg swelling.   Musculoskeletal: Negative.    Skin:        Recent melanoma removed from left anterior upper thigh        Family History   Problem Relation Age of Onset   • No Known Problems Mother         Mother    •  No Known Problems Father         Father  54 years old lung cancer/smoker   • Prostate cancer Brother    • Malig Hyperthermia Neg Hx         Social History     Socioeconomic History   • Marital status:    Tobacco Use   • Smoking status: Former Smoker     Packs/day: 1.50     Years: 39.00     Pack years: 58.50     Types: Cigarettes     Quit date: 10/21/2008     Years since quittin.8   • Smokeless tobacco: Never Used   Vaping Use   • Vaping Use: Never used   Substance and Sexual Activity   • Alcohol use: Never   • Drug use: Never   • Sexual activity: Defer        Past Medical History:   Diagnosis Date   • A-fib ([facility])     DR KHALIL   • Arthritis     RIGHT THUMB   • Bladder cancer ([facility])        • Chronic diastolic congestive heart failure ([facility]) 2021   • COPD (chronic obstructive pulmonary disease) ([facility])     INHALER   • Elevated serum creatinine 2021   • Hyperlipidemia     ON MEDS   • Hypertension     CONTROLLED W MEDS   • Lung cancer ([facility])     DR RAMIRES TOOK CARE OF THAT ()   • Mouth sore secondary to chemotherapy    • Stage 3a chronic kidney disease ([facility]) 2022   • Throat soreness     FROM CHEMO        Immunization History   Administered Date(s) Administered   • COVID-19 (MODERNA) 1st, 2nd, 3rd Dose Only 2021, 2021   • Influenza, Unspecified 2020       No Known Allergies       Current Outpatient Medications:   •  albuterol (ACCUNEB) 1.25 MG/3ML nebulizer solution, Take 1 ampule by nebulization Every 6 (Six) Hours As Needed., Disp: , Rfl:   •  apixaban (Eliquis) 5 MG tablet tablet, Take 1 tablet by mouth 2 (Two) Times a Day., Disp: 180 tablet, Rfl: 3  •  beclomethasone (Qvar) 40 MCG/ACT inhaler, Inhale 2 puffs 2 (Two) Times a Day., Disp: 3 each, Rfl: 3  •  carvedilol (COREG) 25 MG tablet, Take 1 tablet by mouth 2 (Two) Times a Day., Disp: 180 tablet, Rfl: 3  •  montelukast (SINGULAIR) 10 MG tablet, TAKE 1 TABLET BY MOUTH AT BEDTIME, Disp: 90 tablet, Rfl: 2  •   "olmesartan (BENICAR) 5 MG tablet, Take 1 tablet by mouth Daily., Disp: 90 tablet, Rfl: 3  •  rosuvastatin (CRESTOR) 5 MG tablet, Take 1 tablet by mouth Daily., Disp: 90 tablet, Rfl: 3  •  tiotropium bromide-olodaterol (Stiolto Respimat) 2.5-2.5 MCG/ACT aerosol solution inhaler, Inhale 2 puffs Daily., Disp: 3 each, Rfl: 5       OBJECTIVE    Vital Signs   /82 (BP Location: Right arm, Patient Position: Sitting, Cuff Size: Large Adult)   Pulse 72   Temp 98.2 °F (36.8 °C) (Temporal)   Resp 18   Ht 180.3 cm (71\")   Wt 102 kg (225 lb)   SpO2 95% Comment: room air  BMI 31.38 kg/m²     Physical Exam  Vitals reviewed.   Constitutional:       General: He is not in acute distress.     Appearance: Normal appearance. He is obese. He is not ill-appearing.   HENT:      Head: Normocephalic and atraumatic.      Nose: Nose normal.      Mouth/Throat:      Mouth: Mucous membranes are moist.      Pharynx: Oropharynx is clear.   Eyes:      Extraocular Movements: Extraocular movements intact.      Conjunctiva/sclera: Conjunctivae normal.      Pupils: Pupils are equal, round, and reactive to light.   Cardiovascular:      Rate and Rhythm: Normal rate and regular rhythm.      Pulses: Normal pulses.      Heart sounds: Normal heart sounds.   Pulmonary:      Effort: Pulmonary effort is normal. No respiratory distress.      Breath sounds: Normal breath sounds. No stridor. No wheezing, rhonchi or rales.   Abdominal:      General: Bowel sounds are normal.   Musculoskeletal:         General: Normal range of motion.      Cervical back: Normal range of motion and neck supple.   Lymphadenopathy:      Cervical: No cervical adenopathy.   Skin:     General: Skin is warm and dry.      Comments: Healed surgical scar to left anterior upper thigh   Neurological:      General: No focal deficit present.      Mental Status: He is alert and oriented to person, place, and time.   Psychiatric:         Mood and Affect: Mood normal.         Behavior: " Behavior normal.          Results Review  I have personally reviewed the PFT from 05/20/2022 and CT scan from 6/3/2022 with the following:      Full Pulmonary Function Test With Bronchodilator: Patient Communication     Add Comments   Seen     Results  Full Pulmonary Function Test With Bronchodilator (Order [id number])      Order-Level Documents:    Scan on 5/20/2022 by Maldonado Clifford MD: PULMONARY FUNCTION TEST, [facility], 05/20/2022           Progress Note Info    Author Note Status Last Update User Last Update Date/Time   Unknown (Other) Guanako Phan MD 5/24/2022  4:25 PM     Progress Note      PFT interpretation     Spirometry shows very severe obstructive defect.  FEV1/FVC is 42.   FEV1 is  1.03 liters, 29 percent of predicted.  FVC is 2.44 liters, 51 percent of predicted.     There is no significant response to bronchodilator administration.     Lung volumes:  Lung volumes show air trapping.   Total lung capacity is 6.98 liters, 97 percent of predicted.  Residual volume is 4.01 liters, 166 percent of predicted.        Diffusion capacity is 11.64 ml/min/mmHg, 40 percent of predicted.      Flow volume loop is compatible with obstructive process.     Comparision:  Compared to PFT from May 2020, FEV1 has decreased from 1.40 lits to 1.03 lits, 27% change; FVC has decreased from 3.01 lits to 2.44 lits, 19% change, Diffusion capacity improved from 10.55 to 11.64 ml/min/mmHg, 10% change; TLC decreased from 7.82 lit to 6.98 lits, 11% change.      Conclusion:  Low FEV1/FVC with low FEV1, FVC, air trapping and low diffusion capacity, suggestive of severe obstructive airway disease, likely emphysema. Some decline in spirometry parameters was seen over time, however diffusion capacity has improved.     Please correlate clinically.         CT Chest With Contrast Diagnostic [[id number]] (Order [id number])  Order  Status: Final result     Appointment Information      Display Notes    VSKL                   PACS Images     Radiology  Images    Study Result    Narrative & Impression   PROCEDURE:  CT CHEST W CONTRAST DIAGNOSTIC     COMPARISON:  [facility], PET, SKULL BASE TO MID THIGH SUBSEQ, 4/12/2019, 12:08.    [facility], CT, CT CHEST ABD PEL W CONTRAST, 12/04/2020, 8:03.  [facility], CT, CT CHEST ABD PEL W CONTRAST, 6/04/2021, 10:09.  [facility], CT, CT   CHEST W CONTRAST DIAGNOSTIC, 11/19/2021, 8:09.  [facility], CT, CT ABDOMEN PELVIS W   CONTRAST, 11/19/2021, 8:09.  INDICATIONS:  screening exam,lung and bladder ca.     TECHNIQUE:    After obtaining the patient's consent, CT images were obtained with non-ionic   intravenous contrast material.       PROTOCOL:     Standard imaging protocol performed                 RADIATION:      DLP: 266.2mGy*cm               Automated exposure control was utilized to minimize radiation dose.   CONTRAST:      100cc Isovue 370 I.V.  LABS:   eGFR: 50ml/min/1.73m2     FINDINGS:          Heart size appears within normal limits.  There is calcific atherosclerosis of the coronary   arteries.  No pericardial effusion.  There is reflux of contrast into the IVC and hepatic veins.    The central pulmonary arteries appear to enhance as expected on this non angiographic study.  There   is atherosclerosis of the aorta and aortic arch branch vessels.  No definite acute aortic   abnormality is seen.  There are calcified mediastinal and hilar lymph nodes.  Lymph nodes appear   similar in size to the prior exam.     There is emphysema.  There is left lung volume loss with left upper lobectomy, as before.  There   are geographic left perihilar parenchymal opacities and bronchiectasis, as before.  No significant   pulmonary infiltrate is seen.  No definite new pulmonary abnormality is identified.     Degenerative changes are present in the spine.  There appear to be chronic changes in the left   lateral ribs, as before.  No definite new aggressive osseous  lesion is seen.     IMPRESSION:                 1. Emphysema and post treatment changes in the left lung, similar to prior studies.    2. No definitive findings of recurrent or metastatic disease.  3. Calcific atherosclerosis of the coronary arteries, aorta, and branch vessels.            KYAW FANG MD         Electronically Signed and Approved By: KYAW FANG MD on 6/03/2022 at 11:34                ASSESSMENT & PLAN    Patient Active Problem List   Diagnosis   • Asthma   • Malignant neoplasm of urinary bladder (HCC)   • Chronic combined systolic (congestive) and diastolic (congestive) heart failure (HCC)   • Chronic obstructive pulmonary disease (HCC)   • Hypertension   • Lung disease   • Shortness of breath   • Primary localized osteoarthrosis, lower leg   • Tear of medial cartilage or meniscus of knee, current   • Elevated serum creatinine   • Hyperlipidemia   • Stage 3b chronic kidney disease (HCC)   • Paroxysmal atrial fibrillation (HCC)       Diagnoses and all orders for this visit:    1. Pulmonary emphysema, unspecified emphysema type (HCC) (Primary)  -     Overnight Sleep Oximetry Study; Future    2. Dyspnea, unspecified type    3. Adenocarcinoma of left lung (HCC)    4. Atrial fibrillation, chronic (HCC)    5. Stage 3a chronic kidney disease (HCC)           Plan:  Patient doing well overall. We set up an overnight pulse oximetry to see if patient desats during sleep. I he does not we will discontinue the 02. However if he desats, discussed with patient need for nocturnal 02. Patient verbalizes understanding. Patient to continue with current medications as prescribed. Patient to followup in 6 months or sooner if needed.    Smoking status:former,quit in 2008  Vaccination status:up to date with COVID and flu  Medications personally reviewed    Follow Up  No follow-ups on file.    Patient was given instructions and counseling regarding his condition or for health maintenance advice. Please see specific  information pulled into the AVS if appropriate.

## 2022-08-12 ENCOUNTER — OFFICE VISIT (OUTPATIENT)
Dept: PULMONOLOGY | Facility: CLINIC | Age: 67
End: 2022-08-12

## 2022-08-12 VITALS
BODY MASS INDEX: 31.5 KG/M2 | SYSTOLIC BLOOD PRESSURE: 136 MMHG | RESPIRATION RATE: 18 BRPM | HEART RATE: 72 BPM | WEIGHT: 225 LBS | OXYGEN SATURATION: 95 % | DIASTOLIC BLOOD PRESSURE: 82 MMHG | TEMPERATURE: 98.2 F | HEIGHT: 71 IN

## 2022-08-12 DIAGNOSIS — N18.31 STAGE 3A CHRONIC KIDNEY DISEASE: ICD-10-CM

## 2022-08-12 DIAGNOSIS — J43.9 PULMONARY EMPHYSEMA, UNSPECIFIED EMPHYSEMA TYPE: Primary | ICD-10-CM

## 2022-08-12 DIAGNOSIS — I48.20 ATRIAL FIBRILLATION, CHRONIC: ICD-10-CM

## 2022-08-12 DIAGNOSIS — R06.00 DYSPNEA, UNSPECIFIED TYPE: ICD-10-CM

## 2022-08-12 DIAGNOSIS — C34.92 ADENOCARCINOMA OF LEFT LUNG: ICD-10-CM

## 2022-08-12 PROCEDURE — 99213 OFFICE O/P EST LOW 20 MIN: CPT | Performed by: NURSE PRACTITIONER

## 2022-08-24 ENCOUNTER — TELEPHONE (OUTPATIENT)
Dept: PULMONOLOGY | Facility: CLINIC | Age: 67
End: 2022-08-24

## 2022-08-24 NOTE — TELEPHONE ENCOUNTER
Called pt to give results of overnight ox.  Pts oxygen dropped to 55% so he does have the need to continue oxygen.

## 2022-08-25 DIAGNOSIS — R06.00 DYSPNEA, UNSPECIFIED TYPE: ICD-10-CM

## 2022-08-25 RX ORDER — MONTELUKAST SODIUM 10 MG/1
TABLET ORAL
Qty: 90 TABLET | Refills: 2 | Status: SHIPPED | OUTPATIENT
Start: 2022-08-25

## 2022-10-10 ENCOUNTER — TELEPHONE (OUTPATIENT)
Dept: ONCOLOGY | Facility: HOSPITAL | Age: 67
End: 2022-10-10

## 2022-10-10 NOTE — TELEPHONE ENCOUNTER
Caller: JOSSIE RIOS    Relationship to patient: Emergency Contact    Best call back number: [phone]    Chief complaint: CT SCAN IS ON 12/09    Type of visit: FOLLOW UP    Requested date: AFTER 12/09    If rescheduling, when is the original appointment: 12/06    Additional notes: PLEASE CALL ONCE R/S. HUB UNABLE TO R/S DUE TO TIME BLOCKS.

## 2022-12-01 ENCOUNTER — OFFICE VISIT (OUTPATIENT)
Dept: ORTHOPEDIC SURGERY | Facility: CLINIC | Age: 67
End: 2022-12-01

## 2022-12-01 VITALS — BODY MASS INDEX: 31.5 KG/M2 | WEIGHT: 225 LBS | HEIGHT: 71 IN

## 2022-12-01 DIAGNOSIS — M75.101 TEAR OF RIGHT ROTATOR CUFF, UNSPECIFIED TEAR EXTENT, UNSPECIFIED WHETHER TRAUMATIC: Primary | ICD-10-CM

## 2022-12-01 PROCEDURE — 99203 OFFICE O/P NEW LOW 30 MIN: CPT | Performed by: ORTHOPAEDIC SURGERY

## 2022-12-01 NOTE — PROGRESS NOTES
"Chief Complaint  Initial Evaluation of the Right Shoulder     Subjective      Toribio Hayes presents to [facility] for initial evaluation of the right shoulder. He was loosening a bolt at work and felt a sharp pain.  He was sent to work well and is on light duty.  This injury was on 22.  He prior to  had full ROM and no pain with activity. He hasn't had any intervention at this time. He has used some pain medication.     No Known Allergies     Social History     Socioeconomic History   • Marital status:    Tobacco Use   • Smoking status: Former     Packs/day: 1.50     Years: 39.00     Pack years: 58.50     Types: Cigarettes     Quit date: 10/21/2008     Years since quittin.1   • Smokeless tobacco: Never   Vaping Use   • Vaping Use: Never used   Substance and Sexual Activity   • Alcohol use: Never   • Drug use: Never   • Sexual activity: Defer        Review of Systems     Objective   Vital Signs:   Ht 180.3 cm (71\")   Wt 102 kg (225 lb)   BMI 31.38 kg/m²       Physical Exam  Constitutional:       Appearance: Normal appearance. Patient is well-developed and normal weight.   HENT:      Head: Normocephalic.      Right Ear: Hearing and external ear normal.      Left Ear: Hearing and external ear normal.      Nose: Nose normal.   Eyes:      Conjunctiva/sclera: Conjunctivae normal.   Cardiovascular:      Rate and Rhythm: Normal rate.   Pulmonary:      Effort: Pulmonary effort is normal.      Breath sounds: No wheezing or rales.   Abdominal:      Palpations: Abdomen is soft.      Tenderness: There is no abdominal tenderness.   Musculoskeletal:      Cervical back: Normal range of motion.   Skin:     Findings: No rash.   Neurological:      Mental Status: Patient is alert and oriented to person, place, and time.   Psychiatric:         Mood and Affect: Mood and affect normal.         Judgment: Judgment normal.       Ortho Exam      RIGHT SHOULDER radial pulse 2+. " Ulnar pulse 2+. Good tone of deltoid, bicep, triceps, wrist extensors and flexors. Full ROM.  IR to L5.  Sensation intact. Neurovascular Intact. No swelling. No skin discoloration or muscle atrophy.     Procedures      Imaging Results (Most Recent)     None           Result Review :     [facility]  IMPRESSIONS  1. Massive rotator cuff tear with full-thickness full tear. Mild/moderate AC joint arthrosis.  Width supraspinatus and infraspinatus tendon tears with significant retraction.  Limited muscle coverage due to retraction with minimal fatty infiltration.   2. High-grade partial, near full-thickness subscapularis tendon tear with prominent muscle atrophy.  3. Long head biceps tendon tear/detachment.   4. Secondary to superior humeral migration probably contacting the lateral acromion and moderate-marked subacromial-subdeltoid and subcoracoid bursitis.   5. Moderate gkenohumeral effusion without high-grade chondromalacia or loose body.         Assessment and Plan     Diagnoses and all orders for this visit:    1. Tear of right rotator cuff, unspecified tear extent, unspecified whether traumatic (Primary)        Discussed the treatment plan with the patient. Discussed conservative measures as exercises, anti-inflammatory and injection. Patient prescribed to physical therapy. Work note for 10 # and nothing overhead.      Call or return if worsening symptoms.    Follow Up     4-6 weeks.       Patient was given instructions and counseling regarding his condition or for health maintenance advice. Please see specific information pulled into the AVS if appropriate.     Scribed for Davon Norman MD by Mya Bone MA.  12/01/22   08:04 EST    I have personally performed the services described in this document as scribed by the above individual and it is both accurate and complete. Davon Norman MD 12/01/22

## 2022-12-06 RX ORDER — BECLOMETHASONE DIPROPIONATE HFA 40 UG/1
2 AEROSOL, METERED RESPIRATORY (INHALATION) 2 TIMES DAILY
COMMUNITY
Start: 2022-10-14 | End: 2023-02-17 | Stop reason: SDUPTHER

## 2022-12-08 ENCOUNTER — TELEPHONE (OUTPATIENT)
Dept: ONCOLOGY | Facility: HOSPITAL | Age: 67
End: 2022-12-08

## 2022-12-08 DIAGNOSIS — C67.3 MALIGNANT NEOPLASM OF ANTERIOR WALL OF URINARY BLADDER: Primary | ICD-10-CM

## 2022-12-09 ENCOUNTER — LAB (OUTPATIENT)
Dept: LAB | Facility: HOSPITAL | Age: 67
End: 2022-12-09

## 2022-12-09 ENCOUNTER — HOSPITAL ENCOUNTER (OUTPATIENT)
Dept: CT IMAGING | Facility: HOSPITAL | Age: 67
Discharge: HOME | End: 2022-12-09

## 2022-12-09 DIAGNOSIS — C67.3 MALIGNANT NEOPLASM OF ANTERIOR WALL OF URINARY BLADDER: ICD-10-CM

## 2022-12-09 LAB
ALBUMIN SERPL-MCNC: 4.1 G/DL (ref 3.5–5.2)
ALBUMIN/GLOB SERPL: 1.5 G/DL
ALP SERPL-CCNC: 65 U/L (ref 39–117)
ALT SERPL W P-5'-P-CCNC: 14 U/L (ref 1–41)
ANION GAP SERPL CALCULATED.3IONS-SCNC: 6.2 MMOL/L (ref 5–15)
AST SERPL-CCNC: 19 U/L (ref 1–40)
BASOPHILS # BLD AUTO: 0.03 10*3/MM3 (ref 0–0.2)
BASOPHILS NFR BLD AUTO: 0.5 % (ref 0–1.5)
BILIRUB SERPL-MCNC: 0.4 MG/DL (ref 0–1.2)
BUN SERPL-MCNC: 19 MG/DL (ref 8–23)
BUN/CREAT SERPL: 14.2 (ref 7–25)
CALCIUM SPEC-SCNC: 9.4 MG/DL (ref 8.6–10.5)
CHLORIDE SERPL-SCNC: 95 MMOL/L (ref 98–107)
CO2 SERPL-SCNC: 32.8 MMOL/L (ref 22–29)
CREAT BLDA-MCNC: 1.4 MG/DL
CREAT SERPL-MCNC: 1.34 MG/DL (ref 0.76–1.27)
DEPRECATED RDW RBC AUTO: 43.5 FL (ref 37–54)
EGFRCR SERPLBLD CKD-EPI 2021: 55.4 ML/MIN/1.73
EGFRCR SERPLBLD CKD-EPI 2021: 58.4 ML/MIN/1.73
EOSINOPHIL # BLD AUTO: 0.06 10*3/MM3 (ref 0–0.4)
EOSINOPHIL NFR BLD AUTO: 1.1 % (ref 0.3–6.2)
ERYTHROCYTE [DISTWIDTH] IN BLOOD BY AUTOMATED COUNT: 12.8 % (ref 12.3–15.4)
GLOBULIN UR ELPH-MCNC: 2.7 GM/DL
GLUCOSE SERPL-MCNC: 110 MG/DL (ref 65–99)
HCT VFR BLD AUTO: 39.1 % (ref 37.5–51)
HGB BLD-MCNC: 12.8 G/DL (ref 13–17.7)
IMM GRANULOCYTES # BLD AUTO: 0.02 10*3/MM3 (ref 0–0.05)
IMM GRANULOCYTES NFR BLD AUTO: 0.4 % (ref 0–0.5)
LYMPHOCYTES # BLD AUTO: 1.18 10*3/MM3 (ref 0.7–3.1)
LYMPHOCYTES NFR BLD AUTO: 20.8 % (ref 19.6–45.3)
MCH RBC QN AUTO: 30.6 PG (ref 26.6–33)
MCHC RBC AUTO-ENTMCNC: 32.7 G/DL (ref 31.5–35.7)
MCV RBC AUTO: 93.5 FL (ref 79–97)
MONOCYTES # BLD AUTO: 0.62 10*3/MM3 (ref 0.1–0.9)
MONOCYTES NFR BLD AUTO: 10.9 % (ref 5–12)
NEUTROPHILS NFR BLD AUTO: 3.77 10*3/MM3 (ref 1.7–7)
NEUTROPHILS NFR BLD AUTO: 66.3 % (ref 42.7–76)
NRBC BLD AUTO-RTO: 0 /100 WBC (ref 0–0.2)
PLATELET # BLD AUTO: 211 10*3/MM3 (ref 140–450)
PMV BLD AUTO: 10.4 FL (ref 6–12)
POTASSIUM SERPL-SCNC: 4.6 MMOL/L (ref 3.5–5.2)
PROT SERPL-MCNC: 6.8 G/DL (ref 6–8.5)
RBC # BLD AUTO: 4.18 10*6/MM3 (ref 4.14–5.8)
SODIUM SERPL-SCNC: 134 MMOL/L (ref 136–145)
WBC NRBC COR # BLD: 5.68 10*3/MM3 (ref 3.4–10.8)

## 2022-12-09 PROCEDURE — 0 IOPAMIDOL PER 1 ML: Performed by: INTERNAL MEDICINE

## 2022-12-09 PROCEDURE — 82565 ASSAY OF CREATININE: CPT

## 2022-12-09 PROCEDURE — 36415 COLL VENOUS BLD VENIPUNCTURE: CPT

## 2022-12-09 PROCEDURE — 74177 CT ABD & PELVIS W/CONTRAST: CPT

## 2022-12-09 PROCEDURE — 85025 COMPLETE CBC W/AUTO DIFF WBC: CPT

## 2022-12-09 PROCEDURE — 80053 COMPREHEN METABOLIC PANEL: CPT

## 2022-12-09 PROCEDURE — 71260 CT THORAX DX C+: CPT

## 2022-12-09 RX ADMIN — IOPAMIDOL 100 ML: 755 INJECTION, SOLUTION INTRAVENOUS at 08:18

## 2022-12-12 ENCOUNTER — OFFICE VISIT (OUTPATIENT)
Dept: ONCOLOGY | Facility: HOSPITAL | Age: 67
End: 2022-12-12

## 2022-12-12 VITALS
RESPIRATION RATE: 18 BRPM | HEART RATE: 67 BPM | WEIGHT: 223.99 LBS | DIASTOLIC BLOOD PRESSURE: 75 MMHG | SYSTOLIC BLOOD PRESSURE: 152 MMHG | OXYGEN SATURATION: 91 % | BODY MASS INDEX: 31.24 KG/M2 | TEMPERATURE: 98 F

## 2022-12-12 DIAGNOSIS — N18.32 STAGE 3B CHRONIC KIDNEY DISEASE: ICD-10-CM

## 2022-12-12 DIAGNOSIS — C67.3 MALIGNANT NEOPLASM OF ANTERIOR WALL OF URINARY BLADDER: Primary | ICD-10-CM

## 2022-12-12 PROCEDURE — G0463 HOSPITAL OUTPT CLINIC VISIT: HCPCS | Performed by: INTERNAL MEDICINE

## 2022-12-12 PROCEDURE — 99213 OFFICE O/P EST LOW 20 MIN: CPT | Performed by: INTERNAL MEDICINE

## 2022-12-12 NOTE — PROGRESS NOTES
Chief Complaint  Results    Collin Cannon MD Houk, Brandon Lyle, MD Subjective          Toribio Hayes presents to [facility] for follow-up of bladder cancer.  He status post treatments as outlined.  Patient reports he is been doing well since his last visit.  He is currently undergoing physical therapy for rotator cuff injury but reports that it is going well.  He denies any masses or adenopathy, no unusual aches or pains.  He denies hematuria, dysuria.  He reports good appetite and his weight is maintained.  He notes that his energy level is a little low but he attributes that to not being able to do the same amount of activity secondary to his shoulder.    Oncology/Hematology History Overview Note   Bladder, urothelial carcinoma      Prostate cancer, adenocarcinoma, 4+3 Eb score            Clinical Staging      Stage II ([id number])            Treatments      Chemotherapy      10/10/18 began DD [facility]      Surgeries      Left upper lobectomy 2009.  Radical cystoprostatectomy with neobladder 12/18.        Malignant neoplasm of urinary bladder (HCC)   12/29/2020 Initial Diagnosis    Malignant neoplasm of urinary bladder (HCC)         Review of Systems   Constitutional: Positive for fatigue. Negative for appetite change, diaphoresis, fever, unexpected weight gain and unexpected weight loss.   HENT: Negative for hearing loss, mouth sores, sore throat, swollen glands, trouble swallowing and voice change.    Eyes: Negative for blurred vision.   Respiratory: Negative for cough, shortness of breath and wheezing.    Cardiovascular: Negative for chest pain and palpitations.   Gastrointestinal: Negative for abdominal pain, blood in stool, constipation, diarrhea, nausea and vomiting.   Endocrine: Negative for cold intolerance and heat intolerance.   Genitourinary: Negative for difficulty urinating, dysuria, frequency, hematuria and urinary incontinence.   Musculoskeletal:  Negative for arthralgias, back pain and myalgias.   Skin: Negative for rash, skin lesions and wound.   Neurological: Negative for dizziness, seizures, weakness, numbness and headache.   Hematological: Does not bruise/bleed easily.   Psychiatric/Behavioral: Negative for depressed mood. The patient is not nervous/anxious.      Current Outpatient Medications on File Prior to Visit   Medication Sig Dispense Refill   • albuterol (ACCUNEB) 1.25 MG/3ML nebulizer solution Take 1 ampule by nebulization Every 6 (Six) Hours As Needed.     • apixaban (Eliquis) 5 MG tablet tablet Take 1 tablet by mouth 2 (Two) Times a Day. 180 tablet 3   • beclomethasone (Qvar) 40 MCG/ACT inhaler Inhale 2 puffs 2 (Two) Times a Day. 3 each 3   • carvedilol (COREG) 25 MG tablet Take 1 tablet by mouth 2 (Two) Times a Day. 180 tablet 3   • montelukast (SINGULAIR) 10 MG tablet TAKE 1 TABLET BY MOUTH EVERYDAY AT BEDTIME 90 tablet 2   • olmesartan (BENICAR) 5 MG tablet Take 1 tablet by mouth Daily. 90 tablet 3   • Qvar RediHaler 40 MCG/ACT inhaler Inhale 2 puffs 2 (Two) Times a Day.     • rosuvastatin (CRESTOR) 5 MG tablet Take 1 tablet by mouth Daily. 90 tablet 3   • tiotropium bromide-olodaterol (Stiolto Respimat) 2.5-2.5 MCG/ACT aerosol solution inhaler Inhale 2 puffs Daily. 3 each 5     No current facility-administered medications on file prior to visit.       No Known Allergies  Past Medical History:   Diagnosis Date   • A-fib ([facility])     DR KHALIL   • Arthritis     RIGHT THUMB   • Bladder cancer ([facility])     2018   • Chronic diastolic congestive heart failure ([facility]) 12/06/2021   • COPD (chronic obstructive pulmonary disease) ([facility])     INHALER   • Elevated serum creatinine 12/06/2021   • Hyperlipidemia     ON MEDS   • Hypertension     CONTROLLED W MEDS   • Lung cancer ([facility])     DR RAMIRES TOOK CARE OF THAT (2008)   • Mouth sore secondary to chemotherapy    • Stage 3a chronic kidney disease ([facility]) 01/05/2022   • Throat soreness     FROM CHEMO     Past  Surgical History:   Procedure Laterality Date   • COLONOSCOPY     • CYSTECTOMY     • FINGER DEBRIDEMENT Right     RIGHT THUMB   • ILEAL LOOP NEOBLADDER      BLADDER MADE FROM HIS INTESTINES   • LUNG REMOVAL, PARTIAL Left     LEFT UPPER - 2008 - SOA W EXERTION   • PORTOCAVAL SHUNT PLACEMENT       AND    • SENTINEL NODE BIOPSY N/A 2022    Procedure: Excision of left thigh melanoma with sentinel lymph node biopsy;  Surgeon: Dominic Ruiz MD;  Location: [facility] MAIN OR;  Service: General;  Laterality: N/A;   • TURP / TRANSURETHRAL INCISION / DRAINAGE PROSTATE       Social History     Socioeconomic History   • Marital status:    Tobacco Use   • Smoking status: Former     Packs/day: 1.50     Years: 39.00     Pack years: 58.50     Types: Cigarettes     Quit date: 10/21/2008     Years since quittin.1   • Smokeless tobacco: Never   Vaping Use   • Vaping Use: Never used   Substance and Sexual Activity   • Alcohol use: Never   • Drug use: Never   • Sexual activity: Defer     Family History   Problem Relation Age of Onset   • No Known Problems Mother         Mother    • No Known Problems Father         Father  54 years old lung cancer/smoker   • Prostate cancer Brother    • Malig Hyperthermia Neg Hx        Objective   Physical Exam  Vitals reviewed. Exam conducted with a chaperone present.   Constitutional:       General: He is not in acute distress.     Appearance: Normal appearance.   Cardiovascular:      Rate and Rhythm: Normal rate and regular rhythm.      Heart sounds: Normal heart sounds. No murmur heard.    No gallop.   Pulmonary:      Effort: Pulmonary effort is normal.      Breath sounds: Normal breath sounds.   Abdominal:      General: Abdomen is flat. Bowel sounds are normal. There is no distension.      Palpations: Abdomen is soft.      Tenderness: There is no abdominal tenderness.   Musculoskeletal:      Cervical back: Neck supple.      Right lower leg: No edema.      Left lower  leg: No edema.   Lymphadenopathy:      Cervical: No cervical adenopathy.   Neurological:      Mental Status: He is alert and oriented to person, place, and time.   Psychiatric:         Mood and Affect: Mood normal.         Behavior: Behavior normal.         Vitals:    12/12/22 0821   BP: 152/75   Pulse: 67   Resp: 18   Temp: 98 °F (36.7 °C)   SpO2: 91%   Weight: 102 kg (223 lb 15.8 oz)   PainSc: 0-No pain     ECOG score: 0         PHQ-9 Total Score:                    Result Review :   The following data was reviewed by: Jeff Fernando MD on 12/12/2022:  Lab Results   Component Value Date    HGB 12.8 (L) 12/09/2022    HCT 39.1 12/09/2022    MCV 93.5 12/09/2022     12/09/2022    WBC 5.68 12/09/2022    NEUTROABS 3.77 12/09/2022    LYMPHSABS 1.18 12/09/2022    MONOSABS 0.62 12/09/2022    EOSABS 0.06 12/09/2022    BASOSABS 0.03 12/09/2022     Lab Results   Component Value Date    GLUCOSE 110 (H) 12/09/2022    BUN 19 12/09/2022    CREATININE 1.34 (H) 12/09/2022     (L) 12/09/2022    K 4.6 12/09/2022    CL 95 (L) 12/09/2022    CO2 32.8 (H) 12/09/2022    CALCIUM 9.4 12/09/2022    PROTEINTOT 6.8 12/09/2022    ALBUMIN 4.10 12/09/2022    BILITOT 0.4 12/09/2022    ALKPHOS 65 12/09/2022    AST 19 12/09/2022    ALT 14 12/09/2022     Lab Results   Component Value Date    MG 1.9 07/01/2022    FREET4 1.2 03/13/2021    TSH 0.702 03/13/2021       Data reviewed: Radiologic studies CT chest, abdomen, pelvis reviewed      Assessment and Plan    Diagnoses and all orders for this visit:    1. Malignant neoplasm of anterior wall of urinary bladder (HCC) (Primary)  Assessment & Plan:  Patient is doing well.  I see no evidence of disease recurrence by his history, physical examination, lab work or recent scans.  I will see him back in 6 months for continued surveillance.    Orders:  -     CT chest w contrast; Future  -     CT abdomen pelvis w contrast; Future    2. Stage 3b chronic kidney disease (HCC)          Patient Follow  Up: 6 months    Patient was given instructions and counseling regarding his condition or for health maintenance advice. Please see specific information pulled into the AVS if appropriate.     Jeff Fernando MD    12/12/2022

## 2022-12-12 NOTE — ASSESSMENT & PLAN NOTE
Patient is doing well.  I see no evidence of disease recurrence by his history, physical examination, lab work or recent scans.  I will see him back in 6 months for continued surveillance.

## 2022-12-18 DIAGNOSIS — J44.9 CHRONIC OBSTRUCTIVE PULMONARY DISEASE, UNSPECIFIED: ICD-10-CM

## 2022-12-19 RX ORDER — IPRATROPIUM BROMIDE AND ALBUTEROL SULFATE 2.5; .5 MG/3ML; MG/3ML
SOLUTION RESPIRATORY (INHALATION)
Qty: 540 ML | Refills: 11 | Status: SHIPPED | OUTPATIENT
Start: 2022-12-19 | End: 2023-02-17 | Stop reason: SDUPTHER

## 2023-01-12 ENCOUNTER — OFFICE VISIT (OUTPATIENT)
Dept: ORTHOPEDIC SURGERY | Facility: CLINIC | Age: 68
End: 2023-01-12
Payer: OTHER MISCELLANEOUS

## 2023-01-12 ENCOUNTER — TELEPHONE (OUTPATIENT)
Dept: ORTHOPEDIC SURGERY | Facility: CLINIC | Age: 68
End: 2023-01-12
Payer: COMMERCIAL

## 2023-01-12 VITALS — BODY MASS INDEX: 32.06 KG/M2 | HEIGHT: 71 IN | WEIGHT: 229 LBS

## 2023-01-12 DIAGNOSIS — M75.101 TEAR OF RIGHT ROTATOR CUFF, UNSPECIFIED TEAR EXTENT, UNSPECIFIED WHETHER TRAUMATIC: Primary | ICD-10-CM

## 2023-01-12 PROCEDURE — 99213 OFFICE O/P EST LOW 20 MIN: CPT | Performed by: PHYSICIAN ASSISTANT

## 2023-01-12 NOTE — PATIENT INSTRUCTIONS
Patient reports approximate 50% improvement. Advised to continue PT to completion to progress strength and ROM. Continue home exercises. Avoid heavy/overhead lifting, pushing, or pulling. Remain on light duty work restrictions.     Continue icing as needed up to 4 times daily for no longer than 15-20 mins at a time.     Follow-up in 6 weeks. Call with changes or concerns.

## 2023-01-12 NOTE — TELEPHONE ENCOUNTER
Caller:LUKE     Relationship:  [employer]     Best call back number: [phone]    What form or medical record are you requesting: OFFICE NOTES, WORK STATUS 12/01/22, 01/12/23    Who is requesting this form or medical record from you: [employer]     How would you like to receive the form or medical records (pick-up, mail, fax):FAX  If fax, what is the fax number:[phone]    Timeframe paperwork needed: ASAP

## 2023-01-12 NOTE — PROGRESS NOTES
"Chief Complaint  Follow-up of the Right Shoulder    Subjective      Toribio Hayes presents to [facility] for follow-up of right shoulder injury sustained at work on 11/12/2022.  He was evaluated in clinic initially on 12/1/2022 with MRI results showing massive/full rotator cuff tear with mild/moderate AC joint arthrosis, tears of the supraspinatus and infraspinatus with significant retraction; high-grade partial/near full-thickness subscapularis tendon tear with prominent muscle atrophy; long head biceps tendon tear/detachment; and moderate to marked subacromial/subdeltoid and subcoracoid bursitis; moderate glenohumeral effusion without high-grade chondromalacia or loose body.  Patient received a referral to physical therapy and work note for light duty restrictions, less than 10 pounds and no overhead lifting.  He presents today for follow-up reporting that he has started outpatient physical therapy at [facility] and has noted approximate 50% improvement.  Reports there are \"a few ways I cannot move it and have pain\".  He has had continued improvement with PT and would like to continue this at this point.    Objective   No Known Allergies    Vital Signs:   Ht 180.3 cm (71\")   Wt 104 kg (229 lb)   BMI 31.94 kg/m²       Physical Exam    Constitutional: Awake, alert. Well nourished appearance.    Integumentary: Warm, dry, intact. No obvious rashes.    HENT: Atraumatic, normocephalic.   Respiratory: Non labored respirations .   Cardiovascular: Intact peripheral pulses.    Psychiatric: Normal mood and affect. A&O X3    Ortho Exam  Right shoulder: Skin is warm, dry, and intact.  Full active shoulder forward flexion and abduction.  Full flexion and extension of the wrist and elbow.  Full supination and pronation.  Patient is able to form a full fist.  Good thumb opposition.  Sensation intact to light touch.  Distal neurovascular intact.  Internal rotation to L2.    Imaging Results (Most Recent)  "    None                    Assessment and Plan   Problem List Items Addressed This Visit    None  Visit Diagnoses     Tear of right rotator cuff, unspecified tear extent, unspecified whether traumatic    -  Primary    Relevant Orders    Ambulatory Referral to Physical Therapy Evaluate and treat; Stretching, ROM, Strengthening (Completed)          Follow Up   Return in about 6 weeks (around 2/23/2023).  Educated on risk of smoking. Discussed options for smoking cessation.    Patient Instructions   Patient reports approximate 50% improvement. Advised to continue PT to completion to progress strength and ROM. Continue home exercises. Avoid heavy/overhead lifting, pushing, or pulling. Remain on light duty work restrictions.     Continue icing as needed up to 4 times daily for no longer than 15-20 mins at a time.     Follow-up in 6 weeks. Call with changes or concerns.       Patient was given instructions and counseling regarding his condition or for health maintenance advice. Please see specific information pulled into the AVS if appropriate.

## 2023-02-13 NOTE — PROGRESS NOTES
Primary Care Provider  Collin Cannon MD   Referring Provider  No ref. provider found    Patient Complaint  Follow-up, COPD, Shortness of Breath, Asthma, OXYGEN, and LUNG DISEASE      SUBJECTIVE    History of Presenting Illness  Toribio Hayes is a pleasant 67 y.o. male who presents to [facility] for 6-month follow-up.  Patient is here with his spouse today.  Patient has a diagnoses of COPD, Adenocarcinoma of lung with left upper lobe resection 2008 followed by chemotherapy and radiation therapy in remission followed by oncology. He also has had bladder cancer 2020 and melanoma 2022. He has had skin cancer melanoma removed from left anterior upper thigh. Patient states overall he feels good. He has 02 at home for sleep which he states he received 2 years ago.  Patient is under the care of Dr. Villafana and scheduled for an upcoming follow-up CT on his chest.  Patient continues to be on Qvar Stiolto albuterol inhaler and DuoNeb at this time.  Patient spouse is concerned that patient may have some sleep apnea restless legs at night when he sleeps.  Patient does not sleep very much during the night he does use his O2 for sleep.  In addition patient is changing insurances in July when he retires.  Patient concerned about whether his Stiolto [place] will be covered under his new insurance.  Since his last visit patient states he has been doing very well he has had no antibiotics or steroids for his lungs, has not been to urgent care or hospital for his lungs.  Patient feels his breathing is at baseline.    Denies dyspnea, coughing, wheezing, headaches, chest pain, weight loss or hemoptysis. Denies fevers, chills and night sweats. Toribio Hayes is able to perform ADLs without difficulties and denies any swollen glands/lymph nodes in the head or neck.    I have personally reviewed the review of systems, past family, social, medical and surgical histories; and agree with  their findings.    Review of Systems   Constitutional: Negative.    HENT: Negative.    Eyes: Negative.    Respiratory: Negative.    Cardiovascular: Negative.    Musculoskeletal: Negative.    Skin: Negative.         Family History   Problem Relation Age of Onset   • No Known Problems Mother         Mother    • No Known Problems Father         Father  54 years old lung cancer/smoker   • Prostate cancer Brother    • Malig Hyperthermia Neg Hx         Social History     Socioeconomic History   • Marital status:    Tobacco Use   • Smoking status: Former     Packs/day: 1.50     Years: 39.00     Pack years: 58.50     Types: Cigarettes     Quit date: 10/21/2008     Years since quittin.3   • Smokeless tobacco: Never   Vaping Use   • Vaping Use: Never used   Substance and Sexual Activity   • Alcohol use: Never   • Drug use: Never   • Sexual activity: Defer        Past Medical History:   Diagnosis Date   • A-fib ([facility])     DR KHALIL   • Arthritis     RIGHT THUMB   • Bladder cancer ([facility])        • Chronic diastolic congestive heart failure ([facility]) 2021   • COPD (chronic obstructive pulmonary disease) ([facility])     INHALER   • Elevated serum creatinine 2021   • Hyperlipidemia     ON MEDS   • Hypertension     CONTROLLED W MEDS   • Lung cancer ([facility])     DR RAMIRES TOOK CARE OF THAT ()   • Mouth sore secondary to chemotherapy    • Stage 3a chronic kidney disease ([facility]) 2022   • Throat soreness     FROM CHEMO        Immunization History   Administered Date(s) Administered   • COVID-19 (MODERNA) 1st, 2nd, 3rd Dose Only 2021, 2021   • Influenza, Unspecified 2020       No Known Allergies       Current Outpatient Medications:   •  apixaban (Eliquis) 5 MG tablet tablet, Take 1 tablet by mouth 2 (Two) Times a Day., Disp: 180 tablet, Rfl: 3  •  carvedilol (COREG) 25 MG tablet, Take 1 tablet by mouth 2 (Two) Times a Day., Disp: 180 tablet, Rfl: 3  •  ipratropium-albuterol (DUO-NEB) 0.5-2.5  "mg/3 ml nebulizer, Take 3 mL by nebulization 4 (Four) Times a Day As Needed for Wheezing or Shortness of Air., Disp: 360 mL, Rfl: 11  •  montelukast (SINGULAIR) 10 MG tablet, TAKE 1 TABLET BY MOUTH EVERYDAY AT BEDTIME, Disp: 90 tablet, Rfl: 2  •  olmesartan (BENICAR) 5 MG tablet, Take 1 tablet by mouth Daily., Disp: 90 tablet, Rfl: 3  •  rosuvastatin (CRESTOR) 5 MG tablet, Take 1 tablet by mouth Daily., Disp: 90 tablet, Rfl: 3  •  tiotropium bromide-olodaterol (Stiolto Respimat) 2.5-2.5 MCG/ACT aerosol solution inhaler, Inhale 2 puffs Daily., Disp: 3 each, Rfl: 3  •  albuterol sulfate  (90 Base) MCG/ACT inhaler, Inhale 2 puffs Every 4 (Four) Hours As Needed for Wheezing., Disp: 18 g, Rfl: 5  •  Beclomethasone Diprop HFA (QVAR Redihaler) 40 MCG/ACT inhaler, Inhale 2 puffs 2 (Two) Times a Day., Disp: 8.7 g, Rfl: 11       OBJECTIVE    Vital Signs   /68 (BP Location: Left arm, Patient Position: Sitting, Cuff Size: Adult)   Pulse 65   Temp 98.7 °F (37.1 °C) (Tympanic)   Resp 18   Ht 180.3 cm (71\")   Wt 103 kg (226 lb 6.4 oz)   SpO2 98% Comment: ROOM AIR  BMI 31.58 kg/m²     Physical Exam  Vitals reviewed.   Constitutional:       Appearance: Normal appearance.   HENT:      Head: Normocephalic and atraumatic.      Nose: Nose normal.      Mouth/Throat:      Mouth: Mucous membranes are moist.      Pharynx: Oropharynx is clear.   Eyes:      Extraocular Movements: Extraocular movements intact.      Conjunctiva/sclera: Conjunctivae normal.      Pupils: Pupils are equal, round, and reactive to light.   Cardiovascular:      Rate and Rhythm: Normal rate and regular rhythm.      Pulses: Normal pulses.      Heart sounds: Normal heart sounds.   Pulmonary:      Effort: Pulmonary effort is normal.      Breath sounds: Normal breath sounds.   Abdominal:      General: Bowel sounds are normal.   Musculoskeletal:         General: Normal range of motion.      Cervical back: Normal range of motion and neck supple. "   Skin:     General: Skin is warm and dry.   Neurological:      Mental Status: He is alert and oriented to person, place, and time.   Psychiatric:         Behavior: Behavior normal.          Results Review  I have personally reviewed the prior office notes and recent diagnostics.  CT Chest With Contrast Diagnostic [[id number]] (Order [id number])  Order  Status: Final result     Appointment Information    Display Notes    please draw labs today                   PACS Images     Radiology Images    Study Result    Narrative & Impression   PROCEDURE:  CT CHEST W CONTRAST DIAGNOSTIC     COMPARISON:  [facility], CT, CT CHEST W CONTRAST DIAGNOSTIC, 6/03/2022, 8:02.  INDICATIONS:  FOLLOW UP BLADDER CA AND LUNG CA.     TECHNIQUE:    After obtaining the patient's consent, CT images were obtained with non-ionic   intravenous contrast material.       PROTOCOL:     Standard imaging protocol performed                 RADIATION:      DLP: 511.4mGy*cm               Automated exposure control was utilized to minimize radiation dose.   CONTRAST:      100cc Isovue 370 I.V.  LABS:   eGFR: 55ml/min/1.73m2     FINDINGS:          Stable findings of left-sided lobectomy.  There is a stable band of scarred consolidation in the   left posterior hilar aspect of the remaining left lung.  There is mild emphysema.  The right lung   is clear.  No pneumothorax, pleural effusion or focal pneumonia.  No new or enlarging lung nodules.    There is mild subpleural scarring in the left lower lung.  Airways are patent.     Thyroid, trachea and esophagus appear within normal limits.  There is a small sliding type stomach   containing hiatal hernia.  The heart size is normal.  There are severe coronary artery   calcifications.  No pericardial effusion or mediastinal lymphadenopathy.  There is mild aortic and   aortic branch vessel atherosclerosis.     Limited images of the upper abdomen demonstrate no acute findings.  There is a gallstone without    evidence of acute cholecystitis.  Superficial soft tissues are unremarkable.  There are no acute   osseous abnormalities or destructive bone lesions.  There are old rib deformities on the left,   likely related to thoracotomy.     IMPRESSION:                 1. Stable lobectomy on the left with post treatment scarring in the left perihilar lung.  No   evidence of recurrent malignancy or metastatic disease in the chest.  2. Coronary artery disease.  3. Small hiatal hernia.            PEDRO LUIS SAMUEL MD         Electronically Signed and Approved By: PEDRO LUIS SAMUEL MD on 12/09/2022 at 15:09            Full Pulmonary Function Test With Bronchodilator: Patient Communication     Add Comments   Seen     Results  Full Pulmonary Function Test With Bronchodilator (Order [id number])  Order-Level Documents:    Scan on 5/20/2022 by Maldonado Clifford MD: PULMONARY FUNCTION TEST, [facility], 05/20/2022         Progress Note Info    Author Note Status Last Update User Last Update Date/Time   Unknown (Other) Guanako Phan MD 5/24/2022  4:25 PM     Progress Note      PFT interpretation     Spirometry shows very severe obstructive defect.  FEV1/FVC is 42.   FEV1 is  1.03 liters, 29 percent of predicted.  FVC is 2.44 liters, 51 percent of predicted.     There is no significant response to bronchodilator administration.     Lung volumes:  Lung volumes show air trapping.   Total lung capacity is 6.98 liters, 97 percent of predicted.  Residual volume is 4.01 liters, 166 percent of predicted.        Diffusion capacity is 11.64 ml/min/mmHg, 40 percent of predicted.      Flow volume loop is compatible with obstructive process.     Comparision:  Compared to PFT from May 2020, FEV1 has decreased from 1.40 lits to 1.03 lits, 27% change; FVC has decreased from 3.01 lits to 2.44 lits, 19% change, Diffusion capacity improved from 10.55 to 11.64 ml/min/mmHg, 10% change; TLC decreased from 7.82 lit to 6.98 lits, 11% change.      Conclusion:  Low FEV1/FVC  "with low FEV1, FVC, air trapping and low diffusion capacity, suggestive of severe obstructive airway disease, likely emphysema. Some decline in spirometry parameters was seen over time, however diffusion capacity has improved.     Please correlate clinically.       Order   Full Pulmonary Function Test With Bronchodilator (Order # [id number]) on 2022   View Encounter          Toribio Hayes  Complete Transthoracic Echocardiogram with Complete Doppler and Color Flow  Order# [id number]  Reading physician: Noelle Resendiz MD Ordering physician: Noelle Resendiz MD Study date: 22     Patient Information    Patient Name   Toribio Hayes MRN   [id number] Legal Sex   Male  (Age)   1955 (67 y.o.)     Patient Hx Of Height, Weight, and Vitals    Height Weight BSA (Calculated - sq m) BMI (Calculated) Retired BMI (kg/m2) Pulse BP   180.3 cm (71\") 102 kg (224 lb) 2.21 sq meters 31.3 31.31       [employer] PACS Images     Show images for Adult Transthoracic Echo Complete W/ Cont if Necessary Per Protocol [facility] PACS Images     Show images for Adult Transthoracic Echo Complete W/ Cont if Necessary Per Protocol   Clinical Indication    Heart Failure, Cardiomyopathy, or Sytemic or Pulmonary Hypertension; Known Heart Failure   Dx: Primary hypertension [I10 (ICD-10-CM)]; Hyperlipidemia, unspecified hyperlipidemia type [E78.5 (ICD-10-CM)]; Stage 3a chronic kidney disease (HCC) [N18.31 (ICD-10-CM)]; Chronic combined systolic (congestive) and diastolic (congestive) heart failure (HCC) [I50.42 (ICD-10-CM)]     Interpretation Summary    • Estimated left ventricular EF was in agreement with the calculated left ventricular EF. Left ventricular ejection fraction appears to be 56 - 60%. Left ventricular systolic function is normal.  • Left ventricular diastolic function is consistent with (grade I) impaired relaxation.  • Mild mitral regurgitation is noted      ASSESSMENT & PLAN    Patient Active Problem List   Diagnosis   • Asthma   • " Malignant neoplasm of urinary bladder (HCC)   • Chronic combined systolic (congestive) and diastolic (congestive) heart failure (HCC)   • Chronic obstructive pulmonary disease (HCC)   • Hypertension   • Lung disease   • Shortness of breath   • Primary localized osteoarthrosis, lower leg   • Tear of medial cartilage or meniscus of knee, current   • Elevated serum creatinine   • Hyperlipidemia   • Stage 3b chronic kidney disease (HCC)   • Paroxysmal atrial fibrillation (HCC)       Diagnoses and all orders for this visit:    1. Chronic obstructive pulmonary disease, unspecified COPD type (HCC) (Primary)  -     ipratropium-albuterol (DUO-NEB) 0.5-2.5 mg/3 ml nebulizer; Take 3 mL by nebulization 4 (Four) Times a Day As Needed for Wheezing or Shortness of Air.  Dispense: 360 mL; Refill: 11  -     Ambulatory Referral to Sleep Medicine  -     Beclomethasone Diprop HFA (QVAR Redihaler) 40 MCG/ACT inhaler; Inhale 2 puffs 2 (Two) Times a Day.  Dispense: 8.7 g; Refill: 11  -     albuterol sulfate  (90 Base) MCG/ACT inhaler; Inhale 2 puffs Every 4 (Four) Hours As Needed for Wheezing.  Dispense: 18 g; Refill: 5  -     tiotropium bromide-olodaterol (Stiolto Respimat) 2.5-2.5 MCG/ACT aerosol solution inhaler; Inhale 2 puffs Daily.  Dispense: 3 each; Refill: 3    2. Dyspnea, unspecified type  -     ipratropium-albuterol (DUO-NEB) 0.5-2.5 mg/3 ml nebulizer; Take 3 mL by nebulization 4 (Four) Times a Day As Needed for Wheezing or Shortness of Air.  Dispense: 360 mL; Refill: 11  -     Ambulatory Referral to Sleep Medicine  -     Beclomethasone Diprop HFA (QVAR Redihaler) 40 MCG/ACT inhaler; Inhale 2 puffs 2 (Two) Times a Day.  Dispense: 8.7 g; Refill: 11  -     albuterol sulfate  (90 Base) MCG/ACT inhaler; Inhale 2 puffs Every 4 (Four) Hours As Needed for Wheezing.  Dispense: 18 g; Refill: 5  -     tiotropium bromide-olodaterol (Stiolto Respimat) 2.5-2.5 MCG/ACT aerosol solution inhaler; Inhale 2 puffs Daily.  Dispense: 3  each; Refill: 3    3. Adenocarcinoma of left lung (HCC)  -     ipratropium-albuterol (DUO-NEB) 0.5-2.5 mg/3 ml nebulizer; Take 3 mL by nebulization 4 (Four) Times a Day As Needed for Wheezing or Shortness of Air.  Dispense: 360 mL; Refill: 11  -     Ambulatory Referral to Sleep Medicine  -     Beclomethasone Diprop HFA (QVAR Redihaler) 40 MCG/ACT inhaler; Inhale 2 puffs 2 (Two) Times a Day.  Dispense: 8.7 g; Refill: 11  -     albuterol sulfate  (90 Base) MCG/ACT inhaler; Inhale 2 puffs Every 4 (Four) Hours As Needed for Wheezing.  Dispense: 18 g; Refill: 5  -     tiotropium bromide-olodaterol (Stiolto Respimat) 2.5-2.5 MCG/ACT aerosol solution inhaler; Inhale 2 puffs Daily.  Dispense: 3 each; Refill: 3    4. Pulmonary emphysema, unspecified emphysema type (HCC)  -     ipratropium-albuterol (DUO-NEB) 0.5-2.5 mg/3 ml nebulizer; Take 3 mL by nebulization 4 (Four) Times a Day As Needed for Wheezing or Shortness of Air.  Dispense: 360 mL; Refill: 11  -     Ambulatory Referral to Sleep Medicine  -     Beclomethasone Diprop HFA (QVAR Redihaler) 40 MCG/ACT inhaler; Inhale 2 puffs 2 (Two) Times a Day.  Dispense: 8.7 g; Refill: 11  -     albuterol sulfate  (90 Base) MCG/ACT inhaler; Inhale 2 puffs Every 4 (Four) Hours As Needed for Wheezing.  Dispense: 18 g; Refill: 5  -     tiotropium bromide-olodaterol (Stiolto Respimat) 2.5-2.5 MCG/ACT aerosol solution inhaler; Inhale 2 puffs Daily.  Dispense: 3 each; Refill: 3    5. Nocturnal hypoxia    6. Atrial fibrillation, chronic (HCC)  -     ipratropium-albuterol (DUO-NEB) 0.5-2.5 mg/3 ml nebulizer; Take 3 mL by nebulization 4 (Four) Times a Day As Needed for Wheezing or Shortness of Air.  Dispense: 360 mL; Refill: 11  -     Ambulatory Referral to Sleep Medicine  -     Beclomethasone Diprop HFA (QVAR Redihaler) 40 MCG/ACT inhaler; Inhale 2 puffs 2 (Two) Times a Day.  Dispense: 8.7 g; Refill: 11  -     albuterol sulfate  (90 Base) MCG/ACT inhaler; Inhale 2 puffs  Every 4 (Four) Hours As Needed for Wheezing.  Dispense: 18 g; Refill: 5  -     tiotropium bromide-olodaterol (Stiolto Respimat) 2.5-2.5 MCG/ACT aerosol solution inhaler; Inhale 2 puffs Daily.  Dispense: 3 each; Refill: 3    7. Stage 3a chronic kidney disease (HCC)  -     ipratropium-albuterol (DUO-NEB) 0.5-2.5 mg/3 ml nebulizer; Take 3 mL by nebulization 4 (Four) Times a Day As Needed for Wheezing or Shortness of Air.  Dispense: 360 mL; Refill: 11  -     Beclomethasone Diprop HFA (QVAR Redihaler) 40 MCG/ACT inhaler; Inhale 2 puffs 2 (Two) Times a Day.  Dispense: 8.7 g; Refill: 11  -     albuterol sulfate  (90 Base) MCG/ACT inhaler; Inhale 2 puffs Every 4 (Four) Hours As Needed for Wheezing.  Dispense: 18 g; Refill: 5  -     tiotropium bromide-olodaterol (Stiolto Respimat) 2.5-2.5 MCG/ACT aerosol solution inhaler; Inhale 2 puffs Daily.  Dispense: 3 each; Refill: 3    8. Suspected sleep apnea  -     Ambulatory Referral to Sleep Medicine  -     Beclomethasone Diprop HFA (QVAR Redihaler) 40 MCG/ACT inhaler; Inhale 2 puffs 2 (Two) Times a Day.  Dispense: 8.7 g; Refill: 11  -     albuterol sulfate  (90 Base) MCG/ACT inhaler; Inhale 2 puffs Every 4 (Four) Hours As Needed for Wheezing.  Dispense: 18 g; Refill: 5  -     tiotropium bromide-olodaterol (Stiolto Respimat) 2.5-2.5 MCG/ACT aerosol solution inhaler; Inhale 2 puffs Daily.  Dispense: 3 each; Refill: 3           Plan:  Refills on Stiolto Qvar albuterol inhaler and DuoNeb at this time.  Referral to sleep medicine for further evaluation for sleep apnea and restless legs.  Patient to continue with his O2 at 2 L at night for nocturnal hypoxia.  Patient will follow back up in 6 months or sooner if needed.    Smoking status: Former  Vaccination status: Up-to-date with COVID only  Medications personally reviewed    Follow Up  Return in about 6 months (around 8/17/2023) for with Dr. Phan.    Patient was given instructions and counseling regarding his condition  or for health maintenance advice. Please see specific information pulled into the AVS if appropriate.

## 2023-02-17 ENCOUNTER — OFFICE VISIT (OUTPATIENT)
Dept: PULMONOLOGY | Facility: CLINIC | Age: 68
End: 2023-02-17
Payer: COMMERCIAL

## 2023-02-17 VITALS
SYSTOLIC BLOOD PRESSURE: 133 MMHG | RESPIRATION RATE: 18 BRPM | WEIGHT: 226.4 LBS | BODY MASS INDEX: 31.69 KG/M2 | DIASTOLIC BLOOD PRESSURE: 68 MMHG | OXYGEN SATURATION: 98 % | HEIGHT: 71 IN | TEMPERATURE: 98.7 F | HEART RATE: 65 BPM

## 2023-02-17 DIAGNOSIS — N18.31 STAGE 3A CHRONIC KIDNEY DISEASE: ICD-10-CM

## 2023-02-17 DIAGNOSIS — I48.20 ATRIAL FIBRILLATION, CHRONIC: ICD-10-CM

## 2023-02-17 DIAGNOSIS — R06.00 DYSPNEA, UNSPECIFIED TYPE: ICD-10-CM

## 2023-02-17 DIAGNOSIS — C34.92 ADENOCARCINOMA OF LEFT LUNG: ICD-10-CM

## 2023-02-17 DIAGNOSIS — G47.34 NOCTURNAL HYPOXIA: ICD-10-CM

## 2023-02-17 DIAGNOSIS — R29.818 SUSPECTED SLEEP APNEA: ICD-10-CM

## 2023-02-17 DIAGNOSIS — J43.9 PULMONARY EMPHYSEMA, UNSPECIFIED EMPHYSEMA TYPE: ICD-10-CM

## 2023-02-17 DIAGNOSIS — J44.9 CHRONIC OBSTRUCTIVE PULMONARY DISEASE, UNSPECIFIED COPD TYPE: Primary | ICD-10-CM

## 2023-02-17 PROCEDURE — 99214 OFFICE O/P EST MOD 30 MIN: CPT | Performed by: NURSE PRACTITIONER

## 2023-02-17 RX ORDER — TIOTROPIUM BROMIDE AND OLODATEROL 3.124; 2.736 UG/1; UG/1
2 SPRAY, METERED RESPIRATORY (INHALATION)
Qty: 3 EACH | Refills: 3 | Status: SHIPPED | OUTPATIENT
Start: 2023-02-17

## 2023-02-17 RX ORDER — ALBUTEROL SULFATE 90 UG/1
2 AEROSOL, METERED RESPIRATORY (INHALATION) EVERY 4 HOURS PRN
Qty: 18 G | Refills: 5 | Status: SHIPPED | OUTPATIENT
Start: 2023-02-17

## 2023-02-17 RX ORDER — IPRATROPIUM BROMIDE AND ALBUTEROL SULFATE 2.5; .5 MG/3ML; MG/3ML
3 SOLUTION RESPIRATORY (INHALATION) 4 TIMES DAILY PRN
Qty: 360 ML | Refills: 11 | Status: SHIPPED | OUTPATIENT
Start: 2023-02-17

## 2023-02-23 ENCOUNTER — OFFICE VISIT (OUTPATIENT)
Dept: ORTHOPEDIC SURGERY | Facility: CLINIC | Age: 68
End: 2023-02-23
Payer: OTHER MISCELLANEOUS

## 2023-02-23 VITALS — WEIGHT: 226 LBS | BODY MASS INDEX: 31.64 KG/M2 | HEIGHT: 71 IN

## 2023-02-23 DIAGNOSIS — M75.101 TEAR OF RIGHT ROTATOR CUFF, UNSPECIFIED TEAR EXTENT, UNSPECIFIED WHETHER TRAUMATIC: Primary | ICD-10-CM

## 2023-02-23 PROCEDURE — 99213 OFFICE O/P EST LOW 20 MIN: CPT | Performed by: PHYSICIAN ASSISTANT

## 2023-02-23 NOTE — PROGRESS NOTES
"Chief Complaint  Pain and Follow-up of the Right Shoulder    Subjective      Toribio Hayes presents to [facility] for follow-up of right shoulder injury sustained at work on 11/12/2022.  MRI showed massive/full rotator cuff tear with mild/moderate AC joint arthrosis, tears of the supraspinatus and infraspinatus with significant retraction; high-grade partial/near full-thickness subscapularis tendon tear with prominent muscle atrophy; long head biceps tendon tear/detachment; and moderate to marked subacromial/subdeltoid and subcoracoid bursitis; moderate glenohumeral effusion without high-grade chondromalacia or loose body.  The patient has remained in outpatient physical therapy and reports significant improvement.  He denies any complaints of right shoulder pain.  He has noted return of full right shoulder range of motion, although reports that he continues to lack full strength.  PT note reports that the patient has been able to achieve 180 degrees of forward flexion and abduction, external rotation of 90 degrees, internal rotation to 70 degrees.  PT does report that he would benefit from further physical therapy to maximize right shoulder strength for return to work.  The patient does work at [employer].     Objective   No Known Allergies    Vital Signs:   Ht 180.3 cm (71\")   Wt 103 kg (226 lb)   BMI 31.52 kg/m²       Physical Exam    Constitutional: Awake, alert. Well nourished appearance.    Integumentary: Warm, dry, intact. No obvious rashes.    HENT: Atraumatic, normocephalic.   Respiratory: Non labored respirations .   Cardiovascular: Intact peripheral pulses.    Psychiatric: Normal mood and affect. A&O X3    Ortho Exam  Right shoulder: Skin is warm, dry, and intact.  Mild tenderness to palpation of AC joint and subacromial bursa.  Full active shoulder forward flexion and abduction.  Internal rotation to L2.  External rotation to 80 degrees.  Full flexion extension of the wrist " and elbow.  Full supination and pronation.  Patient is able to form a full fist.  Sensation intact light touch.  Distal neurovascular intact.    Imaging Results (Most Recent)     None                  Assessment and Plan   Problem List Items Addressed This Visit    None  Visit Diagnoses     Tear of right rotator cuff, unspecified tear extent, unspecified whether traumatic    -  Primary    Relevant Orders    Ambulatory Referral to Physical Therapy Evaluate and treat; Stretching, ROM, Strengthening (Completed)        Follow Up   Return in about 6 weeks (around 4/6/2023).  Patient is a former smoker. Encourage continued tobacco cessation. Did not discuss options for smoking cessation.    Patient Instructions   Patient is progressing very well with PT. Advised to continue PT to completion to progress strength. Updated order placed today. Continue home exercises. Caution with overhead lifting, pushing, or pulling.     Remain on light duty restrictions of no overhead lifting, no lifting more than 10 lbs.     Continue icing as needed up to 4 times daily for no longer than 15-20 mins at a time.     Follow-up in 6 weeks. No imaging. Call with changes or concerns.       Patient was given instructions and counseling regarding his condition or for health maintenance advice. Please see specific information pulled into the AVS if appropriate.

## 2023-02-23 NOTE — PATIENT INSTRUCTIONS
Patient is progressing very well with PT. Advised to continue PT to completion to progress strength. Updated order placed today. Continue home exercises. Caution with overhead lifting, pushing, or pulling.     Remain on light duty restrictions of no overhead lifting, no lifting more than 10 lbs.     Continue icing as needed up to 4 times daily for no longer than 15-20 mins at a time.     Follow-up in 6 weeks. No imaging. Call with changes or concerns.

## 2023-02-28 RX ORDER — TIOTROPIUM BROMIDE AND OLODATEROL 3.124; 2.736 UG/1; UG/1
2 SPRAY, METERED RESPIRATORY (INHALATION)
Qty: 2 EACH | Refills: 0 | COMMUNITY
Start: 2023-02-28 | End: 2023-03-01

## 2023-04-04 ENCOUNTER — LAB (OUTPATIENT)
Dept: LAB | Facility: HOSPITAL | Age: 68
End: 2023-04-04
Payer: COMMERCIAL

## 2023-04-04 DIAGNOSIS — J43.2 CENTRILOBULAR EMPHYSEMA: ICD-10-CM

## 2023-04-04 DIAGNOSIS — I50.42 CHRONIC COMBINED SYSTOLIC (CONGESTIVE) AND DIASTOLIC (CONGESTIVE) HEART FAILURE: ICD-10-CM

## 2023-04-04 DIAGNOSIS — E78.5 HYPERLIPIDEMIA, UNSPECIFIED HYPERLIPIDEMIA TYPE: Chronic | ICD-10-CM

## 2023-04-04 DIAGNOSIS — I10 PRIMARY HYPERTENSION: Chronic | ICD-10-CM

## 2023-04-04 DIAGNOSIS — I48.0 PAROXYSMAL ATRIAL FIBRILLATION: ICD-10-CM

## 2023-04-04 LAB
ALBUMIN SERPL-MCNC: 4.4 G/DL (ref 3.5–5.2)
ALBUMIN/GLOB SERPL: 1.8 G/DL
ALP SERPL-CCNC: 64 U/L (ref 39–117)
ALT SERPL W P-5'-P-CCNC: 12 U/L (ref 1–41)
ANION GAP SERPL CALCULATED.3IONS-SCNC: 9.3 MMOL/L (ref 5–15)
AST SERPL-CCNC: 18 U/L (ref 1–40)
BILIRUB SERPL-MCNC: 0.4 MG/DL (ref 0–1.2)
BUN SERPL-MCNC: 16 MG/DL (ref 8–23)
BUN/CREAT SERPL: 11.4 (ref 7–25)
CALCIUM SPEC-SCNC: 10.2 MG/DL (ref 8.6–10.5)
CHLORIDE SERPL-SCNC: 96 MMOL/L (ref 98–107)
CHOLEST SERPL-MCNC: 151 MG/DL (ref 0–200)
CO2 SERPL-SCNC: 32.7 MMOL/L (ref 22–29)
CREAT SERPL-MCNC: 1.4 MG/DL (ref 0.76–1.27)
EGFRCR SERPLBLD CKD-EPI 2021: 55.4 ML/MIN/1.73
GLOBULIN UR ELPH-MCNC: 2.5 GM/DL
GLUCOSE SERPL-MCNC: 117 MG/DL (ref 65–99)
HDLC SERPL-MCNC: 52 MG/DL (ref 40–60)
LDLC SERPL CALC-MCNC: 84 MG/DL (ref 0–100)
LDLC/HDLC SERPL: 1.61 {RATIO}
MAGNESIUM SERPL-MCNC: 1.7 MG/DL (ref 1.6–2.4)
POTASSIUM SERPL-SCNC: 4.5 MMOL/L (ref 3.5–5.2)
PROT SERPL-MCNC: 6.9 G/DL (ref 6–8.5)
SODIUM SERPL-SCNC: 138 MMOL/L (ref 136–145)
TRIGL SERPL-MCNC: 77 MG/DL (ref 0–150)
VLDLC SERPL-MCNC: 15 MG/DL (ref 5–40)

## 2023-04-04 PROCEDURE — 80053 COMPREHEN METABOLIC PANEL: CPT

## 2023-04-04 PROCEDURE — 83735 ASSAY OF MAGNESIUM: CPT

## 2023-04-04 PROCEDURE — 36415 COLL VENOUS BLD VENIPUNCTURE: CPT

## 2023-04-04 PROCEDURE — 80061 LIPID PANEL: CPT

## 2023-04-06 ENCOUNTER — OFFICE VISIT (OUTPATIENT)
Dept: ORTHOPEDIC SURGERY | Facility: CLINIC | Age: 68
End: 2023-04-06
Payer: OTHER MISCELLANEOUS

## 2023-04-06 ENCOUNTER — OFFICE VISIT (OUTPATIENT)
Dept: CARDIOLOGY | Facility: CLINIC | Age: 68
End: 2023-04-06
Payer: COMMERCIAL

## 2023-04-06 VITALS
WEIGHT: 222 LBS | BODY MASS INDEX: 31.08 KG/M2 | HEART RATE: 65 BPM | SYSTOLIC BLOOD PRESSURE: 153 MMHG | DIASTOLIC BLOOD PRESSURE: 86 MMHG | HEIGHT: 71 IN

## 2023-04-06 VITALS — WEIGHT: 215 LBS | HEIGHT: 71 IN | BODY MASS INDEX: 30.1 KG/M2 | OXYGEN SATURATION: 95 % | HEART RATE: 91 BPM

## 2023-04-06 DIAGNOSIS — I10 PRIMARY HYPERTENSION: Chronic | ICD-10-CM

## 2023-04-06 DIAGNOSIS — M75.101 TEAR OF RIGHT ROTATOR CUFF, UNSPECIFIED TEAR EXTENT, UNSPECIFIED WHETHER TRAUMATIC: Primary | ICD-10-CM

## 2023-04-06 DIAGNOSIS — I50.42 CHRONIC COMBINED SYSTOLIC (CONGESTIVE) AND DIASTOLIC (CONGESTIVE) HEART FAILURE: Primary | ICD-10-CM

## 2023-04-06 DIAGNOSIS — I48.0 PAROXYSMAL ATRIAL FIBRILLATION: ICD-10-CM

## 2023-04-06 DIAGNOSIS — E78.2 MIXED HYPERLIPIDEMIA: Chronic | ICD-10-CM

## 2023-04-06 PROCEDURE — 99214 OFFICE O/P EST MOD 30 MIN: CPT | Performed by: INTERNAL MEDICINE

## 2023-04-06 PROCEDURE — 99213 OFFICE O/P EST LOW 20 MIN: CPT | Performed by: PHYSICIAN ASSISTANT

## 2023-04-06 NOTE — PATIENT INSTRUCTIONS
Patient is doing very well. Advised to continue PT to completion to progress strength and ROM. Continue home exercises.     Continue icing as needed up to 4 times daily for no longer than 15-20 mins at a time.     Follow-up in 6 weeks. No imaging needed. Call with changes or concerns.

## 2023-04-06 NOTE — PROGRESS NOTES
Chief Complaint  Congestive Heart Failure, Hypertension, and Hyperlipidemia    Subjective        Toribio Hayes presents to [facility] CARDIOLOG[facility]  History of present illness:    Patient states overall he has been doing good.  He notes no chest pain or edema.  He denies any palpitations.  He does note some bruising but no GI bleeding problems.  He has a remote smoking history before quitting back in  at the time of his lung cancer diagnosis.      Past Medical History:   Diagnosis Date   • A-fib     DR KHALIL   • Arthritis     RIGHT THUMB   • Bladder cancer        • Chronic diastolic congestive heart failure 2021   • COPD (chronic obstructive pulmonary disease)     INHALER   • Elevated serum creatinine 2021   • Hyperlipidemia     ON MEDS   • Hypertension     CONTROLLED W MEDS   • Lung cancer     DR RAMIRES TOOK CARE OF THAT ()   • Mouth sore secondary to chemotherapy    • Stage 3a chronic kidney disease 2022   • Throat soreness     FROM CHEMO         Past Surgical History:   Procedure Laterality Date   • COLONOSCOPY     • CYSTECTOMY     • FINGER DEBRIDEMENT Right     RIGHT THUMB   • ILEAL LOOP NEOBLADDER      BLADDER MADE FROM HIS INTESTINES   • LUNG REMOVAL, PARTIAL Left     LEFT UPPER - 2008 - SOA W EXERTION   • PORTOCAVAL SHUNT PLACEMENT       AND    • SENTINEL NODE BIOPSY N/A 2022    Procedure: Excision of left thigh melanoma with sentinel lymph node biopsy;  Surgeon: Dominic Ruiz MD;  Location: [facility] MAIN OR;  Service: General;  Laterality: N/A;   • TURP / TRANSURETHRAL INCISION / DRAINAGE PROSTATE            Social History     Socioeconomic History   • Marital status:    Tobacco Use   • Smoking status: Former     Packs/day: 1.50     Years: 39.00     Pack years: 58.50     Types: Cigarettes     Quit date: 10/21/2008     Years since quittin.4   • Smokeless tobacco: Never   Vaping Use   • Vaping Use: Never used   Substance and Sexual Activity  "  • Alcohol use: Never   • Drug use: Never   • Sexual activity: Yes     Partners: Female         Family History   Problem Relation Age of Onset   • No Known Problems Mother         Mother    • No Known Problems Father         Father  54 years old lung cancer/smoker   • Prostate cancer Brother    • Malig Hyperthermia Neg Hx           No Known Allergies         Current Outpatient Medications:   •  albuterol sulfate  (90 Base) MCG/ACT inhaler, Inhale 2 puffs Every 4 (Four) Hours As Needed for Wheezing., Disp: 18 g, Rfl: 5  •  apixaban (Eliquis) 5 MG tablet tablet, Take 1 tablet by mouth 2 (Two) Times a Day., Disp: 180 tablet, Rfl: 3  •  Beclomethasone Diprop HFA (QVAR Redihaler) 40 MCG/ACT inhaler, Inhale 2 puffs 2 (Two) Times a Day., Disp: 8.7 g, Rfl: 11  •  carvedilol (COREG) 25 MG tablet, Take 1 tablet by mouth 2 (Two) Times a Day., Disp: 180 tablet, Rfl: 3  •  ipratropium-albuterol (DUO-NEB) 0.5-2.5 mg/3 ml nebulizer, Take 3 mL by nebulization 4 (Four) Times a Day As Needed for Wheezing or Shortness of Air., Disp: 360 mL, Rfl: 11  •  montelukast (SINGULAIR) 10 MG tablet, TAKE 1 TABLET BY MOUTH EVERYDAY AT BEDTIME, Disp: 90 tablet, Rfl: 2  •  olmesartan (BENICAR) 5 MG tablet, Take 1 tablet by mouth Daily., Disp: 90 tablet, Rfl: 3  •  rosuvastatin (CRESTOR) 5 MG tablet, Take 1 tablet by mouth Daily., Disp: 90 tablet, Rfl: 3  •  tiotropium bromide-olodaterol (Stiolto Respimat) 2.5-2.5 MCG/ACT aerosol solution inhaler, Inhale 2 puffs Daily., Disp: 3 each, Rfl: 3      ROS:  Cardiac review of systems negative.    Objective     /86   Pulse 65   Ht 180.3 cm (71\")   Wt 101 kg (222 lb)   BMI 30.96 kg/m²       General Appearance:   · well developed  · well nourished  HENT:   · oropharynx moist  · lips not cyanotic  Respiratory:  · no respiratory distress  · normal breath sounds  · no rales  Cardiovascular:  · no jugular venous distention  · regular rhythm  · S1 normal, S2 normal  · no S3, no S4   · no " murmur  · no rub, no thrill  · No carotid bruit  · pedal pulses normal  · lower extremity edema: none    Musculoskeletal:  · no clubbing of fingers.   · normocephalic, head atraumatic  Skin:   · warm, dry  Psychiatric:  · judgement and insight appropriate  · normal mood and affect    ECHO:  Results for orders placed in visit on 02/11/22    Adult Transthoracic Echo Complete W/ Cont if Necessary Per Protocol    Interpretation Summary  · Estimated left ventricular EF was in agreement with the calculated left ventricular EF. Left ventricular ejection fraction appears to be 56 - 60%. Left ventricular systolic function is normal.  · Left ventricular diastolic function is consistent with (grade I) impaired relaxation.  · Mild mitral regurgitation is noted    STRESS:    CATH:  No results found for this or any previous visit.    BMP:   Glucose   Date Value Ref Range Status   04/04/2023 117 (H) 65 - 99 mg/dL Final     BUN   Date Value Ref Range Status   04/04/2023 16 8 - 23 mg/dL Final     Creatinine   Date Value Ref Range Status   04/04/2023 1.40 (H) 0.76 - 1.27 mg/dL Final     Sodium   Date Value Ref Range Status   04/04/2023 138 136 - 145 mmol/L Final     Potassium   Date Value Ref Range Status   04/04/2023 4.5 3.5 - 5.2 mmol/L Final     Chloride   Date Value Ref Range Status   04/04/2023 96 (L) 98 - 107 mmol/L Final     CO2   Date Value Ref Range Status   04/04/2023 32.7 (H) 22.0 - 29.0 mmol/L Final     Calcium   Date Value Ref Range Status   04/04/2023 10.2 8.6 - 10.5 mg/dL Final     BUN/Creatinine Ratio   Date Value Ref Range Status   04/04/2023 11.4 7.0 - 25.0 Final     Anion Gap   Date Value Ref Range Status   04/04/2023 9.3 5.0 - 15.0 mmol/L Final     eGFR   Date Value Ref Range Status   04/04/2023 55.4 (L) >60.0 mL/min/1.73 Final     LIPIDS:  Total Cholesterol   Date Value Ref Range Status   04/04/2023 151 0 - 200 mg/dL Final     Triglycerides   Date Value Ref Range Status   04/04/2023 77 0 - 150 mg/dL Final     HDL  Cholesterol   Date Value Ref Range Status   04/04/2023 52 40 - 60 mg/dL Final     LDL Cholesterol    Date Value Ref Range Status   04/04/2023 84 0 - 100 mg/dL Final     VLDL Cholesterol   Date Value Ref Range Status   04/04/2023 15 5 - 40 mg/dL Final     LDL/HDL Ratio   Date Value Ref Range Status   04/04/2023 1.61  Final         Procedures             ASSESSMENT:  Diagnoses and all orders for this visit:    1. Chronic combined systolic (congestive) and diastolic (congestive) heart failure (Primary)    2. Paroxysmal atrial fibrillation    3. Primary hypertension    4. Mixed hyperlipidemia         PLAN:    1.  Continue the Eliquis.  The patient notes some bruising but no significant bleeding problems.  2.  Patient follows with a pulmonary doctor.  He did smoke for a long time before being diagnosed with cancer.  He had PFTs that showed COPD with an FEV1 of 1.03 L which was 29% predicted.  3.  Blood pressure is mildly elevated but the last time was fine.  We are just going to continue to monitor as he is seeing a new physician today.  4.  Continue the Crestor.  Patient had cholesterol checked 4/4/2023 with LDL 84, HDL 52, and triglycerides 77.  These are under good control.  5.  Patient has chronic kidney disease with baseline serum creatinine 1.3-1.5.  Continue the Benicar.  6.  Continue the Coreg and Benicar.  Patient had echocardiogram done 2/11/2022 with ejection fraction 56 to 60% and mild mitral regurgitation.  He states he was diagnosed with cardiomyopathy years ago but did not receive any left heart cath before.      Return in about 6 months (around 10/6/2023).     Patient was given instructions and counseling regarding his condition or for health maintenance advice. Please see specific information pulled into the AVS if appropriate.         Artie Crandall MD   4/6/2023  13:41 EDT

## 2023-04-06 NOTE — PROGRESS NOTES
"Chief Complaint  Follow-up of the Right Shoulder    Subjective      Toribio Hayes presents to [facility]TH[facility] for follow-up of right shoulder pain/injury.  He sustained injury at work on 11/12/2022.  MRI reviewed at previous evaluation showed advanced arthritis and massive/full-thickness rotator cuff tears with significant tendon retraction, bursitis, etc.  He presents today for follow-up.  Reports that he has remained in outpatient physical therapy at [facility] and reporting at least 75 to 80% improvement.  PT note reports right shoulder P ROM with flexion within normal limits, abduction within normal limits, internal rotation is 70 degrees, and external rotation within normal limits.  Patient reports that he has been on light duty at work at [employer], although feels he will be able to return to full duty work at this time.  He would like to continue physical therapy with a decrease from 3 times weekly to twice weekly for continued strengthening efforts, which is necessary to perform his job effectively.    Objective   No Known Allergies    Vital Signs:   Pulse 91   Ht 180.3 cm (71\")   Wt 97.5 kg (215 lb)   SpO2 95%   BMI 29.99 kg/m²       Physical Exam    Constitutional: Awake, alert. Well nourished appearance.    Integumentary: Warm, dry, intact. No obvious rashes.    HENT: Atraumatic, normocephalic.   Respiratory: Non labored respirations .   Cardiovascular: Intact peripheral pulses.    Psychiatric: Normal mood and affect. A&O X3    Ortho Exam  Right shoulder: Skin is warm, dry, and intact.  Full active shoulder forward flexion and abduction.  External rotation to 80 degrees.  Internal rotation to T12.  Full flexion and extension of the wrist and elbow.  Full pronation and supination.  Patient is able to form a full fist.  Sensation intact light touch.  Distal neurovascular intact    Imaging Results (Most Recent)     None                  Assessment and Plan   Problem List Items Addressed " This Visit    None  Visit Diagnoses     Tear of right rotator cuff, unspecified tear extent, unspecified whether traumatic    -  Primary        Follow Up   Return in about 6 weeks (around 5/18/2023).  Patient is a former smoker.  Encouraged continued tobacco cessation.  Did not discuss options for smoking cessation.    Patient Instructions   Patient is doing very well. Advised to continue PT to completion to progress strength and ROM. Continue home exercises.     Continue icing as needed up to 4 times daily for no longer than 15-20 mins at a time.     Follow-up in 6 weeks. No imaging needed. Call with changes or concerns.       Patient was given instructions and counseling regarding his condition or for health maintenance advice. Please see specific information pulled into the AVS if appropriate.

## 2023-05-16 ENCOUNTER — TELEPHONE (OUTPATIENT)
Dept: ONCOLOGY | Facility: HOSPITAL | Age: 68
End: 2023-05-16
Payer: COMMERCIAL

## 2023-05-16 DIAGNOSIS — C67.9 MALIGNANT NEOPLASM OF URINARY BLADDER, UNSPECIFIED SITE: Primary | ICD-10-CM

## 2023-05-16 NOTE — TELEPHONE ENCOUNTER
Caller: Toribio Hayes    Relationship: Self    Best call back number: [phone]    Who are you requesting to speak with (clinical staff, provider,  specific staff member):CLINICAL    What was the call regarding: CALLING TO REQUEST ORDERS FOR PSA OR TO HAVE PATIENT PROSTATE CHECKED AS HE HAS PREVIOUSLY HAD PROSTATE CANCER AND HAS NOT BEEN CHECKED SINCE 2021    Do you require a callback: YES

## 2023-05-17 NOTE — TELEPHONE ENCOUNTER
Message left for patient stating that Dr. Fernando will order the PSA to be done with his labs when he has his CT scan in June. Dr. Fernando also stated that he should be following with urology for his history of prostate cancer. Left number to return call for any questions.

## 2023-05-18 ENCOUNTER — OFFICE VISIT (OUTPATIENT)
Dept: ORTHOPEDIC SURGERY | Facility: CLINIC | Age: 68
End: 2023-05-18
Payer: OTHER MISCELLANEOUS

## 2023-05-18 VITALS — WEIGHT: 222 LBS | HEIGHT: 71 IN | BODY MASS INDEX: 31.08 KG/M2

## 2023-05-18 DIAGNOSIS — M75.101 TEAR OF RIGHT ROTATOR CUFF, UNSPECIFIED TEAR EXTENT, UNSPECIFIED WHETHER TRAUMATIC: Primary | ICD-10-CM

## 2023-05-18 NOTE — PROGRESS NOTES
"Chief Complaint  Pain and Follow-up of the Right Shoulder    Subjective      Toribio Hayes presents to [facility] for follow-up of right shoulder injury.  Patient sustained injury at work on 11/12/2022 and subsequent MRI revealed advanced arthritis and massive/full-thickness rotator cuff tears with significant tendon retraction, bursitis, etc.  He was treated conservatively with anti-inflammatories and referral to physical therapy.  Patient presents today for follow-up stating he has remained in physical therapy with approximately 6 sessions remaining prior to anticipated discharge to home exercise program.  He reports full ROM to his shoulder, but is continuing to work on strengthening efforts.  He has been able to tolerate return to full duty work.  Reports that his shoulder is now \"a little more sore, but I am working it more to\".  He does not feel the need for steroid injection in office today.    Objective   No Known Allergies    Vital Signs:   Ht 180.3 cm (71\")   Wt 101 kg (222 lb)   BMI 30.96 kg/m²       Physical Exam    Constitutional: Awake, alert. Well nourished appearance.    Integumentary: Warm, dry, intact. No obvious rashes.    HENT: Atraumatic, normocephalic.   Respiratory: Non labored respirations .   Cardiovascular: Intact peripheral pulses.    Psychiatric: Normal mood and affect. A&O X3    Ortho Exam  Right shoulder: Skin is warm, dry, and intact.  Full active shoulder forward flexion and abduction.  Internal rotation to T12.  Full flexion and extension of the wrist and elbow.  Full pronation and supination.  Patient is able to form a full fist.  Sensation intact light touch.  Distal neurovascular intact.    Imaging Results (Most Recent)     None                    Assessment and Plan   Problem List Items Addressed This Visit    None  Visit Diagnoses     Tear of right rotator cuff, unspecified tear extent, unspecified whether traumatic    -  Primary          Follow " Up   Return if symptoms worsen or fail to improve.    Tobacco Use: Medium Risk   • Smoking Tobacco Use: Former   • Smokeless Tobacco Use: Never   • Passive Exposure: Not on file     Patient is a former smoker.  Encouraged continued tobacco cessation.  Did not discuss options for smoking cessation.    Patient Instructions   Patient is doing very well. Advised to continue PT to completion to progress strength and ROM. Continue home exercises. Caution with overhead lifting, pushing, or pulling.     Continue icing as needed up to 4 times daily for no longer than 15-20 mins at a time.     Follow-up as needed. Call with changes or concerns.       Patient was given instructions and counseling regarding his condition or for health maintenance advice. Please see specific information pulled into the AVS if appropriate.

## 2023-05-18 NOTE — PATIENT INSTRUCTIONS
Patient is doing very well. Advised to continue PT to completion to progress strength and ROM. Continue home exercises. Caution with overhead lifting, pushing, or pulling.     Continue icing as needed up to 4 times daily for no longer than 15-20 mins at a time.     Follow-up as needed. Call with changes or concerns.

## 2023-05-19 ENCOUNTER — OFFICE VISIT (OUTPATIENT)
Dept: SLEEP MEDICINE | Facility: HOSPITAL | Age: 68
End: 2023-05-19
Payer: COMMERCIAL

## 2023-05-19 VITALS
HEART RATE: 61 BPM | HEIGHT: 71 IN | SYSTOLIC BLOOD PRESSURE: 130 MMHG | DIASTOLIC BLOOD PRESSURE: 72 MMHG | BODY MASS INDEX: 30.35 KG/M2 | OXYGEN SATURATION: 98 % | WEIGHT: 216.8 LBS

## 2023-05-19 DIAGNOSIS — G47.33 OSA (OBSTRUCTIVE SLEEP APNEA): Primary | ICD-10-CM

## 2023-05-19 PROCEDURE — G0463 HOSPITAL OUTPT CLINIC VISIT: HCPCS

## 2023-05-19 NOTE — PROGRESS NOTES
[facility]      Patient Care Team:  Collin Cannon MD as PCP - General (Internal Medicine)  Jeff Fernando MD as Consulting Physician (Hematology and Oncology)  Dominic Ruiz MD as Consulting Physician (General Surgery)    Referring Provider: Mecca Judd APRN    Chief complaint:   Restless sleep    History of present illness:    Subjective   This is a 68 year old male patient with hx of of A fib and HTN.  Nocturnal hypoxemia, on oxygen 2 L/min at night.  COPD (FEV1 2022: 29% with RV of 166%).    He reported restless sleep with frequent awakening and kicking with his legs throughout the night.  Associated symptoms include loud snoring which disturb his wife.  He wakes up with a dry mouth.  He feels tired during the day.    Sleep schedule:  -Bedtime: 9:30-10 PM  -Sleep latency: 15-20 min  -Wake up time: 4 AM on weekdays and 5 AM on weekend , does feel refreshed  -Nocturnal awakenin-3 times because of nocturia.  No difficulties going back to sleep.  -Perceived sleep hours: 9-7      ESS: Total score: 6     Review of Systems  Constitutional: No fever or chills. No changes in appetite.   ENMT: No sinus congestion, postnasal drip, hoarsness  Cardiovascular: No chest pain, palpitation or legs swelling.    Respiratory: Dyspnea and wheezing.  Gastrointestinal: No constipation, diarrhea, abdominal pain or acid reflux  Neurology: No headache, weakness, numbness or dizziness.   Musculoskeletal: No joints pain, stiffness or swelling.   Psychiatry: No depression, anxiety or irritability.   Hem/Lymphatic: No swollen glands or easy bruising.  Integumentary: No rash.  Endocrinology: No excessive thirst, cold or warm intolerance.   Urinary: No dysuria, bloody urine or frequent urination.     History  Past Medical History:   Diagnosis Date   • A-fib     DR KHALIL   • Arthritis     RIGHT THUMB   • Asthma    • Bladder cancer     2018   • Chronic diastolic  congestive heart failure 2021   • COPD (chronic obstructive pulmonary disease)     INHALER   • Elevated serum creatinine 2021   • Hyperlipidemia     ON MEDS   • Hypertension     CONTROLLED W MEDS   • Lung cancer     DR RAMIRES TOOK CARE OF THAT ()   • Melanoma in situ of left lower leg      removed   • Mouth sore secondary to chemotherapy    • Stage 3a chronic kidney disease 2022   • Throat soreness     FROM CHEMO   ,   Past Surgical History:   Procedure Laterality Date   • COLONOSCOPY     • CYSTECTOMY     • FINGER DEBRIDEMENT Right     RIGHT THUMB   • ILEAL LOOP NEOBLADDER      BLADDER MADE FROM HIS INTESTINES   • LUNG REMOVAL, PARTIAL Left     LEFT UPPER - 2008 - SOA W EXERTION   • PORTOCAVAL SHUNT PLACEMENT       AND    • SENTINEL NODE BIOPSY N/A 2022    Procedure: Excision of left thigh melanoma with sentinel lymph node biopsy;  Surgeon: Dominic Ruiz MD;  Location: [facility] MAIN OR;  Service: General;  Laterality: N/A;   • TURP / TRANSURETHRAL INCISION / DRAINAGE PROSTATE     ,   Family History   Problem Relation Age of Onset   • No Known Problems Mother         Mother    • No Known Problems Father         Father  54 years old lung cancer/smoker   • Prostate cancer Brother    • Malig Hyperthermia Neg Hx    ,   Social History     Socioeconomic History   • Marital status:    Tobacco Use   • Smoking status: Former     Packs/day: 1.50     Years: 39.00     Pack years: 58.50     Types: Cigarettes     Quit date: 10/21/2008     Years since quittin.5   • Smokeless tobacco: Never   Vaping Use   • Vaping Use: Never used   Substance and Sexual Activity   • Alcohol use: Never   • Drug use: Never   • Sexual activity: Yes     Partners: Female     E-cigarette/Vaping   • E-cigarette/Vaping Use Never User      E-cigarette/Vaping Substances   • Nicotine No    • THC No    • CBD No    • Flavoring No      E-cigarette/Vaping Devices   • Disposable No    • Pre-filled or  "Refillable Cartridge No    • Refillable Tank No    • Pre-filled Pod No         and Allergies:  Patient has no known allergies.    Medications:    Current Outpatient Medications:   •  albuterol sulfate  (90 Base) MCG/ACT inhaler, Inhale 2 puffs Every 4 (Four) Hours As Needed for Wheezing., Disp: 18 g, Rfl: 5  •  apixaban (Eliquis) 5 MG tablet tablet, Take 1 tablet by mouth 2 (Two) Times a Day., Disp: 180 tablet, Rfl: 3  •  Beclomethasone Diprop HFA (QVAR Redihaler) 40 MCG/ACT inhaler, Inhale 2 puffs 2 (Two) Times a Day., Disp: 8.7 g, Rfl: 11  •  carvedilol (COREG) 25 MG tablet, Take 1 tablet by mouth 2 (Two) Times a Day., Disp: 180 tablet, Rfl: 3  •  Diclofenac Sodium (VOLTAREN) 1 % gel gel, Apply 4 g topically to the appropriate area as directed 4 (Four) Times a Day As Needed (pain)., Disp: 150 g, Rfl: 1  •  ipratropium-albuterol (DUO-NEB) 0.5-2.5 mg/3 ml nebulizer, Take 3 mL by nebulization 4 (Four) Times a Day As Needed for Wheezing or Shortness of Air., Disp: 360 mL, Rfl: 11  •  montelukast (SINGULAIR) 10 MG tablet, TAKE 1 TABLET BY MOUTH EVERYDAY AT BEDTIME, Disp: 90 tablet, Rfl: 2  •  olmesartan (BENICAR) 5 MG tablet, Take 1 tablet by mouth Daily., Disp: 90 tablet, Rfl: 3  •  rosuvastatin (CRESTOR) 5 MG tablet, Take 1 tablet by mouth Daily., Disp: 90 tablet, Rfl: 3  •  tiotropium bromide-olodaterol (Stiolto Respimat) 2.5-2.5 MCG/ACT aerosol solution inhaler, Inhale 2 puffs Daily., Disp: 3 each, Rfl: 3      Objective   Vital Signs:  Vitals:    05/19/23 0900   BP: 130/72   Pulse: 61   SpO2: 98%   Weight: 98.3 kg (216 lb 12.8 oz)   Height: 180.3 cm (71\")     Body mass index is 30.24 kg/m².  Neck Circumference: 15.5 inches     Physical Exam:  Neck Circumference: 15.5 inches     Constitutional: Not in acute distress.  Eyes: Injected conjunctiva, EOMI. pupils equal reactive to light.  ENMT: Valdez score 2. Mallampati score 3. No oral thrush. Tonsils grade 1. Narrow distance in between the posterior " pharyngeal pillars..  Neck: Large. No thyromegaly.  Trachea midline.  Heart: Regular rhythm and rate, no murmur  Lungs: Diminished but equal air entry bilaterally. No crackles. Mild wheezing on the left.  Nonlabored breathing.       Abdomen: Obese.  Soft.  No tenderness.  Positive bowel sounds.  Extremities: No cyanosis, clubbing or pitting edema.  Warm extremities and well-perfused.  Neuro: Conscious, alert, oriented x3.  Gait is normal.  Strength 5/5 in arms and legs.  Psych: Appropriate mood and affect.    Integumentary: No rash.  Normal skin turgor.  Lymphatic: No palpable cervical or supraclavicular lymph nodes.    Diagnostic data:    Total Cholesterol   Date Value Ref Range Status   04/04/2023 151 0 - 200 mg/dL Final     Triglycerides   Date Value Ref Range Status   04/04/2023 77 0 - 150 mg/dL Final     HDL Cholesterol   Date Value Ref Range Status   04/04/2023 52 40 - 60 mg/dL Final     Hemoglobin   Date Value Ref Range Status   12/09/2022 12.8 (L) 13.0 - 17.7 g/dL Final     CO2   Date Value Ref Range Status   04/04/2023 32.7 (H) 22.0 - 29.0 mmol/L Final       Assessment   1. Snoring, probable PATI  2. Very severe COPD  3. Nocturnal hypoxemia/hypoventilation  4. Obesity, BMI 30  5. A-fib      Plan:  Check split-night PSG.  Not a candidate for HST due to underlying severe COPD and nocturnal hypoxemia. We discussed the pathophysiology of sleep apnea, testing and therapy which include CPAP and weight loss.  Patient is agreeable with CPAP therapy if needed.    Counseled for weight loss.  Encouraged to exercise regularly and cut down on carbohydrates.  Discussed that losing weight may decrease the severity of sleep apnea and obviate the need of CPAP therapy.    Discussed the association between obstructive sleep apnea and cardiovascular disease including HTN and A-fib and the beneficial effect of Pap therapy in reducing the risk of major cardiovascular events.          Aaron Myers MD  05/19/23  10:05 EDT    This  note was dictated utilizing Dragon dictation

## 2023-06-01 ENCOUNTER — LAB (OUTPATIENT)
Dept: LAB | Facility: HOSPITAL | Age: 68
End: 2023-06-01
Payer: COMMERCIAL

## 2023-06-01 ENCOUNTER — HOSPITAL ENCOUNTER (OUTPATIENT)
Dept: CT IMAGING | Facility: HOSPITAL | Age: 68
Discharge: HOME | End: 2023-06-01
Payer: COMMERCIAL

## 2023-06-01 DIAGNOSIS — C67.9 MALIGNANT NEOPLASM OF URINARY BLADDER, UNSPECIFIED SITE: ICD-10-CM

## 2023-06-01 DIAGNOSIS — C67.3 MALIGNANT NEOPLASM OF ANTERIOR WALL OF URINARY BLADDER: ICD-10-CM

## 2023-06-01 LAB
ALBUMIN SERPL-MCNC: 4.3 G/DL (ref 3.5–5.2)
ALBUMIN/GLOB SERPL: 1.5 G/DL
ALP SERPL-CCNC: 69 U/L (ref 39–117)
ALT SERPL W P-5'-P-CCNC: 12 U/L (ref 1–41)
ANION GAP SERPL CALCULATED.3IONS-SCNC: 9.8 MMOL/L (ref 5–15)
AST SERPL-CCNC: 19 U/L (ref 1–40)
BASOPHILS # BLD AUTO: 0.05 10*3/MM3 (ref 0–0.2)
BASOPHILS NFR BLD AUTO: 0.7 % (ref 0–1.5)
BILIRUB SERPL-MCNC: 0.5 MG/DL (ref 0–1.2)
BUN SERPL-MCNC: 22 MG/DL (ref 8–23)
BUN/CREAT SERPL: 14.8 (ref 7–25)
CALCIUM SPEC-SCNC: 10.4 MG/DL (ref 8.6–10.5)
CHLORIDE SERPL-SCNC: 95 MMOL/L (ref 98–107)
CO2 SERPL-SCNC: 30.2 MMOL/L (ref 22–29)
CREAT BLDA-MCNC: 1.6 MG/DL
CREAT SERPL-MCNC: 1.49 MG/DL (ref 0.76–1.27)
DEPRECATED RDW RBC AUTO: 43.8 FL (ref 37–54)
EGFRCR SERPLBLD CKD-EPI 2021: 46.9 ML/MIN/1.73
EGFRCR SERPLBLD CKD-EPI 2021: 51.1 ML/MIN/1.73
EOSINOPHIL # BLD AUTO: 0.05 10*3/MM3 (ref 0–0.4)
EOSINOPHIL NFR BLD AUTO: 0.7 % (ref 0.3–6.2)
ERYTHROCYTE [DISTWIDTH] IN BLOOD BY AUTOMATED COUNT: 12.8 % (ref 12.3–15.4)
GLOBULIN UR ELPH-MCNC: 2.8 GM/DL
GLUCOSE SERPL-MCNC: 117 MG/DL (ref 65–99)
HCT VFR BLD AUTO: 39.6 % (ref 37.5–51)
HGB BLD-MCNC: 13.5 G/DL (ref 13–17.7)
IMM GRANULOCYTES # BLD AUTO: 0.02 10*3/MM3 (ref 0–0.05)
IMM GRANULOCYTES NFR BLD AUTO: 0.3 % (ref 0–0.5)
LYMPHOCYTES # BLD AUTO: 1.32 10*3/MM3 (ref 0.7–3.1)
LYMPHOCYTES NFR BLD AUTO: 19.1 % (ref 19.6–45.3)
MCH RBC QN AUTO: 31.9 PG (ref 26.6–33)
MCHC RBC AUTO-ENTMCNC: 34.1 G/DL (ref 31.5–35.7)
MCV RBC AUTO: 93.6 FL (ref 79–97)
MONOCYTES # BLD AUTO: 0.68 10*3/MM3 (ref 0.1–0.9)
MONOCYTES NFR BLD AUTO: 9.8 % (ref 5–12)
NEUTROPHILS NFR BLD AUTO: 4.79 10*3/MM3 (ref 1.7–7)
NEUTROPHILS NFR BLD AUTO: 69.4 % (ref 42.7–76)
NRBC BLD AUTO-RTO: 0 /100 WBC (ref 0–0.2)
PLATELET # BLD AUTO: 214 10*3/MM3 (ref 140–450)
PMV BLD AUTO: 10.9 FL (ref 6–12)
POTASSIUM SERPL-SCNC: 4.7 MMOL/L (ref 3.5–5.2)
PROT SERPL-MCNC: 7.1 G/DL (ref 6–8.5)
PSA SERPL-MCNC: <0.014 NG/ML (ref 0–4)
RBC # BLD AUTO: 4.23 10*6/MM3 (ref 4.14–5.8)
SODIUM SERPL-SCNC: 135 MMOL/L (ref 136–145)
WBC NRBC COR # BLD: 6.91 10*3/MM3 (ref 3.4–10.8)

## 2023-06-01 PROCEDURE — 85025 COMPLETE CBC W/AUTO DIFF WBC: CPT

## 2023-06-01 PROCEDURE — 80053 COMPREHEN METABOLIC PANEL: CPT

## 2023-06-01 PROCEDURE — 25510000001 IOPAMIDOL PER 1 ML: Performed by: INTERNAL MEDICINE

## 2023-06-01 PROCEDURE — 74177 CT ABD & PELVIS W/CONTRAST: CPT

## 2023-06-01 PROCEDURE — 84153 ASSAY OF PSA TOTAL: CPT

## 2023-06-01 PROCEDURE — 71260 CT THORAX DX C+: CPT

## 2023-06-01 PROCEDURE — 82565 ASSAY OF CREATININE: CPT

## 2023-06-01 PROCEDURE — 36415 COLL VENOUS BLD VENIPUNCTURE: CPT

## 2023-06-01 RX ADMIN — IOPAMIDOL 100 ML: 755 INJECTION, SOLUTION INTRAVENOUS at 08:38

## 2023-06-14 ENCOUNTER — OFFICE VISIT (OUTPATIENT)
Dept: ONCOLOGY | Facility: HOSPITAL | Age: 68
End: 2023-06-14
Payer: COMMERCIAL

## 2023-06-14 VITALS
OXYGEN SATURATION: 98 % | SYSTOLIC BLOOD PRESSURE: 149 MMHG | WEIGHT: 214.73 LBS | DIASTOLIC BLOOD PRESSURE: 78 MMHG | HEART RATE: 72 BPM | RESPIRATION RATE: 18 BRPM | BODY MASS INDEX: 29.95 KG/M2 | TEMPERATURE: 98 F

## 2023-06-14 DIAGNOSIS — R79.89 ELEVATED SERUM CREATININE: ICD-10-CM

## 2023-06-14 DIAGNOSIS — Z85.46 HISTORY OF PROSTATE CANCER: ICD-10-CM

## 2023-06-14 DIAGNOSIS — J43.2 CENTRILOBULAR EMPHYSEMA: ICD-10-CM

## 2023-06-14 DIAGNOSIS — C67.9 MALIGNANT NEOPLASM OF URINARY BLADDER, UNSPECIFIED SITE: Primary | ICD-10-CM

## 2023-06-14 PROCEDURE — G0463 HOSPITAL OUTPT CLINIC VISIT: HCPCS

## 2023-06-14 NOTE — ASSESSMENT & PLAN NOTE
Creatinine is mildly elevated but stable at 1.5.  He should follow-up with his primary care provider for further management.

## 2023-06-14 NOTE — ASSESSMENT & PLAN NOTE
Patient is now 5 years out.  I see no evidence of disease recurrence by his history, physical examination, lab work or scans.  I will see him back yearly for continued surveillance with lab work prior.  He does not need any further abdominal or pelvic imaging unless new signs or symptoms develop.

## 2023-06-14 NOTE — ASSESSMENT & PLAN NOTE
Patient is 15 years out from resection of lung cancer.  He does not smoke.  He will need yearly low-dose CT imaging.

## 2023-06-14 NOTE — PROGRESS NOTES
Chief Complaint  Bladder Cancer    Collin [facility] MD Houk, Brandon Lyle, MD    Subjective          Toribio Hayes presents to [facility] for follow up of bladder and prostate cancer. He states that he is doing well.  Urination is going well without pain, hematuria.  He denies any masses or adenopathy.  No unusual aches or pains.  He notes good appetite and his weight is maintained.  His energy level is not as good as he would like but adequate for his ADLs including work.    Oncology/Hematology History Overview Note   Bladder, urothelial carcinoma      Prostate cancer, adenocarcinoma, 4+3 Benton score            Clinical Staging      Stage II (lH3T0C4)            Treatments      Chemotherapy      10/10/18 began DD MVAC      Surgeries      Left upper lobectomy 2009.  Radical cystoprostatectomy with neobladder 12/18.        Malignant neoplasm of urinary bladder   12/29/2020 Initial Diagnosis    Malignant neoplasm of urinary bladder (HCC)         Review of Systems   Constitutional:  Positive for fatigue. Negative for appetite change, diaphoresis, fever, unexpected weight gain and unexpected weight loss.   HENT:  Negative for hearing loss, mouth sores, sore throat, swollen glands, trouble swallowing and voice change.    Eyes:  Negative for blurred vision.   Respiratory:  Negative for cough, shortness of breath and wheezing.    Cardiovascular:  Negative for chest pain and palpitations.   Gastrointestinal:  Negative for abdominal pain, blood in stool, constipation, diarrhea, nausea and vomiting.   Endocrine: Negative for cold intolerance and heat intolerance.   Genitourinary:  Negative for difficulty urinating, dysuria, frequency, hematuria and urinary incontinence.   Musculoskeletal:  Negative for arthralgias, back pain and myalgias.   Skin:  Negative for rash, skin lesions and wound.   Neurological:  Negative for dizziness, seizures, weakness, numbness and headache.    Hematological:  Does not bruise/bleed easily.   Psychiatric/Behavioral:  Negative for depressed mood. The patient is not nervous/anxious.    Current Outpatient Medications on File Prior to Visit   Medication Sig Dispense Refill    albuterol sulfate  (90 Base) MCG/ACT inhaler Inhale 2 puffs Every 4 (Four) Hours As Needed for Wheezing. 18 g 5    apixaban (Eliquis) 5 MG tablet tablet Take 1 tablet by mouth 2 (Two) Times a Day. 180 tablet 3    Beclomethasone Diprop HFA (QVAR Redihaler) 40 MCG/ACT inhaler Inhale 2 puffs 2 (Two) Times a Day. 8.7 g 11    carvedilol (COREG) 25 MG tablet Take 1 tablet by mouth 2 (Two) Times a Day. 180 tablet 3    Diclofenac Sodium (VOLTAREN) 1 % gel gel Apply 4 g topically to the appropriate area as directed 4 (Four) Times a Day As Needed (pain). 150 g 1    ipratropium-albuterol (DUO-NEB) 0.5-2.5 mg/3 ml nebulizer Take 3 mL by nebulization 4 (Four) Times a Day As Needed for Wheezing or Shortness of Air. 360 mL 11    montelukast (SINGULAIR) 10 MG tablet TAKE 1 TABLET BY MOUTH EVERYDAY AT BEDTIME 90 tablet 2    olmesartan (BENICAR) 5 MG tablet Take 1 tablet by mouth Daily. 90 tablet 3    rosuvastatin (CRESTOR) 5 MG tablet Take 1 tablet by mouth Daily. 90 tablet 3    tiotropium bromide-olodaterol (Stiolto Respimat) 2.5-2.5 MCG/ACT aerosol solution inhaler Inhale 2 puffs Daily. 3 each 3     No current facility-administered medications on file prior to visit.       No Known Allergies  Past Medical History:   Diagnosis Date    A-fib     DR KHALIL    Arthritis     RIGHT THUMB    Asthma     Bladder cancer     2018    Chronic diastolic congestive heart failure 12/06/2021    COPD (chronic obstructive pulmonary disease)     INHALER    Elevated serum creatinine 12/06/2021    Hyperlipidemia     ON MEDS    Hypertension     CONTROLLED W MEDS    Lung cancer     DR RAMIRES TOOK CARE OF THAT (2008)    Melanoma in situ of left lower leg     2022 removed    Mouth sore secondary to chemotherapy     Stage  3a chronic kidney disease 2022    Throat soreness     FROM CHEMO     Past Surgical History:   Procedure Laterality Date    COLONOSCOPY      CYSTECTOMY      FINGER DEBRIDEMENT Right     RIGHT THUMB    ILEAL LOOP NEOBLADDER      BLADDER MADE FROM HIS INTESTINES    LUNG REMOVAL, PARTIAL Left     LEFT UPPER - 2008 - SOA W EXERTION    PORTOCAVAL SHUNT PLACEMENT       AND     SENTINEL NODE BIOPSY N/A 2022    Procedure: Excision of left thigh melanoma with sentinel lymph node biopsy;  Surgeon: Dominic Ruiz MD;  Location: [facility];  Service: General;  Laterality: N/A;    TURP / TRANSURETHRAL INCISION / DRAINAGE PROSTATE       Social History     Socioeconomic History    Marital status:    Tobacco Use    Smoking status: Former     Packs/day: 1.50     Years: 39.00     Pack years: 58.50     Types: Cigarettes     Quit date: 10/21/2008     Years since quittin.6    Smokeless tobacco: Never   Vaping Use    Vaping Use: Never used   Substance and Sexual Activity    Alcohol use: Never    Drug use: Never    Sexual activity: Yes     Partners: Female     Family History   Problem Relation Age of Onset    No Known Problems Mother         Mother     No Known Problems Father         Father  54 years old lung cancer/smoker    Prostate cancer Brother     Malig Hyperthermia Neg Hx        Objective   Physical Exam  Vitals reviewed. Exam conducted with a chaperone present.   Constitutional:       General: He is not in acute distress.     Appearance: Normal appearance.   Cardiovascular:      Rate and Rhythm: Normal rate and regular rhythm.      Heart sounds: Normal heart sounds. No murmur heard.    No gallop.   Pulmonary:      Effort: Pulmonary effort is normal.      Breath sounds: Normal breath sounds.   Abdominal:      General: Abdomen is flat. Bowel sounds are normal.   Musculoskeletal:      Cervical back: Neck supple.      Right lower leg: No edema.      Left lower leg: No edema.    Lymphadenopathy:      Cervical: No cervical adenopathy.   Neurological:      Mental Status: He is alert and oriented to person, place, and time.   Psychiatric:         Mood and Affect: Mood normal.         Behavior: Behavior normal.       Vitals:    06/14/23 0803   BP: 149/78   Pulse: 72   Resp: 18   Temp: 98 °F (36.7 °C)   TempSrc: Temporal   SpO2: 98%   Weight: 97.4 kg (214 lb 11.7 oz)   PainSc: 0-No pain     ECOG score: 0         PHQ-9 Total Score:                    Result Review :   The following data was reviewed by: Jeff Fernando MD on 06/14/2023:  Lab Results   Component Value Date    HGB 13.5 06/01/2023    HCT 39.6 06/01/2023    MCV 93.6 06/01/2023     06/01/2023    WBC 6.91 06/01/2023    NEUTROABS 4.79 06/01/2023    LYMPHSABS 1.32 06/01/2023    MONOSABS 0.68 06/01/2023    EOSABS 0.05 06/01/2023    BASOSABS 0.05 06/01/2023     Lab Results   Component Value Date    GLUCOSE 117 (H) 06/01/2023    BUN 22 06/01/2023    CREATININE 1.60 06/01/2023     (L) 06/01/2023    K 4.7 06/01/2023    CL 95 (L) 06/01/2023    CO2 30.2 (H) 06/01/2023    CALCIUM 10.4 06/01/2023    PROTEINTOT 7.1 06/01/2023    ALBUMIN 4.3 06/01/2023    BILITOT 0.5 06/01/2023    ALKPHOS 69 06/01/2023    AST 19 06/01/2023    ALT 12 06/01/2023     Lab Results   Component Value Date    MG 1.7 04/04/2023    FREET4 1.2 03/13/2021    TSH 0.702 03/13/2021     No results found for: IRON, LABIRON, TRANSFERRIN, TIBC  Lab Results   Component Value Date    RPKJFWPU02 215 12/04/2020    FOLATE 17.40 12/04/2020     Lab Results   Component Value Date    PSA <0.014 06/01/2023       Data reviewed : Radiologic studies CT chest, abdomen, pelvis reviewed       Assessment and Plan    Diagnoses and all orders for this visit:    1. Malignant neoplasm of urinary bladder, unspecified site (Primary)  Assessment & Plan:  Patient is now 5 years out.  I see no evidence of disease recurrence by his history, physical examination, lab work or scans.  I will see  him back yearly for continued surveillance with lab work prior.  He does not need any further abdominal or pelvic imaging unless new signs or symptoms develop.    Orders:  -     CBC & Differential; Future  -     Comprehensive Metabolic Panel; Future    2. Centrilobular emphysema  Assessment & Plan:  Patient is 15 years out from resection of lung cancer.  He does not smoke.  He will need yearly low-dose CT imaging.    Orders:  -     CT Chest Low Dose Follow Up With Contrast; Future    3. Elevated serum creatinine  Assessment & Plan:  Creatinine is mildly elevated but stable at 1.5.  He should follow-up with his primary care provider for further management.      4. History of prostate cancer  Assessment & Plan:  Sp treatment as outlined. PSA is undetectable. Repeat psa yearly    Orders:  -     PSA DIAGNOSTIC; Future            Patient Follow Up: 1 year    Patient was given instructions and counseling regarding his condition or for health maintenance advice. Please see specific information pulled into the AVS if appropriate.     Jeff Fernando MD    6/14/2023

## 2023-07-30 ENCOUNTER — APPOINTMENT (OUTPATIENT)
Dept: GENERAL RADIOLOGY | Facility: HOSPITAL | Age: 68
End: 2023-07-30
Payer: COMMERCIAL

## 2023-07-30 ENCOUNTER — HOSPITAL ENCOUNTER (OUTPATIENT)
Facility: HOSPITAL | Age: 68
Setting detail: OBSERVATION
Discharge: HOME | End: 2023-07-31
Attending: EMERGENCY MEDICINE | Admitting: INTERNAL MEDICINE
Payer: COMMERCIAL

## 2023-07-30 DIAGNOSIS — R26.2 DIFFICULTY WALKING: ICD-10-CM

## 2023-07-30 DIAGNOSIS — Z78.9 DECREASED ACTIVITIES OF DAILY LIVING (ADL): ICD-10-CM

## 2023-07-30 DIAGNOSIS — N39.0 ACUTE UTI: Primary | ICD-10-CM

## 2023-07-30 LAB
ALBUMIN SERPL-MCNC: 4.2 G/DL (ref 3.5–5.2)
ALBUMIN/GLOB SERPL: 1.2 G/DL
ALP SERPL-CCNC: 83 U/L (ref 39–117)
ALT SERPL W P-5'-P-CCNC: 16 U/L (ref 1–41)
AMORPH URATE CRY URNS QL MICRO: ABNORMAL /HPF
ANION GAP SERPL CALCULATED.3IONS-SCNC: 11.6 MMOL/L (ref 5–15)
AST SERPL-CCNC: 17 U/L (ref 1–40)
BACTERIA UR QL AUTO: ABNORMAL /HPF
BASOPHILS # BLD AUTO: 0.02 10*3/MM3 (ref 0–0.2)
BASOPHILS NFR BLD AUTO: 0.2 % (ref 0–1.5)
BILIRUB SERPL-MCNC: 0.5 MG/DL (ref 0–1.2)
BILIRUB UR QL STRIP: NEGATIVE
BUN SERPL-MCNC: 27 MG/DL (ref 8–23)
BUN/CREAT SERPL: 18.4 (ref 7–25)
CALCIUM SPEC-SCNC: 9.8 MG/DL (ref 8.6–10.5)
CHLORIDE SERPL-SCNC: 95 MMOL/L (ref 98–107)
CLARITY UR: ABNORMAL
CO2 SERPL-SCNC: 28.4 MMOL/L (ref 22–29)
COLOR UR: YELLOW
CREAT SERPL-MCNC: 1.47 MG/DL (ref 0.76–1.27)
D-LACTATE SERPL-SCNC: 1.2 MMOL/L (ref 0.5–2)
DEPRECATED RDW RBC AUTO: 43.6 FL (ref 37–54)
EGFRCR SERPLBLD CKD-EPI 2021: 52 ML/MIN/1.73
EOSINOPHIL # BLD AUTO: 0.01 10*3/MM3 (ref 0–0.4)
EOSINOPHIL NFR BLD AUTO: 0.1 % (ref 0.3–6.2)
ERYTHROCYTE [DISTWIDTH] IN BLOOD BY AUTOMATED COUNT: 12.6 % (ref 12.3–15.4)
FLUAV AG NPH QL: NEGATIVE
FLUBV AG NPH QL IA: NEGATIVE
GLOBULIN UR ELPH-MCNC: 3.4 GM/DL
GLUCOSE SERPL-MCNC: 137 MG/DL (ref 65–99)
GLUCOSE UR STRIP-MCNC: NEGATIVE MG/DL
HCT VFR BLD AUTO: 40 % (ref 37.5–51)
HGB BLD-MCNC: 13.6 G/DL (ref 13–17.7)
HGB UR QL STRIP.AUTO: ABNORMAL
HOLD SPECIMEN: NORMAL
HOLD SPECIMEN: NORMAL
HYALINE CASTS UR QL AUTO: ABNORMAL /LPF
IMM GRANULOCYTES # BLD AUTO: 0.03 10*3/MM3 (ref 0–0.05)
IMM GRANULOCYTES NFR BLD AUTO: 0.4 % (ref 0–0.5)
KETONES UR QL STRIP: NEGATIVE
LEUKOCYTE ESTERASE UR QL STRIP.AUTO: ABNORMAL
LYMPHOCYTES # BLD AUTO: 0.4 10*3/MM3 (ref 0.7–3.1)
LYMPHOCYTES NFR BLD AUTO: 4.8 % (ref 19.6–45.3)
MAGNESIUM SERPL-MCNC: 1.5 MG/DL (ref 1.6–2.4)
MCH RBC QN AUTO: 31.6 PG (ref 26.6–33)
MCHC RBC AUTO-ENTMCNC: 34 G/DL (ref 31.5–35.7)
MCV RBC AUTO: 93 FL (ref 79–97)
MONOCYTES # BLD AUTO: 0.25 10*3/MM3 (ref 0.1–0.9)
MONOCYTES NFR BLD AUTO: 3 % (ref 5–12)
NEUTROPHILS NFR BLD AUTO: 7.65 10*3/MM3 (ref 1.7–7)
NEUTROPHILS NFR BLD AUTO: 91.5 % (ref 42.7–76)
NITRITE UR QL STRIP: NEGATIVE
NRBC BLD AUTO-RTO: 0 /100 WBC (ref 0–0.2)
NT-PROBNP SERPL-MCNC: 209.9 PG/ML (ref 0–900)
PH UR STRIP.AUTO: 7 [PH] (ref 5–8)
PHOSPHATE SERPL-MCNC: 2.2 MG/DL (ref 2.5–4.5)
PLATELET # BLD AUTO: 223 10*3/MM3 (ref 140–450)
PMV BLD AUTO: 10 FL (ref 6–12)
POTASSIUM SERPL-SCNC: 4.3 MMOL/L (ref 3.5–5.2)
PROCALCITONIN SERPL-MCNC: 32.34 NG/ML (ref 0–0.25)
PROT SERPL-MCNC: 7.6 G/DL (ref 6–8.5)
PROT UR QL STRIP: ABNORMAL
QT INTERVAL: 312 MS
RBC # BLD AUTO: 4.3 10*6/MM3 (ref 4.14–5.8)
RBC # UR STRIP: ABNORMAL /HPF
REF LAB TEST METHOD: ABNORMAL
SARS-COV-2 RNA RESP QL NAA+PROBE: NOT DETECTED
SODIUM SERPL-SCNC: 135 MMOL/L (ref 136–145)
SP GR UR STRIP: 1.01 (ref 1–1.03)
SQUAMOUS #/AREA URNS HPF: ABNORMAL /HPF
TROPONIN T SERPL HS-MCNC: 15 NG/L
UROBILINOGEN UR QL STRIP: ABNORMAL
WBC # UR STRIP: ABNORMAL /HPF
WBC NRBC COR # BLD: 8.36 10*3/MM3 (ref 3.4–10.8)
WHOLE BLOOD HOLD COAG: NORMAL
WHOLE BLOOD HOLD SPECIMEN: NORMAL

## 2023-07-30 PROCEDURE — 85025 COMPLETE CBC W/AUTO DIFF WBC: CPT

## 2023-07-30 PROCEDURE — 83880 ASSAY OF NATRIURETIC PEPTIDE: CPT | Performed by: EMERGENCY MEDICINE

## 2023-07-30 PROCEDURE — 99284 EMERGENCY DEPT VISIT MOD MDM: CPT

## 2023-07-30 PROCEDURE — 96365 THER/PROPH/DIAG IV INF INIT: CPT

## 2023-07-30 PROCEDURE — 25010000002 CEFTRIAXONE PER 250 MG: Performed by: EMERGENCY MEDICINE

## 2023-07-30 PROCEDURE — 93005 ELECTROCARDIOGRAM TRACING: CPT

## 2023-07-30 PROCEDURE — 80053 COMPREHEN METABOLIC PANEL: CPT | Performed by: EMERGENCY MEDICINE

## 2023-07-30 PROCEDURE — 83605 ASSAY OF LACTIC ACID: CPT | Performed by: EMERGENCY MEDICINE

## 2023-07-30 PROCEDURE — 84100 ASSAY OF PHOSPHORUS: CPT | Performed by: HOSPITALIST

## 2023-07-30 PROCEDURE — 94799 UNLISTED PULMONARY SVC/PX: CPT

## 2023-07-30 PROCEDURE — 94640 AIRWAY INHALATION TREATMENT: CPT

## 2023-07-30 PROCEDURE — G0378 HOSPITAL OBSERVATION PER HR: HCPCS

## 2023-07-30 PROCEDURE — 81001 URINALYSIS AUTO W/SCOPE: CPT | Performed by: EMERGENCY MEDICINE

## 2023-07-30 PROCEDURE — 84484 ASSAY OF TROPONIN QUANT: CPT | Performed by: EMERGENCY MEDICINE

## 2023-07-30 PROCEDURE — 71045 X-RAY EXAM CHEST 1 VIEW: CPT

## 2023-07-30 PROCEDURE — 87804 INFLUENZA ASSAY W/OPTIC: CPT | Performed by: EMERGENCY MEDICINE

## 2023-07-30 PROCEDURE — 93010 ELECTROCARDIOGRAM REPORT: CPT | Performed by: INTERNAL MEDICINE

## 2023-07-30 PROCEDURE — 87635 SARS-COV-2 COVID-19 AMP PRB: CPT | Performed by: EMERGENCY MEDICINE

## 2023-07-30 PROCEDURE — 84145 PROCALCITONIN (PCT): CPT | Performed by: HOSPITALIST

## 2023-07-30 PROCEDURE — 87040 BLOOD CULTURE FOR BACTERIA: CPT

## 2023-07-30 PROCEDURE — 0 MAGNESIUM SULFATE 4 GM/100ML SOLUTION: Performed by: PHYSICIAN ASSISTANT

## 2023-07-30 PROCEDURE — 93005 ELECTROCARDIOGRAM TRACING: CPT | Performed by: EMERGENCY MEDICINE

## 2023-07-30 PROCEDURE — 83735 ASSAY OF MAGNESIUM: CPT | Performed by: HOSPITALIST

## 2023-07-30 RX ORDER — BISACODYL 10 MG
10 SUPPOSITORY, RECTAL RECTAL DAILY PRN
Status: DISCONTINUED | OUTPATIENT
Start: 2023-07-30 | End: 2023-07-31 | Stop reason: HOSPADM

## 2023-07-30 RX ORDER — BUDESONIDE 0.5 MG/2ML
0.5 INHALANT ORAL
Status: DISCONTINUED | OUTPATIENT
Start: 2023-07-30 | End: 2023-07-31 | Stop reason: HOSPADM

## 2023-07-30 RX ORDER — MONTELUKAST SODIUM 10 MG/1
10 TABLET ORAL NIGHTLY
Status: DISCONTINUED | OUTPATIENT
Start: 2023-07-30 | End: 2023-07-31 | Stop reason: HOSPADM

## 2023-07-30 RX ORDER — ENOXAPARIN SODIUM 100 MG/ML
40 INJECTION SUBCUTANEOUS DAILY
Status: DISCONTINUED | OUTPATIENT
Start: 2023-07-30 | End: 2023-07-30

## 2023-07-30 RX ORDER — SODIUM PHOSPHATE IN D5W 15MMOL/250
15 PLASTIC BAG, INJECTION (ML) INTRAVENOUS ONCE
Status: COMPLETED | OUTPATIENT
Start: 2023-07-30 | End: 2023-07-30

## 2023-07-30 RX ORDER — ARFORMOTEROL TARTRATE 15 UG/2ML
15 SOLUTION RESPIRATORY (INHALATION)
Status: DISCONTINUED | OUTPATIENT
Start: 2023-07-30 | End: 2023-07-31 | Stop reason: HOSPADM

## 2023-07-30 RX ORDER — BISACODYL 5 MG/1
5 TABLET, DELAYED RELEASE ORAL DAILY PRN
Status: DISCONTINUED | OUTPATIENT
Start: 2023-07-30 | End: 2023-07-31 | Stop reason: HOSPADM

## 2023-07-30 RX ORDER — IPRATROPIUM BROMIDE AND ALBUTEROL SULFATE 2.5; .5 MG/3ML; MG/3ML
3 SOLUTION RESPIRATORY (INHALATION) 4 TIMES DAILY PRN
Status: DISCONTINUED | OUTPATIENT
Start: 2023-07-30 | End: 2023-07-31 | Stop reason: HOSPADM

## 2023-07-30 RX ORDER — SODIUM CHLORIDE 9 MG/ML
100 INJECTION, SOLUTION INTRAVENOUS CONTINUOUS
Status: DISCONTINUED | OUTPATIENT
Start: 2023-07-30 | End: 2023-07-31 | Stop reason: HOSPADM

## 2023-07-30 RX ORDER — CEFTRIAXONE SODIUM 1 G/50ML
1000 INJECTION, SOLUTION INTRAVENOUS EVERY 24 HOURS
Status: DISCONTINUED | OUTPATIENT
Start: 2023-07-31 | End: 2023-07-30

## 2023-07-30 RX ORDER — HYDROCODONE BITARTRATE AND ACETAMINOPHEN 5; 325 MG/1; MG/1
1 TABLET ORAL EVERY 4 HOURS PRN
Status: DISCONTINUED | OUTPATIENT
Start: 2023-07-30 | End: 2023-07-31 | Stop reason: HOSPADM

## 2023-07-30 RX ORDER — CEFTRIAXONE SODIUM 1 G/50ML
1000 INJECTION, SOLUTION INTRAVENOUS EVERY 24 HOURS
Status: DISCONTINUED | OUTPATIENT
Start: 2023-07-31 | End: 2023-07-31 | Stop reason: HOSPADM

## 2023-07-30 RX ORDER — SODIUM CHLORIDE 9 MG/ML
40 INJECTION, SOLUTION INTRAVENOUS AS NEEDED
Status: DISCONTINUED | OUTPATIENT
Start: 2023-07-30 | End: 2023-07-31 | Stop reason: HOSPADM

## 2023-07-30 RX ORDER — ACETAMINOPHEN 650 MG/1
650 SUPPOSITORY RECTAL EVERY 4 HOURS PRN
Status: DISCONTINUED | OUTPATIENT
Start: 2023-07-30 | End: 2023-07-31 | Stop reason: HOSPADM

## 2023-07-30 RX ORDER — SODIUM CHLORIDE 0.9 % (FLUSH) 0.9 %
10 SYRINGE (ML) INJECTION AS NEEDED
Status: DISCONTINUED | OUTPATIENT
Start: 2023-07-30 | End: 2023-07-30

## 2023-07-30 RX ORDER — CEFTRIAXONE SODIUM 1 G/50ML
1000 INJECTION, SOLUTION INTRAVENOUS ONCE
Status: COMPLETED | OUTPATIENT
Start: 2023-07-30 | End: 2023-07-30

## 2023-07-30 RX ORDER — MAGNESIUM SULFATE HEPTAHYDRATE 40 MG/ML
4 INJECTION, SOLUTION INTRAVENOUS ONCE
Status: DISCONTINUED | OUTPATIENT
Start: 2023-07-30 | End: 2023-07-30

## 2023-07-30 RX ORDER — AMOXICILLIN 250 MG
2 CAPSULE ORAL 2 TIMES DAILY
Status: DISCONTINUED | OUTPATIENT
Start: 2023-07-30 | End: 2023-07-31 | Stop reason: HOSPADM

## 2023-07-30 RX ORDER — ROSUVASTATIN CALCIUM 5 MG/1
5 TABLET, COATED ORAL NIGHTLY
Status: DISCONTINUED | OUTPATIENT
Start: 2023-07-30 | End: 2023-07-31 | Stop reason: HOSPADM

## 2023-07-30 RX ORDER — CARVEDILOL 12.5 MG/1
25 TABLET ORAL 2 TIMES DAILY
Status: DISCONTINUED | OUTPATIENT
Start: 2023-07-30 | End: 2023-07-31 | Stop reason: HOSPADM

## 2023-07-30 RX ORDER — ACETAMINOPHEN 160 MG/5ML
650 SOLUTION ORAL EVERY 4 HOURS PRN
Status: DISCONTINUED | OUTPATIENT
Start: 2023-07-30 | End: 2023-07-31 | Stop reason: HOSPADM

## 2023-07-30 RX ORDER — MONTELUKAST SODIUM 10 MG/1
10 TABLET ORAL NIGHTLY
COMMUNITY

## 2023-07-30 RX ORDER — SODIUM CHLORIDE 0.9 % (FLUSH) 0.9 %
10 SYRINGE (ML) INJECTION EVERY 12 HOURS SCHEDULED
Status: DISCONTINUED | OUTPATIENT
Start: 2023-07-30 | End: 2023-07-31 | Stop reason: HOSPADM

## 2023-07-30 RX ORDER — CHOLECALCIFEROL (VITAMIN D3) 125 MCG
5 CAPSULE ORAL NIGHTLY PRN
Status: DISCONTINUED | OUTPATIENT
Start: 2023-07-30 | End: 2023-07-31 | Stop reason: HOSPADM

## 2023-07-30 RX ORDER — POLYETHYLENE GLYCOL 3350 17 G/17G
17 POWDER, FOR SOLUTION ORAL DAILY PRN
Status: DISCONTINUED | OUTPATIENT
Start: 2023-07-30 | End: 2023-07-31 | Stop reason: HOSPADM

## 2023-07-30 RX ORDER — ACETAMINOPHEN 325 MG/1
650 TABLET ORAL EVERY 4 HOURS PRN
Status: DISCONTINUED | OUTPATIENT
Start: 2023-07-30 | End: 2023-07-31 | Stop reason: HOSPADM

## 2023-07-30 RX ORDER — MAGNESIUM SULFATE HEPTAHYDRATE 40 MG/ML
4 INJECTION, SOLUTION INTRAVENOUS ONCE
Status: COMPLETED | OUTPATIENT
Start: 2023-07-30 | End: 2023-07-31

## 2023-07-30 RX ORDER — IPRATROPIUM BROMIDE AND ALBUTEROL SULFATE 2.5; .5 MG/3ML; MG/3ML
3 SOLUTION RESPIRATORY (INHALATION) EVERY 6 HOURS PRN
Status: DISCONTINUED | OUTPATIENT
Start: 2023-07-30 | End: 2023-07-31 | Stop reason: HOSPADM

## 2023-07-30 RX ADMIN — ROSUVASTATIN CALCIUM 5 MG: 5 TABLET, FILM COATED ORAL at 20:55

## 2023-07-30 RX ADMIN — MAGNESIUM SULFATE HEPTAHYDRATE 4 G: 4 INJECTION, SOLUTION INTRAVENOUS at 20:56

## 2023-07-30 RX ADMIN — APIXABAN 5 MG: 5 TABLET, FILM COATED ORAL at 20:55

## 2023-07-30 RX ADMIN — ARFORMOTEROL TARTRATE 15 MCG: 15 SOLUTION RESPIRATORY (INHALATION) at 18:00

## 2023-07-30 RX ADMIN — SODIUM PHOSPHATE, MONOBASIC, MONOHYDRATE 15 MMOL: 276; 142 INJECTION, SOLUTION INTRAVENOUS at 18:35

## 2023-07-30 RX ADMIN — BUDESONIDE 0.5 MG: 0.5 SUSPENSION RESPIRATORY (INHALATION) at 18:00

## 2023-07-30 RX ADMIN — CARVEDILOL 25 MG: 25 TABLET, FILM COATED ORAL at 20:55

## 2023-07-30 RX ADMIN — IPRATROPIUM BROMIDE 0.5 MG: 0.5 SOLUTION RESPIRATORY (INHALATION) at 23:53

## 2023-07-30 RX ADMIN — MONTELUKAST 10 MG: 10 TABLET, FILM COATED ORAL at 20:55

## 2023-07-30 RX ADMIN — IPRATROPIUM BROMIDE 0.5 MG: 0.5 SOLUTION RESPIRATORY (INHALATION) at 18:00

## 2023-07-30 RX ADMIN — SODIUM CHLORIDE 100 ML/HR: 9 INJECTION, SOLUTION INTRAVENOUS at 17:46

## 2023-07-30 RX ADMIN — SODIUM CHLORIDE 1000 ML: 9 INJECTION, SOLUTION INTRAVENOUS at 10:38

## 2023-07-30 RX ADMIN — CEFTRIAXONE SODIUM 1000 MG: 1 INJECTION, SOLUTION INTRAVENOUS at 10:39

## 2023-07-31 VITALS
OXYGEN SATURATION: 98 % | RESPIRATION RATE: 20 BRPM | DIASTOLIC BLOOD PRESSURE: 68 MMHG | TEMPERATURE: 97.5 F | HEIGHT: 71 IN | SYSTOLIC BLOOD PRESSURE: 118 MMHG | HEART RATE: 61 BPM | BODY MASS INDEX: 30.56 KG/M2 | WEIGHT: 218.26 LBS

## 2023-07-31 LAB
ANION GAP SERPL CALCULATED.3IONS-SCNC: 9.6 MMOL/L (ref 5–15)
BASOPHILS # BLD AUTO: 0.05 10*3/MM3 (ref 0–0.2)
BASOPHILS NFR BLD AUTO: 0.4 % (ref 0–1.5)
BUN SERPL-MCNC: 27 MG/DL (ref 8–23)
BUN/CREAT SERPL: 19.3 (ref 7–25)
CALCIUM SPEC-SCNC: 8.9 MG/DL (ref 8.6–10.5)
CHLORIDE SERPL-SCNC: 99 MMOL/L (ref 98–107)
CO2 SERPL-SCNC: 25.4 MMOL/L (ref 22–29)
CREAT SERPL-MCNC: 1.4 MG/DL (ref 0.76–1.27)
DEPRECATED RDW RBC AUTO: 44.9 FL (ref 37–54)
EGFRCR SERPLBLD CKD-EPI 2021: 55.1 ML/MIN/1.73
EOSINOPHIL # BLD AUTO: 0.05 10*3/MM3 (ref 0–0.4)
EOSINOPHIL NFR BLD AUTO: 0.4 % (ref 0.3–6.2)
ERYTHROCYTE [DISTWIDTH] IN BLOOD BY AUTOMATED COUNT: 12.9 % (ref 12.3–15.4)
GLUCOSE SERPL-MCNC: 111 MG/DL (ref 65–99)
HCT VFR BLD AUTO: 35.7 % (ref 37.5–51)
HGB BLD-MCNC: 11.9 G/DL (ref 13–17.7)
IMM GRANULOCYTES # BLD AUTO: 0.04 10*3/MM3 (ref 0–0.05)
IMM GRANULOCYTES NFR BLD AUTO: 0.3 % (ref 0–0.5)
LYMPHOCYTES # BLD AUTO: 1.18 10*3/MM3 (ref 0.7–3.1)
LYMPHOCYTES NFR BLD AUTO: 9.7 % (ref 19.6–45.3)
MAGNESIUM SERPL-MCNC: 2.4 MG/DL (ref 1.6–2.4)
MCH RBC QN AUTO: 31.6 PG (ref 26.6–33)
MCHC RBC AUTO-ENTMCNC: 33.3 G/DL (ref 31.5–35.7)
MCV RBC AUTO: 94.9 FL (ref 79–97)
MONOCYTES # BLD AUTO: 1.08 10*3/MM3 (ref 0.1–0.9)
MONOCYTES NFR BLD AUTO: 8.9 % (ref 5–12)
NEUTROPHILS NFR BLD AUTO: 80.3 % (ref 42.7–76)
NEUTROPHILS NFR BLD AUTO: 9.74 10*3/MM3 (ref 1.7–7)
NRBC BLD AUTO-RTO: 0 /100 WBC (ref 0–0.2)
PHOSPHATE SERPL-MCNC: 2.7 MG/DL (ref 2.5–4.5)
PLATELET # BLD AUTO: 205 10*3/MM3 (ref 140–450)
PMV BLD AUTO: 10 FL (ref 6–12)
POTASSIUM SERPL-SCNC: 4.1 MMOL/L (ref 3.5–5.2)
RBC # BLD AUTO: 3.76 10*6/MM3 (ref 4.14–5.8)
SODIUM SERPL-SCNC: 134 MMOL/L (ref 136–145)
WBC NRBC COR # BLD: 12.14 10*3/MM3 (ref 3.4–10.8)

## 2023-07-31 PROCEDURE — 94664 DEMO&/EVAL PT USE INHALER: CPT

## 2023-07-31 PROCEDURE — 97161 PT EVAL LOW COMPLEX 20 MIN: CPT

## 2023-07-31 PROCEDURE — 36415 COLL VENOUS BLD VENIPUNCTURE: CPT | Performed by: HOSPITALIST

## 2023-07-31 PROCEDURE — 83735 ASSAY OF MAGNESIUM: CPT | Performed by: HOSPITALIST

## 2023-07-31 PROCEDURE — G0378 HOSPITAL OBSERVATION PER HR: HCPCS

## 2023-07-31 PROCEDURE — 85025 COMPLETE CBC W/AUTO DIFF WBC: CPT | Performed by: HOSPITALIST

## 2023-07-31 PROCEDURE — 94799 UNLISTED PULMONARY SVC/PX: CPT

## 2023-07-31 PROCEDURE — 84100 ASSAY OF PHOSPHORUS: CPT | Performed by: HOSPITALIST

## 2023-07-31 PROCEDURE — 25010000002 CEFTRIAXONE PER 250 MG: Performed by: HOSPITALIST

## 2023-07-31 PROCEDURE — 80048 BASIC METABOLIC PNL TOTAL CA: CPT | Performed by: HOSPITALIST

## 2023-07-31 PROCEDURE — 97165 OT EVAL LOW COMPLEX 30 MIN: CPT

## 2023-07-31 RX ORDER — OLMESARTAN MEDOXOMIL 5 MG/1
TABLET ORAL
Qty: 90 TABLET | Refills: 3 | Status: SHIPPED | OUTPATIENT
Start: 2023-07-31

## 2023-07-31 RX ORDER — CEFDINIR 300 MG/1
300 CAPSULE ORAL 2 TIMES DAILY
Qty: 10 CAPSULE | Refills: 0 | Status: SHIPPED | OUTPATIENT
Start: 2023-07-31

## 2023-07-31 RX ADMIN — CARVEDILOL 25 MG: 25 TABLET, FILM COATED ORAL at 08:39

## 2023-07-31 RX ADMIN — Medication 10 ML: at 08:39

## 2023-07-31 RX ADMIN — ARFORMOTEROL TARTRATE 15 MCG: 15 SOLUTION RESPIRATORY (INHALATION) at 06:39

## 2023-07-31 RX ADMIN — BUDESONIDE 0.5 MG: 0.5 SUSPENSION RESPIRATORY (INHALATION) at 06:39

## 2023-07-31 RX ADMIN — IPRATROPIUM BROMIDE 0.5 MG: 0.5 SOLUTION RESPIRATORY (INHALATION) at 06:39

## 2023-07-31 RX ADMIN — APIXABAN 5 MG: 5 TABLET, FILM COATED ORAL at 08:39

## 2023-07-31 RX ADMIN — IPRATROPIUM BROMIDE 0.5 MG: 0.5 SOLUTION RESPIRATORY (INHALATION) at 11:46

## 2023-07-31 RX ADMIN — CEFTRIAXONE SODIUM 1000 MG: 1 INJECTION, SOLUTION INTRAVENOUS at 08:40

## 2023-08-01 ENCOUNTER — READMISSION MANAGEMENT (OUTPATIENT)
Dept: CALL CENTER | Facility: HOSPITAL | Age: 68
End: 2023-08-01
Payer: COMMERCIAL

## 2023-08-04 LAB
BACTERIA SPEC AEROBE CULT: NORMAL
BACTERIA SPEC AEROBE CULT: NORMAL

## 2023-08-08 ENCOUNTER — READMISSION MANAGEMENT (OUTPATIENT)
Dept: CALL CENTER | Facility: HOSPITAL | Age: 68
End: 2023-08-08
Payer: COMMERCIAL

## 2023-08-08 NOTE — OUTREACH NOTE
Medical Week 2 Survey      Flowsheet Row Responses   [facility] facility patient discharged from? [facility]   Does the patient have one of the following disease processes/diagnoses(primary or secondary)? Other   Week 2 attempt successful? No   Unsuccessful attempts Attempt 1            DIOR BEGUM - Registered Nurse

## 2023-08-11 ENCOUNTER — READMISSION MANAGEMENT (OUTPATIENT)
Dept: CALL CENTER | Facility: HOSPITAL | Age: 68
End: 2023-08-11
Payer: MEDICARE

## 2023-08-11 ENCOUNTER — OFFICE VISIT (OUTPATIENT)
Dept: SLEEP MEDICINE | Facility: HOSPITAL | Age: 68
End: 2023-08-11
Payer: MEDICARE

## 2023-08-11 VITALS
WEIGHT: 208.9 LBS | HEART RATE: 74 BPM | HEIGHT: 71 IN | SYSTOLIC BLOOD PRESSURE: 138 MMHG | OXYGEN SATURATION: 98 % | BODY MASS INDEX: 29.25 KG/M2 | DIASTOLIC BLOOD PRESSURE: 77 MMHG

## 2023-08-11 DIAGNOSIS — J44.9 CHRONIC OBSTRUCTIVE PULMONARY DISEASE, UNSPECIFIED COPD TYPE: Primary | ICD-10-CM

## 2023-08-11 PROCEDURE — G0463 HOSPITAL OUTPT CLINIC VISIT: HCPCS

## 2023-08-11 NOTE — OUTREACH NOTE
Medical Week 2 Survey      Flowsheet Row Responses   [facility] patient discharged from? [facility]   Does the patient have one of the following disease processes/diagnoses(primary or secondary)? Other   Week 2 attempt successful? Yes   Call start time 1354   Call end time 1357   Is patient permission given to speak with other caregiver? Yes   Person spoke with today (if not patient) and relationship Spouse-Lelo.   Meds reviewed with patient/caregiver? Yes   Is the patient having any side effects they believe may be caused by any medication additions or changes? No   Does the patient have all medications ordered at discharge? Yes   Is the patient taking all medications as directed (includes completed medication regime)? Yes   Comments regarding appointments Spouse states has seen his PCP. Next PCP appt in 3 months.   Does the patient have a primary care provider?  Yes   Does the patient have an appointment with their PCP within 7 days of discharge? Yes   Has the patient kept scheduled appointments due by today? Yes   Has home health visited the patient within 72 hours of discharge? N/A   Psychosocial issues? No   Did the patient receive a copy of their discharge instructions? Yes   Nursing interventions Reviewed instructions with patient   What is the patient's perception of their health status since discharge? Improving   Is the patient/caregiver able to teach back signs and symptoms related to disease process for when to call PCP? Yes   Is the patient/caregiver able to teach back signs and symptoms related to disease process for when to call 911? Yes   Is the patient/caregiver able to teach back the hierarchy of who to call/visit for symptoms/problems? PCP, Specialist, Home health nurse, Urgent Care, ED, 911 Yes   If the patient is a current smoker, are they able to teach back resources for cessation? Not a smoker   Additional teach back comments Spouse states aware of UTI s/s.   Week 2 Call Completed? Yes    Graduated Yes   Is the patient interested in additional calls from an ambulatory ? No   Would this patient benefit from a Referral to [facility] Social Work? No   Wrap up additional comments Spouse states patient is doing well. Denies any further s/s of UTI. Denies any needs/concerns today.   Call end time 3547            Teresa ROGERS - Registered Nurse

## 2023-08-11 NOTE — PROGRESS NOTES
[facility]                          Chief Complaint:   F/up PATI    History of present illness:   Subjective     This is a 68 year old male patient with hx of of A fib and HTN.  Nocturnal hypoxemia, on oxygen 2 L/min at night.  COPD (FEV1 2022: 29% with RV of 166%). Symptoms consist of snoring, frequent awakenings and leg jerks. BMI=30.     Split-night PSG 2023: AHI 40/H; RDI 84/h; Mike SpO2 72%.   Mask used: Large F&P FFM .  Hypoxic burden = 16 minutes.    Patient switched insurance since then.  He will has not been able to get the CPAP yet due to the change in his insurance.    Since last visit, he continues to complain about snoring, restless sleep and frequent awakening.        Sleep schedule:  -Bedtime: 10 PM   -Sleep latency: Not long  -Wake up time: 5 AM, does not feel refreshed  -Nocturnal awakenin times because of nocturia.  No difficulties going back to sleep.    ESS: Total score: 10     Concerning COPD, he continues to use his inhalers regularly.  No recent exacerbation.    REVIEW OF SYSTEMS:   HEENT: No nasal congestion or postnasal drip   CARDIOVASCULAR: No chest pain, chest pressure or chest discomfort. No palpitations or edema.   RESPIRATORY: No shortness of breath, cough or sputum.  Positive for wheezing.  GASTROINTESTINAL: No abdominal bloating but acid reflux   NEUROLOGICAL/PSYCHOATRY: No depression nor anxiety    Past Medical History:  Past Medical History:   Diagnosis Date    A-fib     DR KHALIL    Arthritis     RIGHT THUMB    Asthma     Bladder cancer     2018    Chronic diastolic congestive heart failure 2021    COPD (chronic obstructive pulmonary disease)     INHALER    Elevated serum creatinine 2021    Hyperlipidemia     ON MEDS    Hypertension     CONTROLLED W MEDS    Lung cancer     DR RAMIRES TOOK CARE OF THAT ()    Melanoma in situ of left lower leg      removed    Mouth sore secondary to chemotherapy     Stage 3a chronic  kidney disease 2022    Throat soreness     FROM CHEMO   ,   Past Surgical History:   Procedure Laterality Date    COLONOSCOPY      CYSTECTOMY      FINGER DEBRIDEMENT Right     RIGHT THUMB    ILEAL LOOP NEOBLADDER      BLADDER MADE FROM HIS INTESTINES    LUNG REMOVAL, PARTIAL Left     LEFT UPPER - 2008 - SOA W EXERTION    PORTOCAVAL SHUNT PLACEMENT       AND     SENTINEL NODE BIOPSY N/A 2022    Procedure: Excision of left thigh melanoma with sentinel lymph node biopsy;  Surgeon: Dominic Ruiz MD;  Location: [facility];  Service: General;  Laterality: N/A;    TURP / TRANSURETHRAL INCISION / DRAINAGE PROSTATE     ,   Social History     Socioeconomic History    Marital status:    Tobacco Use    Smoking status: Former     Packs/day: 1.50     Years: 39.00     Pack years: 58.50     Types: Cigarettes     Quit date: 10/21/2008     Years since quittin.8    Smokeless tobacco: Never   Vaping Use    Vaping Use: Never used   Substance and Sexual Activity    Alcohol use: Never    Drug use: Never    Sexual activity: Yes     Partners: Female     E-cigarette/Vaping    E-cigarette/Vaping Use Never User      E-cigarette/Vaping Substances    Nicotine No     THC No     CBD No     Flavoring No      E-cigarette/Vaping Devices    Disposable No     Pre-filled or Refillable Cartridge No     Refillable Tank No     Pre-filled Pod No          , and Allergies:  Patient has no known allergies.    Medication Review:     Current Outpatient Medications:     albuterol sulfate  (90 Base) MCG/ACT inhaler, Inhale 2 puffs Every 4 (Four) Hours As Needed for Wheezing., Disp: 18 g, Rfl: 5    Beclomethasone Diprop HFA (QVAR Redihaler) 40 MCG/ACT inhaler, Inhale 2 puffs 2 (Two) Times a Day., Disp: 8.7 g, Rfl: 11    carvedilol (COREG) 25 MG tablet, Take 1 tablet by mouth 2 (Two) Times a Day., Disp: 180 tablet, Rfl: 3    cefdinir (OMNICEF) 300 MG capsule, Take 1 capsule by mouth 2 (Two) Times a Day., Disp: 10  "capsule, Rfl: 0    Diclofenac Sodium (VOLTAREN) 1 % gel gel, Apply  topically to the appropriate area as directed 4 (Four) Times a Day As Needed., Disp: , Rfl:     Eliquis 5 MG tablet tablet, TAKE 1 TABLET BY MOUTH TWICE A DAY, Disp: 180 tablet, Rfl: 3    ipratropium-albuterol (DUO-NEB) 0.5-2.5 mg/3 ml nebulizer, Take 3 mL by nebulization 4 (Four) Times a Day As Needed for Wheezing or Shortness of Air., Disp: 360 mL, Rfl: 11    montelukast (SINGULAIR) 10 MG tablet, Take 1 tablet by mouth Every Night., Disp: , Rfl:     olmesartan (BENICAR) 5 MG tablet, TAKE 1 TABLET BY MOUTH EVERY DAY, Disp: 90 tablet, Rfl: 3    rosuvastatin (CRESTOR) 5 MG tablet, TAKE 1 TABLET BY MOUTH EVERY DAY, Disp: 90 tablet, Rfl: 3    tiotropium bromide-olodaterol (Stiolto Respimat) 2.5-2.5 MCG/ACT aerosol solution inhaler, Inhale 2 puffs Daily., Disp: 3 each, Rfl: 3      Objective   Vital Signs:  Vitals:    08/11/23 0700   BP: 138/77   Pulse: 74   SpO2: 98%   Weight: 94.8 kg (208 lb 14.4 oz)   Height: 180.3 cm (70.98\")     Body mass index is 29.15 kg/mý.          Physical Exam:   General Appearance:    Alert, cooperative, in no acute distress   ENMT:  Freidman score 3, narrow distance in between the posterior pharyngeal pillars    Neck:  Trachea midline. No thyromegaly.   Lungs:   Equal but diminished air entry throughout.  No audible crackles or wheezing.    Heart:    Regular rhythm and normal rate, normal S1 and S2, no Murmur.   Abdomen:     Obese.  Soft.  No tenderness.  No HSM    Neuro:   Conscious, alert, oriented x3. Appropriate mood and affect.    Extremities:   Moves all extremities well, no edema, no cyanosis, no Redness              Diagnostic data:      No results found for: HGBA1C  Total Cholesterol   Date Value Ref Range Status   04/04/2023 151 0 - 200 mg/dL Final     Triglycerides   Date Value Ref Range Status   04/04/2023 77 0 - 150 mg/dL Final     HDL Cholesterol   Date Value Ref Range Status   04/04/2023 52 40 - 60 mg/dL Final "     Hemoglobin   Date Value Ref Range Status   07/31/2023 11.9 (L) 13.0 - 17.7 g/dL Final     CO2   Date Value Ref Range Status   07/31/2023 25.4 22.0 - 29.0 mmol/L Final        Assessment   Severe PATI  COPD, no current exacerbation        PLAN:    Initiate CPAP therapy.  Patient benefit from treatment.  He is looking forward to start treatment.  Continue inhaler therapy as above.              This note was dictated utilizing [employer] dictation

## 2023-08-15 ENCOUNTER — OFFICE VISIT (OUTPATIENT)
Dept: PULMONOLOGY | Facility: CLINIC | Age: 68
End: 2023-08-15
Payer: MEDICARE

## 2023-08-15 VITALS
DIASTOLIC BLOOD PRESSURE: 63 MMHG | HEIGHT: 71 IN | BODY MASS INDEX: 28.94 KG/M2 | WEIGHT: 206.7 LBS | HEART RATE: 79 BPM | SYSTOLIC BLOOD PRESSURE: 108 MMHG | TEMPERATURE: 97.7 F | OXYGEN SATURATION: 97 % | RESPIRATION RATE: 18 BRPM

## 2023-08-15 DIAGNOSIS — I50.42 CHRONIC COMBINED SYSTOLIC (CONGESTIVE) AND DIASTOLIC (CONGESTIVE) HEART FAILURE: Primary | ICD-10-CM

## 2023-08-15 DIAGNOSIS — J43.2 CENTRILOBULAR EMPHYSEMA: ICD-10-CM

## 2023-08-15 DIAGNOSIS — R06.02 SHORTNESS OF BREATH: ICD-10-CM

## 2023-08-15 DIAGNOSIS — N18.31 STAGE 3A CHRONIC KIDNEY DISEASE: ICD-10-CM

## 2023-08-15 DIAGNOSIS — R06.00 DYSPNEA, UNSPECIFIED TYPE: ICD-10-CM

## 2023-08-15 DIAGNOSIS — R29.818 SUSPECTED SLEEP APNEA: ICD-10-CM

## 2023-08-15 DIAGNOSIS — I48.20 ATRIAL FIBRILLATION, CHRONIC: ICD-10-CM

## 2023-08-15 DIAGNOSIS — C34.92 ADENOCARCINOMA OF LEFT LUNG: ICD-10-CM

## 2023-08-15 DIAGNOSIS — J43.9 PULMONARY EMPHYSEMA, UNSPECIFIED EMPHYSEMA TYPE: ICD-10-CM

## 2023-08-15 DIAGNOSIS — J44.9 CHRONIC OBSTRUCTIVE PULMONARY DISEASE, UNSPECIFIED COPD TYPE: ICD-10-CM

## 2023-08-15 RX ORDER — MONTELUKAST SODIUM 10 MG/1
10 TABLET ORAL NIGHTLY
Qty: 90 TABLET | Refills: 3 | Status: SHIPPED | OUTPATIENT
Start: 2023-08-15

## 2023-08-15 RX ORDER — ALBUTEROL SULFATE 90 UG/1
2 AEROSOL, METERED RESPIRATORY (INHALATION) EVERY 4 HOURS PRN
Qty: 18 G | Refills: 5 | Status: SHIPPED | OUTPATIENT
Start: 2023-08-15

## 2023-08-15 NOTE — PROGRESS NOTES
Primary Care Provider  Collin Cannon MD     Referring Provider  No ref. provider found     Chief Complaint  COPD, Shortness of Breath, Wheezing, Cough, and Follow-up (6 month follow up)    Subjective          Toribio Hayes presents to [facility]  History of Present Illness  Toribio Hayes is a 68 y.o. male patient with history of lung adenocarcinoma status post left upper lobectomy 2009, also had chemo and radiation, COPD, history of bladder and prostate cancer status post chemotherapy, COPD.  He is here for follow-up.  Since his last office visit, he has been on Stiolto 2 puffs once daily and Qvar 2 puffs twice daily.  He has rarely needed any rescue inhaler.  He continues to use oxygen with sleep.  He recently went to sleep physician and had sleep study done.  He is planned for CPAP machine to be started soon.  He has no change in weight or appetite.  No fever or chills.  He recently retired.  He has some cough, does not produce much phlegm.  No wheezing.  Has exertional shortness of breath when he exerts heavily.  His cost of inhalers for very high and he wants to switch the inhaler if possible.  He was recently seen by Dr. Fernando and is planned for low-dose lung cancer screening CT scan starting June 2024.  June 2020 CT scan of the chest was reviewed with him today.    Review of Systems     General:  No Fatigue, No Fever No weight loss or loss of appetite  HEENT: No dysphagia, No Visual Changes, no rhinorrhea  Respiratory:  + cough,+Dyspnea, intermittent phlegm, No Pleuritic Pain, no wheezing, no hemoptysis.  Cardiovascular: Denies chest pain, denies palpitations,+SERRA, No Chest Pressure  Gastrointestinal:  No Abdominal Pain, No Nausea, No Vomiting, No Diarrhea  Genitourinary:  No Dysuria, No Frequency, No Hesitancy  Musculoskeletal: No muscle pain or swelling  Endocrine:  No Heat Intolerance, No Cold Intolerance  Hematologic:  No Bleeding, No  Bruising  Psychiatric:  No Anxiety, No Depression  Neurologic:  No Confusion, no Dysarthria, No Headaches  Skin:  No Rash, No Open Wounds    Family History   Problem Relation Age of Onset    No Known Problems Mother         Mother     No Known Problems Father         Father  54 years old lung cancer/smoker    Prostate cancer Brother     Denise Hyperthermia Neg Hx         Social History     Socioeconomic History    Marital status:    Tobacco Use    Smoking status: Former     Packs/day: 1.50     Years: 39.00     Pack years: 58.50     Types: Cigarettes     Quit date: 10/21/2008     Years since quittin.8    Smokeless tobacco: Never   Vaping Use    Vaping Use: Never used   Substance and Sexual Activity    Alcohol use: Never    Drug use: Never    Sexual activity: Yes     Partners: Female        Past Medical History:   Diagnosis Date    A-fib     DR KHALIL    Arthritis     RIGHT THUMB    Asthma     Bladder cancer     2018    Chronic diastolic congestive heart failure 2021    COPD (chronic obstructive pulmonary disease)     INHALER    Elevated serum creatinine 2021    Hyperlipidemia     ON MEDS    Hypertension     CONTROLLED W MEDS    Lung cancer     DR RAMIRES TOOK CARE OF THAT ()    Melanoma in situ of left lower leg      removed    Mouth sore secondary to chemotherapy     Stage 3a chronic kidney disease 2022    Throat soreness     FROM CHEMO        Immunization History   Administered Date(s) Administered    COVID-19 (MODERNA) 1st,2nd,3rd Dose Monovalent 2021, 2021    Fluzone High Dose =>65 Years ([employer] ONLY) 10/31/2022    Influenza, Unspecified 2020    Tdap 2023         No Known Allergies       Current Outpatient Medications:     albuterol sulfate  (90 Base) MCG/ACT inhaler, Inhale 2 puffs Every 4 (Four) Hours As Needed for Wheezing., Disp: 18 g, Rfl: 5    carvedilol (COREG) 25 MG tablet, Take 1 tablet by mouth 2 (Two) Times a Day., Disp: 180  "tablet, Rfl: 3    Diclofenac Sodium (VOLTAREN) 1 % gel gel, Apply  topically to the appropriate area as directed 4 (Four) Times a Day As Needed., Disp: , Rfl:     Eliquis 5 MG tablet tablet, TAKE 1 TABLET BY MOUTH TWICE A DAY, Disp: 180 tablet, Rfl: 3    ipratropium-albuterol (DUO-NEB) 0.5-2.5 mg/3 ml nebulizer, Take 3 mL by nebulization 4 (Four) Times a Day As Needed for Wheezing or Shortness of Air., Disp: 360 mL, Rfl: 11    montelukast (SINGULAIR) 10 MG tablet, Take 1 tablet by mouth Every Night., Disp: 90 tablet, Rfl: 3    olmesartan (BENICAR) 5 MG tablet, TAKE 1 TABLET BY MOUTH EVERY DAY, Disp: 90 tablet, Rfl: 3    rosuvastatin (CRESTOR) 5 MG tablet, TAKE 1 TABLET BY MOUTH EVERY DAY, Disp: 90 tablet, Rfl: 3    Fluticasone-Umeclidin-Vilant (TRELEGY) 100-62.5-25 MCG/ACT inhaler, Inhale 1 puff Daily., Disp: 3 each, Rfl: 3     Objective   Vital Signs:   /63 (BP Location: Right arm, Patient Position: Sitting, Cuff Size: Adult)   Pulse 79   Temp 97.7 øF (36.5 øC) (Tympanic)   Resp 18   Ht 180.3 cm (71\")   Wt 93.8 kg (206 lb 11.2 oz)   SpO2 97% Comment: room air/Nocturnal O2/ 2 liters  BMI 28.83 kg/mý     Mallampatti classification : 1  Physical Exam  Vital Signs Reviewed  WDWN, Alert, in no acute distress, normal conversational  HEENT:  PERRL, EOMI.  OP, nares clear, no sinus tenderness  Neck:  Supple, no JVD, no thyromegaly  Lymph: no axillary, cervical, supraclavicular lymphadenopathy noted bilaterally  Chest:  good aeration, bilateral diminished breath sounds, no wheezing, crackles or rhonchi, resonant to percussion b/l  CV: RRR, no MGR, pulses 2+, equal.  Abd:  Soft, NT, ND, + BS, no HSM  EXT:  no clubbing, no cyanosis, No BLE edema  Neuro:  A&Ox3, CN grossly intact, no focal deficits  Skin: No rashes or lesions noted     Result Review :   The following data was reviewed by: Guanako Phan MD on 08/15/2023:  Common labs          6/1/2023    07:22 6/1/2023    07:58 7/30/2023    09:54 7/31/2023    " 05:39   Common Labs   Glucose 117   137  111    BUN 22   27  27    Creatinine 1.49  1.60  1.47  1.40    Sodium 135   135  134    Potassium 4.7   4.3  4.1    Chloride 95   95  99    Calcium 10.4   9.8  8.9    Albumin 4.3   4.2     Total Bilirubin 0.5   0.5     Alkaline Phosphatase 69   83     AST (SGOT) 19   17     ALT (SGPT) 12   16     WBC 6.91   8.36  12.14    Hemoglobin 13.5   13.6  11.9    Hematocrit 39.6   40.0  35.7    Platelets 214   223  205    PSA <0.014         CMP          6/1/2023    07:22 6/1/2023    07:58 7/30/2023    09:54 7/31/2023    05:39   CMP   Glucose 117   137  111    BUN 22   27  27    Creatinine 1.49  1.60  1.47  1.40    EGFR 51.1  46.9  52.0  55.1    Sodium 135   135  134    Potassium 4.7   4.3  4.1    Chloride 95   95  99    Calcium 10.4   9.8  8.9    Total Protein 7.1   7.6     Albumin 4.3   4.2     Globulin 2.8   3.4     Total Bilirubin 0.5   0.5     Alkaline Phosphatase 69   83     AST (SGOT) 19   17     ALT (SGPT) 12   16     Albumin/Globulin Ratio 1.5   1.2     BUN/Creatinine Ratio 14.8   18.4  19.3    Anion Gap 9.8   11.6  9.6      CBC          6/1/2023    07:22 7/30/2023    09:54 7/31/2023    05:39   CBC   WBC 6.91  8.36  12.14    RBC 4.23  4.30  3.76    Hemoglobin 13.5  13.6  11.9    Hematocrit 39.6  40.0  35.7    MCV 93.6  93.0  94.9    MCH 31.9  31.6  31.6    MCHC 34.1  34.0  33.3    RDW 12.8  12.6  12.9    Platelets 214  223  205      CBC w/diff          6/1/2023    07:22 7/30/2023    09:54 7/31/2023    05:39   CBC w/Diff   WBC 6.91  8.36  12.14    RBC 4.23  4.30  3.76    Hemoglobin 13.5  13.6  11.9    Hematocrit 39.6  40.0  35.7    MCV 93.6  93.0  94.9    MCH 31.9  31.6  31.6    MCHC 34.1  34.0  33.3    RDW 12.8  12.6  12.9    Platelets 214  223  205    Neutrophil Rel % 69.4  91.5  80.3    Immature Granulocyte Rel % 0.3  0.4  0.3    Lymphocyte Rel % 19.1  4.8  9.7    Monocyte Rel % 9.8  3.0  8.9    Eosinophil Rel % 0.7  0.1  0.4    Basophil Rel % 0.7  0.2  0.4      Data reviewed  : Radiologic studies CT scan of chest from June 2023 was reviewed.        Narrative & Impression   PROCEDURE:  CT CHEST W CONTRAST DIAGNOSTIC     COMPARISON:  [facility], CT, CT CHEST W CONTRAST DIAGNOSTIC, 12/09/2022,   8:12.  [facility], CT, CT ABDOMEN PELVIS W CONTRAST, 12/09/2022, 8:12.  INDICATIONS:  FOLLOW UP BLADDER AND LUNG CA.     TECHNIQUE:    After obtaining the patient's consent, CT images were obtained with non-ionic   intravenous contrast material.       PROTOCOL:     Standard imaging protocol performed                 RADIATION:      DLP: 459.1mGy*cm               Automated exposure control was utilized to minimize radiation dose.   CONTRAST:      100cc Isovue 370 I.V.  LABS:   eGFR: 47ml/min/1.73m2     FINDINGS:          The patient is status post a left lobectomy procedure.  There is stable scarring again noted in the   left perihilar region.  There are moderate emphysematous changes.  There are no suspicious   pulmonary nodules or masses to indicate malignancy.  There are no layering pleural effusions.    There are atherosclerotic vascular calcifications which includes involvement of the coronary   arteries.  There are no enlarged hilar or mediastinal lymph nodes.  There is no axillary   adenopathy.  Findings beneath the hemidiaphragms were discussed under the separately dictated CT   abdomen and pelvis report.  There are left-sided rib deformities secondary to previous surgery.    There are no suspicious osteolytic or sclerotic lesions within the bony thorax.     IMPRESSION:                 1. Status post a left lobectomy procedure with stable scarring in the left perihilar region.  2. No evidence of metastatic disease.  3. Moderate emphysema.  4. Additional incidental findings as noted above.            GERTRUDIS LAMA MD         Electronically Signed and Approved By: GERTRUDIS LAMA MD on 6/01/2023 at 14:13                         Assessment and Plan     Diagnoses and all orders for this visit:    1. Chronic combined systolic (congestive) and diastolic (congestive) heart failure (Primary)  -     albuterol sulfate  (90 Base) MCG/ACT inhaler; Inhale 2 puffs Every 4 (Four) Hours As Needed for Wheezing.  Dispense: 18 g; Refill: 5  -     Fluticasone-Umeclidin-Vilant (TRELEGY) 100-62.5-25 MCG/ACT inhaler; Inhale 1 puff Daily.  Dispense: 3 each; Refill: 3  -     montelukast (SINGULAIR) 10 MG tablet; Take 1 tablet by mouth Every Night.  Dispense: 90 tablet; Refill: 3    2. Centrilobular emphysema  -     albuterol sulfate  (90 Base) MCG/ACT inhaler; Inhale 2 puffs Every 4 (Four) Hours As Needed for Wheezing.  Dispense: 18 g; Refill: 5  -     Fluticasone-Umeclidin-Vilant (TRELEGY) 100-62.5-25 MCG/ACT inhaler; Inhale 1 puff Daily.  Dispense: 3 each; Refill: 3  -     montelukast (SINGULAIR) 10 MG tablet; Take 1 tablet by mouth Every Night.  Dispense: 90 tablet; Refill: 3  -     Overnight Sleep Oximetry Study; Future    3. Shortness of breath  -     albuterol sulfate  (90 Base) MCG/ACT inhaler; Inhale 2 puffs Every 4 (Four) Hours As Needed for Wheezing.  Dispense: 18 g; Refill: 5  -     Fluticasone-Umeclidin-Vilant (TRELEGY) 100-62.5-25 MCG/ACT inhaler; Inhale 1 puff Daily.  Dispense: 3 each; Refill: 3  -     montelukast (SINGULAIR) 10 MG tablet; Take 1 tablet by mouth Every Night.  Dispense: 90 tablet; Refill: 3    4. Atrial fibrillation, chronic  -     albuterol sulfate  (90 Base) MCG/ACT inhaler; Inhale 2 puffs Every 4 (Four) Hours As Needed for Wheezing.  Dispense: 18 g; Refill: 5  -     Fluticasone-Umeclidin-Vilant (TRELEGY) 100-62.5-25 MCG/ACT inhaler; Inhale 1 puff Daily.  Dispense: 3 each; Refill: 3  -     montelukast (SINGULAIR) 10 MG tablet; Take 1 tablet by mouth Every Night.  Dispense: 90 tablet; Refill: 3    5. Adenocarcinoma of left lung  -     albuterol sulfate  (90 Base) MCG/ACT inhaler; Inhale 2 puffs Every 4 (Four) Hours As  Needed for Wheezing.  Dispense: 18 g; Refill: 5  -     Fluticasone-Umeclidin-Vilant (TRELEGY) 100-62.5-25 MCG/ACT inhaler; Inhale 1 puff Daily.  Dispense: 3 each; Refill: 3  -     montelukast (SINGULAIR) 10 MG tablet; Take 1 tablet by mouth Every Night.  Dispense: 90 tablet; Refill: 3    6. Suspected sleep apnea  -     albuterol sulfate  (90 Base) MCG/ACT inhaler; Inhale 2 puffs Every 4 (Four) Hours As Needed for Wheezing.  Dispense: 18 g; Refill: 5  -     Fluticasone-Umeclidin-Vilant (TRELEGY) 100-62.5-25 MCG/ACT inhaler; Inhale 1 puff Daily.  Dispense: 3 each; Refill: 3  -     montelukast (SINGULAIR) 10 MG tablet; Take 1 tablet by mouth Every Night.  Dispense: 90 tablet; Refill: 3    7. Pulmonary emphysema, unspecified emphysema type  -     albuterol sulfate  (90 Base) MCG/ACT inhaler; Inhale 2 puffs Every 4 (Four) Hours As Needed for Wheezing.  Dispense: 18 g; Refill: 5  -     Fluticasone-Umeclidin-Vilant (TRELEGY) 100-62.5-25 MCG/ACT inhaler; Inhale 1 puff Daily.  Dispense: 3 each; Refill: 3  -     montelukast (SINGULAIR) 10 MG tablet; Take 1 tablet by mouth Every Night.  Dispense: 90 tablet; Refill: 3    8. Chronic obstructive pulmonary disease, unspecified COPD type  -     albuterol sulfate  (90 Base) MCG/ACT inhaler; Inhale 2 puffs Every 4 (Four) Hours As Needed for Wheezing.  Dispense: 18 g; Refill: 5  -     Fluticasone-Umeclidin-Vilant (TRELEGY) 100-62.5-25 MCG/ACT inhaler; Inhale 1 puff Daily.  Dispense: 3 each; Refill: 3  -     montelukast (SINGULAIR) 10 MG tablet; Take 1 tablet by mouth Every Night.  Dispense: 90 tablet; Refill: 3  -     Overnight Sleep Oximetry Study; Future    9. Stage 3a chronic kidney disease  -     albuterol sulfate  (90 Base) MCG/ACT inhaler; Inhale 2 puffs Every 4 (Four) Hours As Needed for Wheezing.  Dispense: 18 g; Refill: 5  -     Fluticasone-Umeclidin-Vilant (TRELEGY) 100-62.5-25 MCG/ACT inhaler; Inhale 1 puff Daily.  Dispense: 3 each; Refill: 3  -      montelukast (SINGULAIR) 10 MG tablet; Take 1 tablet by mouth Every Night.  Dispense: 90 tablet; Refill: 3    10. Dyspnea, unspecified type  -     albuterol sulfate  (90 Base) MCG/ACT inhaler; Inhale 2 puffs Every 4 (Four) Hours As Needed for Wheezing.  Dispense: 18 g; Refill: 5  -     Fluticasone-Umeclidin-Vilant (TRELEGY) 100-62.5-25 MCG/ACT inhaler; Inhale 1 puff Daily.  Dispense: 3 each; Refill: 3  -     montelukast (SINGULAIR) 10 MG tablet; Take 1 tablet by mouth Every Night.  Dispense: 90 tablet; Refill: 3      COPD: I will switch his Stiolto and Qvar to Stiolto once daily.  Use albuterol as needed.  Keep himself as active as possible.  He quit smoking in 2010.    Continue Singulair once daily.    Chronic hypoxic respiratory failure: Patient is on nocturnal oxygen.  He recently had sleep study.  He is planned for CPAP machine.  I will check overnight oximetry on CPAP without oxygen.  We may be able to discontinue oxygen on next office visit.    Low-dose lung cancer screening CT scan already ordered for June 2024 by Dr. Fernando.    Follow Up   Return in about 6 months (around 2/15/2024).  Patient was given instructions and counseling regarding his condition or for health maintenance advice. Please see specific information pulled into the AVS if appropriate.       Electronically signed by Guanako Phan MD, 8/15/2023, 10:41 EDT.

## 2023-08-17 ENCOUNTER — TELEPHONE (OUTPATIENT)
Dept: PULMONOLOGY | Facility: CLINIC | Age: 68
End: 2023-08-17

## 2023-08-17 NOTE — TELEPHONE ENCOUNTER
Caller: Toribio Hayes    Relationship to patient: Self    Best call back number: [phone]    Patient is needing: PT WANTS TO KNOW IF WE HAVE SAMPLES FOR TRELEGY

## 2023-08-18 RX ORDER — CARVEDILOL 25 MG/1
TABLET ORAL
Qty: 180 TABLET | Refills: 3 | Status: SHIPPED | OUTPATIENT
Start: 2023-08-18

## 2023-08-22 ENCOUNTER — TELEPHONE (OUTPATIENT)
Dept: PULMONOLOGY | Facility: CLINIC | Age: 68
End: 2023-08-22

## 2023-08-22 NOTE — TELEPHONE ENCOUNTER
Caller: KOLTON HUMMEL     Relationship: PROVIDER     What orders are you requesting (i.e. lab or imaging): CLARIFICATION ON OXYMETRY     In what timeframe would the patient need to come in: UNSURE     Where will you receive your lab/imaging services: [facility]     Additional notes: [facility] OR [facility] - ORDERS FOR CPAP AND OVERNIGHT PULSE OXYMETRY BUT DOES NOT SPECIFY HOW THEY WANT OVERNIGHT DONE, WIFE HAS CALLED STATING THAT ONE WAS RECENTLY DONE. SHOULD THE ORDER BE FILLED AFTER CPAP OR IMMEDIATELY

## 2023-08-23 NOTE — TELEPHONE ENCOUNTER
Pt should have his overnight ox done on CPAP without oxygen.  I have sent a new order and spoke with Simona about this.

## 2023-08-25 PROCEDURE — 87086 URINE CULTURE/COLONY COUNT: CPT | Performed by: PHYSICIAN ASSISTANT

## 2023-08-26 ENCOUNTER — HOSPITAL ENCOUNTER (EMERGENCY)
Facility: HOSPITAL | Age: 68
Discharge: HOME | End: 2023-08-26
Attending: EMERGENCY MEDICINE
Payer: MEDICARE

## 2023-08-26 VITALS
HEART RATE: 84 BPM | SYSTOLIC BLOOD PRESSURE: 127 MMHG | OXYGEN SATURATION: 97 % | DIASTOLIC BLOOD PRESSURE: 60 MMHG | HEIGHT: 71 IN | BODY MASS INDEX: 29.01 KG/M2 | TEMPERATURE: 98 F | RESPIRATION RATE: 16 BRPM | WEIGHT: 207.23 LBS

## 2023-08-26 DIAGNOSIS — N39.0 ACUTE UTI (URINARY TRACT INFECTION): Primary | ICD-10-CM

## 2023-08-26 DIAGNOSIS — R33.8 ACUTE URINARY RETENTION: ICD-10-CM

## 2023-08-26 DIAGNOSIS — Z98.890 HISTORY OF BLADDER SURGERY: ICD-10-CM

## 2023-08-26 LAB
ALBUMIN SERPL-MCNC: 3.9 G/DL (ref 3.5–5.2)
ALBUMIN/GLOB SERPL: 1.1 G/DL
ALP SERPL-CCNC: 77 U/L (ref 39–117)
ALT SERPL W P-5'-P-CCNC: 26 U/L (ref 1–41)
ANION GAP SERPL CALCULATED.3IONS-SCNC: 10.3 MMOL/L (ref 5–15)
AST SERPL-CCNC: 21 U/L (ref 1–40)
BACTERIA UR QL AUTO: ABNORMAL /HPF
BASOPHILS # BLD AUTO: 0.03 10*3/MM3 (ref 0–0.2)
BASOPHILS NFR BLD AUTO: 0.3 % (ref 0–1.5)
BILIRUB SERPL-MCNC: 0.4 MG/DL (ref 0–1.2)
BILIRUB UR QL STRIP: NEGATIVE
BUN SERPL-MCNC: 26 MG/DL (ref 8–23)
BUN/CREAT SERPL: 16 (ref 7–25)
CALCIUM SPEC-SCNC: 10.1 MG/DL (ref 8.6–10.5)
CHLORIDE SERPL-SCNC: 95 MMOL/L (ref 98–107)
CLARITY UR: ABNORMAL
CO2 SERPL-SCNC: 28.7 MMOL/L (ref 22–29)
COLOR UR: YELLOW
CREAT SERPL-MCNC: 1.63 MG/DL (ref 0.76–1.27)
D-LACTATE SERPL-SCNC: 1.5 MMOL/L (ref 0.5–2)
DEPRECATED RDW RBC AUTO: 42.8 FL (ref 37–54)
EGFRCR SERPLBLD CKD-EPI 2021: 45.9 ML/MIN/1.73
EOSINOPHIL # BLD AUTO: 0.04 10*3/MM3 (ref 0–0.4)
EOSINOPHIL NFR BLD AUTO: 0.4 % (ref 0.3–6.2)
ERYTHROCYTE [DISTWIDTH] IN BLOOD BY AUTOMATED COUNT: 12.9 % (ref 12.3–15.4)
GLOBULIN UR ELPH-MCNC: 3.6 GM/DL
GLUCOSE SERPL-MCNC: 198 MG/DL (ref 65–99)
GLUCOSE UR STRIP-MCNC: NEGATIVE MG/DL
HCT VFR BLD AUTO: 37 % (ref 37.5–51)
HGB BLD-MCNC: 12.5 G/DL (ref 13–17.7)
HGB UR QL STRIP.AUTO: ABNORMAL
HOLD SPECIMEN: NORMAL
HOLD SPECIMEN: NORMAL
HYALINE CASTS UR QL AUTO: ABNORMAL /LPF
IMM GRANULOCYTES # BLD AUTO: 0.03 10*3/MM3 (ref 0–0.05)
IMM GRANULOCYTES NFR BLD AUTO: 0.3 % (ref 0–0.5)
KETONES UR QL STRIP: NEGATIVE
LEUKOCYTE ESTERASE UR QL STRIP.AUTO: ABNORMAL
LIPASE SERPL-CCNC: 22 U/L (ref 13–60)
LYMPHOCYTES # BLD AUTO: 1.25 10*3/MM3 (ref 0.7–3.1)
LYMPHOCYTES NFR BLD AUTO: 11.4 % (ref 19.6–45.3)
MCH RBC QN AUTO: 30.6 PG (ref 26.6–33)
MCHC RBC AUTO-ENTMCNC: 33.8 G/DL (ref 31.5–35.7)
MCV RBC AUTO: 90.5 FL (ref 79–97)
MONOCYTES # BLD AUTO: 0.88 10*3/MM3 (ref 0.1–0.9)
MONOCYTES NFR BLD AUTO: 8 % (ref 5–12)
NEUTROPHILS NFR BLD AUTO: 79.6 % (ref 42.7–76)
NEUTROPHILS NFR BLD AUTO: 8.76 10*3/MM3 (ref 1.7–7)
NITRITE UR QL STRIP: NEGATIVE
NRBC BLD AUTO-RTO: 0 /100 WBC (ref 0–0.2)
PH UR STRIP.AUTO: 7 [PH] (ref 5–8)
PLATELET # BLD AUTO: 255 10*3/MM3 (ref 140–450)
PMV BLD AUTO: 9.9 FL (ref 6–12)
POTASSIUM SERPL-SCNC: 4.4 MMOL/L (ref 3.5–5.2)
PROT SERPL-MCNC: 7.5 G/DL (ref 6–8.5)
PROT UR QL STRIP: ABNORMAL
RBC # BLD AUTO: 4.09 10*6/MM3 (ref 4.14–5.8)
RBC # UR STRIP: ABNORMAL /HPF
REF LAB TEST METHOD: ABNORMAL
SODIUM SERPL-SCNC: 134 MMOL/L (ref 136–145)
SP GR UR STRIP: 1.01 (ref 1–1.03)
SQUAMOUS #/AREA URNS HPF: ABNORMAL /HPF
UROBILINOGEN UR QL STRIP: ABNORMAL
WBC # UR STRIP: ABNORMAL /HPF
WBC NRBC COR # BLD: 10.99 10*3/MM3 (ref 3.4–10.8)
WHOLE BLOOD HOLD COAG: NORMAL
WHOLE BLOOD HOLD SPECIMEN: NORMAL

## 2023-08-26 PROCEDURE — 96365 THER/PROPH/DIAG IV INF INIT: CPT

## 2023-08-26 PROCEDURE — 83690 ASSAY OF LIPASE: CPT | Performed by: EMERGENCY MEDICINE

## 2023-08-26 PROCEDURE — 87086 URINE CULTURE/COLONY COUNT: CPT | Performed by: EMERGENCY MEDICINE

## 2023-08-26 PROCEDURE — 80053 COMPREHEN METABOLIC PANEL: CPT | Performed by: EMERGENCY MEDICINE

## 2023-08-26 PROCEDURE — 85025 COMPLETE CBC W/AUTO DIFF WBC: CPT

## 2023-08-26 PROCEDURE — 99283 EMERGENCY DEPT VISIT LOW MDM: CPT

## 2023-08-26 PROCEDURE — 83605 ASSAY OF LACTIC ACID: CPT | Performed by: EMERGENCY MEDICINE

## 2023-08-26 PROCEDURE — 25010000002 CEFTRIAXONE PER 250 MG: Performed by: EMERGENCY MEDICINE

## 2023-08-26 PROCEDURE — 81001 URINALYSIS AUTO W/SCOPE: CPT | Performed by: EMERGENCY MEDICINE

## 2023-08-26 PROCEDURE — 51702 INSERT TEMP BLADDER CATH: CPT

## 2023-08-26 RX ORDER — SODIUM CHLORIDE 0.9 % (FLUSH) 0.9 %
10 SYRINGE (ML) INJECTION AS NEEDED
Status: DISCONTINUED | OUTPATIENT
Start: 2023-08-26 | End: 2023-08-26 | Stop reason: HOSPADM

## 2023-08-26 RX ORDER — CEFTRIAXONE SODIUM 1 G/50ML
1000 INJECTION, SOLUTION INTRAVENOUS ONCE
Status: COMPLETED | OUTPATIENT
Start: 2023-08-26 | End: 2023-08-26

## 2023-08-26 RX ADMIN — CEFTRIAXONE SODIUM 1000 MG: 1 INJECTION, SOLUTION INTRAVENOUS at 09:47

## 2023-08-26 NOTE — DISCHARGE INSTRUCTIONS
Your blood work looks about the same as always but your urine sample today shows signs of a urinary tract infection, which can cause painful urination and urinary frequency and urgency.    However you also were noted on your bladder ultrasound to have a significant amount of urinary retention after you attempted to urinate so a catheter was placed.    Please call Dr. Randolph's urology clinic tomorrow morning for an urgent follow-up appointment to be seen.      For now, keep taking the previously prescribed cefdinir antibiotics while we are still waiting for the results of your urine culture performed yesterday.

## 2023-08-26 NOTE — ED PROVIDER NOTES
Time: 9:20 AM EDT  Date of encounter:  8/26/2023  Independent Historian/Clinical History and Information was obtained by:   Patient and Family    History is limited by: N/A    Chief Complaint: Dysuria, urinary frequency and retention          History of Present Illness:  Patient is a 68 y.o. year old male with history of bladder cancer status post neobladder surgery and prostatectomy about 5 years ago, who presents to the emergency department for evaluation of acute onset of painful dysuria, urinary frequency and urgency all night.    He denies any flank pain or abdominal pain or nausea or vomiting or fever or chills.    He does have previous history of UTI causing sepsis but does not feel ill at this time.    He does have chronic history of some mild urinary incontinence.        HPI    Patient Care Team  Primary Care Provider: Collin Cannon MD    Past Medical History:     No Known Allergies  Past Medical History:   Diagnosis Date    A-fib     DR KHALIL    Arthritis     RIGHT THUMB    Asthma     Bladder cancer     2018    Chronic diastolic congestive heart failure 12/06/2021    COPD (chronic obstructive pulmonary disease)     INHALER    Elevated serum creatinine 12/06/2021    Hyperlipidemia     ON MEDS    Hypertension     CONTROLLED W MEDS    Lung cancer     DR RAMIRES TOOK CARE OF THAT (2008)    Melanoma in situ of left lower leg     2022 removed    Mouth sore secondary to chemotherapy     Stage 3a chronic kidney disease 01/05/2022    Throat soreness     FROM CHEMO     Past Surgical History:   Procedure Laterality Date    COLONOSCOPY      CYSTECTOMY      FINGER DEBRIDEMENT Right     RIGHT THUMB    ILEAL LOOP NEOBLADDER      BLADDER MADE FROM HIS INTESTINES    LUNG REMOVAL, PARTIAL Left     LEFT UPPER - 2008 - SOA W EXERTION    PORTOCAVAL SHUNT PLACEMENT      2008 AND 2018    SENTINEL NODE BIOPSY N/A 06/23/2022    Procedure: Excision of left thigh melanoma with sentinel lymph node biopsy;  Surgeon: Dominic Ruiz  MD ASTRID;  Location: [facility] MAIN OR;  Service: General;  Laterality: N/A;    TURP / TRANSURETHRAL INCISION / DRAINAGE PROSTATE       Family History   Problem Relation Age of Onset    No Known Problems Mother         Mother     No Known Problems Father         Father  54 years old lung cancer/smoker    Prostate cancer Brother     Denise Hyperthermia Neg Hx        Home Medications:  Prior to Admission medications    Medication Sig Start Date End Date Taking? Authorizing Provider   albuterol sulfate  (90 Base) MCG/ACT inhaler Inhale 2 puffs Every 4 (Four) Hours As Needed for Wheezing. 8/15/23   Guanako Phan MD   carvedilol (COREG) 25 MG tablet TAKE 1 TABLET BY MOUTH TWICE A DAY 23   Artie Crandall MD   cefdinir (OMNICEF) 300 MG capsule Take 1 capsule by mouth 2 (Two) Times a Day for 7 days. 23  Giovanny Srinivasan PA   Diclofenac Sodium (VOLTAREN) 1 % gel gel Apply  topically to the appropriate area as directed 4 (Four) Times a Day As Needed. 23   ProviderRonn MD   Eliquis 5 MG tablet tablet TAKE 1 TABLET BY MOUTH TWICE A DAY 23   Artie Crandall MD   Fluticasone-Umeclidin-Vilant (TRELEGY) 100-62.5-25 MCG/ACT inhaler Inhale 1 puff Daily. 8/15/23   Guanako Phan MD   ipratropium-albuterol (DUO-NEB) 0.5-2.5 mg/3 ml nebulizer Take 3 mL by nebulization 4 (Four) Times a Day As Needed for Wheezing or Shortness of Air. 23   Mecca Judd APRN   montelukast (SINGULAIR) 10 MG tablet Take 1 tablet by mouth Every Night. 8/15/23   Guanako Phan MD   olmesartan (BENICAR) 5 MG tablet TAKE 1 TABLET BY MOUTH EVERY DAY 23   Artie Crandall MD   rosuvastatin (CRESTOR) 5 MG tablet TAKE 1 TABLET BY MOUTH EVERY DAY 23   Artie Crandall MD        Social History:   Social History     Tobacco Use    Smoking status: Former     Packs/day: 1.50     Years: 39.00     Pack years: 58.50     Types: Cigarettes     Quit date: 10/21/2008     Years since  "quittin.8    Smokeless tobacco: Never   Vaping Use    Vaping Use: Never used   Substance Use Topics    Alcohol use: Never    Drug use: Never         Review of Systems:  Review of Systems   I performed a 10 point review of systems which was all negative, except for the positives found in the HPI above.  Physical Exam:  /60 (BP Location: Left arm, Patient Position: Lying)   Pulse 84   Temp 98 øF (36.7 øC) (Oral)   Resp 16   Ht 180.3 cm (71\")   Wt 94 kg (207 lb 3.7 oz)   SpO2 97%   BMI 28.90 kg/mý     Physical Exam   General: Awake alert and in mild to moderate distress due to discomfort    HEENT: Head normocephalic atraumatic, eyes PERRLA EOMI, nose normal, oropharynx normal.    Neck: Supple full range of motion, no meningismus, no lymphadenopathy    Heart: Regular rate and rhythm, no murmurs or rubs, 2+ radial pulses bilaterally    Lungs: Clear to auscultation bilaterally without wheezes or crackles, no respiratory distress    Abdomen: Soft, nontender, some suprapubic fullness noted, no rebound or guarding    Skin: Warm, dry, no rash    Musculoskeletal: Normal range of motion, no lower extremity edema    Neurologic: Oriented x3, no motor deficits no sensory deficits    Psychiatric: Mood appears stable, no psychosis          Procedures:  Procedures      Medical Decision Making:      Comorbidities that affect care:    History of bladder cancer now status post neobladder    External Notes reviewed:    Previous Labs: Lab work shows mild elevated creatinine unchanged from baseline value.      The following orders were placed and all results were independently analyzed by me:  Orders Placed This Encounter   Procedures    Urine Culture - Urine, Urine, Clean Catch    Meno Draw    Comprehensive Metabolic Panel    Lipase    Urinalysis With Microscopic If Indicated (No Culture) - Urine, Clean Catch    Lactic Acid, Plasma    CBC Auto Differential    Urinalysis, Microscopic Only - Urine, Clean Catch    " Undress & Gown    Insert Indwelling Urinary Catheter    Leg Bag During The Day    CBC & Differential    Green Top (Gel)    Lavender Top    Gold Top - SST    Light Blue Top       Medications Given in the Emergency Department:  Medications   cefTRIAXone (ROCEPHIN) IVPB 1,000 mg (0 mg Intravenous Stopped 8/26/23 1041)        ED Course:         Labs:    Lab Results (last 24 hours)       Procedure Component Value Units Date/Time    CBC & Differential [[id number]]  (Abnormal) Collected: 08/26/23 0725    Specimen: Blood Updated: 08/26/23 0735    Narrative:      The following orders were created for panel order CBC & Differential.  Procedure                               Abnormality         Status                     ---------                               -----------         ------                     CBC Auto Differential[[id number]]        Abnormal            Final result                 Please view results for these tests on the individual orders.    Comprehensive Metabolic Panel [[id number]]  (Abnormal) Collected: 08/26/23 0725    Specimen: Blood Updated: 08/26/23 0810     Glucose 198 mg/dL      BUN 26 mg/dL      Creatinine 1.63 mg/dL      Sodium 134 mmol/L      Potassium 4.4 mmol/L      Chloride 95 mmol/L      CO2 28.7 mmol/L      Calcium 10.1 mg/dL      Total Protein 7.5 g/dL      Albumin 3.9 g/dL      ALT (SGPT) 26 U/L      AST (SGOT) 21 U/L      Alkaline Phosphatase 77 U/L      Total Bilirubin 0.4 mg/dL      Globulin 3.6 gm/dL      A/G Ratio 1.1 g/dL      BUN/Creatinine Ratio 16.0     Anion Gap 10.3 mmol/L      eGFR 45.9 mL/min/1.73     Narrative:      GFR Normal >60  Chronic Kidney Disease <60  Kidney Failure <15      Lipase [[id number]]  (Normal) Collected: 08/26/23 0725    Specimen: Blood Updated: 08/26/23 0810     Lipase 22 U/L     Lactic Acid, Plasma [[id number]]  (Normal) Collected: 08/26/23 0725    Specimen: Blood Updated: 08/26/23 0808     Lactate 1.5 mmol/L     CBC Auto Differential [[id number]]  (Abnormal)  Collected: 08/26/23 0725    Specimen: Blood Updated: 08/26/23 0735     WBC 10.99 10*3/mm3      RBC 4.09 10*6/mm3      Hemoglobin 12.5 g/dL      Hematocrit 37.0 %      MCV 90.5 fL      MCH 30.6 pg      MCHC 33.8 g/dL      RDW 12.9 %      RDW-SD 42.8 fl      MPV 9.9 fL      Platelets 255 10*3/mm3      Neutrophil % 79.6 %      Lymphocyte % 11.4 %      Monocyte % 8.0 %      Eosinophil % 0.4 %      Basophil % 0.3 %      Immature Grans % 0.3 %      Neutrophils, Absolute 8.76 10*3/mm3      Lymphocytes, Absolute 1.25 10*3/mm3      Monocytes, Absolute 0.88 10*3/mm3      Eosinophils, Absolute 0.04 10*3/mm3      Basophils, Absolute 0.03 10*3/mm3      Immature Grans, Absolute 0.03 10*3/mm3      nRBC 0.0 /100 WBC     Urinalysis With Microscopic If Indicated (No Culture) - Urine, Clean Catch [[id number]]  (Abnormal) Collected: 08/26/23 0830    Specimen: Urine, Clean Catch Updated: 08/26/23 0839     Color, UA Yellow     Appearance, UA Turbid     pH, UA 7.0     Specific Gravity, UA 1.012     Glucose, UA Negative     Ketones, UA Negative     Bilirubin, UA Negative     Blood, UA Large (3+)     Protein, UA 30 mg/dL (1+)     Leuk Esterase, UA Large (3+)     Nitrite, UA Negative     Urobilinogen, UA 0.2 E.U./dL    Urinalysis, Microscopic Only - Urine, Clean Catch [[id number]]  (Abnormal) Collected: 08/26/23 0830    Specimen: Urine, Clean Catch Updated: 08/26/23 0839     RBC, UA Too Numerous to Count /HPF      WBC, UA Too Numerous to Count /HPF      Bacteria, UA 1+ /HPF      Squamous Epithelial Cells, UA 0-2 /HPF      Hyaline Casts, UA 3-6 /LPF      Methodology Automated Microscopy    Urine Culture - Urine, Urine, Clean Catch [[id number]] Collected: 08/26/23 0830    Specimen: Urine, Clean Catch Updated: 08/26/23 0908             Imaging:    No Radiology Exams Resulted Within Past 24 Hours      Differential Diagnosis and Discussion:    Dysuria: Differential diagnosis includes but is not limited to urethritis, cystitis, pyelonephritis,  ureteral calculi, neoplasm, chemical irritant, urethral stricture, and trauma    All labs were reviewed and interpreted by me.    MDM     Amount and/or Complexity of Data Reviewed  Clinical lab tests: reviewed  Decide to obtain previous medical records or to obtain history from someone other than the patient: yes             This patient is a pleasant 68-year-old male with previous history of bladder cancer now status post neobladder over 5 years ago and followed now by [facility] urologist Dr. Randolph.    He is presenting with painful dysuria and urinary urgency and frequency all night.    He was actually seen at outside [facility] urgent care yesterday for similar symptoms and found to have a UTI and started on cefdinir antibiotic.    I am also giving him a one-time dose of IV ceftriaxone in the ED for his UTI seen here.    Urinalysis today shows large leukocyte esterase and too numerous to count red blood cells and white blood cells and some bacteria.    Lactic acid is normal and vital signs are unremarkable and no obvious fever or sepsis seen.    I do not see any acute renal failure and he has creatinine of 1.6 which is essentially his baseline CKD.        I performed a bedside bladder ultrasound after he attempted to urinate only a tiny amount and it looks like he is retaining a significant amount in his urinary bladder and we will have to initially place Guajardo catheter for his acute urinary retention.      After placement of urinary catheter in ED he had output of 900 cc of urine and felt significantly better.    I will leave the catheter in and attached to a leg bag so we can be discharged home and follow-up with his urologist.    I will have him continue his cefdinir antibiotics and call his urologist in [facility] tomorrow morning for an urgent follow-up appointment.              Patient Care Considerations:          Consultants/Shared Management Plan:        Social Determinants of  Health:    Patient has presented with family members who are responsible, reliable and will ensure follow up care.      Disposition and Care Coordination:    Discharged: I considered escalation of care by admitting this patient for observation, however the patient has improved and is suitable and  stable for discharge.    I have explained the patient's condition, diagnoses and treatment plan based on the information available to me at this time. I have answered questions and addressed any concerns. The patient has a good  understanding of the patient's diagnosis, condition, and treatment plan as can be expected at this point. The vital signs have been stable. The patient's condition is stable and appropriate for discharge from the emergency department.      The patient will pursue further outpatient evaluation with the primary care physician or other designated or consulting physician as outlined in the discharge instructions. They are agreeable to this plan of care and follow-up instructions have been explained in detail. The patient has received these instructions in written format and have expressed an understanding of the discharge instructions. The patient is aware that any significant change in condition or worsening of symptoms should prompt an immediate return to this or the closest emergency department or call to 911.  I have explained discharge medications and the need for follow up with the patient/caretakers. This was also printed in the discharge instructions. Patient was discharged with the following medications and follow up:      Medication List      No changes were made to your prescriptions during this visit.      Roberto Randolph MD  [facility]– [place] [address] [place] [address]  [phone]    Call in 1 day  for a follow-up appointment       Final diagnoses:   Acute UTI (urinary tract infection)   History of bladder surgery   Acute urinary retention        ED Disposition       ED Disposition    Discharge    Condition   Stable    Comment   --               This medical record created using voice recognition software.             El Morel MD  08/26/23 8300

## 2023-08-27 ENCOUNTER — HOSPITAL ENCOUNTER (EMERGENCY)
Facility: HOSPITAL | Age: 68
Discharge: HOME | End: 2023-08-27
Attending: EMERGENCY MEDICINE | Admitting: EMERGENCY MEDICINE
Payer: MEDICARE

## 2023-08-27 VITALS
SYSTOLIC BLOOD PRESSURE: 102 MMHG | WEIGHT: 206.79 LBS | OXYGEN SATURATION: 98 % | HEART RATE: 89 BPM | RESPIRATION RATE: 18 BRPM | TEMPERATURE: 97.8 F | BODY MASS INDEX: 28.95 KG/M2 | DIASTOLIC BLOOD PRESSURE: 67 MMHG | HEIGHT: 71 IN

## 2023-08-27 DIAGNOSIS — T83.9XXA PROBLEM WITH FOLEY CATHETER, INITIAL ENCOUNTER: Primary | ICD-10-CM

## 2023-08-27 LAB — BACTERIA SPEC AEROBE CULT: NO GROWTH

## 2023-08-27 PROCEDURE — 99282 EMERGENCY DEPT VISIT SF MDM: CPT

## 2023-08-27 NOTE — ED PROVIDER NOTES
Time: 9:15 AM EDT  Date of encounter:  8/27/2023  Independent Historian/Clinical History and Information was obtained by:   Patient    History is limited by: N/A    Chief Complaint: Guajardo catheter not working      History of Present Illness:  Patient is a 68 y.o. year old male who presents to the emergency department for evaluation of a catheter.  Patient has history of bladder cancer and status post neobladder surgery and prostatectomy about 5 years ago and sees a urologist up in [place].  Patient was seen in urgent care and started on cefdinir for an acute urinary tract infection 2 days ago.  Patient was seen yesterday in this emergency department with complaints of dysuria and frequency.  Patient had urinary retention based on a bladder scan showing 900 cc of urine.  A Guajardo catheter was placed.  It is returning today stating that there was blood and clots present and that the catheter is not flowing.    HPI    Patient Care Team  Primary Care Provider: Collin Cannon MD    Past Medical History:     No Known Allergies  Past Medical History:   Diagnosis Date    A-fib     DR KHALIL    Arthritis     RIGHT THUMB    Asthma     Bladder cancer     2018    Chronic diastolic congestive heart failure 12/06/2021    COPD (chronic obstructive pulmonary disease)     INHALER    Elevated serum creatinine 12/06/2021    Hyperlipidemia     ON MEDS    Hypertension     CONTROLLED W MEDS    Lung cancer     DR RAMIRES TOOK CARE OF THAT (2008)    Melanoma in situ of left lower leg     2022 removed    Mouth sore secondary to chemotherapy     Stage 3a chronic kidney disease 01/05/2022    Throat soreness     FROM CHEMO     Past Surgical History:   Procedure Laterality Date    COLONOSCOPY      CYSTECTOMY      FINGER DEBRIDEMENT Right     RIGHT THUMB    ILEAL LOOP NEOBLADDER      BLADDER MADE FROM HIS INTESTINES    LUNG REMOVAL, PARTIAL Left     LEFT UPPER - 2008 - SOA W EXERTION    PORTOCAVAL SHUNT PLACEMENT      2008 AND 2018     SENTINEL NODE BIOPSY N/A 2022    Procedure: Excision of left thigh melanoma with sentinel lymph node biopsy;  Surgeon: Dominic Ruiz MD;  Location: [facility] MAIN OR;  Service: General;  Laterality: N/A;    TURP / TRANSURETHRAL INCISION / DRAINAGE PROSTATE       Family History   Problem Relation Age of Onset    No Known Problems Mother         Mother     No Known Problems Father         Father  54 years old lung cancer/smoker    Prostate cancer Brother     Malig Hyperthermia Neg Hx        Home Medications:  Prior to Admission medications    Medication Sig Start Date End Date Taking? Authorizing Provider   albuterol sulfate  (90 Base) MCG/ACT inhaler Inhale 2 puffs Every 4 (Four) Hours As Needed for Wheezing. 8/15/23   Guanako Phan MD   carvedilol (COREG) 25 MG tablet TAKE 1 TABLET BY MOUTH TWICE A DAY 23   Artie Crandall MD   cefdinir (OMNICEF) 300 MG capsule Take 1 capsule by mouth 2 (Two) Times a Day for 7 days. 23  Giovanny Srinivasan PA   Diclofenac Sodium (VOLTAREN) 1 % gel gel Apply  topically to the appropriate area as directed 4 (Four) Times a Day As Needed. 23   Provider, MD Ronn   Eliquis 5 MG tablet tablet TAKE 1 TABLET BY MOUTH TWICE A DAY 23   Artie Crandall MD   Fluticasone-Umeclidin-Vilant (TRELEGY) 100-62.5-25 MCG/ACT inhaler Inhale 1 puff Daily. 8/15/23   Guanako Phan MD   ipratropium-albuterol (DUO-NEB) 0.5-2.5 mg/3 ml nebulizer Take 3 mL by nebulization 4 (Four) Times a Day As Needed for Wheezing or Shortness of Air. 23   Mecca Judd APRN   montelukast (SINGULAIR) 10 MG tablet Take 1 tablet by mouth Every Night. 8/15/23   Guanako Phan MD   olmesartan (BENICAR) 5 MG tablet TAKE 1 TABLET BY MOUTH EVERY DAY 23   Artie Crandall MD   rosuvastatin (CRESTOR) 5 MG tablet TAKE 1 TABLET BY MOUTH EVERY DAY 23   Artie Crandall MD        Social History:   Social History     Tobacco Use    Smoking  "status: Former     Packs/day: 1.50     Years: 39.00     Pack years: 58.50     Types: Cigarettes     Quit date: 10/21/2008     Years since quittin.8    Smokeless tobacco: Never   Vaping Use    Vaping Use: Never used   Substance Use Topics    Alcohol use: Never    Drug use: Never         Review of Systems:  Review of Systems   Genitourinary:  Positive for difficulty urinating.      Physical Exam:  /67 (BP Location: Left arm, Patient Position: Sitting)   Pulse 89   Temp 97.8 øF (36.6 øC) (Oral)   Resp 18   Ht 180.3 cm (71\")   Wt 93.8 kg (206 lb 12.7 oz)   SpO2 98%   BMI 28.84 kg/mý     Physical Exam  Vitals and nursing note reviewed.   Constitutional:       General: He is not in acute distress.  HENT:      Head: Normocephalic.   Cardiovascular:      Rate and Rhythm: Normal rate and regular rhythm.   Pulmonary:      Effort: Pulmonary effort is normal. No respiratory distress.      Breath sounds: Normal breath sounds.   Abdominal:      Palpations: Abdomen is soft.      Tenderness: There is no abdominal tenderness.   Musculoskeletal:         General: Normal range of motion.   Skin:     General: Skin is warm and dry.   Neurological:      Mental Status: He is alert and oriented to person, place, and time. Mental status is at baseline.                Procedures:  Procedures      Medical Decision Making:      Comorbidities that affect care:    Cancer    External Notes reviewed:    Previous ED Note: Patient was seen in this emergency department yesterday and a Guajardo catheter was placed for urinary retention      The following orders were placed and all results were independently analyzed by me:  No orders of the defined types were placed in this encounter.      Medications Given in the Emergency Department:  Medications - No data to display     ED Course:         Labs:    Lab Results (last 24 hours)       ** No results found for the last 24 hours. **             Imaging:    No Radiology Exams Resulted Within " Past 24 Hours      Differential Diagnosis and Discussion:    Guajardo catheter malfunction        MDM  Nursing staff was successfully able to irrigate the patient's Guajardo is now functioning properly.  Patient stable for discharge.        Patient Care Considerations:    LABS: I considered ordering labs, however patient had lab work done yesterday which was reviewed.      Consultants/Shared Management Plan:    None    Social Determinants of Health:    Patient is independent, reliable, and has access to care.       Disposition and Care Coordination:    Discharged: The patient is suitable and stable for discharge with no need for consideration of observation or admission.        Final diagnoses:   Problem with Guajardo catheter, initial encounter        ED Disposition       ED Disposition   Discharge    Condition   Stable    Comment   --               This medical record created using voice recognition software.             Jimmy Crawley DO  08/27/23 1015

## 2023-08-29 ENCOUNTER — TRANSCRIBE ORDERS (OUTPATIENT)
Dept: ADMINISTRATIVE | Facility: HOSPITAL | Age: 68
End: 2023-08-29
Payer: MEDICARE

## 2023-08-29 DIAGNOSIS — R33.9 RETENTION OF URINE, UNSPECIFIED: Primary | ICD-10-CM

## 2023-08-31 ENCOUNTER — HOSPITAL ENCOUNTER (OUTPATIENT)
Dept: CT IMAGING | Facility: HOSPITAL | Age: 68
Discharge: HOME | End: 2023-08-31
Payer: MEDICARE

## 2023-08-31 DIAGNOSIS — R33.9 RETENTION OF URINE, UNSPECIFIED: ICD-10-CM

## 2023-08-31 PROCEDURE — 25510000001 IOPAMIDOL PER 1 ML

## 2023-08-31 PROCEDURE — 74178 CT ABD&PLV WO CNTR FLWD CNTR: CPT

## 2023-08-31 RX ADMIN — IOPAMIDOL 100 ML: 755 INJECTION, SOLUTION INTRAVENOUS at 09:14

## 2023-09-06 ENCOUNTER — TELEPHONE (OUTPATIENT)
Dept: CARDIOLOGY | Facility: CLINIC | Age: 68
End: 2023-09-06
Payer: MEDICARE

## 2023-09-06 NOTE — TELEPHONE ENCOUNTER
Procedure: Cystoscopy w/ possible biopsy    Medication Directive: Eliquis    PMH: CHF, HTN, HLD, PAF    Last Seen: 04/06/2023    ECHO: 02/11/22  Estimated left ventricular EF was in agreement with the calculated left ventricular EF. Left ventricular ejection fraction appears to be 56 - 60%. Left ventricular systolic function is normal.  Left ventricular diastolic function is consistent with (grade I) impaired relaxation.  Mild mitral regurgitation is noted

## 2023-09-07 NOTE — TELEPHONE ENCOUNTER
Is low risk for perioperative complications from a cardiac standpoint is okay to hold Eliquis for 2 days prior to the procedure and resume afterwards when it safe to do so.

## 2023-09-18 ENCOUNTER — TRANSCRIBE ORDERS (OUTPATIENT)
Dept: ADMINISTRATIVE | Facility: HOSPITAL | Age: 68
End: 2023-09-18
Payer: MEDICARE

## 2023-09-18 DIAGNOSIS — C68.0 CANCER OF URETHRA: Primary | ICD-10-CM

## 2023-09-22 ENCOUNTER — OFFICE VISIT (OUTPATIENT)
Dept: SLEEP MEDICINE | Facility: HOSPITAL | Age: 68
End: 2023-09-22
Payer: MEDICARE

## 2023-09-22 VITALS
HEART RATE: 79 BPM | HEIGHT: 71 IN | WEIGHT: 206.1 LBS | SYSTOLIC BLOOD PRESSURE: 129 MMHG | DIASTOLIC BLOOD PRESSURE: 72 MMHG | BODY MASS INDEX: 28.85 KG/M2 | OXYGEN SATURATION: 96 %

## 2023-09-22 DIAGNOSIS — G47.33 OSA (OBSTRUCTIVE SLEEP APNEA): Primary | ICD-10-CM

## 2023-09-22 PROCEDURE — 3078F DIAST BP <80 MM HG: CPT | Performed by: INTERNAL MEDICINE

## 2023-09-22 PROCEDURE — 3074F SYST BP LT 130 MM HG: CPT | Performed by: INTERNAL MEDICINE

## 2023-09-22 PROCEDURE — G0463 HOSPITAL OUTPT CLINIC VISIT: HCPCS

## 2023-09-22 RX ORDER — PSEUDOEPHEDRINE HCL 30 MG
100 TABLET ORAL
COMMUNITY
Start: 2023-09-01

## 2023-09-22 RX ORDER — PROCHLORPERAZINE MALEATE 10 MG
10 TABLET ORAL
COMMUNITY
Start: 2023-10-04

## 2023-09-22 RX ORDER — TIOTROPIUM BROMIDE AND OLODATEROL 3.124; 2.736 UG/1; UG/1
SPRAY, METERED RESPIRATORY (INHALATION)
COMMUNITY
Start: 2023-09-01

## 2023-09-22 RX ORDER — DEXAMETHASONE 4 MG/1
4 TABLET ORAL
COMMUNITY
Start: 2023-10-04

## 2023-09-22 RX ORDER — BECLOMETHASONE DIPROPIONATE HFA 40 UG/1
AEROSOL, METERED RESPIRATORY (INHALATION)
COMMUNITY
Start: 2023-09-15

## 2023-09-22 NOTE — PROGRESS NOTES
[facility]                          Chief Complaint:   F/up PATI    History of present illness:   Subjective     This is a 68 year old male patient with hx of of A fib and HTN.  Nocturnal hypoxemia, on oxygen 2 L/min at night.  COPD (FEV1 2022: 29% with RV of 166%). Symptoms consist of snoring, frequent awakenings and leg jerks. BMI=30.     Split-night PSG 2023: AHI 40/H; RDI 84/h; Mike SpO2 72%.   Mask used: Large F&P FFM .  Hypoxic burden = 16 minutes.    He started CPAP 2023. He uses it regularly. No issues with it.         Sleep schedule:  -Bedtime: 10-10:30 PM   -Sleep latency: Not long  -Wake up time: 5 AM, does not feel refreshed  -Nocturnal awakenin times because of nocturia.  No difficulties going back to sleep.    ESS: Total score: 9     Concerning COPD, he continues to use his inhalers regularly.  No recent exacerbation.        Past Medical History:  Past Medical History:   Diagnosis Date    A-fib     DR KHALIL    Arthritis     RIGHT THUMB    Asthma     Bladder cancer     2018    Chronic diastolic congestive heart failure 2021    COPD (chronic obstructive pulmonary disease)     INHALER    Elevated serum creatinine 2021    Hyperlipidemia     ON MEDS    Hypertension     CONTROLLED W MEDS    Lung cancer     DR RAMIRES TOOK CARE OF THAT ()    Melanoma in situ of left lower leg      removed    Mouth sore secondary to chemotherapy     Stage 3a chronic kidney disease 2022    Throat soreness     FROM CHEMO   ,   Past Surgical History:   Procedure Laterality Date    COLONOSCOPY      CYSTECTOMY      FINGER DEBRIDEMENT Right     RIGHT THUMB    ILEAL LOOP NEOBLADDER      BLADDER MADE FROM HIS INTESTINES    LUNG REMOVAL, PARTIAL Left     LEFT UPPER - 2008 - SOA W EXERTION    PORTOCAVAL SHUNT PLACEMENT       AND     SENTINEL NODE BIOPSY N/A 2022    Procedure: Excision of left thigh melanoma with sentinel lymph node biopsy;   Surgeon: Dominic Ruiz MD;  Location: [facility] MAIN OR;  Service: General;  Laterality: N/A;    TURP / TRANSURETHRAL INCISION / DRAINAGE PROSTATE     ,   Social History     Socioeconomic History    Marital status:    Tobacco Use    Smoking status: Former     Packs/day: 1.50     Years: 39.00     Pack years: 58.50     Types: Cigarettes     Quit date: 10/21/2008     Years since quittin.9    Smokeless tobacco: Never   Vaping Use    Vaping Use: Never used   Substance and Sexual Activity    Alcohol use: Never    Drug use: Never    Sexual activity: Yes     Partners: Female     E-cigarette/Vaping    E-cigarette/Vaping Use Never User      E-cigarette/Vaping Substances    Nicotine No     THC No     CBD No     Flavoring No      E-cigarette/Vaping Devices    Disposable No     Pre-filled or Refillable Cartridge No     Refillable Tank No     Pre-filled Pod No          , and Allergies:  Patient has no known allergies.    Medication Review:     Current Outpatient Medications:     albuterol sulfate  (90 Base) MCG/ACT inhaler, Inhale 2 puffs Every 4 (Four) Hours As Needed for Wheezing., Disp: 18 g, Rfl: 5    Beclomethasone Diprop HFA (Qvar RediHaler) 40 MCG/ACT inhaler, 31 Gram, 0 Refill(s), INHALE 2 PUFFS TWICE A DAY, 0 Refill(s), Disp: , Rfl:     carvedilol (COREG) 25 MG tablet, TAKE 1 TABLET BY MOUTH TWICE A DAY, Disp: 180 tablet, Rfl: 3    Diclofenac Sodium (VOLTAREN) 1 % gel gel, Apply  topically to the appropriate area as directed 4 (Four) Times a Day As Needed., Disp: , Rfl:     docusate sodium 100 MG capsule, 1 capsule., Disp: , Rfl:     Eliquis 5 MG tablet tablet, TAKE 1 TABLET BY MOUTH TWICE A DAY, Disp: 180 tablet, Rfl: 3    Fluticasone-Umeclidin-Vilant (TRELEGY) 100-62.5-25 MCG/ACT inhaler, Inhale 1 puff Daily., Disp: 3 each, Rfl: 3    ipratropium-albuterol (DUO-NEB) 0.5-2.5 mg/3 ml nebulizer, Take 3 mL by nebulization 4 (Four) Times a Day As Needed for Wheezing or Shortness of Air., Disp: 360 mL, Rfl:  "11    montelukast (SINGULAIR) 10 MG tablet, Take 1 tablet by mouth Every Night., Disp: 90 tablet, Rfl: 3    olmesartan (BENICAR) 5 MG tablet, TAKE 1 TABLET BY MOUTH EVERY DAY, Disp: 90 tablet, Rfl: 3    rosuvastatin (CRESTOR) 5 MG tablet, TAKE 1 TABLET BY MOUTH EVERY DAY, Disp: 90 tablet, Rfl: 3    tiotropium bromide-olodaterol (Stiolto Respimat) 2.5-2.5 MCG/ACT aerosol solution inhaler, 2 Puff, Inhalation, Inh, Daily, # 1 Each, 0 Refill(s), Disp: , Rfl:     [START ON 10/4/2023] dexAMETHasone (DECADRON) 4 MG tablet, 1 tablet., Disp: , Rfl:     [START ON 10/4/2023] prochlorperazine (COMPAZINE) 10 MG tablet, 1 tablet., Disp: , Rfl:       Objective   Vital Signs:  Vitals:    09/22/23 1300   BP: 129/72   Pulse: 79   SpO2: 96%   Weight: 93.5 kg (206 lb 1.6 oz)   Height: 180.3 cm (70.98\")     Body mass index is 28.76 kg/m².          Physical Exam:   General Appearance:    Alert, cooperative, in no acute distress   ENMT:  Freidman score 3, narrow distance in between the posterior pharyngeal pillars    Neck:  Trachea midline. No thyromegaly.   Lungs:   Equal but diminished air entry throughout. Mild wheezing.    Heart:    Regular rhythm and normal rate, normal S1 and S2, no Murmur.   Abdomen:     Obese.  Soft.  No tenderness.  No HSM    Neuro:   Conscious, alert, oriented x3. Appropriate mood and affect.    Extremities:   Moves all extremities well, no edema, no cyanosis, no Redness              Diagnostic data:      Download over the last 30 days:  Usage= 93%  Usage>4h= 90%  Usage= 5h 25 min  Leak=44  AHI=2.4  P95%=11.6    No results found for: HGBA1C  Total Cholesterol   Date Value Ref Range Status   04/04/2023 151 0 - 200 mg/dL Final     Triglycerides   Date Value Ref Range Status   04/04/2023 77 0 - 150 mg/dL Final     HDL Cholesterol   Date Value Ref Range Status   04/04/2023 52 40 - 60 mg/dL Final     Hemoglobin   Date Value Ref Range Status   08/26/2023 12.5 (L) 13.0 - 17.7 g/dL Final     CO2   Date Value Ref Range " Status   08/26/2023 28.7 22.0 - 29.0 mmol/L Final        Assessment   Severe PATI  COPD, no current exacerbation        PLAN:    Discussed the result of the download.   Patient is compliant with therapy and clinically benefit from treatment.  Patient was encouraged to continue using PAP.  Refill supplies.  Counseled for weight loss    Continue inhaler therapy as above.              This note was dictated utilizing [employer] dictation

## 2023-10-02 ENCOUNTER — OFFICE VISIT (OUTPATIENT)
Dept: CARDIOLOGY | Facility: CLINIC | Age: 68
End: 2023-10-02
Payer: MEDICARE

## 2023-10-02 VITALS
HEIGHT: 70 IN | HEART RATE: 67 BPM | DIASTOLIC BLOOD PRESSURE: 81 MMHG | BODY MASS INDEX: 30.21 KG/M2 | WEIGHT: 211 LBS | SYSTOLIC BLOOD PRESSURE: 130 MMHG

## 2023-10-02 DIAGNOSIS — E78.2 MIXED HYPERLIPIDEMIA: Chronic | ICD-10-CM

## 2023-10-02 DIAGNOSIS — I48.0 PAROXYSMAL ATRIAL FIBRILLATION: ICD-10-CM

## 2023-10-02 DIAGNOSIS — I10 PRIMARY HYPERTENSION: Primary | Chronic | ICD-10-CM

## 2023-10-02 PROCEDURE — 99214 OFFICE O/P EST MOD 30 MIN: CPT | Performed by: INTERNAL MEDICINE

## 2023-10-02 PROCEDURE — 3079F DIAST BP 80-89 MM HG: CPT | Performed by: INTERNAL MEDICINE

## 2023-10-02 PROCEDURE — 3075F SYST BP GE 130 - 139MM HG: CPT | Performed by: INTERNAL MEDICINE

## 2023-10-02 NOTE — PROGRESS NOTES
Chief Complaint  Chronic combined systolic (congestive) and diastolic (conge    Subjective        Toribio Hayes presents to [facility] CARDIOLOGY  History of present illness:    Patient states that since seeing us he was diagnosed with urethral cancer.  He is scheduled to undergo 2 cycles of chemotherapy.  He notes no palpitations or bleeding problems.  He notes no exertional chest pain.  He denies any edema.      Past Medical History:   Diagnosis Date    A-fib     DR KHALIL    Arthritis     RIGHT THUMB    Asthma     Bladder cancer         Chronic diastolic congestive heart failure 2021    COPD (chronic obstructive pulmonary disease)     INHALER    Elevated serum creatinine 2021    Hyperlipidemia     ON MEDS    Hypertension     CONTROLLED W MEDS    Lung cancer     DR RAMIRES TOOK CARE OF THAT ()    Melanoma in situ of left lower leg      removed    Mouth sore secondary to chemotherapy     Stage 3a chronic kidney disease 2022    Throat soreness     FROM CHEMO         Past Surgical History:   Procedure Laterality Date    COLONOSCOPY      CYSTECTOMY      FINGER DEBRIDEMENT Right     RIGHT THUMB    ILEAL LOOP NEOBLADDER      BLADDER MADE FROM HIS INTESTINES    LUNG REMOVAL, PARTIAL Left     LEFT UPPER - 2008 - SOA W EXERTION    PORTOCAVAL SHUNT PLACEMENT       AND     SENTINEL NODE BIOPSY N/A 2022    Procedure: Excision of left thigh melanoma with sentinel lymph node biopsy;  Surgeon: Dominic Ruiz MD;  Location: [facility] MAIN OR;  Service: General;  Laterality: N/A;    TURP / TRANSURETHRAL INCISION / DRAINAGE PROSTATE            Social History     Socioeconomic History    Marital status:    Tobacco Use    Smoking status: Former     Packs/day: 1.50     Years: 39.00     Pack years: 58.50     Types: Cigarettes     Quit date: 10/21/2008     Years since quittin.9    Smokeless tobacco: Never   Vaping Use    Vaping Use: Never used   Substance and Sexual  Activity    Alcohol use: Never    Drug use: Never    Sexual activity: Yes     Partners: Female         Family History   Problem Relation Age of Onset    No Known Problems Mother         Mother     No Known Problems Father         Father  54 years old lung cancer/smoker    Prostate cancer Brother     Malrandy Hyperthermia Neg Hx           No Known Allergies         Current Outpatient Medications:     albuterol sulfate  (90 Base) MCG/ACT inhaler, Inhale 2 puffs Every 4 (Four) Hours As Needed for Wheezing., Disp: 18 g, Rfl: 5    Beclomethasone Diprop HFA (Qvar RediHaler) 40 MCG/ACT inhaler, 31 Gram, 0 Refill(s), INHALE 2 PUFFS TWICE A DAY, 0 Refill(s), Disp: , Rfl:     carvedilol (COREG) 25 MG tablet, TAKE 1 TABLET BY MOUTH TWICE A DAY, Disp: 180 tablet, Rfl: 3    [START ON 10/4/2023] dexAMETHasone (DECADRON) 4 MG tablet, 1 tablet., Disp: , Rfl:     Diclofenac Sodium (VOLTAREN) 1 % gel gel, Apply  topically to the appropriate area as directed 4 (Four) Times a Day As Needed., Disp: , Rfl:     docusate sodium 100 MG capsule, 1 capsule., Disp: , Rfl:     Eliquis 5 MG tablet tablet, TAKE 1 TABLET BY MOUTH TWICE A DAY, Disp: 180 tablet, Rfl: 3    Fluticasone-Umeclidin-Vilant (TRELEGY) 100-62.5-25 MCG/ACT inhaler, Inhale 1 puff Daily., Disp: 3 each, Rfl: 3    ipratropium-albuterol (DUO-NEB) 0.5-2.5 mg/3 ml nebulizer, Take 3 mL by nebulization 4 (Four) Times a Day As Needed for Wheezing or Shortness of Air., Disp: 360 mL, Rfl: 11    montelukast (SINGULAIR) 10 MG tablet, Take 1 tablet by mouth Every Night., Disp: 90 tablet, Rfl: 3    olmesartan (BENICAR) 5 MG tablet, TAKE 1 TABLET BY MOUTH EVERY DAY, Disp: 90 tablet, Rfl: 3    [START ON 10/4/2023] prochlorperazine (COMPAZINE) 10 MG tablet, 1 tablet., Disp: , Rfl:     rosuvastatin (CRESTOR) 5 MG tablet, TAKE 1 TABLET BY MOUTH EVERY DAY, Disp: 90 tablet, Rfl: 3    tiotropium bromide-olodaterol (Stiolto Respimat) 2.5-2.5 MCG/ACT aerosol solution inhaler, 2 Puff,  "Inhalation, Inh, Daily, # 1 Each, 0 Refill(s), Disp: , Rfl:       ROS:  Cardiac review of systems negative.    Objective     /81   Pulse 67   Ht 177.8 cm (70\")   Wt 95.7 kg (211 lb)   BMI 30.28 kg/m²       General Appearance:   well developed  well nourished  HENT:   oropharynx moist  lips not cyanotic  Respiratory:  no respiratory distress  normal breath sounds  no rales  Cardiovascular:  no jugular venous distention  regular rhythm  S1 normal, S2 normal  no S3, no S4   no murmur  no rub, no thrill  No carotid bruit  pedal pulses normal  lower extremity edema: none    Musculoskeletal:  no clubbing of fingers.   normocephalic, head atraumatic  Skin:   warm, dry  Psychiatric:  judgement and insight appropriate  normal mood and affect    ECHO:  Results for orders placed in visit on 02/11/22    Adult Transthoracic Echo Complete W/ Cont if Necessary Per Protocol    Interpretation Summary  · Estimated left ventricular EF was in agreement with the calculated left ventricular EF. Left ventricular ejection fraction appears to be 56 - 60%. Left ventricular systolic function is normal.  · Left ventricular diastolic function is consistent with (grade I) impaired relaxation.  · Mild mitral regurgitation is noted    STRESS:    CATH:  No results found for this or any previous visit.    BMP:     Glucose   Date Value Ref Range Status   08/26/2023 198 (H) 65 - 99 mg/dL Final     BUN   Date Value Ref Range Status   08/26/2023 26 (H) 8 - 23 mg/dL Final     Creatinine   Date Value Ref Range Status   08/26/2023 1.63 (H) 0.76 - 1.27 mg/dL Final     Sodium   Date Value Ref Range Status   08/26/2023 134 (L) 136 - 145 mmol/L Final     Potassium   Date Value Ref Range Status   08/26/2023 4.4 3.5 - 5.2 mmol/L Final     Chloride   Date Value Ref Range Status   08/26/2023 95 (L) 98 - 107 mmol/L Final     CO2   Date Value Ref Range Status   08/26/2023 28.7 22.0 - 29.0 mmol/L Final     Calcium   Date Value Ref Range Status   08/26/2023 " 10.1 8.6 - 10.5 mg/dL Final     BUN/Creatinine Ratio   Date Value Ref Range Status   08/26/2023 16.0 7.0 - 25.0 Final     Anion Gap   Date Value Ref Range Status   08/26/2023 10.3 5.0 - 15.0 mmol/L Final     eGFR   Date Value Ref Range Status   08/26/2023 45.9 (L) >60.0 mL/min/1.73 Final     LIPIDS:  Total Cholesterol   Date Value Ref Range Status   04/04/2023 151 0 - 200 mg/dL Final     Triglycerides   Date Value Ref Range Status   04/04/2023 77 0 - 150 mg/dL Final     HDL Cholesterol   Date Value Ref Range Status   04/04/2023 52 40 - 60 mg/dL Final     LDL Cholesterol    Date Value Ref Range Status   04/04/2023 84 0 - 100 mg/dL Final     VLDL Cholesterol   Date Value Ref Range Status   04/04/2023 15 5 - 40 mg/dL Final     LDL/HDL Ratio   Date Value Ref Range Status   04/04/2023 1.61  Final         Procedures             ASSESSMENT:  Diagnoses and all orders for this visit:    1. Primary hypertension (Primary)    2. Mixed hyperlipidemia    3. Paroxysmal atrial fibrillation         PLAN:    1.  Continue the Eliquis.  The patient notes no problems with bleeding.  I told him that if this becomes cost prohibitive to give us a call and we could consider warfarin.  2.  Patient denies any palpitations.  3.  Blood pressure is under excellent control.  4.  Continue the Crestor.  Patient cholesterol checked 4/4/2023 with LDL 84, HDL 52, and triglycerides 27.  These are under excellent control.  5.  Encouraged the patient to start a regular exercise program after he is done with chemotherapy for the urethral cancer.      Return in about 6 months (around 4/2/2024).     Patient was given instructions and counseling regarding his condition or for health maintenance advice. Please see specific information pulled into the AVS if appropriate.         Artie Crandall MD   10/2/2023  12:26 EDT

## 2023-11-20 ENCOUNTER — TELEPHONE (OUTPATIENT)
Dept: CARDIOLOGY | Facility: CLINIC | Age: 68
End: 2023-11-20
Payer: MEDICARE

## 2023-11-20 NOTE — TELEPHONE ENCOUNTER
Patient called stating over the past few weeks patient has noticed his bp has been dropping down- in 90's/50's hr 100's with some dizziness when changing position- patient currently undergoing radiation treatment.     Patient will call back in am with bp/hr readings from [facility] and in am 2 hours after taking morning meds. Patient and wife verbalized understanding and appreciation.     Jonathon currently takes:     Carvedilol 25 mg BID  Eliquis 5 mg BID  Olmesartan 5 mg daily

## 2023-11-21 NOTE — TELEPHONE ENCOUNTER
SANTY patient. Went over recommendations and request for bp/hr log. Patient verbalized understanding and appreciation.

## 2023-11-21 NOTE — TELEPHONE ENCOUNTER
Hold olmesartan for 1 week.  Monitor blood pressure and heart rate.  Submit a blood pressure log for review.

## 2023-11-21 NOTE — TELEPHONE ENCOUNTER
Patient was seen for radiation and was prompted to call provider to see if medication changes are needed for cardiac meds.     Patient called with bp readings.     11/20/23 afternoon: 100/58- 100  PM: 110/60- 100  11/21 am 2 hours after morning medications: 101/58- 84    Patient states he still has some dizziness when changing positions, encouraged patient to change slowly, stay hydrated and try compression socks.     Please advise

## 2023-11-30 ENCOUNTER — TELEPHONE (OUTPATIENT)
Dept: CARDIOLOGY | Facility: CLINIC | Age: 68
End: 2023-11-30
Payer: MEDICARE

## 2023-11-30 NOTE — TELEPHONE ENCOUNTER
----- Message from SARAH Barrett sent at 11/30/2023  3:35 PM EST -----  Blood pressure log reviewed.  Blood pressure looks good.  Continue current medical therapy.  ----- Message -----  From: Jodi Marquez RegSched Rep  Sent: 11/30/2023   2:05 PM EST  To: SARAH Barrett

## 2023-12-13 ENCOUNTER — TELEPHONE (OUTPATIENT)
Dept: CARDIOLOGY | Facility: CLINIC | Age: 68
End: 2023-12-13
Payer: MEDICARE

## 2023-12-13 NOTE — TELEPHONE ENCOUNTER
Patient called asking about holding Eliquis. Attempted to call patient. Left VM with call back number.

## 2023-12-14 NOTE — TELEPHONE ENCOUNTER
SANTY patient. Patient was instructed by oncologist to hold Eliquis for platelet count under 50,000- patient has been holding his eliquis since Wednesday due to platelet count of 13, 000; hgb 6; currently Platelets 32,000; hgb 7.3.     Patient will inform office when he is able to restart.

## 2024-01-27 ENCOUNTER — HOSPITAL ENCOUNTER (EMERGENCY)
Facility: HOSPITAL | Age: 69
Discharge: HOME | End: 2024-01-27
Attending: EMERGENCY MEDICINE
Payer: MEDICARE

## 2024-01-27 VITALS
HEIGHT: 71 IN | WEIGHT: 216.05 LBS | SYSTOLIC BLOOD PRESSURE: 130 MMHG | RESPIRATION RATE: 18 BRPM | HEART RATE: 78 BPM | TEMPERATURE: 97.8 F | OXYGEN SATURATION: 97 % | DIASTOLIC BLOOD PRESSURE: 63 MMHG | BODY MASS INDEX: 30.25 KG/M2

## 2024-01-27 DIAGNOSIS — T83.091A OBSTRUCTION OF FOLEY CATHETER, INITIAL ENCOUNTER: Primary | ICD-10-CM

## 2024-01-27 LAB
BACTERIA UR QL AUTO: ABNORMAL /HPF
BILIRUB UR QL STRIP: NEGATIVE
CLARITY UR: CLEAR
COLOR UR: YELLOW
GLUCOSE UR STRIP-MCNC: NEGATIVE MG/DL
HGB UR QL STRIP.AUTO: ABNORMAL
HYALINE CASTS UR QL AUTO: ABNORMAL /LPF
KETONES UR QL STRIP: NEGATIVE
LEUKOCYTE ESTERASE UR QL STRIP.AUTO: ABNORMAL
NITRITE UR QL STRIP: NEGATIVE
PH UR STRIP.AUTO: 7.5 [PH] (ref 5–8)
PROT UR QL STRIP: ABNORMAL
RBC # UR STRIP: ABNORMAL /HPF
REF LAB TEST METHOD: ABNORMAL
SP GR UR STRIP: 1.01 (ref 1–1.03)
SQUAMOUS #/AREA URNS HPF: ABNORMAL /HPF
UROBILINOGEN UR QL STRIP: ABNORMAL
WBC # UR STRIP: ABNORMAL /HPF

## 2024-01-27 PROCEDURE — 99283 EMERGENCY DEPT VISIT LOW MDM: CPT

## 2024-01-27 PROCEDURE — 81001 URINALYSIS AUTO W/SCOPE: CPT | Performed by: EMERGENCY MEDICINE

## 2024-01-27 PROCEDURE — 87086 URINE CULTURE/COLONY COUNT: CPT | Performed by: EMERGENCY MEDICINE

## 2024-01-27 NOTE — ED PROVIDER NOTES
Time: 9:37 AM EST  Date of encounter:  1/27/2024  Independent Historian/Clinical History and Information was obtained by:   Patient  Chief Complaint: Obstructed Guajardo catheter    History is limited by: N/A    History of Present Illness:  Patient is a 68 y.o. year old male who presents to the emergency department for evaluation of obstructed/nonfunctioning Guajardo catheter.  Patient has a history of bladder cancer with neobladder.  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  Patient has a chronic indwelling catheter for which she gets changed every 4 weeks.  It was last changed on January 9.  Patient is that he went to bed last night and there was output in his catheter.  He woke up this morning and it is now not draining.  They attempted to flush the catheter at home but could not or would not flush.  Subsequently they called Dr. Randolph's office and was told to come to the nearest ER.  Patient denies any fevers or any other complaints.    HPI    Patient Care Team  Primary Care Provider: Collin Cannon MD    Past Medical History:     No Known Allergies  Past Medical History:   Diagnosis Date    A-fib     DR KHALIL    Arthritis     RIGHT THUMB    Asthma     Bladder cancer     2018    Chronic diastolic congestive heart failure 12/06/2021    COPD (chronic obstructive pulmonary disease)     INHALER    Elevated serum creatinine 12/06/2021    Hyperlipidemia     ON MEDS    Hypertension     CONTROLLED W MEDS    Lung cancer     DR RAMIRES TOOK CARE OF THAT (2008)    Melanoma in situ of left lower leg     2022 removed    Mouth sore secondary to chemotherapy     Stage 3a chronic kidney disease 01/05/2022    Throat soreness     FROM CHEMO     Past Surgical History:   Procedure Laterality Date    COLONOSCOPY      CYSTECTOMY      FINGER DEBRIDEMENT Right     RIGHT THUMB    ILEAL LOOP NEOBLADDER      BLADDER MADE FROM HIS INTESTINES    LUNG  REMOVAL, PARTIAL Left     LEFT UPPER - 2008 - SOA W EXERTION    PORTOCAVAL SHUNT PLACEMENT       AND     SENTINEL NODE BIOPSY N/A 2022    Procedure: Excision of left thigh melanoma with sentinel lymph node biopsy;  Surgeon: Dominic Ruiz MD;  Location: [facility] MAIN OR;  Service: General;  Laterality: N/A;    TURP / TRANSURETHRAL INCISION / DRAINAGE PROSTATE       Family History   Problem Relation Age of Onset    No Known Problems Mother         Mother     No Known Problems Father         Father  54 years old lung cancer/smoker    Prostate cancer Brother     Malig Hyperthermia Neg Hx        Home Medications:  Prior to Admission medications    Medication Sig Start Date End Date Taking? Authorizing Provider   albuterol sulfate  (90 Base) MCG/ACT inhaler Inhale 2 puffs Every 4 (Four) Hours As Needed for Wheezing. 8/15/23   Guanako Phan MD   Beclomethasone Diprop HFA (Qvar RediHaler) 40 MCG/ACT inhaler 31 Gram, 0 Refill(s), INHALE 2 PUFFS TWICE A DAY, 0 Refill(s) 9/15/23   Ronn Paula MD   carvedilol (COREG) 25 MG tablet TAKE 1 TABLET BY MOUTH TWICE A DAY 23   Artie Crandall MD   dexAMETHasone (DECADRON) 4 MG tablet 1 tablet. 10/4/23   Ronn Paula MD   Diclofenac Sodium (VOLTAREN) 1 % gel gel Apply  topically to the appropriate area as directed 4 (Four) Times a Day As Needed. 23   Ronn Paula MD   docusate sodium 100 MG capsule 1 capsule. 23   Ronn Paula MD   Eliquis 5 MG tablet tablet TAKE 1 TABLET BY MOUTH TWICE A DAY 23   Artie Crandall MD   Fluticasone-Umeclidin-Vilant (TRELEGY) 100-62.5-25 MCG/ACT inhaler Inhale 1 puff Daily. 8/15/23   Guanako Phan MD   ipratropium-albuterol (DUO-NEB) 0.5-2.5 mg/3 ml nebulizer Take 3 mL by nebulization 4 (Four) Times a Day As Needed for Wheezing or Shortness of Air. 23   Mecca Judd APRN   montelukast (SINGULAIR) 10 MG tablet Take 1 tablet by mouth Every Night.  "8/15/23   Guanako Phan MD   olmesartan (BENICAR) 5 MG tablet TAKE 1 TABLET BY MOUTH EVERY DAY 7/31/23   Artie Crandall MD   prochlorperazine (COMPAZINE) 10 MG tablet 1 tablet. 10/4/23   ProviderRonn MD   rosuvastatin (CRESTOR) 5 MG tablet TAKE 1 TABLET BY MOUTH EVERY DAY 7/6/23   Artie Crandall MD   tiotropium bromide-olodaterol (Stiolto Respimat) 2.5-2.5 MCG/ACT aerosol solution inhaler 2 Puff, Inhalation, Inh, Daily, # 1 Each, 0 Refill(s) 9/1/23   Provider, MD Ronn        Social History:   Social History     Tobacco Use    Smoking status: Former     Packs/day: 1.50     Years: 39.00     Additional pack years: 0.00     Total pack years: 58.50     Types: Cigarettes     Quit date: 10/21/2008     Years since quitting: 15.2    Smokeless tobacco: Never   Vaping Use    Vaping Use: Never used   Substance Use Topics    Alcohol use: Never    Drug use: Never         Review of Systems:  Review of Systems   Constitutional:  Negative for chills and fever.   HENT:  Negative for congestion, ear pain and sore throat.    Eyes:  Negative for pain.   Respiratory:  Negative for cough, chest tightness and shortness of breath.    Cardiovascular:  Negative for chest pain.   Gastrointestinal:  Negative for abdominal pain, diarrhea, nausea and vomiting.   Genitourinary:  Negative for flank pain and hematuria.   Musculoskeletal:  Negative for joint swelling.   Skin:  Negative for pallor.   Neurological:  Negative for seizures and headaches.   All other systems reviewed and are negative.       Physical Exam:  /63 (Patient Position: Lying)   Pulse 78   Temp 97.8 °F (36.6 °C) (Oral)   Resp 18   Ht 180.3 cm (71\")   Wt 98 kg (216 lb 0.8 oz)   SpO2 97%   BMI 30.13 kg/m²     Physical Exam    Vital signs were reviewed under triage note.  General appearance - Patient appears well-developed and well-nourished.  Patient is in no acute distress.  Head - Normocephalic, atraumatic.  Pupils - Equal, round, " reactive to light.  Extraocular muscles are intact.  Conjunctiva is clear.  Nasal - Normal inspection.  No evidence of trauma or epistaxis.  Tympanic membranes - Gray, intact without erythema or retractions.  Oral mucosa - Pink and moist without lesions or erythema.  Uvula is midline.  Chest wall - Atraumatic.  Chest wall is nontender.  There are no vesicular rashes noted.  Neck - Supple.  Trachea was midline.  There is no palpable lymphadenopathy or thyromegaly.  There are no meningeal signs  Lungs - Clear to auscultation and percussion bilaterally.  Heart - Regular rate and rhythm without any murmurs, clicks, or gallops.  Abdomen - Soft.  Bowel sounds are present.  There is no palpable tenderness.  There is no rebound, guarding, or rigidity.  There are no palpable masses.  There are no pulsatile masses.  Back - Spine is straight and midline.  There is no CVA tenderness.  Extremities - Intact x4 with full range of motion.  There is no palpable edema.  Pulses are intact x4 and equal.  Neurologic - Patient is awake, alert, and oriented x3.  Cranial nerves II through XII are grossly intact.  Motor and sensory functions grossly intact.  Cerebellar function was normal.  Integument - There are no rashes.  There are no petechia or purpura lesions noted.  There are no vesicular lesions noted.           Procedures:  Procedures      Medical Decision Making:      Comorbidities that affect care:    {Comorbidities that affect care:04986}    External Notes reviewed:    {External Note review (Optional):88034}      The following orders were placed and all results were independently analyzed by me:  Orders Placed This Encounter   Procedures    Urine Culture - Urine, Indwelling Urethral Catheter    Urinalysis With Microscopic If Indicated (No Culture) - Urine, Clean Catch    Urinalysis, Microscopic Only - Urine, Clean Catch       Medications Given in the Emergency Department:  Medications - No data to display     ED Course:     The  patient was seen and evaluated in the ED by me.  The above history and physical examination was performed as documented.  Patient's Guajardo catheter was exchanged out with a new 1.  The new 1 per nursing staff went into easily.  Patient had immediate return of yellow urine.  There was initially some sediment noted.  Patient has now drained 300 mL.  UA was sent for evaluation.  Culture was ordered.  Patient is nitrite negative and the leukocytosis is most likely secondary to the neobladder itself.  Treatment will be based off of culture results.  Patient is stable for discharge home.    Labs:    Lab Results (last 24 hours)       Procedure Component Value Units Date/Time    Urinalysis With Microscopic If Indicated (No Culture) - Urine, Clean Catch [[id number]]  (Abnormal) Collected: 01/27/24 0913    Specimen: Urine, Clean Catch Updated: 01/27/24 0929     Color, UA Yellow     Appearance, UA Clear     pH, UA 7.5     Specific Gravity, UA 1.012     Glucose, UA Negative     Ketones, UA Negative     Bilirubin, UA Negative     Blood, UA Moderate (2+)     Protein, UA 30 mg/dL (1+)     Leuk Esterase, UA Large (3+)     Nitrite, UA Negative     Urobilinogen, UA 0.2 E.U./dL    Urinalysis, Microscopic Only - Urine, Clean Catch [[id number]]  (Abnormal) Collected: 01/27/24 0913    Specimen: Urine, Clean Catch Updated: 01/27/24 0939     RBC, UA 6-10 /HPF      WBC, UA 21-50 /HPF      Bacteria, UA 2+ /HPF      Squamous Epithelial Cells, UA 0-2 /HPF      Hyaline Casts, UA None Seen /LPF      Methodology Manual Light Microscopy             Imaging:    No Radiology Exams Resulted Within Past 24 Hours      Differential Diagnosis and Discussion:    {Differentials:52364}    {Independent Review of (Optional):98003}    MDM       {Critical Care:72218}    Patient Care Considerations:    {Considerations (Optional):11648}      Consultants/Shared Management Plan:    {Shared Management Plan (Optional):84585}    Social Determinants of Health:    {Social  Determinants of Health (Optional):27446}      Disposition and Care Coordination:    {Admission consideration:63742}    {Discharge (Optional):09909}    Final diagnoses:   Obstruction of Guajardo catheter, initial encounter        ED Disposition       ED Disposition   Discharge    Condition   Stable    Comment   --               This medical record created using voice recognition software.

## 2024-01-27 NOTE — DISCHARGE INSTRUCTIONS
Resume normal home medications and activities.  A urine culture has been placed on your urinalysis.  We will contact you if anything were to grow in the urine culture that would require treatment.  Follow-up with Dr. Randolph as scheduled.  Your next Guajardo change will be 4 weeks from today.  Return to the ER for development of abdominal pain, fever greater than 101, vomiting, or any other concerns issues that may arise.

## 2024-01-28 LAB — BACTERIA SPEC AEROBE CULT: NO GROWTH

## 2024-02-22 ENCOUNTER — OFFICE VISIT (OUTPATIENT)
Dept: PULMONOLOGY | Facility: CLINIC | Age: 69
End: 2024-02-22
Payer: COMMERCIAL

## 2024-02-22 VITALS
SYSTOLIC BLOOD PRESSURE: 129 MMHG | HEART RATE: 71 BPM | DIASTOLIC BLOOD PRESSURE: 68 MMHG | WEIGHT: 221.6 LBS | RESPIRATION RATE: 16 BRPM | OXYGEN SATURATION: 98 % | BODY MASS INDEX: 31.02 KG/M2 | HEIGHT: 71 IN

## 2024-02-22 DIAGNOSIS — J43.2 CENTRILOBULAR EMPHYSEMA: Primary | ICD-10-CM

## 2024-02-22 DIAGNOSIS — J98.4 LUNG DISEASE: ICD-10-CM

## 2024-02-22 DIAGNOSIS — R06.02 SHORTNESS OF BREATH: ICD-10-CM

## 2024-02-22 DIAGNOSIS — J45.30 MILD PERSISTENT ASTHMA, UNSPECIFIED WHETHER COMPLICATED: ICD-10-CM

## 2024-02-22 NOTE — PROGRESS NOTES
Primary Care Provider  Collin Cannon MD     Referring Provider  No ref. provider found     Chief Complaint  COPD, Sleep Apnea, Shortness of Breath, Wheezing, Cough, and Follow-up (Pt here for 6 month follow up)    Subjective          Toribio Hayes presents to [facility] PULMONARY & CRITICAL CARE MEDICINE  History of Present Illness  Toribio Hayes is a 68 y.o. male patient with history of lung adenocarcinoma status post left upper lobectomy 2009, also had chemo and radiation, COPD, history of bladder and prostate cancer status post chemotherapy, COPD.  He is here for follow-up.  Since his last office visit, he has been on Stiolto 2 puffs once daily and Qvar 2 puffs twice daily.  He has rarely needed any rescue inhaler.  He continues to use oxygen with sleep.  He was diagnosed with penile cancer and was started on chemotherapy and radiation therapy.  He had significant anemia with hemoglobin dropping down to 6 g/dL and needed transfusion.  He continues to be short of breath with activities which he attributes to anemia as well.  He has cough and intermittent wheezing.  Shortness of breath is worse going up and down the stairs. He has no change in weight or appetite.  No fever or chills.  He recently retired.  His cost of inhalers for very high and he wants to switch the inhaler if possible.  I have given him trilogy sample but he still has Qvar and Stiolto and wants to finish this before going to new one.  He was recently seen by Dr. Fernando and is planned for low-dose lung cancer screening CT scan starting June 2024.  He is planned for PET scan in March 2024.      Review of Systems     General:  No Fatigue, No Fever No weight loss or loss of appetite  HEENT: No dysphagia, No Visual Changes, no rhinorrhea  Respiratory:  + cough,+Dyspnea, intermittent phlegm, No Pleuritic Pain, no wheezing, no hemoptysis.  Cardiovascular: Denies chest pain, denies palpitations,+SERRA, No Chest  Pressure  Gastrointestinal:  No Abdominal Pain, No Nausea, No Vomiting, No Diarrhea  Genitourinary:  No Dysuria, No Frequency, No Hesitancy  Musculoskeletal: No muscle pain or swelling  Endocrine:  No Heat Intolerance, No Cold Intolerance  Hematologic:  No Bleeding, No Bruising  Psychiatric:  No Anxiety, No Depression  Neurologic:  No Confusion, no Dysarthria, No Headaches  Skin:  No Rash, No Open Wounds    Family History   Problem Relation Age of Onset    No Known Problems Mother         Mother     No Known Problems Father         Father  54 years old lung cancer/smoker    Prostate cancer Brother     Malig Hyperthermia Neg Hx         Social History     Socioeconomic History    Marital status:    Tobacco Use    Smoking status: Former     Packs/day: 1.50     Years: 39.00     Additional pack years: 0.00     Total pack years: 58.50     Types: Cigarettes     Quit date: 10/21/2008     Years since quitting: 15.3    Smokeless tobacco: Never   Vaping Use    Vaping Use: Never used   Substance and Sexual Activity    Alcohol use: Never    Drug use: Never    Sexual activity: Yes     Partners: Female        Past Medical History:   Diagnosis Date    A-fib     DR KHALIL    Arthritis     RIGHT THUMB    Asthma     Bladder cancer     2018    Chronic diastolic congestive heart failure 2021    COPD (chronic obstructive pulmonary disease)     INHALER    Elevated serum creatinine 2021    Hyperlipidemia     ON MEDS    Hypertension     CONTROLLED W MEDS    Lung cancer     DR RAMIRES TOOK CARE OF THAT ()    Lung disease 2021    Melanoma in situ of left lower leg      removed    Mouth sore secondary to chemotherapy     Stage 3a chronic kidney disease 2022    Tear of medial cartilage or meniscus of knee, current 08/15/2016    Throat soreness     FROM CHEMO        Immunization History   Administered Date(s) Administered    COVID-19 (MODERNA) 1st,2nd,3rd Dose Monovalent 2021, 2021     Fluzone High Dose =>65 Years ([facility] ONLY) 10/31/2022, 11/22/2023    Fluzone High-Dose 65+yrs 11/22/2023    Influenza, Unspecified 09/14/2020    Tdap 04/16/2023         No Known Allergies       Current Outpatient Medications:     albuterol sulfate  (90 Base) MCG/ACT inhaler, Inhale 2 puffs Every 4 (Four) Hours As Needed for Wheezing., Disp: 18 g, Rfl: 5    Beclomethasone Diprop HFA (Qvar RediHaler) 40 MCG/ACT inhaler, 31 Gram, 0 Refill(s), INHALE 2 PUFFS TWICE A DAY, 0 Refill(s), Disp: , Rfl:     carvedilol (COREG) 25 MG tablet, TAKE 1 TABLET BY MOUTH TWICE A DAY, Disp: 180 tablet, Rfl: 3    Eliquis 5 MG tablet tablet, TAKE 1 TABLET BY MOUTH TWICE A DAY, Disp: 180 tablet, Rfl: 3    ipratropium-albuterol (DUO-NEB) 0.5-2.5 mg/3 ml nebulizer, Take 3 mL by nebulization 4 (Four) Times a Day As Needed for Wheezing or Shortness of Air., Disp: 360 mL, Rfl: 11    montelukast (SINGULAIR) 10 MG tablet, Take 1 tablet by mouth Every Night., Disp: 90 tablet, Rfl: 3    olmesartan (BENICAR) 5 MG tablet, TAKE 1 TABLET BY MOUTH EVERY DAY, Disp: 90 tablet, Rfl: 3    rosuvastatin (CRESTOR) 5 MG tablet, TAKE 1 TABLET BY MOUTH EVERY DAY, Disp: 90 tablet, Rfl: 3    silver sulfadiazine (SILVADENE, SSD) 1 % cream, APPLY TOPICALLY TO THE AFFECTED AREA(S) OF SKIN BREAKDOWN TWICE DAILY AS DIRECTED, Disp: , Rfl:     tiotropium bromide-olodaterol (Stiolto Respimat) 2.5-2.5 MCG/ACT aerosol solution inhaler, 2 Puff, Inhalation, Inh, Daily, # 1 Each, 0 Refill(s), Disp: , Rfl:     dexAMETHasone (DECADRON) 4 MG tablet, 1 tablet. (Patient not taking: Reported on 2/22/2024), Disp: , Rfl:     Diclofenac Sodium (VOLTAREN) 1 % gel gel, Apply  topically to the appropriate area as directed 4 (Four) Times a Day As Needed. (Patient not taking: Reported on 2/22/2024), Disp: , Rfl:     docusate sodium 100 MG capsule, 1 capsule. (Patient not taking: Reported on 2/22/2024), Disp: , Rfl:     Fluticasone-Umeclidin-Vilant (TRELEGY) 100-62.5-25 MCG/ACT  "inhaler, Inhale 1 puff Daily. (Patient not taking: Reported on 2/22/2024), Disp: 3 each, Rfl: 3    prochlorperazine (COMPAZINE) 10 MG tablet, 1 tablet. (Patient not taking: Reported on 2/22/2024), Disp: , Rfl:     RSVPreF3 Vac Recomb Adjuvanted (AREXVY) 120 MCG/0.5ML reconstituted suspension injection, Inject 0.5 mL into the appropriate muscle as directed by prescriber 1 (One) Time for 1 dose., Disp: 0.5 mL, Rfl: 0     Objective   Vital Signs:   /68 (BP Location: Left arm, Patient Position: Sitting, Cuff Size: Adult)   Pulse 71   Resp 16   Ht 180.3 cm (71\")   Wt 101 kg (221 lb 9.6 oz)   SpO2 98% Comment: room air  BMI 30.91 kg/m²     Mallampatti classification : 1  Physical Exam  Vital Signs Reviewed  WDWN, Alert, in no acute distress, normal conversational  HEENT:  PERRL, EOMI.  OP, nares clear, no sinus tenderness  Neck:  Supple, no JVD, no thyromegaly  Lymph: no axillary, cervical, supraclavicular lymphadenopathy noted bilaterally  Chest:  good aeration, bilateral diminished breath sounds, no wheezing, crackles or rhonchi, resonant to percussion b/l  CV: RRR, no MGR, pulses 2+, equal.  Abd:  Soft, NT, ND, + BS, no HSM  EXT:  no clubbing, no cyanosis, No BLE edema  Neuro:  A&Ox3, CN grossly intact, no focal deficits  Skin: No rashes or lesions noted     Result Review :   The following data was reviewed by: Guanako Phan MD on 08/15/2023:  Common labs          7/31/2023    05:39 8/26/2023    07:25 12/6/2023    08:51   Common Labs   Glucose 111  198     BUN 27  26     Creatinine 1.40  1.63     Sodium 134  134     Potassium 4.1  4.4     Chloride 99  95     Calcium 8.9  10.1     Albumin  3.9     Total Bilirubin  0.4  0.7       Alkaline Phosphatase  77     AST (SGOT)  21     ALT (SGPT)  26     WBC 12.14  10.99     Hemoglobin 11.9  12.5     Hematocrit 35.7  37.0     Platelets 205  255     PSA   <0.01          Details          This result is from an external source.             CMP          7/31/2023    " 05:39 8/26/2023    07:25 12/6/2023    08:51   CMP   Glucose 111  198     BUN 27  26     Creatinine 1.40  1.63     EGFR 55.1  45.9     Sodium 134  134     Potassium 4.1  4.4     Chloride 99  95     Calcium 8.9  10.1     Total Protein  7.5     Albumin  3.9     Globulin  3.6     Total Bilirubin  0.4  0.7       Alkaline Phosphatase  77     AST (SGOT)  21     ALT (SGPT)  26     Albumin/Globulin Ratio  1.1     BUN/Creatinine Ratio 19.3  16.0     Anion Gap 9.6  10.3        Details          This result is from an external source.             CBC          7/30/2023    09:54 7/31/2023    05:39 8/26/2023    07:25   CBC   WBC 8.36  12.14  10.99    RBC 4.30  3.76  4.09    Hemoglobin 13.6  11.9  12.5    Hematocrit 40.0  35.7  37.0    MCV 93.0  94.9  90.5    MCH 31.6  31.6  30.6    MCHC 34.0  33.3  33.8    RDW 12.6  12.9  12.9    Platelets 223  205  255      CBC w/diff          6/1/2023    07:22 7/30/2023    09:54 7/31/2023    05:39   CBC w/Diff   WBC 6.91  8.36  12.14    RBC 4.23  4.30  3.76    Hemoglobin 13.5  13.6  11.9    Hematocrit 39.6  40.0  35.7    MCV 93.6  93.0  94.9    MCH 31.9  31.6  31.6    MCHC 34.1  34.0  33.3    RDW 12.8  12.6  12.9    Platelets 214  223  205    Neutrophil Rel % 69.4  91.5  80.3    Immature Granulocyte Rel % 0.3  0.4  0.3    Lymphocyte Rel % 19.1  4.8  9.7    Monocyte Rel % 9.8  3.0  8.9    Eosinophil Rel % 0.7  0.1  0.4    Basophil Rel % 0.7  0.2  0.4      Data reviewed : Radiologic studies CT scan of chest from June 2023 was reviewed.        Narrative & Impression   PROCEDURE:  CT CHEST W CONTRAST DIAGNOSTIC     COMPARISON:  [facility], CT, CT CHEST W CONTRAST DIAGNOSTIC, 12/09/2022,   8:12.  [facility], CT, CT ABDOMEN PELVIS W CONTRAST, 12/09/2022, 8:12.  INDICATIONS:  FOLLOW UP BLADDER AND LUNG CA.     TECHNIQUE:    After obtaining the patient's consent, CT images were obtained with non-ionic   intravenous contrast material.       PROTOCOL:      Standard imaging protocol performed                 RADIATION:      DLP: 459.1mGy*cm               Automated exposure control was utilized to minimize radiation dose.   CONTRAST:      100cc Isovue 370 I.V.  LABS:   eGFR: 47ml/min/1.73m2     FINDINGS:          The patient is status post a left lobectomy procedure.  There is stable scarring again noted in the   left perihilar region.  There are moderate emphysematous changes.  There are no suspicious   pulmonary nodules or masses to indicate malignancy.  There are no layering pleural effusions.    There are atherosclerotic vascular calcifications which includes involvement of the coronary   arteries.  There are no enlarged hilar or mediastinal lymph nodes.  There is no axillary   adenopathy.  Findings beneath the hemidiaphragms were discussed under the separately dictated CT   abdomen and pelvis report.  There are left-sided rib deformities secondary to previous surgery.    There are no suspicious osteolytic or sclerotic lesions within the bony thorax.     IMPRESSION:                 1. Status post a left lobectomy procedure with stable scarring in the left perihilar region.  2. No evidence of metastatic disease.  3. Moderate emphysema.  4. Additional incidental findings as noted above.            GERTRUDIS LAMA MD         Electronically Signed and Approved By: GERTRUDIS LAMA MD on 6/01/2023 at 14:13                    I reviewed the recent PET scan from 10/11/2023 as well.  Showed highly avid minutes after pain is with  urothelial cancer.  He is post cystectomy and prostatectomy.  Redemonstration of left upper lobe lobectomy and postradiation changes at the left lower lobe.     Assessment and Plan    Diagnoses and all orders for this visit:    1. Centrilobular emphysema (Primary)  -     RSVPreF3 Vac Recomb Adjuvanted (AREXVY) 120 MCG/0.5ML reconstituted suspension injection; Inject 0.5 mL into the appropriate muscle as directed by prescriber 1 (One) Time for 1 dose.   Dispense: 0.5 mL; Refill: 0    2. Lung disease  -     RSVPreF3 Vac Recomb Adjuvanted (AREXVY) 120 MCG/0.5ML reconstituted suspension injection; Inject 0.5 mL into the appropriate muscle as directed by prescriber 1 (One) Time for 1 dose.  Dispense: 0.5 mL; Refill: 0    3. Shortness of breath  -     RSVPreF3 Vac Recomb Adjuvanted (AREXVY) 120 MCG/0.5ML reconstituted suspension injection; Inject 0.5 mL into the appropriate muscle as directed by prescriber 1 (One) Time for 1 dose.  Dispense: 0.5 mL; Refill: 0    4. Mild persistent asthma, unspecified whether complicated  -     RSVPreF3 Vac Recomb Adjuvanted (AREXVY) 120 MCG/0.5ML reconstituted suspension injection; Inject 0.5 mL into the appropriate muscle as directed by prescriber 1 (One) Time for 1 dose.  Dispense: 0.5 mL; Refill: 0        COPD: I will switch his Stiolto and Qvar to Trelegy once daily.  He has sample at home.  He wants to finish Stiolto and Qvar before going to triple inhaler therapy.  Use albuterol as needed.  Keep himself as active as possible.  He quit smoking in 2010.    Continue Singulair once daily.    Chronic hypoxic respiratory failure: Patient is on nocturnal oxygen.  He recently had sleep study.  He is currently on his BiPAP machine.  I reviewed the BiPAP usage data today.  He is average pressure is between 8-16 centimeters of water, average daily use is 4 hours and 57 minutes.  Apnea-hypopnea index on it is 1.5.  Advised him to increase the sleeping hours if possible.    Low-dose lung cancer screening CT scan already ordered for June 2024 by Dr. Fernando.  May need to extend it further out as he is planned for PET scan in March 2024.    I have ordered him to get RSV vaccine through his local pharmacy.  He is up-to-date on pneumococcal and COVID and flu vaccines.    Follow Up   Return in about 6 months (around 8/22/2024).  Patient was given instructions and counseling regarding his condition or for health maintenance advice. Please see  specific information pulled into the AVS if appropriate.       Electronically signed by Guanako Phan MD, 2/22/2024, 09:12 EST.

## 2024-03-24 DIAGNOSIS — R29.818 SUSPECTED SLEEP APNEA: ICD-10-CM

## 2024-03-24 DIAGNOSIS — J43.2 CENTRILOBULAR EMPHYSEMA: ICD-10-CM

## 2024-03-24 DIAGNOSIS — J43.9 PULMONARY EMPHYSEMA, UNSPECIFIED EMPHYSEMA TYPE: ICD-10-CM

## 2024-03-24 DIAGNOSIS — R06.02 SHORTNESS OF BREATH: ICD-10-CM

## 2024-03-24 DIAGNOSIS — J44.9 CHRONIC OBSTRUCTIVE PULMONARY DISEASE, UNSPECIFIED COPD TYPE: ICD-10-CM

## 2024-03-24 DIAGNOSIS — I50.42 CHRONIC COMBINED SYSTOLIC (CONGESTIVE) AND DIASTOLIC (CONGESTIVE) HEART FAILURE: ICD-10-CM

## 2024-03-24 DIAGNOSIS — I48.20 ATRIAL FIBRILLATION, CHRONIC: ICD-10-CM

## 2024-03-24 DIAGNOSIS — N18.31 STAGE 3A CHRONIC KIDNEY DISEASE: ICD-10-CM

## 2024-03-24 DIAGNOSIS — C34.92 ADENOCARCINOMA OF LEFT LUNG: ICD-10-CM

## 2024-03-24 DIAGNOSIS — R06.00 DYSPNEA, UNSPECIFIED TYPE: ICD-10-CM

## 2024-03-25 RX ORDER — MONTELUKAST SODIUM 10 MG/1
10 TABLET ORAL
Qty: 90 TABLET | Refills: 2 | Status: SHIPPED | OUTPATIENT
Start: 2024-03-25

## 2024-04-12 ENCOUNTER — OFFICE VISIT (OUTPATIENT)
Dept: CARDIOLOGY | Facility: CLINIC | Age: 69
End: 2024-04-12
Payer: MEDICARE

## 2024-04-12 VITALS
HEART RATE: 76 BPM | DIASTOLIC BLOOD PRESSURE: 73 MMHG | BODY MASS INDEX: 31.86 KG/M2 | HEIGHT: 71 IN | WEIGHT: 227.6 LBS | SYSTOLIC BLOOD PRESSURE: 107 MMHG

## 2024-04-12 DIAGNOSIS — I10 PRIMARY HYPERTENSION: ICD-10-CM

## 2024-04-12 DIAGNOSIS — I48.0 PAROXYSMAL ATRIAL FIBRILLATION: Primary | ICD-10-CM

## 2024-04-12 DIAGNOSIS — E78.2 MIXED HYPERLIPIDEMIA: ICD-10-CM

## 2024-04-12 DIAGNOSIS — R06.09 DYSPNEA ON EXERTION: ICD-10-CM

## 2024-04-12 NOTE — PROGRESS NOTES
Chief Complaint  Primary hypertension, Chronic combined systolic (congestive) and diastolic (conge, Fatigue, and Shortness of Breath    Subjective        Toribio Hayes presents to [facility] CARDIOLOGY  History of present illness:    Patient does note some shortness of breath and tiredness when he is active.  He did go through chemotherapy and XRT but had stopped that roughly 4 months ago.  When he is walking around he notes no chest pain.  He notes no bleeding problems.  He did have to come off the olmesartan for a while due to lightheadedness but he is back on it and he notes no recent lightheadedness.  He is back on the Eliquis and they are monitoring his blood counts closely.      Past Medical History:   Diagnosis Date    A-fib     DR KHALIL    Arthritis     RIGHT THUMB    Asthma     Bladder cancer     2018    Chronic diastolic congestive heart failure 12/06/2021    COPD (chronic obstructive pulmonary disease)     INHALER    Elevated serum creatinine 12/06/2021    Hyperlipidemia     ON MEDS    Hypertension     CONTROLLED W MEDS    Lung cancer     DR RAMIRES TOOK CARE OF THAT (2008)    Lung disease 12/06/2021    Melanoma in situ of left lower leg     2022 removed    Mouth sore secondary to chemotherapy     Stage 3a chronic kidney disease 01/05/2022    Tear of medial cartilage or meniscus of knee, current 08/15/2016    Throat soreness     FROM CHEMO         Past Surgical History:   Procedure Laterality Date    COLONOSCOPY      CYSTECTOMY      FINGER DEBRIDEMENT Right     RIGHT THUMB    ILEAL LOOP NEOBLADDER      BLADDER MADE FROM HIS INTESTINES    LUNG REMOVAL, PARTIAL Left     LEFT UPPER - 2008 - SOA W EXERTION    PORTOCAVAL SHUNT PLACEMENT      2008 AND 2018    SENTINEL NODE BIOPSY N/A 06/23/2022    Procedure: Excision of left thigh melanoma with sentinel lymph node biopsy;  Surgeon: Dominic Ruiz MD;  Location: [facility] OR;  Service: General;  Laterality: N/A;    TURP / TRANSURETHRAL  INCISION / DRAINAGE PROSTATE            Social History     Socioeconomic History    Marital status:    Tobacco Use    Smoking status: Former     Current packs/day: 0.00     Average packs/day: 1.5 packs/day for 39.0 years (58.5 ttl pk-yrs)     Types: Cigarettes     Start date: 10/21/1969     Quit date: 10/21/2008     Years since quitting: 15.4    Smokeless tobacco: Never   Vaping Use    Vaping status: Never Used   Substance and Sexual Activity    Alcohol use: Never    Drug use: Never    Sexual activity: Yes     Partners: Female         Family History   Problem Relation Age of Onset    No Known Problems Mother         Mother     No Known Problems Father         Father  54 years old lung cancer/smoker    Prostate cancer Brother     Denise Hyperthermia Neg Hx           No Known Allergies         Current Outpatient Medications:     albuterol sulfate  (90 Base) MCG/ACT inhaler, Inhale 2 puffs Every 4 (Four) Hours As Needed for Wheezing., Disp: 18 g, Rfl: 5    Beclomethasone Diprop HFA (Qvar RediHaler) 40 MCG/ACT inhaler, 31 Gram, 0 Refill(s), INHALE 2 PUFFS TWICE A DAY, 0 Refill(s), Disp: , Rfl:     carvedilol (COREG) 25 MG tablet, TAKE 1 TABLET BY MOUTH TWICE A DAY, Disp: 180 tablet, Rfl: 3    Eliquis 5 MG tablet tablet, TAKE 1 TABLET BY MOUTH TWICE A DAY, Disp: 180 tablet, Rfl: 3    ipratropium-albuterol (DUO-NEB) 0.5-2.5 mg/3 ml nebulizer, Take 3 mL by nebulization 4 (Four) Times a Day As Needed for Wheezing or Shortness of Air., Disp: 360 mL, Rfl: 11    montelukast (SINGULAIR) 10 MG tablet, TAKE 1 TABLET BY MOUTH EVERYDAY AT BEDTIME, Disp: 90 tablet, Rfl: 2    olmesartan (BENICAR) 5 MG tablet, TAKE 1 TABLET BY MOUTH EVERY DAY, Disp: 90 tablet, Rfl: 3    rosuvastatin (CRESTOR) 5 MG tablet, TAKE 1 TABLET BY MOUTH EVERY DAY, Disp: 90 tablet, Rfl: 3    tiotropium bromide-olodaterol (Stiolto Respimat) 2.5-2.5 MCG/ACT aerosol solution inhaler, 2 Puff, Inhalation, Inh, Daily, # 1 Each, 0 Refill(s), Disp:  ", Rfl:       ROS:  Cardiac review of systems positive for dyspnea on exertion and exertional fatigue.    Objective     /73   Pulse 76   Ht 180.3 cm (70.98\")   Wt 103 kg (227 lb 9.6 oz)   BMI 31.76 kg/m²       General Appearance:   well developed  well nourished  HENT:   oropharynx moist  lips not cyanotic  Respiratory:  no respiratory distress  normal breath sounds  no rales  Cardiovascular:  no jugular venous distention  regular rhythm  S1 normal, S2 normal  no S3, no S4   no murmur  no rub, no thrill  No carotid bruit  pedal pulses normal  lower extremity edema: none    Musculoskeletal:  no clubbing of fingers.   normocephalic, head atraumatic  Skin:   warm, dry  Psychiatric:  judgement and insight appropriate  normal mood and affect    ECHO:  Results for orders placed in visit on 02/11/22    Adult Transthoracic Echo Complete W/ Cont if Necessary Per Protocol    Interpretation Summary  · Estimated left ventricular EF was in agreement with the calculated left ventricular EF. Left ventricular ejection fraction appears to be 56 - 60%. Left ventricular systolic function is normal.  · Left ventricular diastolic function is consistent with (grade I) impaired relaxation.  · Mild mitral regurgitation is noted    STRESS:    CATH:  No results found for this or any previous visit.    BMP:     Glucose   Date Value Ref Range Status   08/26/2023 198 (H) 65 - 99 mg/dL Final     BUN   Date Value Ref Range Status   08/26/2023 26 (H) 8 - 23 mg/dL Final     Creatinine   Date Value Ref Range Status   08/26/2023 1.63 (H) 0.76 - 1.27 mg/dL Final     Sodium   Date Value Ref Range Status   08/26/2023 134 (L) 136 - 145 mmol/L Final     Potassium   Date Value Ref Range Status   08/26/2023 4.4 3.5 - 5.2 mmol/L Final     Chloride   Date Value Ref Range Status   08/26/2023 95 (L) 98 - 107 mmol/L Final     CO2   Date Value Ref Range Status   08/26/2023 28.7 22.0 - 29.0 mmol/L Final     Calcium   Date Value Ref Range Status "   08/26/2023 10.1 8.6 - 10.5 mg/dL Final     BUN/Creatinine Ratio   Date Value Ref Range Status   08/26/2023 16.0 7.0 - 25.0 Final     Anion Gap   Date Value Ref Range Status   08/26/2023 10.3 5.0 - 15.0 mmol/L Final     eGFR   Date Value Ref Range Status   08/26/2023 45.9 (L) >60.0 mL/min/1.73 Final     LIPIDS:  Total Cholesterol   Date Value Ref Range Status   04/04/2023 151 0 - 200 mg/dL Final     Triglycerides   Date Value Ref Range Status   04/04/2023 77 0 - 150 mg/dL Final     HDL Cholesterol   Date Value Ref Range Status   04/04/2023 52 40 - 60 mg/dL Final     LDL Cholesterol    Date Value Ref Range Status   04/04/2023 84 0 - 100 mg/dL Final     VLDL Cholesterol   Date Value Ref Range Status   04/04/2023 15 5 - 40 mg/dL Final     LDL/HDL Ratio   Date Value Ref Range Status   04/04/2023 1.61  Final         Procedures             ASSESSMENT:  Diagnoses and all orders for this visit:    1. Paroxysmal atrial fibrillation (Primary)    2. Primary hypertension    3. Mixed hyperlipidemia    4. Dyspnea on exertion         PLAN:    1.  Continue the Eliquis.  Patient notes no bleeding problems.  2.  Patient's blood pressure is doing well.  He is taking both Coreg and on losartan.  I did tell him if he got any further lightheadedness he could come off the on losartan.  3.  Patient notes no palpitations.  4.  For patient's dyspnea on exertion I gave him the option of slowly increasing his activity.  If his shortness of breath is not getting better he will give us a call.  The other option was stress test.  He did have an echo back in February 2022 which showed normal heart structure.  We have agreed just to watch it but if it is not getting better he will give us a call.  He notes no exertional chest pain.  5.  Continue the Crestor.  Patient cholesterol checked 4/4/2023 with LDL 84, HDL 52, and triglycerides 77.  These are under good control.      No follow-ups on file.     Patient was given instructions and counseling  regarding his condition or for health maintenance advice. Please see specific information pulled into the AVS if appropriate.         Artie Crandall MD   4/12/2024  09:34 EDT

## 2024-05-01 ENCOUNTER — TELEPHONE (OUTPATIENT)
Dept: CARDIOLOGY | Facility: CLINIC | Age: 69
End: 2024-05-01
Payer: MEDICARE

## 2024-05-01 NOTE — TELEPHONE ENCOUNTER
Caller: JOSSIE    Relationship: WIFE    Best call back number: [phone]    What is the best time to reach you: ANY    Who are you requesting to speak with (clinical staff, provider,  specific staff member): IMAN    Do you know the name of the person who called:     What was the call regarding: PATIENT'S WIFE CALLING IN ABOUT GETTING ASSISTANCE WITH ELIQUIS 5MG. PATIENT WOULD LIKE TO GET FINANCIAL ASSISTANCE PAPERWORK IF POSSIBLE. THANK YOU!    Is it okay if the provider responds through MyChart: NO

## 2024-05-01 NOTE — TELEPHONE ENCOUNTER
SANTY PT WIFE.  PROVIDED [employer] PT ASSISTANCE PROGRAM INFO AND DIFFERENT WAYS TO APPLY.  PT WOULD LIKE TO APPLY.  LEFT PAP APPLICATION AT THE  FOR PT TO COMPLETE AND RETURN WITH INCOME DOCS.  ONCE RECEIVED, I WILL SUBMIT TO [facility] FOR REVIEW.  PT HAS SOME MEDICATION AND DOES NOT NEED SAMPLES.

## 2024-05-02 ENCOUNTER — TELEPHONE (OUTPATIENT)
Dept: CARDIOLOGY | Facility: CLINIC | Age: 69
End: 2024-05-02
Payer: MEDICARE

## 2024-05-02 NOTE — TELEPHONE ENCOUNTER
SANTY SETHI.  PROVIDED HER WITH [employer] PHONE NUMBER.  SHE IS GOING TO CALL THEM WITH QUESTIONS ABOUT INCOME GUIDELINES AND SEE IF THEY WILL QUALIFY.

## 2024-05-02 NOTE — TELEPHONE ENCOUNTER
"  Caller: JOSSIE    Relationship: WIFE    Best call back number: [phone]    What is the best time to reach you: ANY    Who are you requesting to speak with (clinical staff, provider,  specific staff member): BRADLEY    Do you know the name of the person who called:     What was the call regarding: PATIENTS WIFE CALLING IN ABOUT FINANCIAL ASSISTANCE AND HOW MUCH THE ALLOWED INCOME IS FOR ELIQUIS. PATIENTS WIFE HAS ALL PAPERWORK READY TO TURN IN BUT DOESN'T WANT TO \"WASTE TIME FOR BRADLEY\" IF THEY MAKE TOO MUCH - SHE DOESN'T REMEMBER HOW MUCH WAS THE GUIDELINE WHEN SHE SPOKE TO BRADLEY YESTERDAY.  PLEASE CALL PATIENTS WIFE TO ASSIST. THANK YOU!    Is it okay if the provider responds through [facility]: NO  "

## 2024-05-03 ENCOUNTER — TELEPHONE (OUTPATIENT)
Dept: CARDIOLOGY | Facility: CLINIC | Age: 69
End: 2024-05-03
Payer: MEDICARE

## 2024-05-03 NOTE — TELEPHONE ENCOUNTER
[employer] PAP APPLICATION RECEIVED FROM  AND FAXED TO [employer] FOR REVIEW.  SCANNED COPY IN MEDIA.  WAITING ON A DETERMINATION.

## 2024-05-07 ENCOUNTER — TELEPHONE (OUTPATIENT)
Dept: PULMONOLOGY | Facility: CLINIC | Age: 69
End: 2024-05-07

## 2024-05-07 NOTE — TELEPHONE ENCOUNTER
Caller: JOSSIE RIOS    Relationship:WIFE    Callback number: [phone] (home)      Is it ok to leave a message: [x] Yes [] No    Requested medication for samples: STIOLTO    How much medication does the patient currently have left: 2 DAYS    Who will be picking up the samples: PT OR WIFE    Do you need information about patient financial assistance for this medication: [x] Yes [] No    Additional details provided: PLEASE CALL PT TO LET THEM KNOW

## 2024-05-07 NOTE — TELEPHONE ENCOUNTER
Patients wife called back, I informed her of what last office note stated per Dr. Phan and she stated that they will start the Trelegy tomorrow. She stated that the patient still has some Qvar left so wasn't sure to start the Trelegy or not. I informed her to go on and start the Trelegy to make sure the patient rinses out his mouth after each use and to not use the Qvar with the Trelegy. Patients wife verbalized understanding.

## 2024-05-07 NOTE — TELEPHONE ENCOUNTER
Hub staff attempted to follow warm transfer process and was unsuccessful     Caller: JOSSIE RIOS    Relationship to patient: Emergency Contact    Best call back number: [phone]     Patient is needing: PT STILL WANTS STILATO SAMPLE, TRELEGY IS THOUSANDS OF DOLLARS FOR ONE MONTH SUPPLY. WANTS TO CONTINUE WITH WHAT THEY CAN AFFORD, PLEASE ADVISE IF PT CAN GET SAMPLE

## 2024-05-07 NOTE — TELEPHONE ENCOUNTER
I called and left a message to call office back; however per Dr. Phan last office note is suppose to switch to Trelegy after he finished his Stiolto.

## 2024-05-08 ENCOUNTER — TELEPHONE (OUTPATIENT)
Dept: CARDIOLOGY | Facility: CLINIC | Age: 69
End: 2024-05-08
Payer: MEDICARE

## 2024-05-08 RX ORDER — FLUTICASONE FUROATE, UMECLIDINIUM BROMIDE AND VILANTEROL TRIFENATATE 100; 62.5; 25 UG/1; UG/1; UG/1
1 POWDER RESPIRATORY (INHALATION)
Qty: 1 EACH | Refills: 6 | Status: SHIPPED | OUTPATIENT
Start: 2024-05-08 | End: 2024-05-13 | Stop reason: SDUPTHER

## 2024-05-08 NOTE — TELEPHONE ENCOUNTER
I spoke to pt and pt wife. Pt decided to start the Trelegy samples that we gave him in office to see how he tolerates. I sent to pharmacy and pt is aware to see if it is also affordable.

## 2024-05-13 DIAGNOSIS — J43.2 CENTRILOBULAR EMPHYSEMA: Primary | ICD-10-CM

## 2024-05-13 RX ORDER — FLUTICASONE FUROATE, UMECLIDINIUM BROMIDE AND VILANTEROL TRIFENATATE 100; 62.5; 25 UG/1; UG/1; UG/1
1 POWDER RESPIRATORY (INHALATION)
Qty: 3 EACH | Refills: 3 | Status: SHIPPED | OUTPATIENT
Start: 2024-05-13

## 2024-06-24 RX ORDER — ROSUVASTATIN CALCIUM 5 MG/1
TABLET, COATED ORAL
Qty: 90 TABLET | Refills: 3 | Status: SHIPPED | OUTPATIENT
Start: 2024-06-24

## 2024-06-27 ENCOUNTER — TELEPHONE (OUTPATIENT)
Dept: SLEEP MEDICINE | Facility: HOSPITAL | Age: 69
End: 2024-06-27
Payer: MEDICARE

## 2024-06-27 NOTE — TELEPHONE ENCOUNTER
Left message for patient to schedule annual follow up visit at [facility] at [phone], option 1 to schedule.

## 2024-07-15 ENCOUNTER — OFFICE VISIT (OUTPATIENT)
Dept: CARDIOLOGY | Facility: CLINIC | Age: 69
End: 2024-07-15
Payer: MEDICARE

## 2024-07-15 VITALS
SYSTOLIC BLOOD PRESSURE: 138 MMHG | BODY MASS INDEX: 32.5 KG/M2 | WEIGHT: 227 LBS | HEIGHT: 70 IN | DIASTOLIC BLOOD PRESSURE: 76 MMHG | HEART RATE: 70 BPM

## 2024-07-15 DIAGNOSIS — E78.2 MIXED HYPERLIPIDEMIA: Chronic | ICD-10-CM

## 2024-07-15 DIAGNOSIS — I10 PRIMARY HYPERTENSION: Primary | Chronic | ICD-10-CM

## 2024-07-15 DIAGNOSIS — I48.0 PAROXYSMAL ATRIAL FIBRILLATION: ICD-10-CM

## 2024-07-15 PROCEDURE — 3078F DIAST BP <80 MM HG: CPT | Performed by: INTERNAL MEDICINE

## 2024-07-15 PROCEDURE — 3075F SYST BP GE 130 - 139MM HG: CPT | Performed by: INTERNAL MEDICINE

## 2024-07-15 PROCEDURE — 99214 OFFICE O/P EST MOD 30 MIN: CPT | Performed by: INTERNAL MEDICINE

## 2024-07-15 NOTE — PROGRESS NOTES
Chief Complaint  Atrial Fibrillation, Chronic combined systolic (congestive) and diastolic (conge, and Hypertension (3m Follow Up)    Subjective        Toribio Hayes presents to [facility] CARDIOLOGY  History of present illness:    Patient states he is feeling better and able to do more.  His shortness of breath and tiredness with activity is improving.  He notes no bleeding problems or palpitations.  He notes no further lightheadedness.      Past Medical History:   Diagnosis Date    A-fib     DR KHALIL    Arthritis     RIGHT THUMB    Asthma     Bladder cancer     2018    Chronic diastolic congestive heart failure 12/06/2021    COPD (chronic obstructive pulmonary disease)     INHALER    Elevated serum creatinine 12/06/2021    Hyperlipidemia     ON MEDS    Hypertension     CONTROLLED W MEDS    Lung cancer     DR RAMIRES TOOK CARE OF THAT (2008)    Lung disease 12/06/2021    Melanoma in situ of left lower leg     2022 removed    Mouth sore secondary to chemotherapy     Stage 3a chronic kidney disease 01/05/2022    Tear of medial cartilage or meniscus of knee, current 08/15/2016    Throat soreness     FROM CHEMO         Past Surgical History:   Procedure Laterality Date    COLONOSCOPY      CYSTECTOMY      FINGER DEBRIDEMENT Right     RIGHT THUMB    ILEAL LOOP NEOBLADDER      BLADDER MADE FROM HIS INTESTINES    LUNG REMOVAL, PARTIAL Left     LEFT UPPER - 2008 - SOA W EXERTION    PORTOCAVAL SHUNT PLACEMENT      2008 AND 2018    SENTINEL NODE BIOPSY N/A 06/23/2022    Procedure: Excision of left thigh melanoma with sentinel lymph node biopsy;  Surgeon: Dominic Ruiz MD;  Location: [facility] MAIN OR;  Service: General;  Laterality: N/A;    TURP / TRANSURETHRAL INCISION / DRAINAGE PROSTATE            Social History     Socioeconomic History    Marital status:    Tobacco Use    Smoking status: Former     Current packs/day: 0.00     Average packs/day: 1.5 packs/day for 39.0 years (58.5 ttl pk-yrs)      "Types: Cigarettes     Start date: 10/21/1969     Quit date: 10/21/2008     Years since quitting: 15.7    Smokeless tobacco: Never   Vaping Use    Vaping status: Never Used   Substance and Sexual Activity    Alcohol use: Never    Drug use: Never    Sexual activity: Yes     Partners: Female         Family History   Problem Relation Age of Onset    No Known Problems Mother         Mother     No Known Problems Father         Father  54 years old lung cancer/smoker    Prostate cancer Brother     Malrandy Hyperthermia Neg Hx           No Known Allergies         Current Outpatient Medications:     albuterol sulfate  (90 Base) MCG/ACT inhaler, Inhale 2 puffs Every 4 (Four) Hours As Needed for Wheezing., Disp: 18 g, Rfl: 5    carvedilol (COREG) 25 MG tablet, TAKE 1 TABLET BY MOUTH TWICE A DAY, Disp: 180 tablet, Rfl: 3    Eliquis 5 MG tablet tablet, TAKE 1 TABLET BY MOUTH TWICE A DAY, Disp: 180 tablet, Rfl: 3    Fluticasone-Umeclidin-Vilant (Trelegy Ellipta) 100-62.5-25 MCG/ACT inhaler, Inhale 1 puff Daily., Disp: 3 each, Rfl: 3    ipratropium-albuterol (DUO-NEB) 0.5-2.5 mg/3 ml nebulizer, Take 3 mL by nebulization 4 (Four) Times a Day As Needed for Wheezing or Shortness of Air., Disp: 360 mL, Rfl: 11    montelukast (SINGULAIR) 10 MG tablet, TAKE 1 TABLET BY MOUTH EVERYDAY AT BEDTIME, Disp: 90 tablet, Rfl: 2    olmesartan (BENICAR) 5 MG tablet, TAKE 1 TABLET BY MOUTH EVERY DAY, Disp: 90 tablet, Rfl: 3    rosuvastatin (CRESTOR) 5 MG tablet, TAKE 1 TABLET BY MOUTH EVERY DAY, Disp: 90 tablet, Rfl: 3      ROS:  Cardiac review of systems negative.    Objective     /76   Pulse 70   Ht 177.8 cm (70\")   Wt 103 kg (227 lb)   BMI 32.57 kg/m²       General Appearance:   well developed  well nourished  HENT:   oropharynx moist  lips not cyanotic  Respiratory:  no respiratory distress  normal breath sounds  no rales  Cardiovascular:  no jugular venous distention  regular rhythm  S1 normal, S2 normal  no S3, no S4 "   no murmur  no rub, no thrill  No carotid bruit  pedal pulses normal  lower extremity edema: none    Musculoskeletal:  no clubbing of fingers.   normocephalic, head atraumatic  Skin:   warm, dry  Psychiatric:  judgement and insight appropriate  normal mood and affect    ECHO:  Results for orders placed in visit on 02/11/22    Adult Transthoracic Echo Complete W/ Cont if Necessary Per Protocol    Interpretation Summary  · Estimated left ventricular EF was in agreement with the calculated left ventricular EF. Left ventricular ejection fraction appears to be 56 - 60%. Left ventricular systolic function is normal.  · Left ventricular diastolic function is consistent with (grade I) impaired relaxation.  · Mild mitral regurgitation is noted    STRESS:    CATH:  No results found for this or any previous visit.    BMP:     Glucose   Date Value Ref Range Status   08/26/2023 198 (H) 65 - 99 mg/dL Final     BUN   Date Value Ref Range Status   08/26/2023 26 (H) 8 - 23 mg/dL Final     Creatinine   Date Value Ref Range Status   08/26/2023 1.63 (H) 0.76 - 1.27 mg/dL Final     Sodium   Date Value Ref Range Status   08/26/2023 134 (L) 136 - 145 mmol/L Final     Potassium   Date Value Ref Range Status   08/26/2023 4.4 3.5 - 5.2 mmol/L Final     Chloride   Date Value Ref Range Status   08/26/2023 95 (L) 98 - 107 mmol/L Final     CO2   Date Value Ref Range Status   08/26/2023 28.7 22.0 - 29.0 mmol/L Final     Calcium   Date Value Ref Range Status   08/26/2023 10.1 8.6 - 10.5 mg/dL Final     BUN/Creatinine Ratio   Date Value Ref Range Status   08/26/2023 16.0 7.0 - 25.0 Final     Anion Gap   Date Value Ref Range Status   08/26/2023 10.3 5.0 - 15.0 mmol/L Final     eGFR   Date Value Ref Range Status   08/26/2023 45.9 (L) >60.0 mL/min/1.73 Final     LIPIDS:  Total Cholesterol   Date Value Ref Range Status   04/04/2023 151 0 - 200 mg/dL Final     Triglycerides   Date Value Ref Range Status   04/04/2023 77 0 - 150 mg/dL Final     HDL  Cholesterol   Date Value Ref Range Status   04/04/2023 52 40 - 60 mg/dL Final     LDL Cholesterol    Date Value Ref Range Status   04/04/2023 84 0 - 100 mg/dL Final     VLDL Cholesterol   Date Value Ref Range Status   04/04/2023 15 5 - 40 mg/dL Final     LDL/HDL Ratio   Date Value Ref Range Status   04/04/2023 1.61  Final         Procedures             ASSESSMENT:  Diagnoses and all orders for this visit:    1. Primary hypertension (Primary)    2. Mixed hyperlipidemia    3. Paroxysmal atrial fibrillation         PLAN:    1.  Continue the Eliquis.  The patient notes no problems with bleeding.  2.  Patient denies any palpitations.  He is in a normal sinus rhythm by exam.  3.  Blood pressure is under good control.  4.  Continue the Crestor.  Patient cholesterol checked 4/4/2023 with LDL 84, HDL 52, and triglycerides 77.  These are under good control.  5.  Talk to the patient about a regular exercise program.  He does feel like he is doing much better the longer he gets away from the chemotherapy and XRT.  We will just continue to monitor.  His modifiable risk factors are under good control.      Return in about 6 months (around 1/15/2025).     Patient was given instructions and counseling regarding his condition or for health maintenance advice. Please see specific information pulled into the AVS if appropriate.         Artie Crandall MD   7/15/2024  11:53 EDT

## 2024-08-13 ENCOUNTER — OFFICE VISIT (OUTPATIENT)
Dept: PULMONOLOGY | Facility: CLINIC | Age: 69
End: 2024-08-13
Payer: MEDICARE

## 2024-08-13 VITALS
OXYGEN SATURATION: 97 % | TEMPERATURE: 97.2 F | BODY MASS INDEX: 32.6 KG/M2 | DIASTOLIC BLOOD PRESSURE: 72 MMHG | HEIGHT: 70 IN | RESPIRATION RATE: 16 BRPM | SYSTOLIC BLOOD PRESSURE: 139 MMHG | HEART RATE: 79 BPM | WEIGHT: 227.7 LBS

## 2024-08-13 DIAGNOSIS — J44.9 CHRONIC OBSTRUCTIVE PULMONARY DISEASE, UNSPECIFIED COPD TYPE: ICD-10-CM

## 2024-08-13 DIAGNOSIS — I48.20 ATRIAL FIBRILLATION, CHRONIC: ICD-10-CM

## 2024-08-13 DIAGNOSIS — J43.2 CENTRILOBULAR EMPHYSEMA: ICD-10-CM

## 2024-08-13 DIAGNOSIS — R06.02 SHORTNESS OF BREATH: ICD-10-CM

## 2024-08-13 DIAGNOSIS — C34.92 ADENOCARCINOMA OF LEFT LUNG: ICD-10-CM

## 2024-08-13 DIAGNOSIS — R06.00 DYSPNEA, UNSPECIFIED TYPE: ICD-10-CM

## 2024-08-13 DIAGNOSIS — I50.42 CHRONIC COMBINED SYSTOLIC (CONGESTIVE) AND DIASTOLIC (CONGESTIVE) HEART FAILURE: ICD-10-CM

## 2024-08-13 DIAGNOSIS — R29.818 SUSPECTED SLEEP APNEA: ICD-10-CM

## 2024-08-13 DIAGNOSIS — Z23 ENCOUNTER FOR IMMUNIZATION: ICD-10-CM

## 2024-08-13 DIAGNOSIS — N18.31 STAGE 3A CHRONIC KIDNEY DISEASE: ICD-10-CM

## 2024-08-13 DIAGNOSIS — J43.9 PULMONARY EMPHYSEMA, UNSPECIFIED EMPHYSEMA TYPE: ICD-10-CM

## 2024-08-13 PROCEDURE — 90677 PCV20 VACCINE IM: CPT | Performed by: INTERNAL MEDICINE

## 2024-08-13 PROCEDURE — 1159F MED LIST DOCD IN RCRD: CPT | Performed by: INTERNAL MEDICINE

## 2024-08-13 PROCEDURE — 3075F SYST BP GE 130 - 139MM HG: CPT | Performed by: INTERNAL MEDICINE

## 2024-08-13 PROCEDURE — 3078F DIAST BP <80 MM HG: CPT | Performed by: INTERNAL MEDICINE

## 2024-08-13 PROCEDURE — 99214 OFFICE O/P EST MOD 30 MIN: CPT | Performed by: INTERNAL MEDICINE

## 2024-08-13 PROCEDURE — G0009 ADMIN PNEUMOCOCCAL VACCINE: HCPCS | Performed by: INTERNAL MEDICINE

## 2024-08-13 PROCEDURE — 1160F RVW MEDS BY RX/DR IN RCRD: CPT | Performed by: INTERNAL MEDICINE

## 2024-08-13 RX ORDER — IPRATROPIUM BROMIDE AND ALBUTEROL SULFATE 2.5; .5 MG/3ML; MG/3ML
3 SOLUTION RESPIRATORY (INHALATION) 4 TIMES DAILY PRN
Qty: 360 ML | Refills: 11 | Status: SHIPPED | OUTPATIENT
Start: 2024-08-13 | End: 2024-08-16 | Stop reason: SDUPTHER

## 2024-08-13 RX ORDER — BETAMETHASONE DIPROPIONATE 0.5 MG/G
CREAM TOPICAL
COMMUNITY
Start: 2024-07-10 | End: 2024-08-13

## 2024-08-13 RX ORDER — ALBUTEROL SULFATE 90 UG/1
2 AEROSOL, METERED RESPIRATORY (INHALATION) EVERY 4 HOURS PRN
Qty: 17 G | Refills: 5 | Status: SHIPPED | OUTPATIENT
Start: 2024-08-13

## 2024-08-13 RX ORDER — FLUTICASONE FUROATE, UMECLIDINIUM BROMIDE AND VILANTEROL TRIFENATATE 100; 62.5; 25 UG/1; UG/1; UG/1
1 POWDER RESPIRATORY (INHALATION)
Qty: 60 EACH | Refills: 3 | Status: SHIPPED | OUTPATIENT
Start: 2024-08-13

## 2024-08-13 NOTE — PROGRESS NOTES
Primary Care Provider  Collin Cannon MD     Referring Provider  No ref. provider found     Chief Complaint  Asthma, COPD, Follow-up (Pt here for 6 month follow up), and Sleep Apnea    Subjective          Toribio Hayes presents to [facility] PULMONARY [facility]ICAL [facility]ARE MEDICINE  History of Present Illness  Toribio Hayes is a 69 y.o. male patient with history of lung adenocarcinoma status post left upper lobectomy 2009, also had chemo and radiation, COPD, history of bladder and prostate cancer status post chemotherapy, COPD.  He is here for follow-up.  Since his last office visit, we switched his inhaler from Stiolto and Qvar to Trelegy Ellipta 100 mcg once daily.  He has been using DuoNeb nebulizer once or twice a day.  He continues to use oxygen with sleep.  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 He continues to be short of breath with activities which he attributes to anemia too.  He has cough and intermittent wheezing.  Shortness of breath is worse going up and down the stairs. He has no change in weight or appetite.  No fever or chills.  He is retired now.  He recently had PET scan which was reviewed with him today.  It shows inflammation in left lower lobe, persistent.  Has no recent hospitalizations.  No nausea or vomiting.    Review of Systems     General:  Fatigue, No Fever No weight loss or loss of appetite  HEENT: No dysphagia, No Visual Changes, no rhinorrhea  Respiratory:  + cough,+Dyspnea, intermittent phlegm, No Pleuritic Pain, no wheezing, no hemoptysis.  Cardiovascular: Denies chest pain, denies palpitations,+SERRA, No Chest Pressure  Gastrointestinal:  No Abdominal Pain, No Nausea, No Vomiting, No Diarrhea  Genitourinary:  No Dysuria, No Frequency, No Hesitancy  Musculoskeletal: No muscle pain or swelling  Endocrine:  No Heat Intolerance, No Cold Intolerance  Hematologic:  No Bleeding, No Bruising  Psychiatric:  No Anxiety, No  Depression  Neurologic:  No Confusion, no Dysarthria, No Headaches  Skin:  No Rash, No Open Wounds    Family History   Problem Relation Age of Onset    No Known Problems Mother         Mother     No Known Problems Father         Father  54 years old lung cancer/smoker    Prostate cancer Brother     Malrandy Hyperthermia Neg Hx         Social History     Socioeconomic History    Marital status:    Tobacco Use    Smoking status: Former     Current packs/day: 0.00     Average packs/day: 1.5 packs/day for 39.0 years (58.5 ttl pk-yrs)     Types: Cigarettes     Start date: 10/21/1969     Quit date: 10/21/2008     Years since quitting: 15.8    Smokeless tobacco: Never   Vaping Use    Vaping status: Never Used   Substance and Sexual Activity    Alcohol use: Never    Drug use: Never    Sexual activity: Yes     Partners: Female        Past Medical History:   Diagnosis Date    A-fib     DR KHALIL    Arthritis     RIGHT THUMB    Asthma     Bladder cancer     2018    Chronic diastolic congestive heart failure 2021    COPD (chronic obstructive pulmonary disease)     INHALER    Elevated serum creatinine 2021    Hyperlipidemia     ON MEDS    Hypertension     CONTROLLED W MEDS    Lung cancer     DR RAMIRES TOOK CARE OF THAT ()    Lung disease 2021    Melanoma in situ of left lower leg      removed    Mouth sore secondary to chemotherapy     Stage 3a chronic kidney disease 2022    Tear of medial cartilage or meniscus of knee, current 08/15/2016    Throat soreness     FROM CHEMO    Ureteral cancer 2023        Immunization History   Administered Date(s) Administered    COVID-19 (MODERNA) 1st,2nd,3rd Dose Monovalent 2021, 2021    Fluzone (or Fluarix & Flulaval for VFC) >6mos 2020    Fluzone High-Dose 65+YRS 10/31/2022, 2023    Fluzone High-Dose 65+yrs 2023    Influenza Seasonal Injectable 10/27/2021    Influenza, Unspecified 2020    Tdap 2023  "        No Known Allergies       Current Outpatient Medications:     albuterol sulfate  (90 Base) MCG/ACT inhaler, Inhale 2 puffs Every 4 (Four) Hours As Needed for Wheezing., Disp: 17 g, Rfl: 5    carvedilol (COREG) 25 MG tablet, TAKE 1 TABLET BY MOUTH TWICE A DAY, Disp: 180 tablet, Rfl: 3    Eliquis 5 MG tablet tablet, TAKE 1 TABLET BY MOUTH TWICE A DAY, Disp: 180 tablet, Rfl: 3    Fluticasone-Umeclidin-Vilant (Trelegy Ellipta) 100-62.5-25 MCG/ACT inhaler, Inhale 1 puff Daily., Disp: 60 each, Rfl: 3    ipratropium-albuterol (DUO-NEB) 0.5-2.5 mg/3 ml nebulizer, Take 3 mL by nebulization 4 (Four) Times a Day As Needed for Wheezing or Shortness of Air., Disp: 360 mL, Rfl: 11    montelukast (SINGULAIR) 10 MG tablet, TAKE 1 TABLET BY MOUTH EVERYDAY AT BEDTIME, Disp: 90 tablet, Rfl: 2    olmesartan (BENICAR) 5 MG tablet, TAKE 1 TABLET BY MOUTH EVERY DAY, Disp: 90 tablet, Rfl: 3    rosuvastatin (CRESTOR) 5 MG tablet, TAKE 1 TABLET BY MOUTH EVERY DAY, Disp: 90 tablet, Rfl: 3    RSVPreF3 Vac Recomb Adjuvanted (AREXVY) 120 MCG/0.5ML reconstituted suspension injection, Inject 0.5 mL into the appropriate muscle as directed by prescriber 1 (One) Time for 1 dose., Disp: 1 kit, Rfl: 0     Objective   Vital Signs:   /72 (BP Location: Left arm, Patient Position: Sitting, Cuff Size: Adult)   Pulse 79   Temp 97.2 °F (36.2 °C) (Temporal)   Resp 16   Ht 177.8 cm (70\")   Wt 103 kg (227 lb 11.2 oz)   SpO2 97% Comment: room air  BMI 32.67 kg/m²     Mallampatti classification : 1  Physical Exam  Vital Signs Reviewed  WDWN, Alert, in no acute distress, normal conversational  HEENT:  PERRL, EOMI.  OP, nares clear, no sinus tenderness  Neck:  Supple, no JVD, no thyromegaly  Lymph: no axillary, cervical, supraclavicular lymphadenopathy noted bilaterally  Chest: Poor aeration, bilateral diminished breath sounds, end expiratory wheezing, crackles or rhonchi, resonant to percussion b/l  CV: RRR, no MGR, pulses 2+, equal  Abd: "  Soft, NT, ND, + BS, no HSM  EXT:  no clubbing, no cyanosis, No BLE edema  Neuro:  A&Ox3, CN grossly intact, no focal deficits  Skin: No rashes or lesions noted     Result Review :   The following data was reviewed by: Guanako Phan MD on 08/15/2023:  Common labs          8/26/2023    07:25 12/6/2023    08:51 8/7/2024    10:23   Common Labs   Glucose 198      BUN 26      Creatinine 1.63      Sodium 134      Potassium 4.4      Chloride 95      Calcium 10.1      Albumin 3.9      Total Bilirubin 0.4  0.7        Alkaline Phosphatase 77      AST (SGOT) 21      ALT (SGPT) 26      WBC 10.99      Hemoglobin 12.5      Hematocrit 37.0      Platelets 255      PSA  <0.01     <0.01          Details          This result is from an external source.             CMP          8/26/2023    07:25 12/6/2023    08:51   CMP   Glucose 198     BUN 26     Creatinine 1.63     EGFR 45.9     Sodium 134     Potassium 4.4     Chloride 95     Calcium 10.1     Total Protein 7.5     Albumin 3.9     Globulin 3.6     Total Bilirubin 0.4  0.7       Alkaline Phosphatase 77     AST (SGOT) 21     ALT (SGPT) 26     Albumin/Globulin Ratio 1.1     BUN/Creatinine Ratio 16.0     Anion Gap 10.3        Details          This result is from an external source.             CBC          8/26/2023    07:25   CBC   WBC 10.99    RBC 4.09    Hemoglobin 12.5    Hematocrit 37.0    MCV 90.5    MCH 30.6    MCHC 33.8    RDW 12.9    Platelets 255      CBC w/diff          6/1/2023    07:22 7/30/2023    09:54 7/31/2023    05:39   CBC w/Diff   WBC 6.91  8.36  12.14    RBC 4.23  4.30  3.76    Hemoglobin 13.5  13.6  11.9    Hematocrit 39.6  40.0  35.7    MCV 93.6  93.0  94.9    MCH 31.9  31.6  31.6    MCHC 34.1  34.0  33.3    RDW 12.8  12.6  12.9    Platelets 214  223  205    Neutrophil Rel % 69.4  91.5  80.3    Immature Granulocyte Rel % 0.3  0.4  0.3    Lymphocyte Rel % 19.1  4.8  9.7    Monocyte Rel % 9.8  3.0  8.9    Eosinophil Rel % 0.7  0.1  0.4    Basophil Rel % 0.7  0.2   0.4      Data reviewed : Radiologic studies Recent PET CT scan was reviewed.        Narrative & Impression   ACCESSION NUMBER: [id number]    DATE: 7/31/2024 7:10    EXAMINATION: PET CT Skull Base to Midthigh Sub    PROVIDED INDICATION: Urethral cancer    COMPARISON: PET/CT on 03/25/2024    Technique:  Approximately 60 minutes following the intravenous administration of 17.5 mCi of F-18 FDG via a right antecubital angiocatheter, tomographic emission images were obtained over the torso from skull base to mid thigh utilizing CT transmission data for attenuation correction. The noncontrasted CT data is used for anatomic localization of FDG-PET findings and is not interpreted independent of the FDG scan. At the time of isotope injection, the patient's quantitative blood glucose was 108 mg/dL.    FINDINGS:    The study was technically adequate for interpretation.          Head and Neck  :        There is no abnormal focal uptake or discrete mass in the imaging field of the brain.    The paranasal sinuses are clear.      There is asymmetric uptake in the left medial pterygoid muscle, nonspecific.    The images of the salivary glands, oral cavity, pharynx and larynx are unremarkable radiographically and scintigraphically.    There is no abnormal uptake in the thyroid. Again noted is a calcified nodule without abnormal uptake in the right thyroid lobe.    There is no neck lymphadenopathy.      Chest:        MediPort is noted on the right anterior chest wall with catheter tip to the right atrium.    There is no axillary lymphadenopathy.    Again noted are postsurgical changes in the left upper lobe secondary to previous lobectomy. There is patchy consolidation with mild uptake (SUV 2.2) in the medial left lower lobe extending to the left inferior hilum, unchanged compared to prior study and consistent with an inflammatory process or pneumonitis.    Again noted are diffuse centrilobular emphysema and scattered small  granulomas.    There is no mediastinal lymphadenopathy.    There is no pleural effusion or pericardial effusion.    Myocardial uptake is physiologic.      Abdomen and Pelvis:      There is diffuse hepatic steatosis. Small granulomas are seen within the spleen.      The images of the pancreas and adrenal glands are unremarkable and radiographically and scintigraphically. Persistent cholelithiasis.    There is normal radiotracer excretion by the kidneys without hydronephrosis. Noted is a small exophytic hyperdensity lesion in the right kidney, unchanged.    There is colonic diverticulosis. There is a small focus of mild increased uptake in the sigmoid, less prominent than that on prior study.    Patient is status post cystoprostatectomy with ileal neobladder formation.    The current images demonstrate a small area of low density with intense uptake (SUV 7.2) in the mid urethra, decreased in both size and intensity of uptake compared to that on prior study.    There is no intra-abdominal and retroperitoneal lymphadenopathy.    There is a 1.0 cm left ilioinguinal lymph node with mild uptake (SUV 2.2).    There are a few borderline sized bilateral inguinal lymph node with mild uptake greater on the left, the largest measures about 1.3 cm with SUV 1.8.    All lymph nodes above are most likely benign or reactive.      Musculoskeletal:      Again noted is prominent moderate uptake (SUV 4.1) in the left sternoclavicular joint, unchanged and consistent with osteoarthritis.    There is no discrete sclerotic or osteolytic lesion.    Multiple osteophytes are seen in the vertebrae.          IMPRESSION:        1.  Status post cystoprostatectomy with ileal neobladder formation.      2.  Small low-density with intense uptake (SUV 7.2) in the mid urethra, decreased in both size and intensity of uptake compared to that on prior study.    3.  1.0 cm left ilioinguinal lymph node with mild uptake (SUV 2.2). Borderline sized bilateral  inguinal lymph node with mild uptake greater on the left. All lymph nodes are most likely benign or reactive.    4.  Patchy consolidation with mild uptake (SUV 2.2) in the medial left lower lobe extending to the left inferior hilum, unchanged compared to prior study and consistent with an inflammatory process or pneumonitis.          Dictated by: Jose R Kenny M.D. Signed by Jose R Kenny M.D. on 7/31/2024 11:00  ##### Final #####    Dictated by:    JOSE R KENNY MD-RAD  Dictated DT/TM: 07/31/2024 11:00 am  Interpreted and electronically signed by:  JOSE R KENNY MD-RAD  Signed DT/TM:  07/31/2024 11:00 am  Exam End: --    Specimen Collected: 07/31/24 07:10 Last Resulted: 07/31/24 09:06   Received From: [facility]  Result Received: 08/13/24 07:47        Assessment and Plan    Diagnoses and all orders for this visit:    1. Chronic obstructive pulmonary disease, unspecified COPD type  -     ipratropium-albuterol (DUO-NEB) 0.5-2.5 mg/3 ml nebulizer; Take 3 mL by nebulization 4 (Four) Times a Day As Needed for Wheezing or Shortness of Air.  Dispense: 360 mL; Refill: 11  -     Pneumococcal Conjugate Vaccine 20-Valent (PCV20)  -     RSVPreF3 Vac Recomb Adjuvanted (AREXVY) 120 MCG/0.5ML reconstituted suspension injection; Inject 0.5 mL into the appropriate muscle as directed by prescriber 1 (One) Time for 1 dose.  Dispense: 1 kit; Refill: 0  -     albuterol sulfate  (90 Base) MCG/ACT inhaler; Inhale 2 puffs Every 4 (Four) Hours As Needed for Wheezing.  Dispense: 17 g; Refill: 5  -     Fluticasone-Umeclidin-Vilant (Trelegy Ellipta) 100-62.5-25 MCG/ACT inhaler; Inhale 1 puff Daily.  Dispense: 60 each; Refill: 3    2. Dyspnea, unspecified type  -     ipratropium-albuterol (DUO-NEB) 0.5-2.5 mg/3 ml nebulizer; Take 3 mL by nebulization 4 (Four) Times a Day As Needed for Wheezing or Shortness of Air.  Dispense: 360 mL; Refill: 11  -     Pneumococcal Conjugate Vaccine 20-Valent (PCV20)  -     RSVPreF3 Vac Recomb Adjuvanted  (AREXVY) 120 MCG/0.5ML reconstituted suspension injection; Inject 0.5 mL into the appropriate muscle as directed by prescriber 1 (One) Time for 1 dose.  Dispense: 1 kit; Refill: 0  -     albuterol sulfate  (90 Base) MCG/ACT inhaler; Inhale 2 puffs Every 4 (Four) Hours As Needed for Wheezing.  Dispense: 17 g; Refill: 5  -     Fluticasone-Umeclidin-Vilant (Trelegy Ellipta) 100-62.5-25 MCG/ACT inhaler; Inhale 1 puff Daily.  Dispense: 60 each; Refill: 3    3. Adenocarcinoma of left lung  -     ipratropium-albuterol (DUO-NEB) 0.5-2.5 mg/3 ml nebulizer; Take 3 mL by nebulization 4 (Four) Times a Day As Needed for Wheezing or Shortness of Air.  Dispense: 360 mL; Refill: 11  -     Pneumococcal Conjugate Vaccine 20-Valent (PCV20)  -     RSVPreF3 Vac Recomb Adjuvanted (AREXVY) 120 MCG/0.5ML reconstituted suspension injection; Inject 0.5 mL into the appropriate muscle as directed by prescriber 1 (One) Time for 1 dose.  Dispense: 1 kit; Refill: 0  -     albuterol sulfate  (90 Base) MCG/ACT inhaler; Inhale 2 puffs Every 4 (Four) Hours As Needed for Wheezing.  Dispense: 17 g; Refill: 5  -     Fluticasone-Umeclidin-Vilant (Trelegy Ellipta) 100-62.5-25 MCG/ACT inhaler; Inhale 1 puff Daily.  Dispense: 60 each; Refill: 3    4. Pulmonary emphysema, unspecified emphysema type  -     ipratropium-albuterol (DUO-NEB) 0.5-2.5 mg/3 ml nebulizer; Take 3 mL by nebulization 4 (Four) Times a Day As Needed for Wheezing or Shortness of Air.  Dispense: 360 mL; Refill: 11  -     Pneumococcal Conjugate Vaccine 20-Valent (PCV20)  -     RSVPreF3 Vac Recomb Adjuvanted (AREXVY) 120 MCG/0.5ML reconstituted suspension injection; Inject 0.5 mL into the appropriate muscle as directed by prescriber 1 (One) Time for 1 dose.  Dispense: 1 kit; Refill: 0  -     albuterol sulfate  (90 Base) MCG/ACT inhaler; Inhale 2 puffs Every 4 (Four) Hours As Needed for Wheezing.  Dispense: 17 g; Refill: 5  -     Fluticasone-Umeclidin-Vilant (Trelegy  Ellipta) 100-62.5-25 MCG/ACT inhaler; Inhale 1 puff Daily.  Dispense: 60 each; Refill: 3    5. Atrial fibrillation, chronic  -     ipratropium-albuterol (DUO-NEB) 0.5-2.5 mg/3 ml nebulizer; Take 3 mL by nebulization 4 (Four) Times a Day As Needed for Wheezing or Shortness of Air.  Dispense: 360 mL; Refill: 11  -     Pneumococcal Conjugate Vaccine 20-Valent (PCV20)  -     RSVPreF3 Vac Recomb Adjuvanted (AREXVY) 120 MCG/0.5ML reconstituted suspension injection; Inject 0.5 mL into the appropriate muscle as directed by prescriber 1 (One) Time for 1 dose.  Dispense: 1 kit; Refill: 0  -     albuterol sulfate  (90 Base) MCG/ACT inhaler; Inhale 2 puffs Every 4 (Four) Hours As Needed for Wheezing.  Dispense: 17 g; Refill: 5  -     Fluticasone-Umeclidin-Vilant (Trelegy Ellipta) 100-62.5-25 MCG/ACT inhaler; Inhale 1 puff Daily.  Dispense: 60 each; Refill: 3    6. Stage 3a chronic kidney disease  -     ipratropium-albuterol (DUO-NEB) 0.5-2.5 mg/3 ml nebulizer; Take 3 mL by nebulization 4 (Four) Times a Day As Needed for Wheezing or Shortness of Air.  Dispense: 360 mL; Refill: 11  -     Pneumococcal Conjugate Vaccine 20-Valent (PCV20)  -     RSVPreF3 Vac Recomb Adjuvanted (AREXVY) 120 MCG/0.5ML reconstituted suspension injection; Inject 0.5 mL into the appropriate muscle as directed by prescriber 1 (One) Time for 1 dose.  Dispense: 1 kit; Refill: 0  -     albuterol sulfate  (90 Base) MCG/ACT inhaler; Inhale 2 puffs Every 4 (Four) Hours As Needed for Wheezing.  Dispense: 17 g; Refill: 5  -     Fluticasone-Umeclidin-Vilant (Trelegy Ellipta) 100-62.5-25 MCG/ACT inhaler; Inhale 1 puff Daily.  Dispense: 60 each; Refill: 3    7. Chronic combined systolic (congestive) and diastolic (congestive) heart failure  -     Pneumococcal Conjugate Vaccine 20-Valent (PCV20)  -     RSVPreF3 Vac Recomb Adjuvanted (AREXVY) 120 MCG/0.5ML reconstituted suspension injection; Inject 0.5 mL into the appropriate muscle as directed by  prescriber 1 (One) Time for 1 dose.  Dispense: 1 kit; Refill: 0  -     albuterol sulfate  (90 Base) MCG/ACT inhaler; Inhale 2 puffs Every 4 (Four) Hours As Needed for Wheezing.  Dispense: 17 g; Refill: 5  -     Fluticasone-Umeclidin-Vilant (Trelegy Ellipta) 100-62.5-25 MCG/ACT inhaler; Inhale 1 puff Daily.  Dispense: 60 each; Refill: 3    8. Centrilobular emphysema  -     Pneumococcal Conjugate Vaccine 20-Valent (PCV20)  -     RSVPreF3 Vac Recomb Adjuvanted (AREXVY) 120 MCG/0.5ML reconstituted suspension injection; Inject 0.5 mL into the appropriate muscle as directed by prescriber 1 (One) Time for 1 dose.  Dispense: 1 kit; Refill: 0  -     albuterol sulfate  (90 Base) MCG/ACT inhaler; Inhale 2 puffs Every 4 (Four) Hours As Needed for Wheezing.  Dispense: 17 g; Refill: 5  -     Fluticasone-Umeclidin-Vilant (Trelegy Ellipta) 100-62.5-25 MCG/ACT inhaler; Inhale 1 puff Daily.  Dispense: 60 each; Refill: 3    9. Shortness of breath  -     Pneumococcal Conjugate Vaccine 20-Valent (PCV20)  -     RSVPreF3 Vac Recomb Adjuvanted (AREXVY) 120 MCG/0.5ML reconstituted suspension injection; Inject 0.5 mL into the appropriate muscle as directed by prescriber 1 (One) Time for 1 dose.  Dispense: 1 kit; Refill: 0  -     albuterol sulfate  (90 Base) MCG/ACT inhaler; Inhale 2 puffs Every 4 (Four) Hours As Needed for Wheezing.  Dispense: 17 g; Refill: 5  -     Fluticasone-Umeclidin-Vilant (Trelegy Ellipta) 100-62.5-25 MCG/ACT inhaler; Inhale 1 puff Daily.  Dispense: 60 each; Refill: 3    10. Suspected sleep apnea  -     Pneumococcal Conjugate Vaccine 20-Valent (PCV20)  -     RSVPreF3 Vac Recomb Adjuvanted (AREXVY) 120 MCG/0.5ML reconstituted suspension injection; Inject 0.5 mL into the appropriate muscle as directed by prescriber 1 (One) Time for 1 dose.  Dispense: 1 kit; Refill: 0  -     albuterol sulfate  (90 Base) MCG/ACT inhaler; Inhale 2 puffs Every 4 (Four) Hours As Needed for Wheezing.  Dispense: 17  g; Refill: 5  -     Fluticasone-Umeclidin-Vilant (Trelegy Ellipta) 100-62.5-25 MCG/ACT inhaler; Inhale 1 puff Daily.  Dispense: 60 each; Refill: 3    11. Encounter for immunization  -     Pneumococcal Conjugate Vaccine 20-Valent (PCV20)        COPD: Continue with Trelegy Ellipta 100 mcg once daily.  Use albuterol as needed.  Keep himself as active as possible.  Use DuoNeb nebulizer for airway clearance.  Using it once or twice a day.  He quit smoking in 2010.    Continue Singulair once daily.    Chronic hypoxic respiratory failure: Patient is on nocturnal oxygen.  He had sleep study and is on CPAP through sleep physician.    Given that patient has had serial PET scans for his penile and prostate cancer, can hold off on low-dose lung cancer screening CT scan of the chest..    I have ordered him to get RSV vaccine through his local pharmacy.  He is up-to-date on flu and COVID and flu vaccines.  We will administer pneumococcal vaccine today.    Follow Up   Return in about 6 months (around 2/13/2025).  Patient was given instructions and counseling regarding his condition or for health maintenance advice. Please see specific information pulled into the AVS if appropriate.       Electronically signed by Guaanko Phan MD, 8/13/2024, 08:18 EDT.

## 2024-08-16 DIAGNOSIS — J44.9 CHRONIC OBSTRUCTIVE PULMONARY DISEASE, UNSPECIFIED COPD TYPE: ICD-10-CM

## 2024-08-16 DIAGNOSIS — J43.9 PULMONARY EMPHYSEMA, UNSPECIFIED EMPHYSEMA TYPE: ICD-10-CM

## 2024-08-16 DIAGNOSIS — I48.20 ATRIAL FIBRILLATION, CHRONIC: ICD-10-CM

## 2024-08-16 DIAGNOSIS — N18.31 STAGE 3A CHRONIC KIDNEY DISEASE: ICD-10-CM

## 2024-08-16 DIAGNOSIS — C34.92 ADENOCARCINOMA OF LEFT LUNG: ICD-10-CM

## 2024-08-16 DIAGNOSIS — R06.00 DYSPNEA, UNSPECIFIED TYPE: ICD-10-CM

## 2024-08-16 RX ORDER — IPRATROPIUM BROMIDE AND ALBUTEROL SULFATE 2.5; .5 MG/3ML; MG/3ML
3 SOLUTION RESPIRATORY (INHALATION) 4 TIMES DAILY PRN
Qty: 360 ML | Refills: 11 | Status: SHIPPED | OUTPATIENT
Start: 2024-08-16

## 2024-08-19 RX ORDER — CARVEDILOL 25 MG/1
TABLET ORAL
Qty: 180 TABLET | Refills: 3 | Status: SHIPPED | OUTPATIENT
Start: 2024-08-19

## 2024-08-29 ENCOUNTER — TELEPHONE (OUTPATIENT)
Dept: PULMONOLOGY | Facility: CLINIC | Age: 69
End: 2024-08-29
Payer: MEDICARE

## 2024-08-30 ENCOUNTER — TELEPHONE (OUTPATIENT)
Dept: CARDIOLOGY | Facility: CLINIC | Age: 69
End: 2024-08-30
Payer: MEDICARE

## 2024-08-30 NOTE — TELEPHONE ENCOUNTER
Procedure: Cystoscopy w/ pos Bladder biopsy    Medication Directive:  Eliquis    PMH: HTN, HLD, PAF    Last Seen:  07/15/24

## 2024-09-25 ENCOUNTER — TELEPHONE (OUTPATIENT)
Dept: CARDIOLOGY | Facility: CLINIC | Age: 69
End: 2024-09-25
Payer: MEDICARE

## 2024-10-01 ENCOUNTER — TELEPHONE (OUTPATIENT)
Dept: PULMONOLOGY | Facility: CLINIC | Age: 69
End: 2024-10-01
Payer: MEDICARE

## 2024-10-01 NOTE — TELEPHONE ENCOUNTER
Pt needs pulmonary clearance to have robotic asst. Lap conversion of neobladder .  Please advise.

## 2024-10-18 RX ORDER — OLMESARTAN MEDOXOMIL 5 MG/1
TABLET ORAL
Qty: 90 TABLET | Refills: 3 | Status: SHIPPED | OUTPATIENT
Start: 2024-10-18

## 2024-10-18 NOTE — TELEPHONE ENCOUNTER
Per CoverMy Meds: This medication is on your plan's list of covered drugs. Prior authorization is not required at this time. If your pharmacy has questions regarding the processing of your prescription, please have them call the [facility] pharmacy help desk at [phone]. For additional information, the member can contact Member Services by calling the number on the back of their ID Card. Phoned [facility] [address] [phone] and left a detailed vm regarding. REFILL PROTOCOL FAILED

## 2024-11-04 ENCOUNTER — TELEPHONE (OUTPATIENT)
Dept: CARDIOLOGY | Facility: CLINIC | Age: 69
End: 2024-11-04
Payer: MEDICARE

## 2024-11-04 NOTE — TELEPHONE ENCOUNTER
The [facility] received a fax that requires your attention. The document has been indexed to the patient’s chart for your review.      Reason for sending: EXTERNAL MEDICAL RECORD NOTIFICATION     Documents Description: ASSISTANCE ENDING-[facility] PAF-11.2.24    Name of Sender: [facility]     Date Indexed: 11.2.24

## 2024-11-18 ENCOUNTER — TRANSCRIBE ORDERS (OUTPATIENT)
Dept: ADMINISTRATIVE | Facility: HOSPITAL | Age: 69
End: 2024-11-18
Payer: MEDICARE

## 2024-11-18 DIAGNOSIS — N13.30 HYDRONEPHROSIS, UNSPECIFIED HYDRONEPHROSIS TYPE: Primary | ICD-10-CM

## 2024-12-07 DIAGNOSIS — J44.9 CHRONIC OBSTRUCTIVE PULMONARY DISEASE, UNSPECIFIED COPD TYPE: ICD-10-CM

## 2024-12-07 DIAGNOSIS — N18.31 STAGE 3A CHRONIC KIDNEY DISEASE: ICD-10-CM

## 2024-12-07 DIAGNOSIS — R06.02 SHORTNESS OF BREATH: ICD-10-CM

## 2024-12-07 DIAGNOSIS — I50.42 CHRONIC COMBINED SYSTOLIC (CONGESTIVE) AND DIASTOLIC (CONGESTIVE) HEART FAILURE: ICD-10-CM

## 2024-12-07 DIAGNOSIS — R06.00 DYSPNEA, UNSPECIFIED TYPE: ICD-10-CM

## 2024-12-07 DIAGNOSIS — J43.2 CENTRILOBULAR EMPHYSEMA: ICD-10-CM

## 2024-12-07 DIAGNOSIS — C34.92 ADENOCARCINOMA OF LEFT LUNG: ICD-10-CM

## 2024-12-07 DIAGNOSIS — J43.9 PULMONARY EMPHYSEMA, UNSPECIFIED EMPHYSEMA TYPE: ICD-10-CM

## 2024-12-07 DIAGNOSIS — I48.20 ATRIAL FIBRILLATION, CHRONIC: ICD-10-CM

## 2024-12-07 DIAGNOSIS — R29.818 SUSPECTED SLEEP APNEA: ICD-10-CM

## 2024-12-09 ENCOUNTER — LAB (OUTPATIENT)
Dept: LAB | Facility: HOSPITAL | Age: 69
End: 2024-12-09
Payer: MEDICARE

## 2024-12-09 ENCOUNTER — TRANSCRIBE ORDERS (OUTPATIENT)
Dept: LAB | Facility: HOSPITAL | Age: 69
End: 2024-12-09
Payer: MEDICARE

## 2024-12-09 DIAGNOSIS — E87.6 HYPOKALEMIA: ICD-10-CM

## 2024-12-09 DIAGNOSIS — E87.1 HYPONATREMIA: ICD-10-CM

## 2024-12-09 DIAGNOSIS — D64.9 ANEMIA, UNSPECIFIED TYPE: ICD-10-CM

## 2024-12-09 DIAGNOSIS — E87.6 HYPOKALEMIA: Primary | ICD-10-CM

## 2024-12-09 LAB
ANION GAP SERPL CALCULATED.3IONS-SCNC: 9.5 MMOL/L (ref 5–15)
BASOPHILS # BLD AUTO: 0.03 10*3/MM3 (ref 0–0.2)
BASOPHILS NFR BLD AUTO: 0.5 % (ref 0–1.5)
BUN SERPL-MCNC: 11 MG/DL (ref 8–23)
BUN/CREAT SERPL: 9 (ref 7–25)
CALCIUM SPEC-SCNC: 8.5 MG/DL (ref 8.6–10.5)
CHLORIDE SERPL-SCNC: 101 MMOL/L (ref 98–107)
CO2 SERPL-SCNC: 26.5 MMOL/L (ref 22–29)
CREAT SERPL-MCNC: 1.22 MG/DL (ref 0.76–1.27)
DEPRECATED RDW RBC AUTO: 47.9 FL (ref 37–54)
EGFRCR SERPLBLD CKD-EPI 2021: 64.6 ML/MIN/1.73
EOSINOPHIL # BLD AUTO: 0.03 10*3/MM3 (ref 0–0.4)
EOSINOPHIL NFR BLD AUTO: 0.5 % (ref 0.3–6.2)
ERYTHROCYTE [DISTWIDTH] IN BLOOD BY AUTOMATED COUNT: 14.3 % (ref 12.3–15.4)
GLUCOSE SERPL-MCNC: 115 MG/DL (ref 65–99)
HCT VFR BLD AUTO: 20.8 % (ref 37.5–51)
HGB BLD-MCNC: 6.9 G/DL (ref 13–17.7)
IMM GRANULOCYTES # BLD AUTO: 0.03 10*3/MM3 (ref 0–0.05)
IMM GRANULOCYTES NFR BLD AUTO: 0.5 % (ref 0–0.5)
LYMPHOCYTES # BLD AUTO: 0.58 10*3/MM3 (ref 0.7–3.1)
LYMPHOCYTES NFR BLD AUTO: 10 % (ref 19.6–45.3)
MCH RBC QN AUTO: 30.5 PG (ref 26.6–33)
MCHC RBC AUTO-ENTMCNC: 33.2 G/DL (ref 31.5–35.7)
MCV RBC AUTO: 92 FL (ref 79–97)
MONOCYTES # BLD AUTO: 0.69 10*3/MM3 (ref 0.1–0.9)
MONOCYTES NFR BLD AUTO: 11.9 % (ref 5–12)
NEUTROPHILS NFR BLD AUTO: 4.42 10*3/MM3 (ref 1.7–7)
NEUTROPHILS NFR BLD AUTO: 76.6 % (ref 42.7–76)
NRBC BLD AUTO-RTO: 0 /100 WBC (ref 0–0.2)
PLATELET # BLD AUTO: 233 10*3/MM3 (ref 140–450)
PMV BLD AUTO: 10.6 FL (ref 6–12)
POTASSIUM SERPL-SCNC: 4 MMOL/L (ref 3.5–5.2)
RBC # BLD AUTO: 2.26 10*6/MM3 (ref 4.14–5.8)
SODIUM SERPL-SCNC: 137 MMOL/L (ref 136–145)
WBC NRBC COR # BLD AUTO: 5.78 10*3/MM3 (ref 3.4–10.8)

## 2024-12-09 PROCEDURE — 80048 BASIC METABOLIC PNL TOTAL CA: CPT

## 2024-12-09 PROCEDURE — 36415 COLL VENOUS BLD VENIPUNCTURE: CPT

## 2024-12-09 PROCEDURE — 85025 COMPLETE CBC W/AUTO DIFF WBC: CPT

## 2024-12-09 RX ORDER — MONTELUKAST SODIUM 10 MG/1
10 TABLET ORAL
Qty: 90 TABLET | Refills: 2 | Status: SHIPPED | OUTPATIENT
Start: 2024-12-09

## 2024-12-11 ENCOUNTER — TRANSCRIBE ORDERS (OUTPATIENT)
Dept: ADMINISTRATIVE | Facility: HOSPITAL | Age: 69
End: 2024-12-11
Payer: MEDICARE

## 2024-12-11 DIAGNOSIS — M79.81 NONTRAUMATIC HEMATOMA OF SOFT TISSUE: Primary | ICD-10-CM

## 2024-12-30 ENCOUNTER — TRANSCRIBE ORDERS (OUTPATIENT)
Dept: LAB | Facility: HOSPITAL | Age: 69
End: 2024-12-30
Payer: MEDICARE

## 2024-12-30 DIAGNOSIS — C68.0 CANCER OF URETHRA: Primary | ICD-10-CM

## 2024-12-31 ENCOUNTER — LAB (OUTPATIENT)
Dept: LAB | Facility: HOSPITAL | Age: 69
End: 2024-12-31
Payer: MEDICARE

## 2024-12-31 DIAGNOSIS — C68.0 CANCER OF URETHRA: ICD-10-CM

## 2024-12-31 LAB
ANION GAP SERPL CALCULATED.3IONS-SCNC: 8.9 MMOL/L (ref 5–15)
BASOPHILS # BLD AUTO: 0.03 10*3/MM3 (ref 0–0.2)
BASOPHILS NFR BLD AUTO: 0.5 % (ref 0–1.5)
BUN SERPL-MCNC: 14 MG/DL (ref 8–23)
BUN/CREAT SERPL: 10.5 (ref 7–25)
CALCIUM SPEC-SCNC: 8.8 MG/DL (ref 8.6–10.5)
CHLORIDE SERPL-SCNC: 98 MMOL/L (ref 98–107)
CO2 SERPL-SCNC: 27.1 MMOL/L (ref 22–29)
CREAT SERPL-MCNC: 1.33 MG/DL (ref 0.76–1.27)
DEPRECATED RDW RBC AUTO: 62.7 FL (ref 37–54)
EGFRCR SERPLBLD CKD-EPI 2021: 58.2 ML/MIN/1.73
EOSINOPHIL # BLD AUTO: 0.06 10*3/MM3 (ref 0–0.4)
EOSINOPHIL NFR BLD AUTO: 1.1 % (ref 0.3–6.2)
ERYTHROCYTE [DISTWIDTH] IN BLOOD BY AUTOMATED COUNT: 17.9 % (ref 12.3–15.4)
GLUCOSE SERPL-MCNC: 110 MG/DL (ref 65–99)
HCT VFR BLD AUTO: 28.2 % (ref 37.5–51)
HGB BLD-MCNC: 9 G/DL (ref 13–17.7)
IMM GRANULOCYTES # BLD AUTO: 0.01 10*3/MM3 (ref 0–0.05)
IMM GRANULOCYTES NFR BLD AUTO: 0.2 % (ref 0–0.5)
LYMPHOCYTES # BLD AUTO: 1.14 10*3/MM3 (ref 0.7–3.1)
LYMPHOCYTES NFR BLD AUTO: 20.8 % (ref 19.6–45.3)
MCH RBC QN AUTO: 30.8 PG (ref 26.6–33)
MCHC RBC AUTO-ENTMCNC: 31.9 G/DL (ref 31.5–35.7)
MCV RBC AUTO: 96.6 FL (ref 79–97)
MONOCYTES # BLD AUTO: 0.77 10*3/MM3 (ref 0.1–0.9)
MONOCYTES NFR BLD AUTO: 14.1 % (ref 5–12)
NEUTROPHILS NFR BLD AUTO: 3.46 10*3/MM3 (ref 1.7–7)
NEUTROPHILS NFR BLD AUTO: 63.3 % (ref 42.7–76)
NRBC BLD AUTO-RTO: 0 /100 WBC (ref 0–0.2)
PLATELET # BLD AUTO: 291 10*3/MM3 (ref 140–450)
PMV BLD AUTO: 9.2 FL (ref 6–12)
POTASSIUM SERPL-SCNC: 5 MMOL/L (ref 3.5–5.2)
RBC # BLD AUTO: 2.92 10*6/MM3 (ref 4.14–5.8)
SODIUM SERPL-SCNC: 134 MMOL/L (ref 136–145)
WBC NRBC COR # BLD AUTO: 5.47 10*3/MM3 (ref 3.4–10.8)

## 2024-12-31 PROCEDURE — 85025 COMPLETE CBC W/AUTO DIFF WBC: CPT

## 2024-12-31 PROCEDURE — 36415 COLL VENOUS BLD VENIPUNCTURE: CPT

## 2024-12-31 PROCEDURE — 80048 BASIC METABOLIC PNL TOTAL CA: CPT

## 2025-01-16 ENCOUNTER — HOSPITAL ENCOUNTER (OUTPATIENT)
Facility: HOSPITAL | Age: 70
Discharge: HOME | End: 2025-01-16
Payer: MEDICARE

## 2025-01-16 ENCOUNTER — TELEPHONE (OUTPATIENT)
Dept: CARDIOLOGY | Facility: CLINIC | Age: 70
End: 2025-01-16

## 2025-01-16 ENCOUNTER — OFFICE VISIT (OUTPATIENT)
Dept: CARDIOLOGY | Facility: CLINIC | Age: 70
End: 2025-01-16
Payer: MEDICARE

## 2025-01-16 ENCOUNTER — LAB (OUTPATIENT)
Facility: HOSPITAL | Age: 70
End: 2025-01-16
Payer: MEDICARE

## 2025-01-16 VITALS
BODY MASS INDEX: 29.18 KG/M2 | HEIGHT: 70 IN | SYSTOLIC BLOOD PRESSURE: 105 MMHG | HEART RATE: 82 BPM | WEIGHT: 203.8 LBS | DIASTOLIC BLOOD PRESSURE: 71 MMHG

## 2025-01-16 DIAGNOSIS — R06.09 DYSPNEA ON EXERTION: ICD-10-CM

## 2025-01-16 DIAGNOSIS — E78.2 MIXED HYPERLIPIDEMIA: ICD-10-CM

## 2025-01-16 DIAGNOSIS — I48.0 PAROXYSMAL ATRIAL FIBRILLATION: ICD-10-CM

## 2025-01-16 DIAGNOSIS — I10 PRIMARY HYPERTENSION: Primary | ICD-10-CM

## 2025-01-16 DIAGNOSIS — I10 PRIMARY HYPERTENSION: ICD-10-CM

## 2025-01-16 LAB
ALBUMIN SERPL-MCNC: 3.9 G/DL (ref 3.5–5.2)
ALBUMIN/GLOB SERPL: 1 G/DL
ALP SERPL-CCNC: 86 U/L (ref 39–117)
ALT SERPL W P-5'-P-CCNC: 21 U/L (ref 1–41)
ANION GAP SERPL CALCULATED.3IONS-SCNC: 11 MMOL/L (ref 5–15)
AST SERPL-CCNC: 23 U/L (ref 1–40)
BASOPHILS # BLD AUTO: 0.04 10*3/MM3 (ref 0–0.2)
BASOPHILS NFR BLD AUTO: 0.5 % (ref 0–1.5)
BILIRUB SERPL-MCNC: 0.3 MG/DL (ref 0–1.2)
BUN SERPL-MCNC: 17 MG/DL (ref 8–23)
BUN/CREAT SERPL: 11.3 (ref 7–25)
CALCIUM SPEC-SCNC: 9.8 MG/DL (ref 8.6–10.5)
CHLORIDE SERPL-SCNC: 96 MMOL/L (ref 98–107)
CHOLEST SERPL-MCNC: 138 MG/DL (ref 0–200)
CO2 SERPL-SCNC: 28 MMOL/L (ref 22–29)
CREAT SERPL-MCNC: 1.5 MG/DL (ref 0.76–1.27)
DEPRECATED RDW RBC AUTO: 59.7 FL (ref 37–54)
EGFRCR SERPLBLD CKD-EPI 2021: 50.4 ML/MIN/1.73
EOSINOPHIL # BLD AUTO: 0.04 10*3/MM3 (ref 0–0.4)
EOSINOPHIL NFR BLD AUTO: 0.5 % (ref 0.3–6.2)
ERYTHROCYTE [DISTWIDTH] IN BLOOD BY AUTOMATED COUNT: 17 % (ref 12.3–15.4)
GLOBULIN UR ELPH-MCNC: 3.8 GM/DL
GLUCOSE SERPL-MCNC: 115 MG/DL (ref 65–99)
HCT VFR BLD AUTO: 32.5 % (ref 37.5–51)
HDLC SERPL-MCNC: 56 MG/DL (ref 40–60)
HGB BLD-MCNC: 10.2 G/DL (ref 13–17.7)
IMM GRANULOCYTES # BLD AUTO: 0.03 10*3/MM3 (ref 0–0.05)
IMM GRANULOCYTES NFR BLD AUTO: 0.4 % (ref 0–0.5)
LDLC SERPL CALC-MCNC: 64 MG/DL (ref 0–100)
LDLC/HDLC SERPL: 1.11 {RATIO}
LYMPHOCYTES # BLD AUTO: 1.28 10*3/MM3 (ref 0.7–3.1)
LYMPHOCYTES NFR BLD AUTO: 15.9 % (ref 19.6–45.3)
MCH RBC QN AUTO: 30.3 PG (ref 26.6–33)
MCHC RBC AUTO-ENTMCNC: 31.4 G/DL (ref 31.5–35.7)
MCV RBC AUTO: 96.4 FL (ref 79–97)
MONOCYTES # BLD AUTO: 0.77 10*3/MM3 (ref 0.1–0.9)
MONOCYTES NFR BLD AUTO: 9.6 % (ref 5–12)
NEUTROPHILS NFR BLD AUTO: 5.9 10*3/MM3 (ref 1.7–7)
NEUTROPHILS NFR BLD AUTO: 73.1 % (ref 42.7–76)
NRBC BLD AUTO-RTO: 0 /100 WBC (ref 0–0.2)
NT-PROBNP SERPL-MCNC: 286.9 PG/ML (ref 0–900)
PLATELET # BLD AUTO: 310 10*3/MM3 (ref 140–450)
PMV BLD AUTO: 8.8 FL (ref 6–12)
POTASSIUM SERPL-SCNC: 5 MMOL/L (ref 3.5–5.2)
PROT SERPL-MCNC: 7.7 G/DL (ref 6–8.5)
RBC # BLD AUTO: 3.37 10*6/MM3 (ref 4.14–5.8)
SODIUM SERPL-SCNC: 135 MMOL/L (ref 136–145)
TRIGL SERPL-MCNC: 99 MG/DL (ref 0–150)
VLDLC SERPL-MCNC: 18 MG/DL (ref 5–40)
WBC NRBC COR # BLD AUTO: 8.06 10*3/MM3 (ref 3.4–10.8)

## 2025-01-16 PROCEDURE — 71046 X-RAY EXAM CHEST 2 VIEWS: CPT

## 2025-01-16 PROCEDURE — 80061 LIPID PANEL: CPT

## 2025-01-16 PROCEDURE — 36415 COLL VENOUS BLD VENIPUNCTURE: CPT

## 2025-01-16 PROCEDURE — 80053 COMPREHEN METABOLIC PANEL: CPT

## 2025-01-16 PROCEDURE — 83880 ASSAY OF NATRIURETIC PEPTIDE: CPT

## 2025-01-16 PROCEDURE — 85025 COMPLETE CBC W/AUTO DIFF WBC: CPT

## 2025-01-16 RX ORDER — ACETAMINOPHEN 325 MG/1
325 TABLET ORAL AS NEEDED
COMMUNITY
Start: 2024-11-15

## 2025-01-16 RX ORDER — DOCUSATE SODIUM 100 MG/1
1 CAPSULE, LIQUID FILLED ORAL EVERY 12 HOURS SCHEDULED
COMMUNITY
Start: 2024-11-15

## 2025-01-16 NOTE — ASSESSMENT & PLAN NOTE
Continue Eliquis 5 mg twice daily for stroke risk reduction.  Patient notes no palpitations but does have dyspnea with mild effort.  Continue carvedilol 25 mg for rate control.

## 2025-01-16 NOTE — PROGRESS NOTES
Patient's hemoglobin has improved to 10.2.  His BNP is normal.  His chest x-ray showed no changes concerning for heart failure or pneumonia.  Discussed his chest x-ray with Dr. Kamara in radiology.  Would recommend that he get into see pulmonology to be evaluated for his shortness of breath.

## 2025-01-16 NOTE — TELEPHONE ENCOUNTER
SANTY patient. Went over results and recommendations. Patient verbalized understanding and appreciation.

## 2025-01-16 NOTE — PROGRESS NOTES
Chief Complaint  Hypertension and Follow-up (6 mo f/u. )    Subjective        History of Present Illness  Toribio Hayes presents to [facility] CARDIOLOGY for follow up.   Patient is a 69-year-old male with past medical history outlined below, significant for paroxysmal atrial fibrillation, hypertension, hyperlipidemia, remote history of cardiomyopathy with normalization of LVEF who presents for follow-up.  He had a long hospital stay in December related to his bladder cancer where his blood count dropped and he ended up with an infection.  Since then he reports significant shortness of breath.  He gets short of breath with even mild activities.  He notes no chest pain.  He notes no edema.  He has no palpitations.    Past Medical History:   Diagnosis Date    A-fib     DR KHALIL    Arthritis     RIGHT THUMB    Asthma     Bladder cancer     2018    Chronic diastolic congestive heart failure 12/06/2021    COPD (chronic obstructive pulmonary disease)     INHALER    Elevated serum creatinine 12/06/2021    Heart murmur     Hyperlipidemia     ON MEDS    Hypertension     CONTROLLED W MEDS    Lung cancer     DR RAMIRES TOOK CARE OF THAT (2008)    Lung disease 12/06/2021    Melanoma in situ of left lower leg     2022 removed    Mouth sore secondary to chemotherapy     Sleep apnea     Stage 3a chronic kidney disease 01/05/2022    Tear of medial cartilage or meniscus of knee, current 08/15/2016    Throat soreness     FROM CHEMO    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       ALLERGY  No Known Allergies     Past Surgical History:   Procedure Laterality Date    COLONOSCOPY      CYSTECTOMY      FINGER DEBRIDEMENT Right     RIGHT THUMB    ILEAL LOOP NEOBLADDER      BLADDER MADE FROM HIS INTESTINES    LUNG REMOVAL, PARTIAL Left     LEFT UPPER - 2008 - SOA W EXERTION    PORTOCAVAL SHUNT PLACEMENT      2008 AND 2018    SENTINEL NODE BIOPSY N/A 06/23/2022    Procedure: Excision of left thigh melanoma with sentinel lymph node biopsy;   Surgeon: Dominic Ruiz MD;  Location: [facility] MAIN OR;  Service: General;  Laterality: N/A;    TURP / TRANSURETHRAL INCISION / DRAINAGE PROSTATE          Social History     Socioeconomic History    Marital status:    Tobacco Use    Smoking status: Former     Current packs/day: 0.00     Average packs/day: 1.5 packs/day for 39.0 years (58.5 ttl pk-yrs)     Types: Cigarettes     Start date: 10/21/1969     Quit date: 10/21/2008     Years since quittin.2    Smokeless tobacco: Never   Vaping Use    Vaping status: Never Used   Substance and Sexual Activity    Alcohol use: Never    Drug use: Never    Sexual activity: Not Currently     Partners: Female       Family History   Problem Relation Age of Onset    No Known Problems Mother         Mother     No Known Problems Father         Father  54 years old lung cancer/smoker    Prostate cancer Brother     Malig Hyperthermia Neg Hx         Current Outpatient Medications on File Prior to Visit   Medication Sig    acetaminophen (TYLENOL) 325 MG tablet Take 1 tablet by mouth As Needed.    albuterol sulfate  (90 Base) MCG/ACT inhaler Inhale 2 puffs Every 4 (Four) Hours As Needed for Wheezing.    carvedilol (COREG) 25 MG tablet TAKE 1 TABLET BY MOUTH TWICE A DAY    docusate sodium (COLACE) 100 MG capsule Take 1 capsule by mouth Every 12 (Twelve) Hours.    Eliquis 5 MG tablet tablet TAKE 1 TABLET BY MOUTH TWICE A DAY    Fluticasone-Umeclidin-Vilant (Trelegy Ellipta) 100-62.5-25 MCG/ACT inhaler Inhale 1 puff Daily.    ipratropium-albuterol (DUO-NEB) 0.5-2.5 mg/3 ml nebulizer Take 3 mL by nebulization 4 (Four) Times a Day As Needed for Wheezing or Shortness of Air.    montelukast (SINGULAIR) 10 MG tablet TAKE 1 TABLET BY MOUTH EVERYDAY AT BEDTIME    olmesartan (BENICAR) 5 MG tablet TAKE 1 TABLET BY MOUTH EVERY DAY    rosuvastatin (CRESTOR) 5 MG tablet TAKE 1 TABLET BY MOUTH EVERY DAY     No current facility-administered medications on file prior to visit.  "      Objective   Vitals:    01/16/25 0843   BP: 105/71   Pulse: 82   Weight: 92.4 kg (203 lb 12.8 oz)   Height: 177.8 cm (70\")       Physical Exam  Constitutional:       General: He is awake. He is not in acute distress.     Appearance: Normal appearance.   HENT:      Head: Normocephalic.      Nose: Nose normal. No congestion.   Eyes:      Extraocular Movements: Extraocular movements intact.      Conjunctiva/sclera: Conjunctivae normal.      Pupils: Pupils are equal, round, and reactive to light.   Neck:      Thyroid: No thyromegaly.      Vascular: No JVD.   Cardiovascular:      Rate and Rhythm: Normal rate and regular rhythm.      Chest Wall: PMI is not displaced.      Pulses: Normal pulses.      Heart sounds: Normal heart sounds, S1 normal and S2 normal. No murmur heard.     No friction rub. No gallop. No S3 or S4 sounds.   Pulmonary:      Effort: Pulmonary effort is normal.      Breath sounds: Normal breath sounds. No wheezing, rhonchi or rales.      Comments: Diminished on the left  Abdominal:      General: Bowel sounds are normal.      Palpations: Abdomen is soft.      Tenderness: There is no abdominal tenderness.   Musculoskeletal:      Cervical back: No tenderness.      Right lower leg: No edema.      Left lower leg: No edema.   Lymphadenopathy:      Cervical: No cervical adenopathy.   Skin:     General: Skin is warm and dry.      Capillary Refill: Capillary refill takes less than 2 seconds.      Coloration: Skin is not cyanotic.      Findings: No petechiae or rash.      Nails: There is no clubbing.   Neurological:      Mental Status: He is alert.   Psychiatric:         Mood and Affect: Mood normal.         Behavior: Behavior is cooperative.           Result Review     The following data was reviewed by SARAH Vernon on 01/16/25.      CMP          12/9/2024    07:13 12/31/2024    07:49   CMP   Glucose 115  110    BUN 11  14    Creatinine 1.22  1.33    EGFR 64.6  58.2    Sodium 137  134  "   Potassium 4.0  5.0    Chloride 101  98    Calcium 8.5  8.8    BUN/Creatinine Ratio 9.0  10.5    Anion Gap 9.5  8.9      CBC w/diff          12/9/2024    07:13 12/31/2024    07:49   CBC w/Diff   WBC 5.78  5.47    RBC 2.26  2.92    Hemoglobin 6.9  9.0    Hematocrit 20.8  28.2    MCV 92.0  96.6    MCH 30.5  30.8    MCHC 33.2  31.9    RDW 14.3  17.9    Platelets 233  291    Neutrophil Rel % 76.6  63.3    Immature Granulocyte Rel % 0.5  0.2    Lymphocyte Rel % 10.0  20.8    Monocyte Rel % 11.9  14.1    Eosinophil Rel % 0.5  1.1    Basophil Rel % 0.5  0.5           Results for orders placed in visit on 02/11/22    Adult Transthoracic Echo Complete W/ Cont if Necessary Per Protocol    Interpretation Summary  · Estimated left ventricular EF was in agreement with the calculated left ventricular EF. Left ventricular ejection fraction appears to be 56 - 60%. Left ventricular systolic function is normal.  · Left ventricular diastolic function is consistent with (grade I) impaired relaxation.  · Mild mitral regurgitation is noted      No results found for this or any previous visit.          Procedures      Assessment & Plan  Primary hypertension  Well-controlled.  Continue current regimen.  Paroxysmal atrial fibrillation  Continue Eliquis 5 mg twice daily for stroke risk reduction.  Patient notes no palpitations but does have dyspnea with mild effort.  Continue carvedilol 25 mg for rate control.  Mixed hyperlipidemia  Continue statin therapy.  Will check a lipid panel.  Dyspnea on exertion  Will check a CBC to rule out anemia as a source for his dyspnea.  Will check a stat chest x-ray, BMP, BNP.  Echocardiogram will be done to rule out structural or valvular abnormalities.                 The medical services provided during this encounter are part of ongoing care related to this patient's single serious condition or complex condition.    Follow Up   Return in about 6 weeks (around 2/27/2025) for With Germaine Franz  APRN.    Patient was given instructions and counseling regarding his condition or for health maintenance advice. Please see specific information pulled into the AVS if appropriate.     Germaine Franz, SARAH  01/16/25  09:00 EST    Dictated Utilizing Dragon Dictation

## 2025-01-16 NOTE — TELEPHONE ENCOUNTER
----- Message from Germaine Franz sent at 1/16/2025 11:07 AM EST -----  Patient's hemoglobin has improved to 10.2.  His BNP is normal.  His chest x-ray showed no changes concerning for heart failure or pneumonia.  Discussed his chest x-ray with Dr. Kamara in radiology.  Would recommend that he get into see pulmonology to be evaluated for his shortness of breath.

## 2025-01-24 ENCOUNTER — HOSPITAL ENCOUNTER (OUTPATIENT)
Facility: HOSPITAL | Age: 70
Discharge: HOME | End: 2025-01-24
Payer: MEDICARE

## 2025-01-24 DIAGNOSIS — R06.09 DYSPNEA ON EXERTION: ICD-10-CM

## 2025-01-24 LAB
AORTIC DIMENSIONLESS INDEX: 0.88 (DI)
ASCENDING AORTA: 2.9 CM
AV MEAN PRESS GRAD SYS DOP V1V2: 3.3 MMHG
AV VMAX SYS DOP: 125.2 CM/SEC
BH CV ECHO MEAS - AO MAX PG: 6.3 MMHG
BH CV ECHO MEAS - AO ROOT DIAM: 4.1 CM
BH CV ECHO MEAS - AO V2 VTI: 22.3 CM
BH CV ECHO MEAS - EF(MOD-SP2): 57.8 %
BH CV ECHO MEAS - EF(MOD-SP4): 55.5 %
BH CV ECHO MEAS - FS: 29 %
BH CV ECHO MEAS - IVS/LVPW: 0.82 CM
BH CV ECHO MEAS - IVSD: 0.9 CM
BH CV ECHO MEAS - LA DIMENSION: 3.6 CM
BH CV ECHO MEAS - LAT PEAK E' VEL: 10.5 CM/SEC
BH CV ECHO MEAS - LV MAX PG: 4.8 MMHG
BH CV ECHO MEAS - LV MEAN PG: 1.6 MMHG
BH CV ECHO MEAS - LV V1 MAX: 110 CM/SEC
BH CV ECHO MEAS - LV V1 VTI: 18.7 CM
BH CV ECHO MEAS - LVIDD: 5.2 CM
BH CV ECHO MEAS - LVIDS: 3.7 CM
BH CV ECHO MEAS - LVOT DIAM: 2.1 CM
BH CV ECHO MEAS - LVPWD: 1.1 CM
BH CV ECHO MEAS - MED PEAK E' VEL: 6.5 CM/SEC
BH CV ECHO MEAS - MV A MAX VEL: 104.7 CM/SEC
BH CV ECHO MEAS - MV DEC SLOPE: 285 CM/SEC2
BH CV ECHO MEAS - MV E MAX VEL: 65.3 CM/SEC
BH CV ECHO MEAS - MV E/A: 0.62
BH CV ECHO MEAS - MV MAX PG: 4.5 MMHG
BH CV ECHO MEAS - MV MEAN PG: 1.7 MMHG
BH CV ECHO MEAS - MV V2 VTI: 24.5 CM
BH CV ECHO MEAS - RAP SYSTOLE: 5 MMHG
BH CV ECHO MEAS - RVDD: 3.3 CM
BH CV ECHO MEAS - RVSP: 31.9 MMHG
BH CV ECHO MEAS - TAPSE (>1.6): 2.03 CM
BH CV ECHO MEAS - TR MAX PG: 26.9 MMHG
BH CV ECHO MEAS - TR MAX VEL: 259.4 CM/SEC
BH CV ECHO MEASUREMENTS AVERAGE E/E' RATIO: 7.68
IVRT: 69 MS
LEFT ATRIUM VOLUME INDEX: 22.2 ML/M2
LV EF BIPLANE MOD: 56.4 %

## 2025-01-24 PROCEDURE — 93306 TTE W/DOPPLER COMPLETE: CPT

## 2025-01-27 ENCOUNTER — TELEPHONE (OUTPATIENT)
Dept: CARDIOLOGY | Facility: CLINIC | Age: 70
End: 2025-01-27
Payer: MEDICARE

## 2025-01-27 NOTE — TELEPHONE ENCOUNTER
----- Message from Janneth Jacobsen sent at 1/27/2025 12:48 PM EST -----  Echo showed that the heart is squeezing appropriately.  Valves are functioning normally.

## 2025-01-31 ENCOUNTER — TRANSCRIBE ORDERS (OUTPATIENT)
Dept: ADMINISTRATIVE | Facility: HOSPITAL | Age: 70
End: 2025-01-31
Payer: MEDICARE

## 2025-01-31 ENCOUNTER — LAB (OUTPATIENT)
Dept: LAB | Facility: HOSPITAL | Age: 70
End: 2025-01-31
Payer: MEDICARE

## 2025-01-31 DIAGNOSIS — N39.0 URINARY TRACT INFECTION WITHOUT HEMATURIA, SITE UNSPECIFIED: Primary | ICD-10-CM

## 2025-01-31 PROCEDURE — 87077 CULTURE AEROBIC IDENTIFY: CPT | Performed by: UROLOGY

## 2025-01-31 PROCEDURE — 87186 SC STD MICRODIL/AGAR DIL: CPT | Performed by: UROLOGY

## 2025-01-31 PROCEDURE — 87086 URINE CULTURE/COLONY COUNT: CPT | Performed by: UROLOGY

## 2025-02-02 LAB — BACTERIA SPEC AEROBE CULT: ABNORMAL

## 2025-02-11 ENCOUNTER — OFFICE VISIT (OUTPATIENT)
Dept: PULMONOLOGY | Facility: CLINIC | Age: 70
End: 2025-02-11
Payer: MEDICARE

## 2025-02-11 VITALS
HEART RATE: 77 BPM | OXYGEN SATURATION: 98 % | RESPIRATION RATE: 16 BRPM | SYSTOLIC BLOOD PRESSURE: 125 MMHG | DIASTOLIC BLOOD PRESSURE: 81 MMHG | BODY MASS INDEX: 29.06 KG/M2 | TEMPERATURE: 97.7 F | HEIGHT: 70 IN | WEIGHT: 203 LBS

## 2025-02-11 DIAGNOSIS — J43.2 CENTRILOBULAR EMPHYSEMA: ICD-10-CM

## 2025-02-11 DIAGNOSIS — R29.818 SUSPECTED SLEEP APNEA: ICD-10-CM

## 2025-02-11 DIAGNOSIS — I48.20 ATRIAL FIBRILLATION, CHRONIC: ICD-10-CM

## 2025-02-11 DIAGNOSIS — N18.31 STAGE 3A CHRONIC KIDNEY DISEASE: ICD-10-CM

## 2025-02-11 DIAGNOSIS — I50.42 CHRONIC COMBINED SYSTOLIC (CONGESTIVE) AND DIASTOLIC (CONGESTIVE) HEART FAILURE: ICD-10-CM

## 2025-02-11 DIAGNOSIS — C34.92 ADENOCARCINOMA OF LEFT LUNG: ICD-10-CM

## 2025-02-11 DIAGNOSIS — R06.02 SHORTNESS OF BREATH: ICD-10-CM

## 2025-02-11 DIAGNOSIS — R06.00 DYSPNEA, UNSPECIFIED TYPE: ICD-10-CM

## 2025-02-11 DIAGNOSIS — C67.9 MALIGNANT NEOPLASM OF URINARY BLADDER, UNSPECIFIED SITE: Primary | ICD-10-CM

## 2025-02-11 DIAGNOSIS — J44.9 CHRONIC OBSTRUCTIVE PULMONARY DISEASE, UNSPECIFIED COPD TYPE: ICD-10-CM

## 2025-02-11 DIAGNOSIS — J43.9 PULMONARY EMPHYSEMA, UNSPECIFIED EMPHYSEMA TYPE: ICD-10-CM

## 2025-02-11 RX ORDER — ALBUTEROL SULFATE 90 UG/1
2 INHALANT RESPIRATORY (INHALATION) EVERY 4 HOURS PRN
Qty: 17 G | Refills: 5 | Status: SHIPPED | OUTPATIENT
Start: 2025-02-11

## 2025-02-11 RX ORDER — FLUTICASONE FUROATE, UMECLIDINIUM BROMIDE AND VILANTEROL TRIFENATATE 100; 62.5; 25 UG/1; UG/1; UG/1
1 POWDER RESPIRATORY (INHALATION)
Qty: 60 EACH | Refills: 3 | Status: SHIPPED | OUTPATIENT
Start: 2025-02-11

## 2025-02-11 NOTE — PROGRESS NOTES
Primary Care Provider  Collin Cannon MD     Referring Provider  No ref. provider found     Chief Complaint  Follow-up (6 month), COPD, and Emphysema    Subjective          Toribio Hayes presents to [facility]  History of Present Illness  Toribio Hayes is a 69 y.o. 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 He is here for a follow-up visit.    He reports satisfactory use of Trelegy once daily and infrequent use of his rescue inhaler. Nebulizer use has been minimal, and he does not report any productive cough. He has sufficient supply of his nebulizer and inhalers. He recently refilled his montelukast prescription, providing a 90-day supply. He has received his influenza and pneumonia vaccines but has not yet received the RSV vaccine. He reports no recent fevers.    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 This necessitated hospital readmission at the end of November 2024 and December 2024, during which he was treated with four concurrent antibiotics. He has since discontinued these medications. The urostomy is changed every three days. He has an open wound from the surgery, which is being packed and is showing signs of improvement. Home health services are involved in his care. He reports significant pain that impedes his mobility, although he is not taking any analgesics. He acknowledges a lack of physical activity and exercise. He reports no known renal issues. He manages his pain with Tylenol and occasionally ibuprofen. He uses an incentive spirometer, achieving a reading of 2000. A PET scan was scheduled for 11/27/2024, but it was not conducted due to his hospitalization. The scan has not been rescheduled. He has a urology appointment on  2025, and a CT scan is planned prior to this visit.    MEDICATIONS  Trelegy, albuterol, montelukast, Tylenol, ibuprofen    IMMUNIZATIONS  He has had influenza and pneumonia vaccines.    Review of Systems     General:  No Fatigue, No Fever No weight loss or loss of appetite  HEENT: No dysphagia, No Visual Changes, no rhinorrhea  Respiratory:  + cough,+Dyspnea, minimal phlegm, No Pleuritic Pain, no wheezing, no hemoptysis.  Cardiovascular: Denies chest pain, denies palpitations,+SERRA, No Chest Pressure  Gastrointestinal:  No Abdominal Pain, No Nausea, No Vomiting, No Diarrhea  Genitourinary:  No Dysuria, No Frequency, No Hesitancy  Musculoskeletal: No muscle pain or swelling  Endocrine:  No Heat Intolerance, No Cold Intolerance  Hematologic:  No Bleeding, No Bruising  Psychiatric:  No Anxiety, No Depression  Neurologic:  No Confusion, no Dysarthria, No Headaches  Skin:  No Rash, No Open Wounds    Family History   Problem Relation Age of Onset    No Known Problems Mother         Mother     No Known Problems Father         Father  54 years old lung cancer/smoker    Prostate cancer Brother     Cancer Brother     Malig Hyperthermia Neg Hx         Social History     Socioeconomic History    Marital status:    Tobacco Use    Smoking status: Former     Current packs/day: 0.00     Average packs/day: 1.5 packs/day for 39.0 years (58.5 ttl pk-yrs)     Types: Cigarettes     Start date: 10/21/1969     Quit date: 10/21/2008     Years since quittin.3     Passive exposure: Past    Smokeless tobacco: Never   Vaping Use    Vaping status: Never Used   Substance and Sexual Activity    Alcohol use: Never    Drug use: Never    Sexual activity: Not Currently     Partners: Female        Past Medical History:   Diagnosis Date    A-fib     DR KHALIL    Arthritis     RIGHT THUMB    Asthma     Asthma, extrinsic     Bladder cancer     2018    Chronic diastolic congestive heart failure 2021    COPD (chronic  obstructive pulmonary disease)     INHALER    Elevated serum creatinine 12/06/2021    Heart murmur     Hyperlipidemia     ON MEDS    Hypertension     CONTROLLED W MEDS    Lung cancer     DR RAMIRES TOOK CARE OF THAT (2008)    Lung disease 12/06/2021    Melanoma in situ of left lower leg     2022 removed    Mouth sore secondary to chemotherapy     Primary central sleep apnea     Sleep apnea     Stage 3a chronic kidney disease 01/05/2022    Tear of medial cartilage or meniscus of knee, current 08/15/2016    Throat soreness     FROM CHEMO    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        Immunization History   Administered Date(s) Administered    COVID-19 (MODERNA) 1st,2nd,3rd Dose Monovalent 03/19/2021, 04/16/2021    Fluzone (or Fluarix & Flulaval for VFC) >6mos 09/14/2020    Fluzone High-Dose 65+YRS 10/31/2022, 11/22/2023    Fluzone High-Dose 65+yrs 11/22/2023    Influenza Seasonal Injectable 10/27/2021    Influenza, Unspecified 09/14/2020    Pneumococcal Conjugate 20-Valent (PCV20) 08/13/2024    Tdap 04/16/2023         No Known Allergies       Current Outpatient Medications:     acetaminophen (TYLENOL) 325 MG tablet, Take 1 tablet by mouth As Needed., Disp: , Rfl:     albuterol sulfate  (90 Base) MCG/ACT inhaler, Inhale 2 puffs Every 4 (Four) Hours As Needed for Wheezing., Disp: 17 g, Rfl: 5    carvedilol (COREG) 25 MG tablet, TAKE 1 TABLET BY MOUTH TWICE A DAY, Disp: 180 tablet, Rfl: 3    docusate sodium (COLACE) 100 MG capsule, Take 1 capsule by mouth Every 12 (Twelve) Hours., Disp: , Rfl:     Eliquis 5 MG tablet tablet, TAKE 1 TABLET BY MOUTH TWICE A DAY, Disp: 180 tablet, Rfl: 3    Fluticasone-Umeclidin-Vilant (Trelegy Ellipta) 100-62.5-25 MCG/ACT inhaler, Inhale 1 puff Daily., Disp: 60 each, Rfl: 3    ipratropium-albuterol (DUO-NEB) 0.5-2.5 mg/3 ml nebulizer, Take 3 mL by nebulization 4 (Four) Times a Day As Needed for Wheezing or Shortness of Air., Disp: 360 mL, Rfl: 11    montelukast (SINGULAIR) 10 MG tablet, TAKE 1  "TABLET BY MOUTH EVERYDAY AT BEDTIME, Disp: 90 tablet, Rfl: 2    olmesartan (BENICAR) 5 MG tablet, TAKE 1 TABLET BY MOUTH EVERY DAY, Disp: 90 tablet, Rfl: 3    rosuvastatin (CRESTOR) 5 MG tablet, TAKE 1 TABLET BY MOUTH EVERY DAY, Disp: 90 tablet, Rfl: 3     Objective   Physical Exam  Physical Exam  Lungs were auscultated.    Vital Signs:   /81 (BP Location: Left arm, Patient Position: Sitting, Cuff Size: Adult)   Pulse 77   Temp 97.7 °F (36.5 °C) (Temporal)   Resp 16   Ht 177.8 cm (70\")   Wt 92.1 kg (203 lb)   SpO2 98% Comment: room air  BMI 29.13 kg/m²       Vital Signs Reviewed  WDWN, Alert, NAD.   HEENT:  PERRL, EOMI.  OP, nares clear, no sinus tenderness  Mallampatti classification : 1  Neck:  Supple, no JVD, no thyromegaly  Lymph: no axillary, cervical, supraclavicular lymphadenopathy noted bilaterally  Chest:  good aeration, bilateral diminished breath sounds, no wheezing, crackles or rhonchi, resonant to percussion b/l  CV: RRR, no MGR, pulses 2+, equal.  Abd:  Soft, NT, ND, + BS, no HSM, urostomy in place  EXT:  no clubbing, no cyanosis, No BLE edema  Neuro:  A&Ox3, CN grossly intact, no focal deficits.  Skin: No rashes or lesions noted     Result Review :   The following data was reviewed by: Guanako Phan MD on 02/11/2025:  Common labs          12/9/2024    07:13 12/31/2024    07:49 1/16/2025    09:38   Common Labs   Glucose 115  110  115    BUN 11  14  17    Creatinine 1.22  1.33  1.50    Sodium 137  134  135    Potassium 4.0  5.0  5.0    Chloride 101  98  96    Calcium 8.5  8.8  9.8    Albumin   3.9    Total Bilirubin   0.3    Alkaline Phosphatase   86    AST (SGOT)   23    ALT (SGPT)   21    WBC 5.78  5.47  8.06    Hemoglobin 6.9  9.0  10.2    Hematocrit 20.8  28.2  32.5    Platelets 233  291  310    Total Cholesterol   138    Triglycerides   99    HDL Cholesterol   56    LDL Cholesterol    64      CMP          12/9/2024    07:13 12/31/2024    07:49 1/16/2025    09:38   CMP   Glucose 115  " 110  115    BUN 11  14  17    Creatinine 1.22  1.33  1.50    EGFR 64.6  58.2  50.4    Sodium 137  134  135    Potassium 4.0  5.0  5.0    Chloride 101  98  96    Calcium 8.5  8.8  9.8    Total Protein   7.7    Albumin   3.9    Globulin   3.8    Total Bilirubin   0.3    Alkaline Phosphatase   86    AST (SGOT)   23    ALT (SGPT)   21    Albumin/Globulin Ratio   1.0    BUN/Creatinine Ratio 9.0  10.5  11.3    Anion Gap 9.5  8.9  11.0      CBC          12/9/2024    07:13 12/31/2024    07:49 1/16/2025    09:38   CBC   WBC 5.78  5.47  8.06    RBC 2.26  2.92  3.37    Hemoglobin 6.9  9.0  10.2    Hematocrit 20.8  28.2  32.5    MCV 92.0  96.6  96.4    MCH 30.5  30.8  30.3    MCHC 33.2  31.9  31.4    RDW 14.3  17.9  17.0    Platelets 233  291  310      CBC w/diff          12/9/2024    07:13 12/31/2024    07:49 1/16/2025    09:38   CBC w/Diff   WBC 5.78  5.47  8.06    RBC 2.26  2.92  3.37    Hemoglobin 6.9  9.0  10.2    Hematocrit 20.8  28.2  32.5    MCV 92.0  96.6  96.4    MCH 30.5  30.8  30.3    MCHC 33.2  31.9  31.4    RDW 14.3  17.9  17.0    Platelets 233  291  310    Neutrophil Rel % 76.6  63.3  73.1    Immature Granulocyte Rel % 0.5  0.2  0.4    Lymphocyte Rel % 10.0  20.8  15.9    Monocyte Rel % 11.9  14.1  9.6    Eosinophil Rel % 0.5  1.1  0.5    Basophil Rel % 0.5  0.5  0.5      Data reviewed : Radiologic studies Imaging reviewed.     Results  Imaging  X-ray and CT scan showed areas of obstruction in the lungs.             Assessment and Plan    There are no diagnoses linked to this encounter.  Assessment & Plan  1. Chronic Obstructive Pulmonary Disease (COPD).  His left lung diaphragm is elevated post-surgery, potentially compromising its function and increasing susceptibility to infection. His current condition is stable, with no audible secretions detected during the examination. He is currently on a lower dose of Trelegy. He is advised to maintain an active lifestyle to promote wound healing. He is encouraged to  continue using the incentive spirometer, aiming for a reading of 2000. He is advised to rinse his mouth after using the inhaler to prevent thrush. He will continue his regimen of Trelegy and albuterol as needed. Refills for albuterol and Trelegy inhalers have been provided. If his respiratory symptoms worsen, necessitating increased use of albuterol, the dosage of Trelegy can be adjusted accordingly.    2. Penile and Prostate Cancer.  He is status post-surgery and has a urostomy. He experienced a severe infection post-surgery, requiring multiple antibiotics. The wound from the surgery is still healing and requires regular packing and monitoring by home health services. He is advised to remain as active as possible to aid in wound healing. A CT scan is scheduled before his urology appointment on 03/26/2025. No additional imaging is required from our end as urology will handle it.    3. Obstructive Sleep Apnea.  He is currently using a CPAP machine. No changes to his current treatment plan are necessary.    4. Pain Management.  He experiences significant pain, which makes it harder for him to walk. He is advised to take Tylenol for pain management and to increase his activity levels. If the pain becomes severe, he may take ibuprofen.    5. Health Maintenance.  He is advised to receive the RSV vaccine, as it is a single-dose vaccine and important for preventing severe respiratory illness.    Follow-up  The patient will follow up in 6 months.    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    Follow Up   No follow-ups on file.  Patient was given instructions and counseling regarding his condition or for health maintenance advice. Please see specific information pulled into the AVS if appropriate.     Patient or patient representative verbalized consent for the use of Ambient Listening during the visit with  Guanako Phan MD for chart  documentation. 2/11/2025  08:30 EST    Electronically signed by Guanako Phan MD, 2/11/2025, 08:04 EST.

## 2025-02-14 ENCOUNTER — TRANSCRIBE ORDERS (OUTPATIENT)
Dept: ADMINISTRATIVE | Facility: HOSPITAL | Age: 70
End: 2025-02-14
Payer: MEDICARE

## 2025-02-14 DIAGNOSIS — C67.9 MALIGNANT NEOPLASM OF URINARY BLADDER, UNSPECIFIED SITE: Primary | ICD-10-CM

## 2025-02-14 DIAGNOSIS — C68.0 MALIGNANT NEOPLASM OF URETHRA: ICD-10-CM

## 2025-02-21 ENCOUNTER — HOSPITAL ENCOUNTER (OUTPATIENT)
Dept: GENERAL RADIOLOGY | Facility: HOSPITAL | Age: 70
Discharge: HOME | End: 2025-02-21
Payer: MEDICARE

## 2025-02-21 ENCOUNTER — TRANSCRIBE ORDERS (OUTPATIENT)
Dept: ADMINISTRATIVE | Facility: HOSPITAL | Age: 70
End: 2025-02-21
Payer: MEDICARE

## 2025-02-21 ENCOUNTER — HOSPITAL ENCOUNTER (OUTPATIENT)
Dept: CT IMAGING | Facility: HOSPITAL | Age: 70
Discharge: HOME | End: 2025-02-21
Payer: MEDICARE

## 2025-02-21 DIAGNOSIS — C67.9 MALIGNANT NEOPLASM OF URINARY BLADDER, UNSPECIFIED SITE: ICD-10-CM

## 2025-02-21 DIAGNOSIS — C61 MALIGNANT NEOPLASM OF PROSTATE: ICD-10-CM

## 2025-02-21 DIAGNOSIS — C68.0 CANCER OF URETHRA: ICD-10-CM

## 2025-02-21 DIAGNOSIS — C67.9 MALIGNANT NEOPLASM OF URINARY BLADDER, UNSPECIFIED SITE: Primary | ICD-10-CM

## 2025-02-21 DIAGNOSIS — C68.0 MALIGNANT NEOPLASM OF URETHRA: ICD-10-CM

## 2025-02-21 PROCEDURE — 74177 CT ABD & PELVIS W/CONTRAST: CPT

## 2025-02-21 PROCEDURE — 25510000001 IOPAMIDOL PER 1 ML: Performed by: UROLOGY

## 2025-02-21 PROCEDURE — 71046 X-RAY EXAM CHEST 2 VIEWS: CPT

## 2025-02-21 RX ORDER — IOPAMIDOL 755 MG/ML
100 INJECTION, SOLUTION INTRAVASCULAR
Status: COMPLETED | OUTPATIENT
Start: 2025-02-21 | End: 2025-02-21

## 2025-02-21 RX ADMIN — IOPAMIDOL 70 ML: 755 INJECTION, SOLUTION INTRAVENOUS at 17:37

## 2025-02-27 ENCOUNTER — OFFICE VISIT (OUTPATIENT)
Dept: CARDIOLOGY | Facility: CLINIC | Age: 70
End: 2025-02-27
Payer: MEDICARE

## 2025-02-27 ENCOUNTER — TELEPHONE (OUTPATIENT)
Dept: CARDIOLOGY | Facility: CLINIC | Age: 70
End: 2025-02-27

## 2025-02-27 VITALS
WEIGHT: 207 LBS | SYSTOLIC BLOOD PRESSURE: 161 MMHG | BODY MASS INDEX: 29.63 KG/M2 | HEART RATE: 83 BPM | HEIGHT: 70 IN | DIASTOLIC BLOOD PRESSURE: 71 MMHG

## 2025-02-27 DIAGNOSIS — E78.2 MIXED HYPERLIPIDEMIA: ICD-10-CM

## 2025-02-27 DIAGNOSIS — I48.0 PAROXYSMAL ATRIAL FIBRILLATION: Primary | ICD-10-CM

## 2025-02-27 DIAGNOSIS — I10 PRIMARY HYPERTENSION: ICD-10-CM

## 2025-02-27 DIAGNOSIS — Z01.810 PRE-OPERATIVE CARDIOVASCULAR EXAMINATION: ICD-10-CM

## 2025-02-27 NOTE — ASSESSMENT & PLAN NOTE
Blood pressure is elevated in the office today but he reports normal blood pressure readings at home.  Continue current hypertensive regimen

## 2025-02-27 NOTE — PROGRESS NOTES
Chief Complaint  Follow-up, Hypertension, and Congestive Heart Failure    Subjective        History of Present Illness  Toribio Hayes presents to [facility] CARDIOLOGY for follow up.   Patient is a 69-year-old male with past medical history outlined below, significant for paroxysmal atrial fibrillation, hypertension, hyperlipidemia, remote history of cardiomyopathy with normalization of LVEF who presents for follow-up.  He had a long hospital stay in December related to his bladder cancer where his blood count dropped and he ended up with an infection.  Since then he has been having significant shortness of breath.  He follows with pulmonology.  He is doing much better from a cardiac standpoint since I saw him last time.  He has no chest pain.  His shortness of breath has improved with the use of inhalers.  He denies any dizziness, lightheadedness, palpitations or edema.    Past Medical History:   Diagnosis Date    A-fib     DR KHALIL    Arthritis     RIGHT THUMB    Asthma     Asthma, extrinsic     Bladder cancer     2018    Chronic diastolic congestive heart failure 12/06/2021    COPD (chronic obstructive pulmonary disease)     INHALER    Elevated serum creatinine 12/06/2021    Heart murmur     Hyperlipidemia     ON MEDS    Hypertension     CONTROLLED W MEDS    Lung cancer     DR RAMIRES TOOK CARE OF THAT (2008)    Lung disease 12/06/2021    Melanoma in situ of left lower leg     2022 removed    Mouth sore secondary to chemotherapy     Primary central sleep apnea     Sleep apnea     Stage 3a chronic kidney disease 01/05/2022    Tear of medial cartilage or meniscus of knee, current 08/15/2016    Throat soreness     FROM CHEMO    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       ALLERGY  No Known Allergies     Past Surgical History:   Procedure Laterality Date    BRONCHOSCOPY      COLONOSCOPY      CYSTECTOMY      FINGER DEBRIDEMENT Right     RIGHT THUMB    ILEAL LOOP NEOBLADDER      BLADDER MADE FROM HIS INTESTINES     LUNG BIOPSY      LUNG REMOVAL, PARTIAL Left     LEFT UPPER - 2008 - SOA W EXERTION    PORTOCAVAL SHUNT PLACEMENT       AND     SENTINEL NODE BIOPSY N/A 2022    Procedure: Excision of left thigh melanoma with sentinel lymph node biopsy;  Surgeon: Dominic Ruiz MD;  Location: [facility] MAIN OR;  Service: General;  Laterality: N/A;    TURP / TRANSURETHRAL INCISION / DRAINAGE PROSTATE          Social History     Socioeconomic History    Marital status:    Tobacco Use    Smoking status: Former     Current packs/day: 0.00     Average packs/day: 1.5 packs/day for 39.0 years (58.5 ttl pk-yrs)     Types: Cigarettes     Start date: 10/21/1969     Quit date: 10/21/2008     Years since quittin.3     Passive exposure: Past    Smokeless tobacco: Never   Vaping Use    Vaping status: Never Used   Substance and Sexual Activity    Alcohol use: Never    Drug use: Never    Sexual activity: Not Currently     Partners: Female       Family History   Problem Relation Age of Onset    No Known Problems Mother         Mother     No Known Problems Father         Father  54 years old lung cancer/smoker    Prostate cancer Brother     Cancer Brother     Malig Hyperthermia Neg Hx         Current Outpatient Medications on File Prior to Visit   Medication Sig    acetaminophen (TYLENOL) 325 MG tablet Take 1 tablet by mouth As Needed.    albuterol sulfate  (90 Base) MCG/ACT inhaler Inhale 2 puffs Every 4 (Four) Hours As Needed for Wheezing.    carvedilol (COREG) 25 MG tablet TAKE 1 TABLET BY MOUTH TWICE A DAY    docusate sodium (COLACE) 100 MG capsule Take 1 capsule by mouth Every 12 (Twelve) Hours.    Eliquis 5 MG tablet tablet TAKE 1 TABLET BY MOUTH TWICE A DAY    Fluticasone-Umeclidin-Vilant (Trelegy Ellipta) 100-62.5-25 MCG/ACT inhaler Inhale 1 puff Daily.    ipratropium-albuterol (DUO-NEB) 0.5-2.5 mg/3 ml nebulizer Take 3 mL by nebulization 4 (Four) Times a Day As Needed for Wheezing or Shortness of Air.  "   montelukast (SINGULAIR) 10 MG tablet TAKE 1 TABLET BY MOUTH EVERYDAY AT BEDTIME    olmesartan (BENICAR) 5 MG tablet TAKE 1 TABLET BY MOUTH EVERY DAY    rosuvastatin (CRESTOR) 5 MG tablet TAKE 1 TABLET BY MOUTH EVERY DAY     No current facility-administered medications on file prior to visit.       Objective   Vitals:    02/27/25 0834   BP: 161/71   BP Location: Left arm   Pulse: 83   Weight: 93.9 kg (207 lb)   Height: 177.8 cm (70\")       Physical Exam  Constitutional:       General: He is awake. He is not in acute distress.     Appearance: Normal appearance.   HENT:      Head: Normocephalic.      Nose: Nose normal. No congestion.   Eyes:      Extraocular Movements: Extraocular movements intact.      Conjunctiva/sclera: Conjunctivae normal.      Pupils: Pupils are equal, round, and reactive to light.   Neck:      Thyroid: No thyromegaly.      Vascular: No JVD.   Cardiovascular:      Rate and Rhythm: Normal rate and regular rhythm.      Chest Wall: PMI is not displaced.      Pulses: Normal pulses.      Heart sounds: Normal heart sounds, S1 normal and S2 normal. No murmur heard.     No friction rub. No gallop. No S3 or S4 sounds.   Pulmonary:      Effort: Pulmonary effort is normal.      Breath sounds: Normal breath sounds. No wheezing, rhonchi or rales.   Abdominal:      General: Bowel sounds are normal.      Palpations: Abdomen is soft.      Tenderness: There is no abdominal tenderness.   Musculoskeletal:      Cervical back: No tenderness.      Right lower leg: No edema.      Left lower leg: No edema.   Lymphadenopathy:      Cervical: No cervical adenopathy.   Skin:     General: Skin is warm and dry.      Capillary Refill: Capillary refill takes less than 2 seconds.      Coloration: Skin is not cyanotic.      Findings: No petechiae or rash.      Nails: There is no clubbing.   Neurological:      Mental Status: He is alert.   Psychiatric:         Mood and Affect: Mood normal.         Behavior: Behavior is " cooperative.           Result Review     The following data was reviewed by SARAH Vernon on 02/27/25.      CMP          12/9/2024    07:13 12/31/2024    07:49 1/16/2025    09:38   CMP   Glucose 115  110  115    BUN 11  14  17    Creatinine 1.22  1.33  1.50    EGFR 64.6  58.2  50.4    Sodium 137  134  135    Potassium 4.0  5.0  5.0    Chloride 101  98  96    Calcium 8.5  8.8  9.8    Total Protein   7.7    Albumin   3.9    Globulin   3.8    Total Bilirubin   0.3    Alkaline Phosphatase   86    AST (SGOT)   23    ALT (SGPT)   21    Albumin/Globulin Ratio   1.0    BUN/Creatinine Ratio 9.0  10.5  11.3    Anion Gap 9.5  8.9  11.0      CBC w/diff          12/9/2024    07:13 12/31/2024    07:49 1/16/2025    09:38   CBC w/Diff   WBC 5.78  5.47  8.06    RBC 2.26  2.92  3.37    Hemoglobin 6.9  9.0  10.2    Hematocrit 20.8  28.2  32.5    MCV 92.0  96.6  96.4    MCH 30.5  30.8  30.3    MCHC 33.2  31.9  31.4    RDW 14.3  17.9  17.0    Platelets 233  291  310    Neutrophil Rel % 76.6  63.3  73.1    Immature Granulocyte Rel % 0.5  0.2  0.4    Lymphocyte Rel % 10.0  20.8  15.9    Monocyte Rel % 11.9  14.1  9.6    Eosinophil Rel % 0.5  1.1  0.5    Basophil Rel % 0.5  0.5  0.5       Lipid Panel          1/16/2025    09:38   Lipid Panel   Total Cholesterol 138    Triglycerides 99    HDL Cholesterol 56    VLDL Cholesterol 18    LDL Cholesterol  64    LDL/HDL Ratio 1.11        Results for orders placed during the hospital encounter of 01/24/25    Adult Transthoracic Echo Complete w/ Color, Spectral and Contrast if necessary per protocol    Interpretation Summary    Left ventricular ejection fraction appears to be 56 - 60%.    The left ventricular cavity is borderline dilated.    Left ventricular diastolic function is consistent with (grade I) impaired relaxation and age.    Estimated right ventricular systolic pressure from tricuspid regurgitation is normal (<35 mmHg).    Aortic root is mildly dilated at 4.1 cm.    No  significant valvular disease.      No results found for this or any previous visit.          Procedures      Assessment & Plan  Paroxysmal atrial fibrillation  In a normal rhythm on exam today.  Continue carvedilol and Eliquis.  Eliquis has become cost prohibitive for him.  May consider switching to Pradaxa at next follow-up visit.  Primary hypertension  Blood pressure is elevated in the office today but he reports normal blood pressure readings at home.  Continue current hypertensive regimen  Mixed hyperlipidemia  Continue statin therapy.  Pre-operative cardiovascular examination  Patient has upcoming surgery for an abscess.  His functional capacity is estimated at greater than 4 metabolic equivalents.  He has no chest pain or discomfort.  His breathing has improved.  He has no edema.  Feel he is low risk for perioperative cardiovascular complications and cleared to proceed.  He may hold his Eliquis for 2 to 3 days prior to the procedure and resume the day after the procedure assuming there are no bleeding issues                   The medical services provided during this encounter are part of ongoing care related to this patient's single serious condition or complex condition.  Follow Up   Return in about 6 months (around 8/27/2025) for With Dr. Crandall.    Patient was given instructions and counseling regarding his condition or for health maintenance advice. Please see specific information pulled into the AVS if appropriate.     Germaine Franz, APRN  02/27/25  08:27 EST    Dictated Utilizing Dragon Dictation

## 2025-02-27 NOTE — ASSESSMENT & PLAN NOTE
In a normal rhythm on exam today.  Continue carvedilol and Eliquis.  Eliquis has become cost prohibitive for him.  May consider switching to Pradaxa at next follow-up visit.

## 2025-02-27 NOTE — TELEPHONE ENCOUNTER
The [facility] received a fax that requires your attention. The document has been indexed to the patient’s chart for your review.      Reason for sending: EXTERNAL MEDICAL RECORD NOTIFICATION     Documents Description: CARDIAC CLEARANCE REQ-[facility]-2.26.25    Name of Sender: [facility]     Date Indexed: 2.26.25

## 2025-06-04 ENCOUNTER — HOSPITAL ENCOUNTER (OUTPATIENT)
Dept: OTHER | Facility: HOSPITAL | Age: 70
Discharge: HOME | End: 2025-06-04

## 2025-06-17 ENCOUNTER — TELEPHONE (OUTPATIENT)
Dept: CARDIOLOGY | Facility: CLINIC | Age: 70
End: 2025-06-17
Payer: MEDICARE

## 2025-06-17 NOTE — TELEPHONE ENCOUNTER
Patient called and is wondering about changing his Eliquis 5 mg to a cheaper medication. He said during his last appointment you might be able to put him on something cheaper. Toribio Hayes [id number]  Please advice. Thank you.

## 2025-06-18 ENCOUNTER — TRANSCRIBE ORDERS (OUTPATIENT)
Dept: ADMINISTRATIVE | Facility: HOSPITAL | Age: 70
End: 2025-06-18
Payer: MEDICARE

## 2025-06-18 DIAGNOSIS — R29.818 SUSPECTED SLEEP APNEA: ICD-10-CM

## 2025-06-18 DIAGNOSIS — J44.9 CHRONIC OBSTRUCTIVE PULMONARY DISEASE, UNSPECIFIED COPD TYPE: ICD-10-CM

## 2025-06-18 DIAGNOSIS — C34.92 ADENOCARCINOMA OF LEFT LUNG: ICD-10-CM

## 2025-06-18 DIAGNOSIS — J43.9 PULMONARY EMPHYSEMA, UNSPECIFIED EMPHYSEMA TYPE: ICD-10-CM

## 2025-06-18 DIAGNOSIS — J43.2 CENTRILOBULAR EMPHYSEMA: ICD-10-CM

## 2025-06-18 DIAGNOSIS — R06.02 SHORTNESS OF BREATH: ICD-10-CM

## 2025-06-18 DIAGNOSIS — I50.42 CHRONIC COMBINED SYSTOLIC (CONGESTIVE) AND DIASTOLIC (CONGESTIVE) HEART FAILURE: ICD-10-CM

## 2025-06-18 DIAGNOSIS — M86.18 ACUTE OSTEOMYELITIS OF STERNUM: Primary | ICD-10-CM

## 2025-06-18 DIAGNOSIS — I48.20 ATRIAL FIBRILLATION, CHRONIC: ICD-10-CM

## 2025-06-18 DIAGNOSIS — N18.31 STAGE 3A CHRONIC KIDNEY DISEASE: ICD-10-CM

## 2025-06-18 DIAGNOSIS — R06.00 DYSPNEA, UNSPECIFIED TYPE: ICD-10-CM

## 2025-06-18 NOTE — TELEPHONE ENCOUNTER
PER BESSIE, [facility] [employer] RAN A TEST BILL FOR PRADAXA.      PER [employer]:  30 days $74.80; 90 days not covered -- max 30 day supply     Pt's spouse is not on verbal but pt gave me permission to speak to her.  Advised them to get her on the verbal release next time they are in the office.    Went over Warfarin with them.  They are discussing the pro's and con's of Pradaxa and Warfarin.  They plan to call me with their decision.     BESSIE CEBALLOS:  PT'S SPOUSE CALLED THEIR INS CO AND WAS TOLD ELIQUIS WOULD ONLY COST $47 AND WILL NOT INCREASE OVER TIME. SO, THEY DECIDED TO STAY ON ELIQUIS.  I AM SENDING IN A SCRIPT TO [facility].

## 2025-06-19 ENCOUNTER — HOSPITAL ENCOUNTER (OUTPATIENT)
Dept: CT IMAGING | Facility: HOSPITAL | Age: 70
Discharge: HOME | End: 2025-06-19
Admitting: NURSE PRACTITIONER
Payer: MEDICARE

## 2025-06-19 ENCOUNTER — TELEPHONE (OUTPATIENT)
Dept: PULMONOLOGY | Facility: CLINIC | Age: 70
End: 2025-06-19
Payer: MEDICARE

## 2025-06-19 DIAGNOSIS — M86.18 ACUTE OSTEOMYELITIS OF STERNUM: ICD-10-CM

## 2025-06-19 DIAGNOSIS — R06.02 SHORTNESS OF BREATH: ICD-10-CM

## 2025-06-19 DIAGNOSIS — J43.9 PULMONARY EMPHYSEMA, UNSPECIFIED EMPHYSEMA TYPE: ICD-10-CM

## 2025-06-19 DIAGNOSIS — R06.00 DYSPNEA, UNSPECIFIED TYPE: ICD-10-CM

## 2025-06-19 DIAGNOSIS — C34.92 ADENOCARCINOMA OF LEFT LUNG: ICD-10-CM

## 2025-06-19 DIAGNOSIS — R29.818 SUSPECTED SLEEP APNEA: ICD-10-CM

## 2025-06-19 DIAGNOSIS — I50.42 CHRONIC COMBINED SYSTOLIC (CONGESTIVE) AND DIASTOLIC (CONGESTIVE) HEART FAILURE: ICD-10-CM

## 2025-06-19 DIAGNOSIS — J44.9 CHRONIC OBSTRUCTIVE PULMONARY DISEASE, UNSPECIFIED COPD TYPE: ICD-10-CM

## 2025-06-19 DIAGNOSIS — J43.2 CENTRILOBULAR EMPHYSEMA: ICD-10-CM

## 2025-06-19 DIAGNOSIS — I48.20 ATRIAL FIBRILLATION, CHRONIC: ICD-10-CM

## 2025-06-19 DIAGNOSIS — N18.31 STAGE 3A CHRONIC KIDNEY DISEASE: ICD-10-CM

## 2025-06-19 PROCEDURE — 74176 CT ABD & PELVIS W/O CONTRAST: CPT

## 2025-06-19 RX ORDER — FLUTICASONE FUROATE, UMECLIDINIUM BROMIDE AND VILANTEROL TRIFENATATE 100; 62.5; 25 UG/1; UG/1; UG/1
1 POWDER RESPIRATORY (INHALATION)
Qty: 60 EACH | Refills: 3 | Status: SHIPPED | OUTPATIENT
Start: 2025-06-19

## 2025-06-19 RX ORDER — FLUTICASONE FUROATE, UMECLIDINIUM BROMIDE AND VILANTEROL TRIFENATATE 100; 62.5; 25 UG/1; UG/1; UG/1
1 POWDER RESPIRATORY (INHALATION)
Qty: 60 EACH | Refills: 3 | Status: SHIPPED | OUTPATIENT
Start: 2025-06-19 | End: 2025-06-19 | Stop reason: SDUPTHER

## 2025-06-19 RX ORDER — CARVEDILOL 25 MG/1
25 TABLET ORAL 2 TIMES DAILY
Qty: 180 TABLET | Refills: 3 | Status: SHIPPED | OUTPATIENT
Start: 2025-06-19

## 2025-06-19 NOTE — TELEPHONE ENCOUNTER
PATIENT AND WIFE CALLED NEEDS A REFILL FOR TRELEGY 100 SENT TO [facility] [place].    PLEASE ADIVISE.   multiple falls as per family   attempts to get up alone   Xray found subacute 3-7 rib fractres   supportive care   PT eval FTT due to metastatic cancer s/p fall on Thursday (negative for acute fractures)  Nutrition consult   On ritalin 10mg 8AM, 7PM per his oncologist   Case management consulted for increase HHA hours

## 2025-07-16 ENCOUNTER — APPOINTMENT (OUTPATIENT)
Dept: GENERAL RADIOLOGY | Facility: HOSPITAL | Age: 70
End: 2025-07-16
Payer: MEDICARE

## 2025-07-16 ENCOUNTER — HOSPITAL ENCOUNTER (OUTPATIENT)
Facility: HOSPITAL | Age: 70
Setting detail: OBSERVATION
Discharge: HOME | End: 2025-07-17
Attending: EMERGENCY MEDICINE
Payer: MEDICARE

## 2025-07-16 DIAGNOSIS — R26.2 DIFFICULTY WALKING: ICD-10-CM

## 2025-07-16 DIAGNOSIS — R42 DIZZINESS: ICD-10-CM

## 2025-07-16 DIAGNOSIS — I95.9 SYMPTOMATIC HYPOTENSION: Primary | ICD-10-CM

## 2025-07-16 LAB
ALBUMIN SERPL-MCNC: 3 G/DL (ref 3.5–5.2)
ALBUMIN/GLOB SERPL: 1.2 G/DL
ALP SERPL-CCNC: 140 U/L (ref 39–117)
ALT SERPL W P-5'-P-CCNC: 12 U/L (ref 1–41)
ANION GAP SERPL CALCULATED.3IONS-SCNC: 9.1 MMOL/L (ref 5–15)
AST SERPL-CCNC: 20 U/L (ref 1–40)
BACTERIA UR QL AUTO: ABNORMAL /HPF
BASOPHILS # BLD AUTO: 0.01 10*3/MM3 (ref 0–0.2)
BASOPHILS NFR BLD AUTO: 0.2 % (ref 0–1.5)
BILIRUB SERPL-MCNC: 0.5 MG/DL (ref 0–1.2)
BILIRUB UR QL STRIP: NEGATIVE
BUN SERPL-MCNC: 33.8 MG/DL (ref 8–23)
BUN/CREAT SERPL: 19.1 (ref 7–25)
CALCIUM SPEC-SCNC: 9.2 MG/DL (ref 8.6–10.5)
CHLORIDE SERPL-SCNC: 101 MMOL/L (ref 98–107)
CLARITY UR: ABNORMAL
CO2 SERPL-SCNC: 25.9 MMOL/L (ref 22–29)
COLOR UR: YELLOW
CREAT SERPL-MCNC: 1.77 MG/DL (ref 0.76–1.27)
D-LACTATE SERPL-SCNC: 1.5 MMOL/L (ref 0.5–2)
DEPRECATED RDW RBC AUTO: 61.4 FL (ref 37–54)
EGFRCR SERPLBLD CKD-EPI 2021: 41.1 ML/MIN/1.73
EOSINOPHIL # BLD AUTO: 0.02 10*3/MM3 (ref 0–0.4)
EOSINOPHIL NFR BLD AUTO: 0.4 % (ref 0.3–6.2)
ERYTHROCYTE [DISTWIDTH] IN BLOOD BY AUTOMATED COUNT: 18.9 % (ref 12.3–15.4)
GEN 5 1HR TROPONIN T REFLEX: 20 NG/L
GLOBULIN UR ELPH-MCNC: 2.5 GM/DL
GLUCOSE SERPL-MCNC: 108 MG/DL (ref 65–99)
GLUCOSE UR STRIP-MCNC: NEGATIVE MG/DL
HCT VFR BLD AUTO: 29.3 % (ref 37.5–51)
HGB BLD-MCNC: 9.5 G/DL (ref 13–17.7)
HGB UR QL STRIP.AUTO: ABNORMAL
HOLD SPECIMEN: NORMAL
HOLD SPECIMEN: NORMAL
HYALINE CASTS UR QL AUTO: ABNORMAL /LPF
IMM GRANULOCYTES # BLD AUTO: 0.02 10*3/MM3 (ref 0–0.05)
IMM GRANULOCYTES NFR BLD AUTO: 0.4 % (ref 0–0.5)
KETONES UR QL STRIP: NEGATIVE
LEUKOCYTE ESTERASE UR QL STRIP.AUTO: ABNORMAL
LYMPHOCYTES # BLD AUTO: 0.81 10*3/MM3 (ref 0.7–3.1)
LYMPHOCYTES NFR BLD AUTO: 18.1 % (ref 19.6–45.3)
MAGNESIUM SERPL-MCNC: 1.6 MG/DL (ref 1.6–2.4)
MCH RBC QN AUTO: 28.8 PG (ref 26.6–33)
MCHC RBC AUTO-ENTMCNC: 32.4 G/DL (ref 31.5–35.7)
MCV RBC AUTO: 88.8 FL (ref 79–97)
MONOCYTES # BLD AUTO: 0.6 10*3/MM3 (ref 0.1–0.9)
MONOCYTES NFR BLD AUTO: 13.4 % (ref 5–12)
NEUTROPHILS NFR BLD AUTO: 3.02 10*3/MM3 (ref 1.7–7)
NEUTROPHILS NFR BLD AUTO: 67.5 % (ref 42.7–76)
NITRITE UR QL STRIP: NEGATIVE
NRBC BLD AUTO-RTO: 0 /100 WBC (ref 0–0.2)
PH UR STRIP.AUTO: 6 [PH] (ref 5–8)
PHOSPHATE SERPL-MCNC: 3.3 MG/DL (ref 2.5–4.5)
PLATELET # BLD AUTO: 111 10*3/MM3 (ref 140–450)
PMV BLD AUTO: 8.8 FL (ref 6–12)
POTASSIUM SERPL-SCNC: 4.3 MMOL/L (ref 3.5–5.2)
PROT SERPL-MCNC: 5.5 G/DL (ref 6–8.5)
PROT UR QL STRIP: ABNORMAL
QT INTERVAL: 387 MS
QTC INTERVAL: 426 MS
RBC # BLD AUTO: 3.3 10*6/MM3 (ref 4.14–5.8)
RBC # UR STRIP: ABNORMAL /HPF
REF LAB TEST METHOD: ABNORMAL
SODIUM SERPL-SCNC: 136 MMOL/L (ref 136–145)
SP GR UR STRIP: 1.02 (ref 1–1.03)
SQUAMOUS #/AREA URNS HPF: ABNORMAL /HPF
TROPONIN T NUMERIC DELTA: 0 NG/L
TROPONIN T SERPL HS-MCNC: 20 NG/L
UROBILINOGEN UR QL STRIP: ABNORMAL
WBC # UR STRIP: ABNORMAL /HPF
WBC NRBC COR # BLD AUTO: 4.48 10*3/MM3 (ref 3.4–10.8)
WHOLE BLOOD HOLD COAG: NORMAL
WHOLE BLOOD HOLD SPECIMEN: NORMAL
YEAST URNS QL MICRO: ABNORMAL /HPF

## 2025-07-16 PROCEDURE — 85025 COMPLETE CBC W/AUTO DIFF WBC: CPT | Performed by: EMERGENCY MEDICINE

## 2025-07-16 PROCEDURE — 80053 COMPREHEN METABOLIC PANEL: CPT | Performed by: EMERGENCY MEDICINE

## 2025-07-16 PROCEDURE — G0378 HOSPITAL OBSERVATION PER HR: HCPCS

## 2025-07-16 PROCEDURE — 99236 HOSP IP/OBS SAME DATE HI 85: CPT

## 2025-07-16 PROCEDURE — 96361 HYDRATE IV INFUSION ADD-ON: CPT

## 2025-07-16 PROCEDURE — 84484 ASSAY OF TROPONIN QUANT: CPT | Performed by: EMERGENCY MEDICINE

## 2025-07-16 PROCEDURE — 36415 COLL VENOUS BLD VENIPUNCTURE: CPT

## 2025-07-16 PROCEDURE — 71045 X-RAY EXAM CHEST 1 VIEW: CPT

## 2025-07-16 PROCEDURE — 96360 HYDRATION IV INFUSION INIT: CPT

## 2025-07-16 PROCEDURE — 84100 ASSAY OF PHOSPHORUS: CPT

## 2025-07-16 PROCEDURE — 83735 ASSAY OF MAGNESIUM: CPT | Performed by: EMERGENCY MEDICINE

## 2025-07-16 PROCEDURE — 25810000003 LACTATED RINGERS PER 1000 ML

## 2025-07-16 PROCEDURE — 99285 EMERGENCY DEPT VISIT HI MDM: CPT

## 2025-07-16 PROCEDURE — 81001 URINALYSIS AUTO W/SCOPE: CPT | Performed by: EMERGENCY MEDICINE

## 2025-07-16 PROCEDURE — 93010 ELECTROCARDIOGRAM REPORT: CPT | Performed by: INTERNAL MEDICINE

## 2025-07-16 PROCEDURE — 87040 BLOOD CULTURE FOR BACTERIA: CPT | Performed by: EMERGENCY MEDICINE

## 2025-07-16 PROCEDURE — 83605 ASSAY OF LACTIC ACID: CPT | Performed by: EMERGENCY MEDICINE

## 2025-07-16 PROCEDURE — 93005 ELECTROCARDIOGRAM TRACING: CPT | Performed by: EMERGENCY MEDICINE

## 2025-07-16 PROCEDURE — 25810000003 SODIUM CHLORIDE 0.9 % SOLUTION: Performed by: EMERGENCY MEDICINE

## 2025-07-16 RX ORDER — NITROGLYCERIN 0.4 MG/1
0.4 TABLET SUBLINGUAL
Status: DISCONTINUED | OUTPATIENT
Start: 2025-07-16 | End: 2025-07-17 | Stop reason: HOSPADM

## 2025-07-16 RX ORDER — CYCLOBENZAPRINE HCL 10 MG
10 TABLET ORAL NIGHTLY PRN
COMMUNITY
Start: 2025-06-25 | End: 2025-07-17 | Stop reason: HOSPADM

## 2025-07-16 RX ORDER — BISACODYL 10 MG
10 SUPPOSITORY, RECTAL RECTAL DAILY PRN
Status: DISCONTINUED | OUTPATIENT
Start: 2025-07-16 | End: 2025-07-17 | Stop reason: HOSPADM

## 2025-07-16 RX ORDER — MONTELUKAST SODIUM 10 MG/1
10 TABLET ORAL NIGHTLY
Status: DISCONTINUED | OUTPATIENT
Start: 2025-07-16 | End: 2025-07-17 | Stop reason: HOSPADM

## 2025-07-16 RX ORDER — POLYETHYLENE GLYCOL 3350 17 G/17G
17 POWDER, FOR SOLUTION ORAL DAILY PRN
Status: DISCONTINUED | OUTPATIENT
Start: 2025-07-16 | End: 2025-07-17 | Stop reason: HOSPADM

## 2025-07-16 RX ORDER — OMEGA-3/DHA/EPA/FISH OIL 35-113.5MG
1 TABLET,CHEWABLE ORAL DAILY
COMMUNITY
Start: 2025-06-03

## 2025-07-16 RX ORDER — IPRATROPIUM BROMIDE AND ALBUTEROL SULFATE 2.5; .5 MG/3ML; MG/3ML
3 SOLUTION RESPIRATORY (INHALATION) 4 TIMES DAILY PRN
Status: DISCONTINUED | OUTPATIENT
Start: 2025-07-16 | End: 2025-07-17 | Stop reason: HOSPADM

## 2025-07-16 RX ORDER — METRONIDAZOLE 500 MG/1
500 TABLET ORAL 2 TIMES DAILY
COMMUNITY
Start: 2025-06-06 | End: 2025-07-16

## 2025-07-16 RX ORDER — AMOXICILLIN 250 MG
2 CAPSULE ORAL 2 TIMES DAILY PRN
Status: DISCONTINUED | OUTPATIENT
Start: 2025-07-16 | End: 2025-07-17 | Stop reason: HOSPADM

## 2025-07-16 RX ORDER — SODIUM CHLORIDE 0.9 % (FLUSH) 0.9 %
10 SYRINGE (ML) INJECTION EVERY 12 HOURS SCHEDULED
Status: DISCONTINUED | OUTPATIENT
Start: 2025-07-16 | End: 2025-07-17 | Stop reason: HOSPADM

## 2025-07-16 RX ORDER — BISACODYL 5 MG/1
5 TABLET, DELAYED RELEASE ORAL DAILY PRN
Status: DISCONTINUED | OUTPATIENT
Start: 2025-07-16 | End: 2025-07-17 | Stop reason: HOSPADM

## 2025-07-16 RX ORDER — ROSUVASTATIN CALCIUM 5 MG/1
5 TABLET, COATED ORAL NIGHTLY
Status: DISCONTINUED | OUTPATIENT
Start: 2025-07-16 | End: 2025-07-17 | Stop reason: HOSPADM

## 2025-07-16 RX ORDER — SODIUM CHLORIDE 0.9 % (FLUSH) 0.9 %
10 SYRINGE (ML) INJECTION AS NEEDED
Status: DISCONTINUED | OUTPATIENT
Start: 2025-07-16 | End: 2025-07-17 | Stop reason: HOSPADM

## 2025-07-16 RX ORDER — SODIUM CHLORIDE, SODIUM LACTATE, POTASSIUM CHLORIDE, CALCIUM CHLORIDE 600; 310; 30; 20 MG/100ML; MG/100ML; MG/100ML; MG/100ML
100 INJECTION, SOLUTION INTRAVENOUS CONTINUOUS
Status: ACTIVE | OUTPATIENT
Start: 2025-07-16 | End: 2025-07-17

## 2025-07-16 RX ORDER — SODIUM CHLORIDE 9 MG/ML
40 INJECTION, SOLUTION INTRAVENOUS AS NEEDED
Status: DISCONTINUED | OUTPATIENT
Start: 2025-07-16 | End: 2025-07-17 | Stop reason: HOSPADM

## 2025-07-16 RX ORDER — ACETAMINOPHEN 650 MG/1
650 SUPPOSITORY RECTAL EVERY 4 HOURS PRN
Status: DISCONTINUED | OUTPATIENT
Start: 2025-07-16 | End: 2025-07-17 | Stop reason: HOSPADM

## 2025-07-16 RX ORDER — ACETAMINOPHEN 160 MG/5ML
650 SOLUTION ORAL EVERY 4 HOURS PRN
Status: DISCONTINUED | OUTPATIENT
Start: 2025-07-16 | End: 2025-07-17 | Stop reason: HOSPADM

## 2025-07-16 RX ORDER — ACETAMINOPHEN 325 MG/1
650 TABLET ORAL EVERY 4 HOURS PRN
Status: DISCONTINUED | OUTPATIENT
Start: 2025-07-16 | End: 2025-07-17 | Stop reason: HOSPADM

## 2025-07-16 RX ORDER — CARVEDILOL 25 MG/1
25 TABLET ORAL 2 TIMES DAILY
Status: DISCONTINUED | OUTPATIENT
Start: 2025-07-16 | End: 2025-07-17

## 2025-07-16 RX ADMIN — APIXABAN 5 MG: 5 TABLET, FILM COATED ORAL at 20:58

## 2025-07-16 RX ADMIN — ROSUVASTATIN CALCIUM 5 MG: 5 TABLET, FILM COATED ORAL at 20:57

## 2025-07-16 RX ADMIN — SODIUM CHLORIDE, POTASSIUM CHLORIDE, SODIUM LACTATE AND CALCIUM CHLORIDE 100 ML/HR: 600; 310; 30; 20 INJECTION, SOLUTION INTRAVENOUS at 18:56

## 2025-07-16 RX ADMIN — MONTELUKAST 10 MG: 10 TABLET, FILM COATED ORAL at 20:58

## 2025-07-16 RX ADMIN — Medication 10 ML: at 20:57

## 2025-07-16 RX ADMIN — SODIUM CHLORIDE 500 ML: 9 INJECTION, SOLUTION INTRAVENOUS at 12:21

## 2025-07-16 NOTE — ED NOTES
ED to Inpatient Report    PATIENT DEMOGRAPHICS  Toribio Hayes   1955  70 y.o.  male  No Known Allergies       07/16/25  1031   Weight: 86 kg (189 lb 9.5 oz)      Code Status and Medical Interventions: CPR (Attempt to Resuscitate); Full Support   Ordered at: 07/16/25 1438     Code Status (Patient has no pulse and is not breathing):    CPR (Attempt to Resuscitate)     Medical Interventions (Patient has pulse or is breathing):    Full Support        HISTORY  Past Medical History:   Diagnosis Date    A-fib     DR KHALIL    Arthritis     RIGHT THUMB    Asthma     Asthma, extrinsic     Bladder cancer     2018    Chronic diastolic congestive heart failure 12/06/2021    COPD (chronic obstructive pulmonary disease)     INHALER    Elevated serum creatinine 12/06/2021    Heart murmur     Hyperlipidemia     ON MEDS    Hypertension     CONTROLLED W MEDS    Lung cancer     DR RAMIRES TOOK CARE OF THAT (2008)    Lung disease 12/06/2021    Melanoma in situ of left lower leg     2022 removed    Mouth sore secondary to chemotherapy     Primary central sleep apnea     Sleep apnea     Stage 3a chronic kidney disease 01/05/2022    Tear of medial cartilage or meniscus of knee, current 08/15/2016    Throat soreness     FROM CHEMO    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      Past Surgical History:   Procedure Laterality Date    BRONCHOSCOPY      COLONOSCOPY      CYSTECTOMY      FINGER DEBRIDEMENT Right     RIGHT THUMB    ILEAL LOOP NEOBLADDER      BLADDER MADE FROM HIS INTESTINES    LUNG BIOPSY      LUNG REMOVAL, PARTIAL Left     LEFT UPPER - 2008 - SOA W EXERTION    PORTOCAVAL SHUNT PLACEMENT      2008 AND 2018    SENTINEL NODE BIOPSY N/A 06/23/2022    Procedure: Excision of left thigh melanoma with sentinel lymph node biopsy;  Surgeon: Dominic Ruiz MD;  Location: [facility] MAIN OR;  Service: General;  Laterality: N/A;    TURP / TRANSURETHRAL INCISION / DRAINAGE PROSTATE       Prior to Admission medications    Medication Sig Start Date End Date  Taking? Authorizing Provider   apixaban (Eliquis) 5 MG tablet tablet Take 1 tablet by mouth 2 (Two) Times a Day. 6/18/25  Yes Germaine Franz APRN   carvedilol (COREG) 25 MG tablet Take 1 tablet by mouth 2 (Two) Times a Day. 6/19/25  Yes Germaine Franz APRN   [facility] Vitamin B-12 1000 MCG tablet Take 1 tablet by mouth Daily. 6/3/25  Yes Ronn Paula MD   cyclobenzaprine (FLEXERIL) 10 MG tablet Take 1 tablet by mouth At Night As Needed. 6/25/25  Yes Ronn Paula MD   docusate sodium (COLACE) 100 MG capsule Take 1 capsule by mouth Every 12 (Twelve) Hours. 11/15/24  Yes Ronn Paula MD   Fluticasone-Umeclidin-Vilant (Trelegy Ellipta) 100-62.5-25 MCG/ACT inhaler Inhale 1 puff Daily. 6/19/25  Yes Mecca Judd APRN   montelukast (SINGULAIR) 10 MG tablet TAKE 1 TABLET BY MOUTH EVERYDAY AT BEDTIME 12/9/24  Yes Guanako Phan MD   olmesartan (BENICAR) 5 MG tablet TAKE 1 TABLET BY MOUTH EVERY DAY 10/18/24  Yes Germaine Franz APRN   rosuvastatin (CRESTOR) 5 MG tablet TAKE 1 TABLET BY MOUTH EVERY DAY 6/24/24  Yes Germaine Franz APRN   metroNIDAZOLE (FLAGYL) 500 MG tablet Take 1 tablet by mouth 2 (Two) Times a Day. 6/6/25 7/16/25 Yes Ronn Paula MD   albuterol sulfate  (90 Base) MCG/ACT inhaler Inhale 2 puffs Every 4 (Four) Hours As Needed for Wheezing. 2/11/25   Guanako Phan MD   ipratropium-albuterol (DUO-NEB) 0.5-2.5 mg/3 ml nebulizer Take 3 mL by nebulization 4 (Four) Times a Day As Needed for Wheezing or Shortness of Air. 8/16/24   Guanako Phan MD   acetaminophen (TYLENOL) 325 MG tablet Take 1 tablet by mouth As Needed. 11/15/24 7/16/25  Provider, Ronn, MD        [facility]  Chief Complaint   Patient presents with    Dizziness    Hypotension      Diagnosis Plan   1. Dizziness           Erickson Jimenez DO  Vitals:    07/16/25 1530 07/16/25 1600 07/16/25 1630 07/16/25 1700   BP: 111/58 123/66 115/66 123/60   BP Location:   Right arm     Patient Position:   Lying    Pulse: 85 84 84 84   Resp:   20    Temp:   97.5 °F (36.4 °C)    TempSrc:   Oral    SpO2: 97% 97% 94% 97%   Weight:       Height:         Results for orders placed or performed during the hospital encounter of 07/16/25   Comprehensive Metabolic Panel    Collection Time: 07/16/25 10:44 AM    Specimen: Arm, Left; Blood   Result Value Ref Range    Glucose 108 (H) 65 - 99 mg/dL    BUN 33.8 (H) 8.0 - 23.0 mg/dL    Creatinine 1.77 (H) 0.76 - 1.27 mg/dL    Sodium 136 136 - 145 mmol/L    Potassium 4.3 3.5 - 5.2 mmol/L    Chloride 101 98 - 107 mmol/L    CO2 25.9 22.0 - 29.0 mmol/L    Calcium 9.2 8.6 - 10.5 mg/dL    Total Protein 5.5 (L) 6.0 - 8.5 g/dL    Albumin 3.0 (L) 3.5 - 5.2 g/dL    ALT (SGPT) 12 1 - 41 U/L    AST (SGOT) 20 1 - 40 U/L    Alkaline Phosphatase 140 (H) 39 - 117 U/L    Total Bilirubin 0.5 0.0 - 1.2 mg/dL    Globulin 2.5 gm/dL    A/G Ratio 1.2 g/dL    BUN/Creatinine Ratio 19.1 7.0 - 25.0    Anion Gap 9.1 5.0 - 15.0 mmol/L    eGFR 41.1 (L) >60.0 mL/min/1.73   High Sensitivity Troponin T    Collection Time: 07/16/25 10:44 AM    Specimen: Arm, Left; Blood   Result Value Ref Range    HS Troponin T 20 <22 ng/L   Magnesium    Collection Time: 07/16/25 10:44 AM    Specimen: Arm, Left; Blood   Result Value Ref Range    Magnesium 1.6 1.6 - 2.4 mg/dL   CBC Auto Differential    Collection Time: 07/16/25 10:44 AM    Specimen: Arm, Left; Blood   Result Value Ref Range    WBC 4.48 3.40 - 10.80 10*3/mm3    RBC 3.30 (L) 4.14 - 5.80 10*6/mm3    Hemoglobin 9.5 (L) 13.0 - 17.7 g/dL    Hematocrit 29.3 (L) 37.5 - 51.0 %    MCV 88.8 79.0 - 97.0 fL    MCH 28.8 26.6 - 33.0 pg    MCHC 32.4 31.5 - 35.7 g/dL    RDW 18.9 (H) 12.3 - 15.4 %    RDW-SD 61.4 (H) 37.0 - 54.0 fl    MPV 8.8 6.0 - 12.0 fL    Platelets 111 (L) 140 - 450 10*3/mm3    Neutrophil % 67.5 42.7 - 76.0 %    Lymphocyte % 18.1 (L) 19.6 - 45.3 %    Monocyte % 13.4 (H) 5.0 - 12.0 %    Eosinophil % 0.4 0.3 - 6.2 %    Basophil % 0.2 0.0 - 1.5 %     Immature Grans % 0.4 0.0 - 0.5 %    Neutrophils, Absolute 3.02 1.70 - 7.00 10*3/mm3    Lymphocytes, Absolute 0.81 0.70 - 3.10 10*3/mm3    Monocytes, Absolute 0.60 0.10 - 0.90 10*3/mm3    Eosinophils, Absolute 0.02 0.00 - 0.40 10*3/mm3    Basophils, Absolute 0.01 0.00 - 0.20 10*3/mm3    Immature Grans, Absolute 0.02 0.00 - 0.05 10*3/mm3    nRBC 0.0 0.0 - 0.2 /100 WBC   Green Top (Gel)    Collection Time: 07/16/25 10:44 AM   Result Value Ref Range    Extra Tube Hold for add-ons.    Lavender Top    Collection Time: 07/16/25 10:44 AM   Result Value Ref Range    Extra Tube hold for add-on    Gold Top - SST    Collection Time: 07/16/25 10:44 AM   Result Value Ref Range    Extra Tube Hold for add-ons.    Light Blue Top    Collection Time: 07/16/25 10:44 AM   Result Value Ref Range    Extra Tube Hold for add-ons.    ECG 12 Lead Other; Dizziness    Collection Time: 07/16/25 11:03 AM   Result Value Ref Range    QT Interval 387 ms    QTC Interval 426 ms   Urinalysis With Microscopic If Indicated (No Culture) - Urine, Clean Catch    Collection Time: 07/16/25 11:27 AM    Specimen: Urine, Clean Catch   Result Value Ref Range    Color, UA Yellow Yellow, Straw    Appearance, UA Cloudy (A) Clear    pH, UA 6.0 5.0 - 8.0    Specific Gravity, UA 1.016 1.005 - 1.030    Glucose, UA Negative Negative    Ketones, UA Negative Negative    Bilirubin, UA Negative Negative    Blood, UA Large (3+) (A) Negative    Protein, UA 30 mg/dL (1+) (A) Negative    Leuk Esterase, UA Moderate (2+) (A) Negative    Nitrite, UA Negative Negative    Urobilinogen, UA 0.2 E.U./dL 0.2 - 1.0 E.U./dL   Urinalysis, Microscopic Only - Urine, Clean Catch    Collection Time: 07/16/25 11:27 AM    Specimen: Urine, Clean Catch   Result Value Ref Range    RBC, UA 0-2 None Seen, 0-2 /HPF    WBC, UA 6-10 (A) None Seen, 0-2 /HPF    Bacteria, UA Trace (A) None Seen /HPF    Squamous Epithelial Cells, UA None Seen None Seen, 0-2 /HPF    Yeast, UA Moderate/2+ Budding Yeast (A)  None Seen /HPF    Hyaline Casts, UA None Seen None Seen /LPF    Methodology Manual Light Microscopy    High Sensitivity Troponin T 1Hr    Collection Time: 07/16/25 12:16 PM    Specimen: Arm, Left; Blood   Result Value Ref Range    HS Troponin T 20 <22 ng/L    Troponin T Numeric Delta 0 Abnormal if >/=3 ng/L   Lactic Acid, Plasma    Collection Time: 07/16/25 12:16 PM    Specimen: Arm, Left; Blood   Result Value Ref Range    Lactate 1.5 0.5 - 2.0 mmol/L   Phosphorus    Collection Time: 07/16/25  3:42 PM    Specimen: Arm, Left; Blood   Result Value Ref Range    Phosphorus 3.3 2.5 - 4.5 mg/dL      XR Chest 1 View   Final Result   Impression:   No acute process identified.            Electronically Signed: Chinmay Iniguez MD     7/16/2025 10:58 AM EDT     Workstation ID: [id number]        Lines, Drains & Airways       Active LDAs       Name Placement date Placement time Site Days    Peripheral IV 07/16/25 1121 20 G Left Antecubital 07/16/25  1121  Antecubital  less than 1    Urostomy RLQ 07/16/25  1045  RLQ  less than 1                  Medications   sodium chloride 0.9 % flush 10 mL (has no administration in time range)   sodium chloride 0.9 % flush 10 mL (has no administration in time range)   sodium chloride 0.9 % flush 10 mL (has no administration in time range)   sodium chloride 0.9 % infusion 40 mL (has no administration in time range)   acetaminophen (TYLENOL) tablet 650 mg (has no administration in time range)     Or   acetaminophen (TYLENOL) 160 MG/5ML oral solution 650 mg (has no administration in time range)     Or   acetaminophen (TYLENOL) suppository 650 mg (has no administration in time range)   sennosides-docusate (PERICOLACE) 8.6-50 MG per tablet 2 tablet (has no administration in time range)     And   polyethylene glycol (MIRALAX) packet 17 g (has no administration in time range)     And   bisacodyl (DULCOLAX) EC tablet 5 mg (has no administration in time range)     And   bisacodyl (DULCOLAX) suppository 10  mg (has no administration in time range)   sodium chloride 0.9 % bolus 500 mL (0 mL Intravenous Stopped 7/16/25 1236)      ECG 12 Lead Other; Dizziness   Preliminary Result   HEART RATE=73  bpm   RR Tvnbingv=752  ms   MI Opwevvjf=116  ms   P Horizontal Axis=-18  deg   P Front Axis=56  deg   QRSD Dizulbdc=032  ms   QT Gouccoaz=616  ms   IRpT=746  ms   QRS Axis=-20  deg   T Wave Axis=40  deg   - ABNORMAL ECG -   Sinus rhythm   Nonspecific intraventricular conduction delay   Date and Time of Study:2025-07-16 11:03:35           Cassia Nolen RN  07/16/25 17:12 EDT

## 2025-07-16 NOTE — ED PROVIDER NOTES
Time: 12:40 PM EDT  Date of encounter:  7/16/2025  Independent Historian/Clinical History and Information was obtained by:   Patient    History is limited by: N/A    Chief Complaint: Dizziness      History of Present Illness:  Patient is a 70 y.o. year old male who presents to the emergency department for evaluation of dizziness and near syncope this morning.  Patient denies chest pain and shortness of breath.  Patient reports that he has had a significant amount of vomiting and diarrhea since he has been on the antibiotics for a infection.  Patient denies chest pain and shortness of breath.  Patient has no cough or hemoptysis.      Patient Care Team  Primary Care Provider: Darren Mejía APRN    Past Medical History:     No Known Allergies  Past Medical History:   Diagnosis Date    A-fib     DR KHALIL    Arthritis     RIGHT THUMB    Asthma     Asthma, extrinsic     Bladder cancer     2018    Chronic diastolic congestive heart failure 12/06/2021    COPD (chronic obstructive pulmonary disease)     INHALER    Elevated serum creatinine 12/06/2021    Heart murmur     Hyperlipidemia     ON MEDS    Hypertension     CONTROLLED W MEDS    Lung cancer     DR RAMIRES TOOK CARE OF THAT (2008)    Lung disease 12/06/2021    Melanoma in situ of left lower leg     2022 removed    Mouth sore secondary to chemotherapy     Primary central sleep apnea     Sleep apnea     Stage 3a chronic kidney disease 01/05/2022    Tear of medial cartilage or meniscus of knee, current 08/15/2016    Throat soreness     FROM CHEMO    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     Past Surgical History:   Procedure Laterality Date    BRONCHOSCOPY      COLONOSCOPY      CYSTECTOMY      FINGER DEBRIDEMENT Right     RIGHT THUMB    ILEAL LOOP NEOBLADDER      BLADDER MADE FROM HIS INTESTINES    LUNG BIOPSY      LUNG REMOVAL, PARTIAL Left     LEFT UPPER - 2008 - SOA W EXERTION    PORTOCAVAL SHUNT PLACEMENT      2008 AND 2018    SENTINEL NODE BIOPSY N/A 06/23/2022     Procedure: Excision of left thigh melanoma with sentinel lymph node biopsy;  Surgeon: Dominic Ruiz MD;  Location: [facility] MAIN OR;  Service: General;  Laterality: N/A;    TURP / TRANSURETHRAL INCISION / DRAINAGE PROSTATE       Family History   Problem Relation Age of Onset    No Known Problems Mother         Mother     No Known Problems Father         Father  54 years old lung cancer/smoker    Prostate cancer Brother     Cancer Brother     Denise Hyperthermia Neg Hx        Home Medications:  Prior to Admission medications    Medication Sig Start Date End Date Taking? Authorizing Provider   acetaminophen (TYLENOL) 325 MG tablet Take 1 tablet by mouth As Needed. 11/15/24   ProviderRonn MD   albuterol sulfate  (90 Base) MCG/ACT inhaler Inhale 2 puffs Every 4 (Four) Hours As Needed for Wheezing. 25   Guanako Phan MD   apixaban (Eliquis) 5 MG tablet tablet Take 1 tablet by mouth 2 (Two) Times a Day. 25   Germaine Franz APRN   carvedilol (COREG) 25 MG tablet Take 1 tablet by mouth 2 (Two) Times a Day. 25   Germaine Franz APRN   docusate sodium (COLACE) 100 MG capsule Take 1 capsule by mouth Every 12 (Twelve) Hours. 11/15/24   Ronn Paula MD   Fluticasone-Umeclidin-Vilant (Trelegy Ellipta) 100-62.5-25 MCG/ACT inhaler Inhale 1 puff Daily. 25   Mecca Judd APRN   ipratropium-albuterol (DUO-NEB) 0.5-2.5 mg/3 ml nebulizer Take 3 mL by nebulization 4 (Four) Times a Day As Needed for Wheezing or Shortness of Air. 24   Guanako Phan MD   montelukast (SINGULAIR) 10 MG tablet TAKE 1 TABLET BY MOUTH EVERYDAY AT BEDTIME 24   Guanako Phan MD   olmesartan (BENICAR) 5 MG tablet TAKE 1 TABLET BY MOUTH EVERY DAY 10/18/24   Germaine Franz APRN   rosuvastatin (CRESTOR) 5 MG tablet TAKE 1 TABLET BY MOUTH EVERY DAY 24   Germaine Franz APRN        Social History:   Social History     Tobacco Use    Smoking status:  "Former     Current packs/day: 0.00     Average packs/day: 1.5 packs/day for 39.0 years (58.5 ttl pk-yrs)     Types: Cigarettes     Start date: 10/21/1969     Quit date: 10/21/2008     Years since quittin.7     Passive exposure: Past    Smokeless tobacco: Never   Vaping Use    Vaping status: Never Used   Substance Use Topics    Alcohol use: Never    Drug use: Never         Review of Systems:  Review of Systems   Constitutional:  Negative for chills and fever.   HENT:  Negative for congestion, rhinorrhea and sore throat.    Eyes:  Negative for pain and visual disturbance.   Respiratory:  Negative for apnea, cough, chest tightness and shortness of breath.    Cardiovascular:  Negative for chest pain and palpitations.   Gastrointestinal:  Negative for abdominal pain, diarrhea, nausea and vomiting.   Genitourinary:  Negative for difficulty urinating and dysuria.   Musculoskeletal:  Negative for joint swelling and myalgias.   Skin:  Negative for color change.   Neurological:  Positive for dizziness. Negative for seizures and headaches.   Psychiatric/Behavioral: Negative.     All other systems reviewed and are negative.       Physical Exam:  /59   Pulse 90   Temp 97.5 °F (36.4 °C) (Oral)   Resp 19   Ht 177.8 cm (70\")   Wt 86 kg (189 lb 9.5 oz)   SpO2 98%   BMI 27.20 kg/m²     Physical Exam  Vitals and nursing note reviewed.   Constitutional:       General: He is not in acute distress.     Appearance: Normal appearance. He is not toxic-appearing.   HENT:      Head: Normocephalic and atraumatic.      Jaw: There is normal jaw occlusion.   Eyes:      General: Lids are normal.      Extraocular Movements: Extraocular movements intact.      Conjunctiva/sclera: Conjunctivae normal.      Pupils: Pupils are equal, round, and reactive to light.   Cardiovascular:      Rate and Rhythm: Normal rate and regular rhythm.      Pulses: Normal pulses.      Heart sounds: Normal heart sounds.   Pulmonary:      Effort: Pulmonary " effort is normal. No respiratory distress.      Breath sounds: Normal breath sounds. No wheezing or rhonchi.   Abdominal:      General: Abdomen is flat.      Palpations: Abdomen is soft.      Tenderness: There is no abdominal tenderness. There is no guarding or rebound.   Musculoskeletal:         General: Normal range of motion.      Cervical back: Normal range of motion and neck supple.      Right lower leg: No edema.      Left lower leg: No edema.   Skin:     General: Skin is warm and dry.   Neurological:      Mental Status: He is alert and oriented to person, place, and time. Mental status is at baseline.   Psychiatric:         Mood and Affect: Mood normal.                    Medical Decision Making:      Comorbidities that affect care:    Bladder cancer, recent infection    External Notes reviewed:    Previous Clinic Note: Patient was last seen in clinic after follow-up for perineal abscess and debridement      The following orders were placed and all results were independently analyzed by me:  Orders Placed This Encounter   Procedures    Blood Culture - Blood,    Blood Culture - Blood,    XR Chest 1 View    Apex Draw    Comprehensive Metabolic Panel    High Sensitivity Troponin T    Magnesium    Urinalysis With Microscopic If Indicated (No Culture) - Urine, Clean Catch    CBC Auto Differential    High Sensitivity Troponin T 1Hr    Lactic Acid, Plasma    Urinalysis, Microscopic Only - Urine, Clean Catch    Basic Metabolic Panel    Magnesium    Phosphorus    CBC Auto Differential    Diet: Cardiac; Healthy Heart (2-3 Na+); Fluid Consistency: Thin (IDDSI 0)    Undress & Gown    Continuous Pulse Oximetry    Vital Signs    Orthostatic Blood Pressure    Vital Signs    Intake & Output    Weigh Patient    Oral Care    Saline Lock & Maintain IV Access    Place Sequential Compression Device    Maintain Sequential Compression Device    Ambulate Patient    Up With Assistance    Orthostatic Blood Pressure    Code Status  and Medical Interventions: CPR (Attempt to Resuscitate); Full Support    Inpatient Hospitalist Consult    Oxygen Therapy- Nasal Cannula; Titrate 1-6 LPM Per SpO2; 90 - 95%    POC Glucose Once    ECG 12 Lead Other; Dizziness    Insert Peripheral IV    Insert Peripheral IV    Initiate Observation Status    Fall Precautions    CBC & Differential    Green Top (Gel)    Lavender Top    Gold Top - SST    Light Blue Top    CBC & Differential       Medications Given in the Emergency Department:  Medications   sodium chloride 0.9 % flush 10 mL (has no administration in time range)   sodium chloride 0.9 % flush 10 mL (has no administration in time range)   sodium chloride 0.9 % flush 10 mL (has no administration in time range)   sodium chloride 0.9 % infusion 40 mL (has no administration in time range)   acetaminophen (TYLENOL) tablet 650 mg (has no administration in time range)     Or   acetaminophen (TYLENOL) 160 MG/5ML oral solution 650 mg (has no administration in time range)     Or   acetaminophen (TYLENOL) suppository 650 mg (has no administration in time range)   sennosides-docusate (PERICOLACE) 8.6-50 MG per tablet 2 tablet (has no administration in time range)     And   polyethylene glycol (MIRALAX) packet 17 g (has no administration in time range)     And   bisacodyl (DULCOLAX) EC tablet 5 mg (has no administration in time range)     And   bisacodyl (DULCOLAX) suppository 10 mg (has no administration in time range)   sodium chloride 0.9 % bolus 500 mL (0 mL Intravenous Stopped 7/16/25 1236)        ED Course:         Labs:    Lab Results (last 24 hours)       Procedure Component Value Units Date/Time    CBC & Differential [[id number]]  (Abnormal) Collected: 07/16/25 1044    Specimen: Blood from Arm, Left Updated: 07/16/25 1053    Narrative:      The following orders were created for panel order CBC & Differential.  Procedure                               Abnormality         Status                     ---------                                -----------         ------                     CBC Auto Differential[[id number]]        Abnormal            Final result                 Please view results for these tests on the individual orders.    Comprehensive Metabolic Panel [[id number]]  (Abnormal) Collected: 07/16/25 1044    Specimen: Blood from Arm, Left Updated: 07/16/25 1112     Glucose 108 mg/dL      BUN 33.8 mg/dL      Creatinine 1.77 mg/dL      Sodium 136 mmol/L      Potassium 4.3 mmol/L      Chloride 101 mmol/L      CO2 25.9 mmol/L      Calcium 9.2 mg/dL      Total Protein 5.5 g/dL      Albumin 3.0 g/dL      ALT (SGPT) 12 U/L      AST (SGOT) 20 U/L      Alkaline Phosphatase 140 U/L      Total Bilirubin 0.5 mg/dL      Globulin 2.5 gm/dL      A/G Ratio 1.2 g/dL      BUN/Creatinine Ratio 19.1     Anion Gap 9.1 mmol/L      eGFR 41.1 mL/min/1.73     Narrative:      GFR Categories in Chronic Kidney Disease (CKD)              GFR Category          GFR (mL/min/1.73)    Interpretation  G1                    90 or greater        Normal or high (1)  G2                    60-89                Mild decrease (1)  G3a                   45-59                Mild to moderate decrease  G3b                   30-44                Moderate to severe decrease  G4                    15-29                Severe decrease  G5                    14 or less           Kidney failure    (1)In the absence of evidence of kidney disease, neither GFR category G1 or G2 fulfill the criteria for CKD.    eGFR calculation 2021 CKD-EPI creatinine equation, which does not include race as a factor    High Sensitivity Troponin T [[id number]]  (Normal) Collected: 07/16/25 1044    Specimen: Blood from Arm, Left Updated: 07/16/25 1115     HS Troponin T 20 ng/L     Narrative:      High Sensitive Troponin T Reference Range:  <14.0 ng/L- Negative Female for AMI  <22.0 ng/L- Negative Male for AMI  >=14 - Abnormal Female indicating possible myocardial injury.  >=22 - Abnormal Male  indicating possible myocardial injury.   Clinicians would have to utilize clinical acumen, EKG, Troponin, and serial changes to determine if it is an Acute Myocardial Infarction or myocardial injury due to an underlying chronic condition.         Magnesium [[id number]]  (Normal) Collected: 07/16/25 1044    Specimen: Blood from Arm, Left Updated: 07/16/25 1112     Magnesium 1.6 mg/dL     CBC Auto Differential [[id number]]  (Abnormal) Collected: 07/16/25 1044    Specimen: Blood from Arm, Left Updated: 07/16/25 1053     WBC 4.48 10*3/mm3      RBC 3.30 10*6/mm3      Hemoglobin 9.5 g/dL      Hematocrit 29.3 %      MCV 88.8 fL      MCH 28.8 pg      MCHC 32.4 g/dL      RDW 18.9 %      RDW-SD 61.4 fl      MPV 8.8 fL      Platelets 111 10*3/mm3      Neutrophil % 67.5 %      Lymphocyte % 18.1 %      Monocyte % 13.4 %      Eosinophil % 0.4 %      Basophil % 0.2 %      Immature Grans % 0.4 %      Neutrophils, Absolute 3.02 10*3/mm3      Lymphocytes, Absolute 0.81 10*3/mm3      Monocytes, Absolute 0.60 10*3/mm3      Eosinophils, Absolute 0.02 10*3/mm3      Basophils, Absolute 0.01 10*3/mm3      Immature Grans, Absolute 0.02 10*3/mm3      nRBC 0.0 /100 WBC     Urinalysis With Microscopic If Indicated (No Culture) - Urine, Clean Catch [[id number]]  (Abnormal) Collected: 07/16/25 1127    Specimen: Urine, Clean Catch Updated: 07/16/25 1205     Color, UA Yellow     Appearance, UA Cloudy     pH, UA 6.0     Specific Gravity, UA 1.016     Glucose, UA Negative     Ketones, UA Negative     Bilirubin, UA Negative     Blood, UA Large (3+)     Protein, UA 30 mg/dL (1+)     Leuk Esterase, UA Moderate (2+)     Nitrite, UA Negative     Urobilinogen, UA 0.2 E.U./dL    Urinalysis, Microscopic Only - Urine, Clean Catch [[id number]]  (Abnormal) Collected: 07/16/25 1127    Specimen: Urine, Clean Catch Updated: 07/16/25 1229     RBC, UA 0-2 /HPF      WBC, UA 6-10 /HPF      Bacteria, UA Trace /HPF      Squamous Epithelial Cells, UA None Seen /HPF       Yeast, UA Moderate/2+ Budding Yeast /HPF      Hyaline Casts, UA None Seen /LPF      Methodology Manual Light Microscopy    High Sensitivity Troponin T 1Hr [[id number]]  (Normal) Collected: 07/16/25 1216    Specimen: Blood from Arm, Left Updated: 07/16/25 1244     HS Troponin T 20 ng/L      Troponin T Numeric Delta 0 ng/L     Narrative:      High Sensitive Troponin T Reference Range:  <14.0 ng/L- Negative Female for AMI  <22.0 ng/L- Negative Male for AMI  >=14 - Abnormal Female indicating possible myocardial injury.  >=22 - Abnormal Male indicating possible myocardial injury.   Clinicians would have to utilize clinical acumen, EKG, Troponin, and serial changes to determine if it is an Acute Myocardial Infarction or myocardial injury due to an underlying chronic condition.         Lactic Acid, Plasma [[id number]]  (Normal) Collected: 07/16/25 1216    Specimen: Blood from Arm, Left Updated: 07/16/25 1243     Lactate 1.5 mmol/L     Blood Culture - Blood, Arm, Left [[id number]] Collected: 07/16/25 1218    Specimen: Blood from Arm, Left Updated: 07/16/25 1223    Blood Culture - Blood, Arm, Left [[id number]] Collected: 07/16/25 1224    Specimen: Blood from Arm, Left Updated: 07/16/25 1228             Imaging:    XR Chest 1 View  Result Date: 7/16/2025  XR CHEST 1 VW Date of Exam: 7/16/2025 10:45 AM EDT Indication: Weak/Dizzy/AMS triage protocol Comparison: 2/21/2025 Findings: Cardiomediastinal silhouette is unchanged. There is a right-sided internal jugular chest port. There is left thoracic volume loss. No superimposed airspace disease, pneumothorax, nor pleural effusion. No acute osseous abnormality identified.     Impression: No acute process identified. Electronically Signed: Chinmay Iniguez MD  7/16/2025 10:58 AM EDT  Workstation ID: [id number]        Differential Diagnosis and Discussion:    Dizziness: Based on the patient's history, signs, and symptoms, the diffential diagnosis includes but is not limited to meningitis,  stroke, sepsis, subarachnoid hemorrhage, intracranial bleeding, encephalitis, vertigo, electrolyte imbalance, and metabolic disorders.    PROCEDURES:    Labs were collected in the emergency department and all labs were reviewed and interpreted by me.  X-ray were performed in the emergency department and all X-ray impressions were independently interpreted by me.  An EKG was performed and the EKG was interpreted by me.    ECG 12 Lead Other; Dizziness   Preliminary Result   HEART RATE=73  bpm   RR Wnnamkkv=037  ms   MA Ohcffaqj=471  ms   P Horizontal Axis=-18  deg   P Front Axis=56  deg   QRSD Ehbefdpt=076  ms   QT Ewemheov=518  ms   MOpT=811  ms   QRS Axis=-20  deg   T Wave Axis=40  deg   - ABNORMAL ECG -   Sinus rhythm   Nonspecific intraventricular conduction delay   Date and Time of Study:2025-07-16 11:03:35          Procedures    MDM     Amount and/or Complexity of Data Reviewed  Decide to obtain previous medical records or to obtain history from someone other than the patient: yes       The patient´s CBC that was reviewed and interpreted by me shows no abnormalities of critical concern. Of note, there is no anemia requiring a blood transfusion and the platelet count is acceptable.  CBC shows a BUN of 33.8 and a creatinine of 1.77.  Patient was found to have significant orthostasis in the emergency department.  Patient was started on fluids.                Patient Care Considerations:    CT ABDOMEN AND PELVIS: I considered ordering a CT scan of the abdomen and pelvis however abdomen is soft and nontender.      Consultants/Shared Management Plan:    Case was discussed with hospitalist who agrees with admission.    Social Determinants of Health:    Patient is independent, reliable, and has access to care.       Disposition and Care Coordination:    Admit:   Through independent evaluation of the patient's history, physical, and imperical data, the patient meets criteria for inpatient admission to the  [facility].        Final diagnoses:   Dizziness        ED Disposition       ED Disposition   Decision to Admit    Condition   --    Comment   Level of Care: Med/Surg [1]   Diagnosis: Dizziness [[id number]]   Admitting Physician: ALBA MCNAMARA [[id number]]   Attending Physician: ALBA MCNAMARA [[id number]]                 This medical record created using voice recognition software.             Gage Musa MD  07/16/25 1357

## 2025-07-16 NOTE — ED NOTES
ED to Inpatient Report    PATIENT DEMOGRAPHICS  Toribio Hayes   1955  70 y.o.  male  No Known Allergies       07/16/25  1031   Weight: 86 kg (189 lb 9.5 oz)      Code Status and Medical Interventions: CPR (Attempt to Resuscitate); Full Support   Ordered at: 07/16/25 1438     Code Status (Patient has no pulse and is not breathing):    CPR (Attempt to Resuscitate)     Medical Interventions (Patient has pulse or is breathing):    Full Support        HISTORY  Past Medical History:   Diagnosis Date    A-fib     DR KHALIL    Arthritis     RIGHT THUMB    Asthma     Asthma, extrinsic     Bladder cancer     2018    Chronic diastolic congestive heart failure 12/06/2021    COPD (chronic obstructive pulmonary disease)     INHALER    Elevated serum creatinine 12/06/2021    Heart murmur     Hyperlipidemia     ON MEDS    Hypertension     CONTROLLED W MEDS    Lung cancer     DR RAMIRES TOOK CARE OF THAT (2008)    Lung disease 12/06/2021    Melanoma in situ of left lower leg     2022 removed    Mouth sore secondary to chemotherapy     Primary central sleep apnea     Sleep apnea     Stage 3a chronic kidney disease 01/05/2022    Tear of medial cartilage or meniscus of knee, current 08/15/2016    Throat soreness     FROM CHEMO    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      Past Surgical History:   Procedure Laterality Date    BRONCHOSCOPY      COLONOSCOPY      CYSTECTOMY      FINGER DEBRIDEMENT Right     RIGHT THUMB    ILEAL LOOP NEOBLADDER      BLADDER MADE FROM HIS INTESTINES    LUNG BIOPSY      LUNG REMOVAL, PARTIAL Left     LEFT UPPER - 2008 - SOA W EXERTION    PORTOCAVAL SHUNT PLACEMENT      2008 AND 2018    SENTINEL NODE BIOPSY N/A 06/23/2022    Procedure: Excision of left thigh melanoma with sentinel lymph node biopsy;  Surgeon: Dominic Ruiz MD;  Location[facility] MAIN OR;  Service: General;  Laterality: N/A;    TURP / TRANSURETHRAL INCISION / DRAINAGE PROSTATE       Prior to Admission medications    Medication Sig Start Date End Date  Taking? Authorizing Provider   [facility] Vitamin B-12 1000 MCG tablet Take 1 tablet by mouth Daily. 6/3/25  Yes Ronn Paula MD   cyclobenzaprine (FLEXERIL) 10 MG tablet Take 1 tablet by mouth At Night As Needed. 6/25/25  Yes Ronn Paula MD   metroNIDAZOLE (FLAGYL) 500 MG tablet Take 1 tablet by mouth 2 (Two) Times a Day. 6/6/25 7/18/25 Yes Ronn Paula MD   albuterol sulfate  (90 Base) MCG/ACT inhaler Inhale 2 puffs Every 4 (Four) Hours As Needed for Wheezing. 2/11/25   Guanako Phan MD   apixaban (Eliquis) 5 MG tablet tablet Take 1 tablet by mouth 2 (Two) Times a Day. 6/18/25   Germaine Franz APRN   carvedilol (COREG) 25 MG tablet Take 1 tablet by mouth 2 (Two) Times a Day. 6/19/25   Germaine Franz APRN   docusate sodium (COLACE) 100 MG capsule Take 1 capsule by mouth Every 12 (Twelve) Hours. 11/15/24   Ronn Paula MD   Fluticasone-Umeclidin-Vilant (Trelegy Ellipta) 100-62.5-25 MCG/ACT inhaler Inhale 1 puff Daily. 6/19/25   Mecca Judd APRN   ipratropium-albuterol (DUO-NEB) 0.5-2.5 mg/3 ml nebulizer Take 3 mL by nebulization 4 (Four) Times a Day As Needed for Wheezing or Shortness of Air. 8/16/24   Guanako Phan MD   montelukast (SINGULAIR) 10 MG tablet TAKE 1 TABLET BY MOUTH EVERYDAY AT BEDTIME 12/9/24   Guanako Phan MD   olmesartan (BENICAR) 5 MG tablet TAKE 1 TABLET BY MOUTH EVERY DAY 10/18/24   Germaine Franz APRN   rosuvastatin (CRESTOR) 5 MG tablet TAKE 1 TABLET BY MOUTH EVERY DAY 6/24/24   Germaine Franz APRN   acetaminophen (TYLENOL) 325 MG tablet Take 1 tablet by mouth As Needed. 11/15/24 7/16/25  Provider, Historical, MD        [facility]  Chief Complaint   Patient presents with    Dizziness    Hypotension     No diagnosis found.   Erickson Jimenez DO  Vitals:    07/16/25 1300 07/16/25 1330 07/16/25 1400 07/16/25 1430   BP: 120/61 111/64 108/58 116/62   BP Location:   Right arm    Patient Position:   Lying    Pulse: 62  67 72 78   Resp:   19    Temp:   97.5 °F (36.4 °C)    TempSrc:   Oral    SpO2: 96% 96% 97% 95%   Weight:       Height:         Results for orders placed or performed during the hospital encounter of 07/16/25   Comprehensive Metabolic Panel    Collection Time: 07/16/25 10:44 AM    Specimen: Arm, Left; Blood   Result Value Ref Range    Glucose 108 (H) 65 - 99 mg/dL    BUN 33.8 (H) 8.0 - 23.0 mg/dL    Creatinine 1.77 (H) 0.76 - 1.27 mg/dL    Sodium 136 136 - 145 mmol/L    Potassium 4.3 3.5 - 5.2 mmol/L    Chloride 101 98 - 107 mmol/L    CO2 25.9 22.0 - 29.0 mmol/L    Calcium 9.2 8.6 - 10.5 mg/dL    Total Protein 5.5 (L) 6.0 - 8.5 g/dL    Albumin 3.0 (L) 3.5 - 5.2 g/dL    ALT (SGPT) 12 1 - 41 U/L    AST (SGOT) 20 1 - 40 U/L    Alkaline Phosphatase 140 (H) 39 - 117 U/L    Total Bilirubin 0.5 0.0 - 1.2 mg/dL    Globulin 2.5 gm/dL    A/G Ratio 1.2 g/dL    BUN/Creatinine Ratio 19.1 7.0 - 25.0    Anion Gap 9.1 5.0 - 15.0 mmol/L    eGFR 41.1 (L) >60.0 mL/min/1.73   High Sensitivity Troponin T    Collection Time: 07/16/25 10:44 AM    Specimen: Arm, Left; Blood   Result Value Ref Range    HS Troponin T 20 <22 ng/L   Magnesium    Collection Time: 07/16/25 10:44 AM    Specimen: Arm, Left; Blood   Result Value Ref Range    Magnesium 1.6 1.6 - 2.4 mg/dL   CBC Auto Differential    Collection Time: 07/16/25 10:44 AM    Specimen: Arm, Left; Blood   Result Value Ref Range    WBC 4.48 3.40 - 10.80 10*3/mm3    RBC 3.30 (L) 4.14 - 5.80 10*6/mm3    Hemoglobin 9.5 (L) 13.0 - 17.7 g/dL    Hematocrit 29.3 (L) 37.5 - 51.0 %    MCV 88.8 79.0 - 97.0 fL    MCH 28.8 26.6 - 33.0 pg    MCHC 32.4 31.5 - 35.7 g/dL    RDW 18.9 (H) 12.3 - 15.4 %    RDW-SD 61.4 (H) 37.0 - 54.0 fl    MPV 8.8 6.0 - 12.0 fL    Platelets 111 (L) 140 - 450 10*3/mm3    Neutrophil % 67.5 42.7 - 76.0 %    Lymphocyte % 18.1 (L) 19.6 - 45.3 %    Monocyte % 13.4 (H) 5.0 - 12.0 %    Eosinophil % 0.4 0.3 - 6.2 %    Basophil % 0.2 0.0 - 1.5 %    Immature Grans % 0.4 0.0 - 0.5 %     Neutrophils, Absolute 3.02 1.70 - 7.00 10*3/mm3    Lymphocytes, Absolute 0.81 0.70 - 3.10 10*3/mm3    Monocytes, Absolute 0.60 0.10 - 0.90 10*3/mm3    Eosinophils, Absolute 0.02 0.00 - 0.40 10*3/mm3    Basophils, Absolute 0.01 0.00 - 0.20 10*3/mm3    Immature Grans, Absolute 0.02 0.00 - 0.05 10*3/mm3    nRBC 0.0 0.0 - 0.2 /100 WBC   Green Top (Gel)    Collection Time: 07/16/25 10:44 AM   Result Value Ref Range    Extra Tube Hold for add-ons.    Lavender Top    Collection Time: 07/16/25 10:44 AM   Result Value Ref Range    Extra Tube hold for add-on    Gold Top - SST    Collection Time: 07/16/25 10:44 AM   Result Value Ref Range    Extra Tube Hold for add-ons.    Light Blue Top    Collection Time: 07/16/25 10:44 AM   Result Value Ref Range    Extra Tube Hold for add-ons.    ECG 12 Lead Other; Dizziness    Collection Time: 07/16/25 11:03 AM   Result Value Ref Range    QT Interval 387 ms    QTC Interval 426 ms   Urinalysis With Microscopic If Indicated (No Culture) - Urine, Clean Catch    Collection Time: 07/16/25 11:27 AM    Specimen: Urine, Clean Catch   Result Value Ref Range    Color, UA Yellow Yellow, Straw    Appearance, UA Cloudy (A) Clear    pH, UA 6.0 5.0 - 8.0    Specific Gravity, UA 1.016 1.005 - 1.030    Glucose, UA Negative Negative    Ketones, UA Negative Negative    Bilirubin, UA Negative Negative    Blood, UA Large (3+) (A) Negative    Protein, UA 30 mg/dL (1+) (A) Negative    Leuk Esterase, UA Moderate (2+) (A) Negative    Nitrite, UA Negative Negative    Urobilinogen, UA 0.2 E.U./dL 0.2 - 1.0 E.U./dL   Urinalysis, Microscopic Only - Urine, Clean Catch    Collection Time: 07/16/25 11:27 AM    Specimen: Urine, Clean Catch   Result Value Ref Range    RBC, UA 0-2 None Seen, 0-2 /HPF    WBC, UA 6-10 (A) None Seen, 0-2 /HPF    Bacteria, UA Trace (A) None Seen /HPF    Squamous Epithelial Cells, UA None Seen None Seen, 0-2 /HPF    Yeast, UA Moderate/2+ Budding Yeast (A) None Seen /HPF    Hyaline Casts, UA  None Seen None Seen /LPF    Methodology Manual Light Microscopy    High Sensitivity Troponin T 1Hr    Collection Time: 07/16/25 12:16 PM    Specimen: Arm, Left; Blood   Result Value Ref Range    HS Troponin T 20 <22 ng/L    Troponin T Numeric Delta 0 Abnormal if >/=3 ng/L   Lactic Acid, Plasma    Collection Time: 07/16/25 12:16 PM    Specimen: Arm, Left; Blood   Result Value Ref Range    Lactate 1.5 0.5 - 2.0 mmol/L      XR Chest 1 View   Final Result   Impression:   No acute process identified.            Electronically Signed: Chinmay Iniguez MD     7/16/2025 10:58 AM EDT     Workstation ID: [id number]        Lines, Drains & Airways       Active LDAs       Name Placement date Placement time Site Days    Peripheral IV 07/16/25 1121 20 G Left Antecubital 07/16/25  1121  Antecubital  less than 1    Urostomy RLQ 07/16/25  1045  RLQ  less than 1                  Medications   sodium chloride 0.9 % flush 10 mL (has no administration in time range)   sodium chloride 0.9 % flush 10 mL (has no administration in time range)   sodium chloride 0.9 % flush 10 mL (has no administration in time range)   sodium chloride 0.9 % infusion 40 mL (has no administration in time range)   acetaminophen (TYLENOL) tablet 650 mg (has no administration in time range)     Or   acetaminophen (TYLENOL) 160 MG/5ML oral solution 650 mg (has no administration in time range)     Or   acetaminophen (TYLENOL) suppository 650 mg (has no administration in time range)   sennosides-docusate (PERICOLACE) 8.6-50 MG per tablet 2 tablet (has no administration in time range)     And   polyethylene glycol (MIRALAX) packet 17 g (has no administration in time range)     And   bisacodyl (DULCOLAX) EC tablet 5 mg (has no administration in time range)     And   bisacodyl (DULCOLAX) suppository 10 mg (has no administration in time range)   sodium chloride 0.9 % bolus 500 mL (0 mL Intravenous Stopped 7/16/25 1236)      ECG 12 Lead Other; Dizziness   Preliminary Result    HEART RATE=73  bpm   RR Rhcohcbr=117  ms   NJ Cbtksbba=765  ms   P Horizontal Axis=-18  deg   P Front Axis=56  deg   QRSD Ibmjolwb=545  ms   QT Bvroqvjo=630  ms   ZRpK=743  ms   QRS Axis=-20  deg   T Wave Axis=40  deg   - ABNORMAL ECG -   Sinus rhythm   Nonspecific intraventricular conduction delay   Date and Time of Study:2025-07-16 11:03:35           Cassia Nolen RN  07/16/25 14:59 EDT

## 2025-07-16 NOTE — ED NOTES
Patient to ED from home via [facility] with dizziness around 0600 this morning. Home health came out and observed patient BP @ 60/40.   Upon EMS arrival to patient home, BP at 83/53. EMS administered 550 NS enroute.   BP went up to 110/64. Patient normal sinus rhythm en route to ED. Patient did self administer BP medication this morning prior to arrival.   Patient has positive history for bladder cancer from 2018, urethral cancer 2023; patient just completed antibiotic regime for osteomyelitis for peritoneal abscess.    Patient presents with urostomy bag in RLQ

## 2025-07-16 NOTE — H&P
[facility]  Date: [facility]25   Patient Name: Toribio Hayes  : 1955  MRN: [id number]  Primary Care Physician:  Darren Mejía, SARAH  Date of admission: 2025    Subjective   Subjective     Chief Complaint: Dizziness and low blood pressure    HPI:    Toribio Hayes is a 70 y.o. male with past medical history of A-fib on Eliquis, hypertension, bladder cancer, asthma, hyperlipidemia, stage IIIa CKD who presented to the ED with complaint of dizziness and low blood pressure.  Patient reported this morning after taking his home BP medication as prescribed, he started feeling dizzy, his wife decided to take his blood pressure and was around 60/40.  At that time, EMS was called and patient was transported to the ED.  Patient received 550 NS en route and BP went up to 110/64.  Upon arrival in the ED patient was feeling better, his BP on arrival was 98/57.  Patient was given another 500 cc of NS in the ED.    Of note, patient has been on antibiotics for about 6-week due to osteomyelitis.  And during that time, he has been experiencing diarrhea and intermittent vomiting.  He also reports losing about 20 pounds during that 6 weeks of treatment.  He completed his antibiotic course yesterday.  Currently, he is reporting loose stool however frequency is improved.      Personal History     Past Medical History:  Past Medical History:   Diagnosis Date    A-fib     DR KHALIL    Arthritis     RIGHT THUMB    Asthma     Asthma, extrinsic     Bladder cancer     2018    Chronic diastolic congestive heart failure 2021    COPD (chronic obstructive pulmonary disease)     INHALER    Elevated serum creatinine 2021    Heart murmur     Hyperlipidemia     ON MEDS    Hypertension     CONTROLLED W MEDS    Lung cancer     DR RAMIRES TOOK CARE OF THAT ()    Lung disease 2021    Melanoma in situ of left lower leg      removed    Mouth sore secondary to chemotherapy      Primary central sleep apnea     Sleep apnea     Stage 3a chronic kidney disease 2022    Tear of medial cartilage or meniscus of knee, current 08/15/2016    Throat soreness     FROM CHEMO    Ureteral cancer 2023       Past Surgical History:  Past Surgical History:   Procedure Laterality Date    BRONCHOSCOPY      COLONOSCOPY      CYSTECTOMY      FINGER DEBRIDEMENT Right     RIGHT THUMB    ILEAL LOOP NEOBLADDER      BLADDER MADE FROM HIS INTESTINES    LUNG BIOPSY      LUNG REMOVAL, PARTIAL Left     LEFT UPPER - 2008 - SOA W EXERTION    PORTOCAVAL SHUNT PLACEMENT       AND     SENTINEL NODE BIOPSY N/A 2022    Procedure: Excision of left thigh melanoma with sentinel lymph node biopsy;  Surgeon: Dominic Ruiz MD;  Location: [facility] MAIN OR;  Service: General;  Laterality: N/A;    TURP / TRANSURETHRAL INCISION / DRAINAGE PROSTATE         Family History:   Family History   Problem Relation Age of Onset    No Known Problems Mother         Mother     No Known Problems Father         Father  54 years old lung cancer/smoker    Prostate cancer Brother     Cancer Brother     Malig Hyperthermia Neg Hx        Social History:   Social History     Socioeconomic History    Marital status:    Tobacco Use    Smoking status: Former     Current packs/day: 0.00     Average packs/day: 1.5 packs/day for 39.0 years (58.5 ttl pk-yrs)     Types: Cigarettes     Start date: 10/21/1969     Quit date: 10/21/2008     Years since quittin.7     Passive exposure: Past    Smokeless tobacco: Never   Vaping Use    Vaping status: Never Used   Substance and Sexual Activity    Alcohol use: Never    Drug use: Never    Sexual activity: Not Currently     Partners: Female       Home Medications:  Fluticasone-Umeclidin-Vilant, acetaminophen, albuterol sulfate HFA, apixaban, carvedilol, docusate sodium, ipratropium-albuterol, montelukast, olmesartan, and rosuvastatin    Allergies:  No Known Allergies    Review of  Systems   All systems were reviewed and negative except for: As per HPI    Objective   Objective     Vitals:   Temp:  [97.4 °F (36.3 °C)-97.5 °F (36.4 °C)] 97.5 °F (36.4 °C)  Heart Rate:  [62-99] 72  Resp:  [19-20] 19  BP: ()/(43-74) 108/58    Physical Exam    Constitutional: Awake, alert, no acute distress   Eyes: Pupils equal, sclerae anicteric, no conjunctival injection   HENT: NCAT, mucous membranes moist   Neck: Supple, no thyromegaly, no lymphadenopathy, trachea midline   Respiratory: Clear to auscultation bilaterally, nonlabored respirations    Cardiovascular: RRR, no murmurs, rubs, or gallops, palpable pedal pulses bilaterally   Gastrointestinal: Positive bowel sounds, soft, nontender, nondistended   Musculoskeletal: No bilateral ankle edema, no clubbing or cyanosis to extremities   Psychiatric: Appropriate affect, cooperative   Neurologic: Oriented x 3, strength symmetric in all extremities, Cranial Nerves grossly intact to confrontation, speech clear   Skin: No rashes     Result Review    Result Review:  I have personally reviewed the results from the time of this admission to 7/16/2025 14:30 EDT and agree with these findings:  [x]  Laboratory  []  Microbiology  []  Radiology  []  EKG/Telemetry   []  Cardiology/Vascular   []  Pathology  []  Old records  []  Other:      Assessment & Plan   Assessment / Plan     Assessment/Plan:   Dizziness likely secondary to hypotension  Orthostatic vitals has been positive  We will start patient on maintenance fluid with LR  Hold antihypertensive  Continue to monitor BP  PT eval    A-fib   Continue Eliquis 5 mg twice daily   Hold Coreg due to hypotension   IV Lopressor 5 mg as needed for heart rate over 120   Continue to monitor    Hypertension   Currently hypotensive, will hold BP meds   Continue to monitor  Asthma   Continue home med  Hyperlipidemia   Continue home med  CKD stage IIIa   -Unknown creatinine baseline   Creatinine currently elevated   Avoid  nephrotoxic agent   IV fluid   Continue to monitor renal indicis  Scrotal wound   Wound care ordered      VTE Prophylaxis:  Pharmacologic VTE prophylaxis orders are signed & held.        CODE STATUS:         Admission Status:  I believe this patient meets status.    Electronically signed by Erickson Jimenez DO, 07/16/25, 2:30 PM EDT.

## 2025-07-17 VITALS
RESPIRATION RATE: 20 BRPM | WEIGHT: 187.6 LBS | HEIGHT: 70 IN | HEART RATE: 80 BPM | DIASTOLIC BLOOD PRESSURE: 77 MMHG | OXYGEN SATURATION: 99 % | TEMPERATURE: 97.9 F | BODY MASS INDEX: 26.86 KG/M2 | SYSTOLIC BLOOD PRESSURE: 114 MMHG

## 2025-07-17 PROBLEM — R42 DIZZINESS: Status: RESOLVED | Noted: 2025-07-16 | Resolved: 2025-07-17

## 2025-07-17 LAB
ANION GAP SERPL CALCULATED.3IONS-SCNC: 7.9 MMOL/L (ref 5–15)
BASOPHILS # BLD AUTO: 0.01 10*3/MM3 (ref 0–0.2)
BASOPHILS NFR BLD AUTO: 0.3 % (ref 0–1.5)
BUN SERPL-MCNC: 30.5 MG/DL (ref 8–23)
BUN/CREAT SERPL: 22.4 (ref 7–25)
CALCIUM SPEC-SCNC: 9 MG/DL (ref 8.6–10.5)
CHLORIDE SERPL-SCNC: 105 MMOL/L (ref 98–107)
CO2 SERPL-SCNC: 25.1 MMOL/L (ref 22–29)
CREAT SERPL-MCNC: 1.36 MG/DL (ref 0.76–1.27)
DEPRECATED RDW RBC AUTO: 60.6 FL (ref 37–54)
EGFRCR SERPLBLD CKD-EPI 2021: 56.3 ML/MIN/1.73
EOSINOPHIL # BLD AUTO: 0.07 10*3/MM3 (ref 0–0.4)
EOSINOPHIL NFR BLD AUTO: 1.8 % (ref 0.3–6.2)
ERYTHROCYTE [DISTWIDTH] IN BLOOD BY AUTOMATED COUNT: 18.9 % (ref 12.3–15.4)
GLUCOSE SERPL-MCNC: 93 MG/DL (ref 65–99)
HCT VFR BLD AUTO: 27.7 % (ref 37.5–51)
HGB BLD-MCNC: 8.9 G/DL (ref 13–17.7)
IMM GRANULOCYTES # BLD AUTO: 0.02 10*3/MM3 (ref 0–0.05)
IMM GRANULOCYTES NFR BLD AUTO: 0.5 % (ref 0–0.5)
LYMPHOCYTES # BLD AUTO: 1.11 10*3/MM3 (ref 0.7–3.1)
LYMPHOCYTES NFR BLD AUTO: 28.8 % (ref 19.6–45.3)
MAGNESIUM SERPL-MCNC: 1.5 MG/DL (ref 1.6–2.4)
MCH RBC QN AUTO: 28.3 PG (ref 26.6–33)
MCHC RBC AUTO-ENTMCNC: 32.1 G/DL (ref 31.5–35.7)
MCV RBC AUTO: 88.2 FL (ref 79–97)
MONOCYTES # BLD AUTO: 0.6 10*3/MM3 (ref 0.1–0.9)
MONOCYTES NFR BLD AUTO: 15.6 % (ref 5–12)
NEUTROPHILS NFR BLD AUTO: 2.04 10*3/MM3 (ref 1.7–7)
NEUTROPHILS NFR BLD AUTO: 53 % (ref 42.7–76)
NRBC BLD AUTO-RTO: 0 /100 WBC (ref 0–0.2)
PHOSPHATE SERPL-MCNC: 3.4 MG/DL (ref 2.5–4.5)
PLATELET # BLD AUTO: 105 10*3/MM3 (ref 140–450)
PMV BLD AUTO: 9.1 FL (ref 6–12)
POTASSIUM SERPL-SCNC: 4.1 MMOL/L (ref 3.5–5.2)
RBC # BLD AUTO: 3.14 10*6/MM3 (ref 4.14–5.8)
SODIUM SERPL-SCNC: 138 MMOL/L (ref 136–145)
WBC NRBC COR # BLD AUTO: 3.85 10*3/MM3 (ref 3.4–10.8)

## 2025-07-17 PROCEDURE — 36415 COLL VENOUS BLD VENIPUNCTURE: CPT

## 2025-07-17 PROCEDURE — 99239 HOSP IP/OBS DSCHRG MGMT >30: CPT

## 2025-07-17 PROCEDURE — G0378 HOSPITAL OBSERVATION PER HR: HCPCS

## 2025-07-17 PROCEDURE — 83735 ASSAY OF MAGNESIUM: CPT

## 2025-07-17 PROCEDURE — 25810000003 LACTATED RINGERS PER 1000 ML

## 2025-07-17 PROCEDURE — 84100 ASSAY OF PHOSPHORUS: CPT

## 2025-07-17 PROCEDURE — 97161 PT EVAL LOW COMPLEX 20 MIN: CPT

## 2025-07-17 PROCEDURE — 96361 HYDRATE IV INFUSION ADD-ON: CPT

## 2025-07-17 PROCEDURE — 80048 BASIC METABOLIC PNL TOTAL CA: CPT

## 2025-07-17 PROCEDURE — 85025 COMPLETE CBC W/AUTO DIFF WBC: CPT

## 2025-07-17 RX ORDER — CARVEDILOL 12.5 MG/1
12.5 TABLET ORAL 2 TIMES DAILY
Status: DISCONTINUED | OUTPATIENT
Start: 2025-07-17 | End: 2025-07-17

## 2025-07-17 RX ORDER — CARVEDILOL 12.5 MG/1
12.5 TABLET ORAL 2 TIMES DAILY
Status: DISCONTINUED | OUTPATIENT
Start: 2025-07-17 | End: 2025-07-17 | Stop reason: HOSPADM

## 2025-07-17 RX ORDER — CARVEDILOL 12.5 MG/1
12.5 TABLET ORAL 2 TIMES DAILY
Qty: 60 TABLET | Refills: 0 | Status: SHIPPED | OUTPATIENT
Start: 2025-07-17

## 2025-07-17 RX ADMIN — APIXABAN 5 MG: 5 TABLET, FILM COATED ORAL at 10:39

## 2025-07-17 RX ADMIN — CARVEDILOL 12.5 MG: 12.5 TABLET, FILM COATED ORAL at 11:25

## 2025-07-17 RX ADMIN — Medication 10 ML: at 10:39

## 2025-07-17 RX ADMIN — SODIUM CHLORIDE, POTASSIUM CHLORIDE, SODIUM LACTATE AND CALCIUM CHLORIDE 100 ML/HR: 600; 310; 30; 20 INJECTION, SOLUTION INTRAVENOUS at 03:44

## 2025-07-17 NOTE — PLAN OF CARE
Goal Outcome Evaluation:  Plan of Care Reviewed With: patient        Progress: improving  Outcome Evaluation: pt is axox4, no c/o pain. pt stated he is feeling a lot better than when he came in. Wound care done by wound care team. pt is discharging home with wife. no other concerns noted.

## 2025-07-17 NOTE — SIGNIFICANT NOTE
Wound Eval / Progress Noted     [place]     Patient Name: Toribio Hayes  : 1955  MRN: [id number]  Today's Date: 2025                 Admit Date: 2025    Visit Dx:    ICD-10-CM ICD-9-CM   1. Symptomatic hypotension  I95.9 458.9   2. Dizziness  R42 780.4   3. Difficulty walking  R26.2 719.7         * No active hospital problems. *        Past Medical History:   Diagnosis Date    A-fib     DR KHALIL    Arthritis     RIGHT THUMB    Asthma     Asthma, extrinsic     Bladder cancer         Chronic diastolic congestive heart failure 2021    COPD (chronic obstructive pulmonary disease)     INHALER    Elevated serum creatinine 2021    Heart murmur     History of transfusion     Hyperlipidemia     ON MEDS    Hypertension     CONTROLLED W MEDS    Lung cancer     DR RAMIRES TOOK CARE OF THAT ()    Lung disease 2021    Melanoma in situ of left lower leg      removed    Mouth sore secondary to chemotherapy     Primary central sleep apnea     Sleep apnea     Stage 3a chronic kidney disease 2022    Tear of medial cartilage or meniscus of knee, current 08/15/2016    Throat soreness     FROM CHEMO    Ureteral cancer 2023        Past Surgical History:   Procedure Laterality Date    BRONCHOSCOPY      COLONOSCOPY      CYSTECTOMY      FINGER DEBRIDEMENT Right     RIGHT THUMB    ILEAL LOOP NEOBLADDER      BLADDER MADE FROM HIS INTESTINES    LUNG BIOPSY      LUNG REMOVAL, PARTIAL Left     LEFT UPPER - 2008 - SOA W EXERTION    PORTOCAVAL SHUNT PLACEMENT       AND     SENTINEL NODE BIOPSY N/A 2022    Procedure: Excision of left thigh melanoma with sentinel lymph node biopsy;  Surgeon: Dominic Ruiz MD;  Location: [facility] MAIN OR;  Service: General;  Laterality: N/A;    TURP / TRANSURETHRAL INCISION / DRAINAGE PROSTATE           Physical Assessment:  Wound 25 Bilateral lower scrotum Surgical Puncture (Active)   Wound Image   25   Dressing  "Appearance intact 07/17/25 1255   Dressing Removed Type gauze, iodoform 07/17/25 1255   Base red;moist 07/17/25 1255   Periwound pink 07/17/25 1255   Edges open 07/17/25 1255   Wound Length (cm) 0.8 cm 07/17/25 1255   Wound Width (cm) 0.4 cm 07/17/25 1255   Wound Depth (cm) 4.2 cm 07/17/25 1255   Wound Surface Area (cm^2) 0.25 cm^2 07/17/25 1255   Wound Volume (cm^3) 0.704 cm^3 07/17/25 1255   Drainage Characteristics/Odor serous 07/17/25 1255   Drainage Amount scant 07/17/25 1255   Care, Wound cleansed with;irrigated with;sterile normal saline 07/17/25 1255   Dressing Care gauze, iodoform;gauze, dry 07/17/25 1255       Wound 07/16/25 2050 Left lower scrotum Irritant Contact Perspiration (Active)   Wound Image   07/16/25 2058   Dressing Appearance intact 07/17/25 1255   Dressing Removed Type low-adherent 07/17/25 1255   Confirmed Empty Wound Bed Yes, visual inspection of wound bed 07/17/25 1255   Base pink;moist 07/17/25 1255   Periwound pink 07/17/25 1255   Wound Length (cm) 0.2 cm 07/17/25 1255   Wound Width (cm) 0.6 cm 07/17/25 1255   Wound Depth (cm) 0.1 cm 07/17/25 1255   Wound Surface Area (cm^2) 0.09 cm^2 07/17/25 1255   Wound Volume (cm^3) 0.006 cm^3 07/17/25 1255   Drainage Amount none 07/17/25 1255   Dressing Care open to air 07/17/25 1255         07/17/25 1325   Urostomy RLQ   Placement date: If unknown, DO NOT use \"Add Comment\" note/Placement time: If unknown, DO NOT use \"Add Comment\" note: 07/16/25 1045   Present on Admission? Select all that apply: Unknown placement date/time  Location: RLQ  Stoma Size (mm): 25 mm   Wound Image    Stomal Appliance 2 piece;Intact;Drainable   Stoma Appearance irregular;moist;pink;flush with skin   Peristomal Assessment Clean   Treatment Bag change   Stoma size : [id number]         Wound Check / Follow-up:  wound nurse consult for scrotal wound and urostomy.   Patient alert and agreeable to assessment, resting in bed on 4NT with wife at bedside and he provided permission " to assess in her presence as she performs wound care and ostomy care at home.     Per their report: abscess about 8 months ago , recently completed 6 week course of antibiotics. Saw surgeon and infectious disease this week.  Uses iodoform packing BID at home.     Posterior scrotal wound as above, red moist tissue noted. Depth is the tunnel as it is just a tunneling type wound. No odor. Irrigated well with NS. Filled with iodoform packing strip 1/4th in. Covered with dry dressing as per home regimen and their request.     Small superficial area to left groin fold as above, no drainage at present, pink base, lies along scarring from radiation and surgeries. Wife stated he has been wearing depends recently due to severe diarrhea from antibiotics and thinks they may have rubbed and caused area. Discussed breathable underwear and is currently in mesh underwear. No treatment currently, suggesting hygiene and keep dry.     Urostomy RUQ : 8 months old per patient report (had neobladder prior). Uses Coloplast adhesive coupling shy convex system at home and was due for routine change today. Placed [place] 2 piece convex with Ceraplus urostomy system with adapter for night-time drainage bag. Peristomal skin intact, does have ridge around stoma where it appears that epithelial has crossed over stomal tissue. Stoma is flush with skin but is red and moist and functioning. Dark yellow clear urine noted at this time. They do have an appointment in [place] soon to see ostomy team there.     Impression: chronic scrotal wound, Urostomy with flush level stoma.     Short term goals:  wound care, ostomy care.     Roxanna Lama RN    7/17/2025    15:00 EDT

## 2025-07-17 NOTE — NURSING NOTE
JONY Hickman, RN was the 2nd RN to complete skin assessment with me as pt was an ER admission that came yesterday evening. Pt has 2 areas with wound around the scrotal area. Below the scrotum was an open wound that is deep that pt has been treating at home for 8 months and his wife brought iodine packing strips that they have been using and preferred to use that over the typical wet-to-dry dressing we do for the initial skin assessment with admissions. Below the scrotum on L side has a very small open area wit hsmall amount of bleeding from MASD likely. Applied tefla dressing to cover site and abd pad over it and had pt wear mesh briefs to keep the dressings in place.

## 2025-07-17 NOTE — DISCHARGE SUMMARY
[facility]         HOSPITALIST  DISCHARGE SUMMARY    Patient Name: Toribio Hayes  : 1955  MRN: [id number]    Date of Admission: 2025  Date of Discharge:  2025  Primary Care Physician: Darren Mejía APRN    Consults       Date and Time Order Name Status Description    2025  1:50 PM Inpatient Hospitalist Consult              Active and Resolved Hospital Problems:  Active Hospital Problems   No active problems to display.      Resolved Hospital Problems    Diagnosis POA    **Dizziness [R42] Yes   Hypotension, resolved  Antibiotic Induced Diarrhea, improved    Hospital Course     Hospital Course:  Toribio Hayes is a 70 y.o. male with past medical history of A-fib on Eliquis, hypertension, bladder cancer, asthma, hyperlipidemia, stage IIIa CKD who presented to the ED with complaint of dizziness and low blood pressure.  Patient reported this morning after taking his home BP medication as prescribed, he started feeling dizzy, his wife decided to take his blood pressure and was around 60/40.  At that time, EMS was called and patient was transported to the ED.  Patient received 550 NS en route and BP went up to 110/64.  Upon arrival in the ED patient was feeling better, his BP on arrival was 98/57.  Patient was given another 500 cc of NS in the ED.     Of note, patient has been on antibiotics for about 6-week due to osteomyelitis.  And during that time, he has been experiencing diarrhea and intermittent vomiting.  He also reports losing about 20 pounds during that 6 weeks of treatment.  He completed his antibiotic course yesterday.  Currently, he is reporting loose stool however frequency is improved.     Next day, patient no longer had any episode of hypotension or dizziness.  He is restarted on Coreg at 12.5 twice daily and he tolerated medication well.  Over the last 24 hours in the hospital patient only had 3 bowel movement described as mixed stool, so no more  diarrhea.        DISCHARGE Follow Up Recommendations for labs and diagnostics: Patient to follow-up with cardiologist for further evaluation of Coreg dosage and the need for ARB in the setting of hypotension.  To follow-up with PCP within 2 weeks for BMP repeat to follow-up creatinine level      Day of Discharge     Vital Signs:  Temp:  [97.5 °F (36.4 °C)-98.1 °F (36.7 °C)] 98.1 °F (36.7 °C)  Heart Rate:  [] 85  Resp:  [20] 20  BP: ()/(58-83) 110/61  Physical Exam:   Constitutional: Awake, alert, no acute distress              Eyes: Pupils equal, sclerae anicteric, no conjunctival injection              HENT: NCAT, mucous membranes moist              Neck: Supple, no thyromegaly, no lymphadenopathy, trachea midline              Respiratory: Clear to auscultation bilaterally, nonlabored respirations               Cardiovascular: RRR, no murmurs, rubs, or gallops, palpable pedal pulses bilaterally              Gastrointestinal: Positive bowel sounds, soft, nontender, nondistended              Musculoskeletal: No bilateral ankle edema, no clubbing or cyanosis to extremities              Psychiatric: Appropriate affect, cooperative              Neurologic: Oriented x 3, strength symmetric in all extremities, Cranial Nerves grossly intact to confrontation, speech clear      Discharge Details        Discharge Medications        Changes to Medications        Instructions Start Date   carvedilol 12.5 MG tablet  Commonly known as: COREG  What changed:   medication strength  how much to take   12.5 mg, Oral, 2 Times Daily             Continue These Medications        Instructions Start Date   albuterol sulfate  (90 Base) MCG/ACT inhaler  Commonly known as: PROVENTIL HFA;VENTOLIN HFA;PROAIR HFA   2 puffs, Inhalation, Every 4 Hours PRN      apixaban 5 MG tablet tablet  Commonly known as: Eliquis   5 mg, Oral, 2 Times Daily      [employer] Vitamin B-12 1000 MCG tablet  Generic drug: cyanocobalamin   1 tablet, Daily       docusate sodium 100 MG capsule  Commonly known as: COLACE   1 capsule, Every 12 Hours Scheduled      ipratropium-albuterol 0.5-2.5 mg/3 ml nebulizer  Commonly known as: DUO-NEB   3 mL, Nebulization, 4 Times Daily PRN      montelukast 10 MG tablet  Commonly known as: SINGULAIR   10 mg, Oral, Every Night at Bedtime      rosuvastatin 5 MG tablet  Commonly known as: CRESTOR   TAKE 1 TABLET BY MOUTH EVERY DAY      Trelegy Ellipta 100-62.5-25 MCG/ACT inhaler  Generic drug: Fluticasone-Umeclidin-Vilant   1 puff, Inhalation, Daily - RT             Stop These Medications      cyclobenzaprine 10 MG tablet  Commonly known as: FLEXERIL     olmesartan 5 MG tablet  Commonly known as: BENICAR              No Known Allergies    Discharge Disposition:  Home or Self Care    Diet:  Hospital:  Diet Order   Procedures    Diet: Cardiac; Healthy Heart (2-3 Na+); Fluid Consistency: Thin (IDDSI 0)       Discharge Activity:   Activity Instructions       Activity as Tolerated      Measure Blood Pressure              CODE STATUS:  Code Status and Medical Interventions: CPR (Attempt to Resuscitate); Full Support   Ordered at: 07/16/25 1438     Code Status (Patient has no pulse and is not breathing):    CPR (Attempt to Resuscitate)     Medical Interventions (Patient has pulse or is breathing):    Full Support         Future Appointments   Date Time Provider Department Center   8/11/2025  8:15 AM Guanako Phan MD [facility] P[facility]   10/2/2025  9:45 AM Artie Crandall MD [facility]       Additional Instructions for the Follow-ups that You Need to Schedule       Discharge Follow-up with PCP   As directed       Currently Documented PCP:    Darren Mejía APRN    PCP Phone Number:    [phone]     Follow Up Details: F/U within 2 weeks for reevaluation of ARB due to hypotensive episodes                Pertinent  and/or Most Recent Results     PROCEDURES:   None    LAB RESULTS:      Lab 07/17/25  0422 07/16/25  1216  07/16/25  1044   WBC 3.85  --  4.48   HEMOGLOBIN 8.9*  --  9.5*   HEMATOCRIT 27.7*  --  29.3*   PLATELETS 105*  --  111*   NEUTROS ABS 2.04  --  3.02   IMMATURE GRANS (ABS) 0.02  --  0.02   LYMPHS ABS 1.11  --  0.81   MONOS ABS 0.60  --  0.60   EOS ABS 0.07  --  0.02   MCV 88.2  --  88.8   LACTATE  --  1.5  --          Lab 07/17/25  0422 07/16/25  1542 07/16/25  1044   SODIUM 138  --  136   POTASSIUM 4.1  --  4.3   CHLORIDE 105  --  101   CO2 25.1  --  25.9   ANION GAP 7.9  --  9.1   BUN 30.5*  --  33.8*   CREATININE 1.36*  --  1.77*   EGFR 56.3*  --  41.1*   GLUCOSE 93  --  108*   CALCIUM 9.0  --  9.2   MAGNESIUM 1.5*  --  1.6   PHOSPHORUS 3.4 3.3  --          Lab 07/16/25  1044   TOTAL PROTEIN 5.5*   ALBUMIN 3.0*   GLOBULIN 2.5   ALT (SGPT) 12   AST (SGOT) 20   BILIRUBIN 0.5   ALK PHOS 140*         Lab 07/16/25  1216 07/16/25  1044   HSTROP T 20 20                 Brief Urine Lab Results  (Last result in the past 365 days)        Color   Clarity   Blood   Leuk Est   Nitrite   Protein   CREAT   Urine HCG        07/16/25 1127 Yellow   Cloudy   Large (3+)   Moderate (2+)   Negative   30 mg/dL (1+)                 Microbiology Results (last 10 days)       Procedure Component Value - Date/Time    Blood Culture - Blood, Arm, Left [[id number]]  (Normal) Collected: 07/16/25 1224    Lab Status: Preliminary result Specimen: Blood from Arm, Left Updated: 07/17/25 1230     Blood Culture No growth at 24 hours    Blood Culture - Blood, Arm, Left [[id number]]  (Normal) Collected: 07/16/25 1218    Lab Status: Preliminary result Specimen: Blood from Arm, Left Updated: 07/17/25 1230     Blood Culture No growth at 24 hours    Narrative:      Less than seven (7) mL's of blood was collected.  Insufficient quantity may yield false negative results.            XR Chest 1 View  Result Date: 7/16/2025  Impression: No acute process identified. Electronically Signed: Chinmay Iniguez MD  7/16/2025 10:58 AM EDT  Workstation ID: [id number]                Results for orders placed during the hospital encounter of 01/24/25    Adult Transthoracic Echo Complete w/ Color, Spectral and Contrast if necessary per protocol 01/24/2025  3:33 PM    Interpretation Summary    Left ventricular ejection fraction appears to be 56 - 60%.    The left ventricular cavity is borderline dilated.    Left ventricular diastolic function is consistent with (grade I) impaired relaxation and age.    Estimated right ventricular systolic pressure from tricuspid regurgitation is normal (<35 mmHg).    Aortic root is mildly dilated at 4.1 cm.    No significant valvular disease.      Labs Pending at Discharge:  Pending Labs       Order Current Status    Blood Culture - Blood, Arm, Left Preliminary result    Blood Culture - Blood, Arm, Left Preliminary result            The 10-year ASCVD risk score (Jc DK, et al., 2019) is: 11.9%    Values used to calculate the score:      Age: 70 years      Sex: Male      Is Non- : No      Diabetic: No      Tobacco smoker: No      Systolic Blood Pressure: 110 mmHg      Is BP treated: Yes      HDL Cholesterol: 56 mg/dL      Total Cholesterol: 138 mg/dL     Time spent on Discharge including face to face service: 40 minutes    Electronically signed by Erickson Jimenez DO, 07/17/25, 2:58 PM EDT.

## 2025-07-17 NOTE — PLAN OF CARE
Goal Outcome Evaluation:  Plan of Care Reviewed With: patient        Progress: no change  Outcome Evaluation: Pt was new ED admission yesterday evening. Family was briefly at the bedside. Pt is A&Ox4 this shift. Pt denied pain/discomfort. On continuous IV fluids per orders. Pt has had 2 loose BM's since arriving to the floor from ED; has not had any BM's this shift. If pt has another loose stool, will need to obtain sample to be tested for c. diff and pt is aware. Pt has 2 wounds around the scrotal area, skin adn wound care provided per protocol and placed WCON consult. VSS. Pt is hoping to be discharged home today. Pt is currently resting in bed with no apparent distress at this time, call light in reach. No new issues or new needs noted at this time.

## 2025-07-17 NOTE — THERAPY EVALUATION
Acute Care - Physical Therapy Initial Evaluation   [place]     Patient Name: Toribio Hayes  : 1955  MRN: [id number]  Today's Date: 2025      Visit Dx:     ICD-10-CM ICD-9-CM   1. Dizziness  R42 780.4   2. Difficulty walking  R26.2 719.7     Patient Active Problem List   Diagnosis    Asthma    Malignant neoplasm of urinary bladder    Chronic combined systolic (congestive) and diastolic (congestive) heart failure    Chronic obstructive pulmonary disease    Hypertension    Lung disease    Shortness of breath    Primary localized osteoarthrosis, lower leg    Tear of medial cartilage or meniscus of knee, current    Elevated serum creatinine    Hyperlipidemia    Stage 3b chronic kidney disease    Paroxysmal atrial fibrillation    History of prostate cancer    UTI (urinary tract infection)    Dizziness     Past Medical History:   Diagnosis Date    A-fib     DR KHALIL    Arthritis     RIGHT THUMB    Asthma     Asthma, extrinsic     Bladder cancer     2018    Chronic diastolic congestive heart failure 2021    COPD (chronic obstructive pulmonary disease)     INHALER    Elevated serum creatinine 2021    Heart murmur     History of transfusion     Hyperlipidemia     ON MEDS    Hypertension     CONTROLLED W MEDS    Lung cancer     DR RAMIRES TOOK CARE OF THAT ()    Lung disease 2021    Melanoma in situ of left lower leg      removed    Mouth sore secondary to chemotherapy     Primary central sleep apnea     Sleep apnea     Stage 3a chronic kidney disease 2022    Tear of medial cartilage or meniscus of knee, current 08/15/2016    Throat soreness     FROM CHEMO    Ureteral cancer 2023     Past Surgical History:   Procedure Laterality Date    BRONCHOSCOPY      COLONOSCOPY      CYSTECTOMY      FINGER DEBRIDEMENT Right     RIGHT THUMB    ILEAL LOOP NEOBLADDER      BLADDER MADE FROM HIS INTESTINES    LUNG BIOPSY      LUNG REMOVAL, PARTIAL Left     LEFT UPPER - 2008 - SOA W EXERTION     PORTOCAVAL SHUNT PLACEMENT      2008 AND 2018    SENTINEL NODE BIOPSY N/A 06/23/2022    Procedure: Excision of left thigh melanoma with sentinel lymph node biopsy;  Surgeon: Dominic Ruiz MD;  Location: [facility] MAIN OR;  Service: General;  Laterality: N/A;    TURP / TRANSURETHRAL INCISION / DRAINAGE PROSTATE       PT Assessment (Last 12 Hours)       PT Evaluation and Treatment       Row Name 07/17/25 1400          Physical Therapy Time and Intention    Subjective Information no complaints  -CS     Document Type evaluation  -CS     Mode of Treatment individual therapy;physical therapy  -CS     Patient Effort good  -CS     Symptoms Noted During/After Treatment none  -CS       Row Name 07/17/25 1400          General Information    Patient Profile Reviewed yes  -CS     Patient Observations alert;cooperative;agree to therapy  -CS     Prior Level of Function independent:;all household mobility;gait;transfer;ADL's;bed mobility  Having occasional assist with ADLs and bathing in the last 6 months  -CS     Equipment Currently Used at Home walker, rolling  Pt and spouse report recent use of rolling walker. She provides supervision while he stands to shower in step-over tub. No home O2.  -CS     Existing Precautions/Restrictions --  urostomy  -CS     Barriers to Rehab none identified  -CS       Row Name 07/17/25 1400          Living Environment    Current Living Arrangements home  -CS     Home Accessibility stairs to enter home;stairs within home  -CS     People in Home spouse  -CS     Primary Care Provided by self  -CS       Row Name 07/17/25 1400          Home Main Entrance    Number of Stairs, Main Entrance two  -CS     Stair Railings, Main Entrance railings safe and in good condition  -CS       Row Name 07/17/25 1400          Stairs Within Home, Primary    Stairs, Within Home, Primary Non-essential steps to the basement  -CS     Stair Railings, Within Home, Primary railings safe and in good condition  -CS       Row Name  07/17/25 1400          Pain    Pretreatment Pain Rating 3/10  -CS     Posttreatment Pain Rating 4/10  -CS     Pain Location back  -CS     Pain Side/Orientation generalized  -CS       Row Name 07/17/25 1400          Cognition    Orientation Status (Cognition) oriented x 4  -CS       Row Name 07/17/25 1400          Range of Motion Comprehensive    General Range of Motion bilateral lower extremity ROM WFL  -CS       Row Name 07/17/25 1400          Strength Comprehensive (MMT)    General Manual Muscle Testing (MMT) Assessment no strength deficits identified  -CS       Row Name 07/17/25 1400          Bed Mobility    Bed Mobility bed mobility (all) activities  -CS     All Activities, [place] (Bed Mobility) modified independence  -CS     Bed Mobility, Safety Issues decreased use of legs for bridging/pushing  -CS     Assistive Device (Bed Mobility) bed rails  -CS     Comment, (Bed Mobility) Pt educated on log roll technique due to low back pain he has been experiencing for the last 6 months  -       Row Name 07/17/25 1400          Transfers    Transfers sit-stand transfer;stand-sit transfer  -CS       Row Name 07/17/25 1400          Sit-Stand Transfer    Sit-Stand [place] (Transfers) verbal cues;supervision  -CS     Assistive Device (Sit-Stand Transfers) walker, front-wheeled  -CS       Row Name 07/17/25 1400          Stand-Sit Transfer    Stand-Sit [place] (Transfers) supervision  -CS     Assistive Device (Stand-Sit Transfers) walker, front-wheeled  -CS       Row Name 07/17/25 1400          Gait/Stairs (Locomotion)    Gait/Stairs Locomotion gait/ambulation assistive device  -     [place] Level (Gait) supervision  -CS     Assistive Device (Gait) walker, front-wheeled  -CS     Patient was able to Ambulate yes  -CS     Distance in Feet (Gait) 25  -CS     Pattern (Gait) step-through  -CS     Deviations/Abnormal Patterns (Gait) gait speed decreased;stride length decreased;antalgic  -       Row Name  07/17/25 1400          Safety Issues/Impairments Affecting Functional Mobility    Impairments Affecting Function (Mobility) endurance/activity tolerance;pain  -CS       Row Name 07/17/25 1400          Balance    Balance Assessment standing dynamic balance  -CS     Dynamic Standing Balance supervision  -CS     Position/Device Used, Standing Balance walker, front-wheeled  -CS       Row Name             Wound 07/16/25 2050 Bilateral lower scrotum Surgical Puncture    Wound - Properties Group Placement Date: 07/16/25  -KS Placement Time: 2050 -KS Present on Original Admission: Y  -KS Side: Bilateral  -KS Orientation: lower  -KS Location: scrotum  -KS Primary Wound Type: Surgical  -KS Secondary Wound Type - Surgical: Puncture  -KS    Retired Wound - Properties Group Placement Date: 07/16/25  -KS Placement Time: 2050  -KS Present on Original Admission: Y  -KS Side: Bilateral  -KS Orientation: lower  -KS Location: scrotum  -KS    Retired Wound - Properties Group Placement Date: 07/16/25  -KS Placement Time: 2050 -KS Present on Original Admission: Y  -KS Side: Bilateral  -KS Orientation: lower  -KS Location: scrotum  -KS    Retired Wound - Properties Group Date first assessed: 07/16/25  -KS Time first assessed: 2050  -KS Present on Original Admission: Y  -KS Side: Bilateral  -KS Location: scrotum  -KS      Row Name             Wound 07/16/25 2050 Left lower scrotum Irritant Contact Perspiration    Wound - Properties Group Placement Date: 07/16/25  -KS Placement Time: 2050  -KS Present on Original Admission: Y  -KS Side: Left  -KS Orientation: lower  -KS Location: scrotum  -KS Primary Wound Type: Irritant Con  -KS Secondary Wound Type - Irritant Contact Dermatitis: Perspiration  -KS    Retired Wound - Properties Group Placement Date: 07/16/25  -KS Placement Time: 2050  -KS Present on Original Admission: Y  -KS Side: Left  -KS Orientation: lower  -KS Location: scrotum  -KS    Retired Wound - Properties Group Placement Date:  07/16/25  -KS Placement Time: 2050  -KS Present on Original Admission: Y  -KS Side: Left  -KS Orientation: lower  -KS Location: scrotum  -KS    Retired Wound - Properties Group Date first assessed: 07/16/25  -KS Time first assessed: 2050  -KS Present on Original Admission: Y  -KS Side: Left  -KS Location: scrotum  -KS      Row Name 07/17/25 1400          Plan of Care Review    Plan of Care Reviewed With patient;spouse  -CS     Progress no change  -CS     Outcome Evaluation Pt presents with no physical limitations that impede his ability to return home independently or with assist from family as needed. Pt was encouraged to continue ambulating as tolerated while here at the hospital. He will be discharged from PT caseload. Pt and spouse were educated and shown exercises and stretches he could be completing to aide in strengthening and alleviating pain in his lower back.  -CS       Row Name 07/17/25 1400          Positioning and Restraints    Pre-Treatment Position in bed  -CS     Post Treatment Position bed  -CS     In Bed fowlers;call light within reach;encouraged to call for assist;exit alarm on;with family/caregiver  -CS       Row Name 07/17/25 1400          Therapy Assessment/Plan (PT)    Criteria for Skilled Interventions Met (PT) no problems identified which require skilled intervention  -CS     Therapy Frequency (PT) evaluation only  -CS       Row Name 07/17/25 1400          PT Evaluation Complexity    History, PT Evaluation Complexity no personal factors and/or comorbidities  -CS     Examination of Body Systems (PT Eval Complexity) total of 4 or more elements  -CS     Clinical Presentation (PT Evaluation Complexity) stable  -CS     Clinical Decision Making (PT Evaluation Complexity) low complexity  -CS     Overall Complexity (PT Evaluation Complexity) low complexity  -CS       Row Name 07/17/25 1400          Therapy Plan Review/Discharge Plan (PT)    Therapy Plan Review (PT) evaluation/treatment results  reviewed;patient;spouse/significant other  -CS       Row Name 07/17/25 1400          Physical Therapy Goals    Problem Specific Goal Selection (PT) problem specific goal 1, PT  -CS       Row Name 07/17/25 1400          Problem Specific Goal 1 (PT)    Problem Specific Goal 1 (PT) Complete PT evaluation  -CS     Time Frame (Problem Specific Goal 1, PT) by discharge  -CS     Progress/Outcome (Problem Specific Goal 1, PT) goal met  -CS               User Key  (r) = Recorded By, (t) = Taken By, (c) = Cosigned By      Initials Name Provider Type    Ena Vidal, RN Registered Nurse    Charlene Dumas, PT Physical Therapist                      PT Recommendation and Plan  Anticipated Discharge Disposition (PT): home with home health  Therapy Frequency (PT): evaluation only  Plan of Care Reviewed With: patient, spouse  Progress: no change  Outcome Evaluation: Pt presents with no physical limitations that impede his ability to return home independently or with assist from family as needed. Pt was encouraged to continue ambulating as tolerated while here at the hospital. He will be discharged from PT caseload. Pt and spouse were educated and shown exercises and stretches he could be completing to aide in strengthening and alleviating pain in his lower back.   Outcome Measures       Row Name 07/17/25 1300             How much help from another person do you currently need...    Turning from your back to your side while in flat bed without using bedrails? 4  -CS      Moving from lying on back to sitting on the side of a flat bed without bedrails? 4  -CS      Moving to and from a bed to a chair (including a wheelchair)? 3  -CS      Standing up from a chair using your arms (e.g., wheelchair, bedside chair)? 4  -CS      Climbing 3-5 steps with a railing? 3  -CS      To walk in hospital room? 3  -CS      AM-PAC 6 Clicks Score (PT) 21  -CS         Functional Assessment    Outcome Measure Options AM-PAC 6 Clicks Basic  Mobility (PT)  -CS                User Key  (r) = Recorded By, (t) = Taken By, (c) = Cosigned By      Initials Name Provider Type    Charlene Dumas PT Physical Therapist                     Time Calculation:    PT Charges       Row Name 07/17/25 1404             Time Calculation    PT Received On 07/17/25  -CS         Untimed Charges    PT Eval/Re-eval Minutes 46  -CS         Total Minutes    Untimed Charges Total Minutes 46  -CS       Total Minutes 46  -CS                User Key  (r) = Recorded By, (t) = Taken By, (c) = Cosigned By      Initials Name Provider Type    Charlene Dumas, CINDY Physical Therapist                  Therapy Charges for Today       Code Description Service Date Service Provider Modifiers Qty    80320782604 HC PT EVAL LOW COMPLEXITY 4 7/17/2025 Charlene Singh PT GP 1            PT G-Codes  Outcome Measure Options: AM-PAC 6 Clicks Basic Mobility (PT)  AM-PAC 6 Clicks Score (PT): 21    Charlene Singh PT  7/17/2025

## 2025-07-17 NOTE — PLAN OF CARE
Goal Outcome Evaluation:  Plan of Care Reviewed With: patient, spouse        Progress: no change  Outcome Evaluation: Pt presents with no physical limitations that impede his ability to return home independently or with assist from family as needed. Pt was encouraged to continue ambulating as tolerated while here at the hospital. He will be discharged from PT caseload. Pt and spouse were educated and shown exercises and stretches he could be completing to aide in strengthening and alleviating pain in his lower back.    Anticipated Discharge Disposition (PT): home with home health

## 2025-07-18 ENCOUNTER — READMISSION MANAGEMENT (OUTPATIENT)
Dept: CALL CENTER | Facility: HOSPITAL | Age: 70
End: 2025-07-18
Payer: MEDICARE

## 2025-07-18 NOTE — OUTREACH NOTE
Prep Survey      Flowsheet Row Responses   [facility] patient discharged from? [facility]   Is LACE score < 7 ? No   Eligibility Readm Mgmt   Discharge diagnosis *Dizziness   Does the patient have one of the following disease processes/diagnoses(primary or secondary)? Other   Does the patient have Home health ordered? Yes   What is the Home health agency?  [facility]   Prep survey completed? Yes            Janneth ROGERS - Registered Nurse

## 2025-07-21 LAB
BACTERIA SPEC AEROBE CULT: NORMAL
BACTERIA SPEC AEROBE CULT: NORMAL

## 2025-07-25 ENCOUNTER — READMISSION MANAGEMENT (OUTPATIENT)
Dept: CALL CENTER | Facility: HOSPITAL | Age: 70
End: 2025-07-25
Payer: MEDICARE

## 2025-07-25 NOTE — OUTREACH NOTE
Medical Week 1 Survey      Flowsheet Row Responses   [facility] patient discharged from? [facility]   Does the patient have one of the following disease processes/diagnoses(primary or secondary)? Other   Week 1 attempt successful? Yes   Call start time 1304   Call end time 1306   Discharge diagnosis *Dizziness   Person spoke with today (if not patient) and relationship Spouse- Lelo   Meds reviewed with patient/caregiver? Yes   Does the patient have all medications ordered at discharge? Yes   Prescription comments CHANGE how you take:  carvedilol (COREG) -- medication strength, how much to  take  STOP taking:  cyclobenzaprine 10 MG tablet (FLEXERIL)  olmesartan 5 MG tablet (BENICAR)   Is the patient taking all medications as directed (includes completed medication regime)? Yes   Does the patient have a primary care provider?  Yes   Comments regarding PCP Seen On[facility], 07/31 cards.   What is the Home health agency?  [facility] [place]   Has home health visited the patient within 72 hours of discharge? Yes   Home health comments Home health coming once a week.   Psychosocial issues? No   Did the patient receive a copy of their discharge instructions? Yes   Nursing interventions Reviewed instructions with patient   What is the patient's perception of their health status since discharge? Improving   Is the patient/caregiver able to teach back signs and symptoms related to disease process for when to call PCP? Yes   Is the patient/caregiver able to teach back signs and symptoms related to disease process for when to call 911? Yes   Is the patient/caregiver able to teach back the hierarchy of who to call/visit for symptoms/problems? PCP, Specialist, Home health nurse, Urgent Care, ED, 911 Yes   Additional teach back comments Spouse monitoring BP 3 times a day   Week 1 call completed? Yes   Graduated Yes   Call end time 1306            Janneth ROGERS - Registered Nurse

## 2025-07-31 ENCOUNTER — OFFICE VISIT (OUTPATIENT)
Dept: CARDIOLOGY | Facility: CLINIC | Age: 70
End: 2025-07-31
Payer: MEDICARE

## 2025-07-31 VITALS
DIASTOLIC BLOOD PRESSURE: 84 MMHG | BODY MASS INDEX: 27.03 KG/M2 | HEIGHT: 70 IN | SYSTOLIC BLOOD PRESSURE: 116 MMHG | OXYGEN SATURATION: 97 % | HEART RATE: 57 BPM | WEIGHT: 188.8 LBS

## 2025-07-31 DIAGNOSIS — I48.0 PAROXYSMAL ATRIAL FIBRILLATION: ICD-10-CM

## 2025-07-31 DIAGNOSIS — E78.2 MIXED HYPERLIPIDEMIA: ICD-10-CM

## 2025-07-31 DIAGNOSIS — R42 DIZZINESS: Primary | ICD-10-CM

## 2025-07-31 DIAGNOSIS — I10 PRIMARY HYPERTENSION: ICD-10-CM

## 2025-07-31 PROBLEM — R55 SYNCOPE: Status: ACTIVE | Noted: 2025-07-31

## 2025-07-31 NOTE — ASSESSMENT & PLAN NOTE
Patient was recently admitted for symptomatic hypotension and dizziness which was related to him being on antibiotics for 6 weeks for osteomyelitis and having diarrhea/vomiting which cause dehydration.  Patient reports during hospitalization his dehydration improved therefore he has not had any further dizziness episodes.

## 2025-07-31 NOTE — ASSESSMENT & PLAN NOTE
Patient is in normal rhythm today on exam.  Patient reports he has not had any episodes of palpitations or heart fluttering sensation.  He denies any bleeding issues.  Continue Eliquis 5 mg twice daily for stroke risk reduction and carvedilol 12.5 mg twice daily for rate control.

## 2025-07-31 NOTE — PROGRESS NOTES
Chief Complaint  Follow-up, Dizziness, and Hypotension    Subjective        Toribio Hayes presents to [facility] CARDIOLOGY     History of Present Illness     Mr. Hayes  is a 70-year-old male with history of paroxysmal atrial fibrillation, hypertension, hyperlipidemia, remote history of cardiomyopathy with normalization of LVEF who presents to the clinic today for hospital follow-up. Recent hospitalization on 7/16/2025 -7/17/2025 at [facility] for symptomatic hypotension and dizziness.Patient to the emergency room with complaints of dizziness and low blood pressure.  Patient had taken his blood pressure medicine as prescribed at home for the feeling dizzy so he had his wife check his blood pressure it was 60/40.  Patient received the normal saline en route by EMS blood pressure went up to 110/64 on arrival in the ED patient is feeling better and is given another 500 cc of normal saline in the ED.  It was noted in the ER that patient had been on antibiotics for 6 weeks due to osteomyelitis and during that time he was experiencing diarrhea and intermittent vomiting along with losing approximately 20 pounds during 6 weeks of treatment.  During his ER visit he was reporting loose stool.  At discharge patient had not had any more hypotension or dizziness and back to normal bowel movements. this appears to be related to dehydration .  Patient was restarted on his carvedilol but at a lower dose of patient 12.5 mg twice daily until blood pressure can tolerate, his olmesartan was stopped.  Patient reports from cardiac standpoint he is doing well.  He is ambulating with a walker right now due to having some issues with his degenerative disc disease in back.  Patient has been monitoring his blood pressure at home which have been stable there has still been some blood pressure readings in the low 100s over 60s, average is around 110s to 120s.  He currently denies any chest pain or dyspnea.  He notes  no palpitations, dizziness, syncope episodes or edema.      Past Medical History:   Diagnosis Date    A-fib     Arthritis     Asthma     Asthma, extrinsic     Bladder cancer     Chronic diastolic congestive heart failure 12/06/2021    COPD (chronic obstructive pulmonary disease)     Elevated serum creatinine 12/06/2021    Heart murmur     History of transfusion     Hyperlipidemia     Hypertension     Lung cancer     Lung disease 12/06/2021    Melanoma in situ of left lower leg     Mouth sore secondary to chemotherapy     Primary central sleep apnea     Sleep apnea     Stage 3a chronic kidney disease 01/05/2022    Tear of medial cartilage or meniscus of knee, current 08/15/2016    Throat soreness     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       No Known Allergies     Past Surgical History:   Procedure Laterality Date    BRONCHOSCOPY      COLONOSCOPY      CYSTECTOMY      FINGER DEBRIDEMENT Right     RIGHT THUMB    ILEAL LOOP NEOBLADDER      BLADDER MADE FROM HIS INTESTINES    LUNG BIOPSY      LUNG REMOVAL, PARTIAL Left     LEFT UPPER - 2008 - SOA W EXERTION    PORTOCAVAL SHUNT PLACEMENT      2008 AND 2018    SENTINEL NODE BIOPSY N/A 06/23/2022    Procedure: Excision of left thigh melanoma with sentinel lymph node biopsy;  Surgeon: Dominic Ruiz MD;  Location: [facility] OR;  Service: General;  Laterality: N/A;    TURP / TRANSURETHRAL INCISION / DRAINAGE PROSTATE          Social History  He  reports that he quit smoking about 16 years ago. His smoking use included cigarettes. He started smoking about 55 years ago. He has a 58.5 pack-year smoking history. He has been exposed to tobacco smoke. He has never used smokeless tobacco. He reports that he does not drink alcohol and does not use drugs.    Family History  His family history includes Cancer in his brother; No Known Problems in his father and mother; Prostate cancer in his brother.       Current Outpatient Medications on File Prior to Visit   Medication Sig    albuterol  "sulfate  (90 Base) MCG/ACT inhaler Inhale 2 puffs Every 4 (Four) Hours As Needed for Wheezing.    apixaban (Eliquis) 5 MG tablet tablet Take 1 tablet by mouth 2 (Two) Times a Day.    carvedilol (COREG) 12.5 MG tablet Take 1 tablet by mouth 2 (Two) Times a Day.    [employer] Vitamin B-12 1000 MCG tablet Take 1 tablet by mouth Daily.    docusate sodium (COLACE) 100 MG capsule Take 1 capsule by mouth Every 12 (Twelve) Hours.    Fluticasone-Umeclidin-Vilant (Trelegy Ellipta) 100-62.5-25 MCG/ACT inhaler Inhale 1 puff Daily.    ipratropium-albuterol (DUO-NEB) 0.5-2.5 mg/3 ml nebulizer Take 3 mL by nebulization 4 (Four) Times a Day As Needed for Wheezing or Shortness of Air.    montelukast (SINGULAIR) 10 MG tablet TAKE 1 TABLET BY MOUTH EVERYDAY AT BEDTIME    rosuvastatin (CRESTOR) 5 MG tablet TAKE 1 TABLET BY MOUTH EVERY DAY     No current facility-administered medications on file prior to visit.         Review of Systems   Respiratory:  Negative for chest tightness and shortness of breath.    Cardiovascular:  Negative for chest pain, palpitations and leg swelling.   Gastrointestinal:  Negative for nausea, vomiting and indigestion.   Neurological:  Negative for dizziness, syncope and light-headedness.        Objective   Vitals:    07/31/25 0847   BP: 116/84   Pulse: 57   SpO2: 97%   Weight: 85.6 kg (188 lb 12.8 oz)   Height: 177.8 cm (70\")         Physical Exam   General : Alert, awake, no acute distress  Neck : Supple, no carotid bruit, no jugular venous distention  CVS : Regular rate and rhythm, no murmur, no rubs or gallops  Lungs: Clear to auscultation bilaterally, no crackles or rhonchi  Abdomen: Soft, nontender, bowel sounds active  Extremities: Warm, well-perfused, no pedal edema      Result Review     The following data was reviewed by SARAH Ortiz  proBNP   Date Value Ref Range Status   01/16/2025 286.9 0.0 - 900.0 pg/mL Final     CMP          1/16/2025    09:38 7/16/2025    10:44 7/17/2025    04:22   [facility] " "  Glucose 115  108  93    BUN 17  33.8  30.5    Creatinine 1.50  1.77  1.36    EGFR 50.4  41.1  56.3    Sodium 135  136  138    Potassium 5.0  4.3  4.1    Chloride 96  101  105    Calcium 9.8  9.2  9.0    Total Protein 7.7  5.5     Albumin 3.9  3.0     Globulin 3.8  2.5     Total Bilirubin 0.3  0.5     Alkaline Phosphatase 86  140     AST (SGOT) 23  20     ALT (SGPT) 21  12     Albumin/Globulin Ratio 1.0  1.2     BUN/Creatinine Ratio 11.3  19.1  22.4    Anion Gap 11.0  9.1  7.9      CBC w/diff          1/16/2025    09:38 7/16/2025    10:44 7/17/2025    04:22   CBC w/Diff   WBC 8.06  4.48  3.85    RBC 3.37  3.30  3.14    Hemoglobin 10.2  9.5  8.9    Hematocrit 32.5  29.3  27.7    MCV 96.4  88.8  88.2    MCH 30.3  28.8  28.3    MCHC 31.4  32.4  32.1    RDW 17.0  18.9  18.9    Platelets 310  111  105    Neutrophil Rel % 73.1  67.5  53.0    Immature Granulocyte Rel % 0.4  0.4  0.5    Lymphocyte Rel % 15.9  18.1  28.8    Monocyte Rel % 9.6  13.4  15.6    Eosinophil Rel % 0.5  0.4  1.8    Basophil Rel % 0.5  0.2  0.3       Lab Results   Component Value Date    TSH 0.702 03/13/2021      Lab Results   Component Value Date    FREET4 1.2 03/13/2021      No results found for: \"DDIMERQUANT\"  Magnesium   Date Value Ref Range Status   07/17/2025 1.5 (L) 1.6 - 2.4 mg/dL Final      No results found for: \"DIGOXIN\"   Lab Results   Component Value Date    TROPONINT 20 07/16/2025           Lipid Panel          1/16/2025    09:38   Lipid Panel   Total Cholesterol 138    Triglycerides 99    HDL Cholesterol 56    VLDL Cholesterol 18    LDL Cholesterol  64    LDL/HDL Ratio 1.11          Results for orders placed during the hospital encounter of 01/24/25    Adult Transthoracic Echo Complete w/ Color, Spectral and Contrast if necessary per protocol 01/24/2025  3:33 PM    Interpretation Summary    Left ventricular ejection fraction appears to be 56 - 60%.    The left ventricular cavity is borderline dilated.    Left ventricular diastolic " function is consistent with (grade I) impaired relaxation and age.    Estimated right ventricular systolic pressure from tricuspid regurgitation is normal (<35 mmHg).    Aortic root is mildly dilated at 4.1 cm.    No significant valvular disease.          Procedures        Assessment and Plan   Diagnoses and all orders for this visit:    1. Dizziness (Primary)  Assessment & Plan:  Patient was recently admitted for symptomatic hypotension and dizziness which was related to him being on antibiotics for 6 weeks for osteomyelitis and having diarrhea/vomiting which cause dehydration.  Patient reports during hospitalization his dehydration improved therefore he has not had any further dizziness episodes.      2. Primary hypertension  Assessment & Plan:  Blood pressure is stable and controlled.  He was seen in the hospital for symptomatic hypotension/dizziness which was related to dehydration from continuous antibiotics and now having diarrhea and vomiting . His carvedilol was decreased to 12.5 mg twice daily, olmesartan was stopped.  Patient has had a few lower blood pressure readings after reviewing his BP log however he has not been symptomatic.  Encourage patient continue monitoring blood pressure at home.  Continue carvedilol 12.5 mg twice daily      3. Mixed hyperlipidemia  Assessment & Plan:  Recent lipid panel 1/16/2025 LDL 64, HDL 56, triglycerides 99.  Continue Crestor 5 mg daily.      4. Paroxysmal atrial fibrillation  Assessment & Plan:  Patient is in normal rhythm today on exam.  Patient reports he has not had any episodes of palpitations or heart fluttering sensation.  He denies any bleeding issues.  Continue Eliquis 5 mg twice daily for stroke risk reduction and carvedilol 12.5 mg twice daily for rate control.                  Follow Up   Return for Next scheduled follow up 11/2025 with Dr Crandall .    Toribio Hayes  reports that he quit smoking about 16 years ago. His smoking use included cigarettes. He  started smoking about 55 years ago. He has a 58.5 pack-year smoking history. He has been exposed to tobacco smoke. He has never used smokeless tobacco. I have educated him on the risk of diseases from using tobacco products such as cancer, COPD, and heart disease.                  Patient was given instructions and counseling regarding his condition or for health maintenance advice. Please see specific information pulled into the AVS if appropriate.     Signed,  Laina Callahan, APRN  07/31/2025     Dictated Utilizing Dragon Dictation: Please note that portions of this note were completed with a voice recognition program.  Part of this note may be an electronic transcription/translation of spoken language to printed text using the Dragon Dictation System.

## 2025-07-31 NOTE — ASSESSMENT & PLAN NOTE
Blood pressure is stable and controlled.  He was seen in the hospital for symptomatic hypotension/dizziness which was related to dehydration from continuous antibiotics and now having diarrhea and vomiting . His carvedilol was decreased to 12.5 mg twice daily, olmesartan was stopped.  Patient has had a few lower blood pressure readings after reviewing his BP log however he has not been symptomatic.  Encourage patient continue monitoring blood pressure at home.  Continue carvedilol 12.5 mg twice daily

## 2025-08-11 ENCOUNTER — OFFICE VISIT (OUTPATIENT)
Dept: PULMONOLOGY | Facility: CLINIC | Age: 70
End: 2025-08-11
Payer: MEDICARE

## 2025-08-11 VITALS
DIASTOLIC BLOOD PRESSURE: 85 MMHG | WEIGHT: 191.6 LBS | SYSTOLIC BLOOD PRESSURE: 148 MMHG | RESPIRATION RATE: 16 BRPM | TEMPERATURE: 97.8 F | OXYGEN SATURATION: 98 % | HEIGHT: 70 IN | BODY MASS INDEX: 27.43 KG/M2 | HEART RATE: 60 BPM

## 2025-08-11 DIAGNOSIS — J43.9 PULMONARY EMPHYSEMA, UNSPECIFIED EMPHYSEMA TYPE: ICD-10-CM

## 2025-08-11 DIAGNOSIS — C34.92 ADENOCARCINOMA OF LEFT LUNG: ICD-10-CM

## 2025-08-11 DIAGNOSIS — J44.9 CHRONIC OBSTRUCTIVE PULMONARY DISEASE, UNSPECIFIED COPD TYPE: ICD-10-CM

## 2025-08-11 DIAGNOSIS — I50.42 CHRONIC COMBINED SYSTOLIC (CONGESTIVE) AND DIASTOLIC (CONGESTIVE) HEART FAILURE: ICD-10-CM

## 2025-08-11 DIAGNOSIS — I48.20 ATRIAL FIBRILLATION, CHRONIC: ICD-10-CM

## 2025-08-11 DIAGNOSIS — R29.818 SUSPECTED SLEEP APNEA: ICD-10-CM

## 2025-08-11 DIAGNOSIS — N18.31 STAGE 3A CHRONIC KIDNEY DISEASE: ICD-10-CM

## 2025-08-11 DIAGNOSIS — J45.30 MILD PERSISTENT ASTHMA, UNSPECIFIED WHETHER COMPLICATED: ICD-10-CM

## 2025-08-11 DIAGNOSIS — R06.00 DYSPNEA, UNSPECIFIED TYPE: ICD-10-CM

## 2025-08-11 DIAGNOSIS — R06.02 SHORTNESS OF BREATH: Primary | ICD-10-CM

## 2025-08-11 DIAGNOSIS — J43.2 CENTRILOBULAR EMPHYSEMA: ICD-10-CM

## 2025-08-11 DIAGNOSIS — J98.4 LUNG DISEASE: ICD-10-CM

## 2025-08-11 RX ORDER — ALBUTEROL SULFATE 90 UG/1
2 INHALANT RESPIRATORY (INHALATION) EVERY 4 HOURS PRN
Qty: 17 G | Refills: 5 | Status: SHIPPED | OUTPATIENT
Start: 2025-08-11

## 2025-08-11 RX ORDER — OLMESARTAN MEDOXOMIL 5 MG/1
1 TABLET ORAL DAILY
COMMUNITY
Start: 2025-07-30

## 2025-08-11 RX ORDER — MONTELUKAST SODIUM 10 MG/1
10 TABLET ORAL
Qty: 90 TABLET | Refills: 2 | Status: SHIPPED | OUTPATIENT
Start: 2025-08-11

## 2025-08-11 RX ORDER — FLUTICASONE FUROATE, UMECLIDINIUM BROMIDE AND VILANTEROL TRIFENATATE 100; 62.5; 25 UG/1; UG/1; UG/1
1 POWDER RESPIRATORY (INHALATION)
Qty: 60 EACH | Refills: 3 | Status: SHIPPED | OUTPATIENT
Start: 2025-08-11

## (undated) DEVICE — INTENDED FOR TISSUE SEPARATION, AND OTHER PROCEDURES THAT REQUIRE A SHARP SURGICAL BLADE TO PUNCTURE OR CUT.: Brand: BARD-PARKER ® CARBON RIB-BACK BLADES

## (undated) DEVICE — SUT VIC 3/0 SH 27IN J416H

## (undated) DEVICE — CVR PROB 96IN LF STRL

## (undated) DEVICE — DEV OPN LIGASURE SM/JAW 28D 16.5MM 18.8CM 1P/U

## (undated) DEVICE — SOL IRR NACL 0.9PCT BT 1000ML

## (undated) DEVICE — ELECTRD BLD EDGE COAT 3IN

## (undated) DEVICE — SUT MNCRYL 4/0 PS2 18 IN

## (undated) DEVICE — 3M™ STERI-STRIP™ REINFORCED ADHESIVE SKIN CLOSURES, R1547, 1/2 IN X 4 IN (12 MM X 100 MM), 6 STRIPS/ENVELOPE: Brand: 3M™ STERI-STRIP™

## (undated) DEVICE — PENCL E/S SMOKEEVAC W/TELESCP CANN

## (undated) DEVICE — APPL CHLORAPREP HI/LITE 26ML ORNG

## (undated) DEVICE — MAJOR-LF: Brand: MEDLINE INDUSTRIES, INC.

## (undated) DEVICE — SUT ETHLN 3/0 PS1 18IN 1663H

## (undated) DEVICE — GOWN,REINFORCE,POLY,SIRUS,BREATH SLV,XLG: Brand: MEDLINE

## (undated) DEVICE — GAUZE,SPONGE,4"X4",16PLY,STRL,LF,10/TRAY: Brand: MEDLINE

## (undated) DEVICE — SLV SCD KN/LEN ADJ EXPRSS BLENDED MD 1P/U

## (undated) DEVICE — GLV SURG BIOGEL LTX PF 7 1/2

## (undated) DEVICE — SUT ETHLN 2/0 FS 18IN 664H

## (undated) DEVICE — SUT SILK 3/0 TIES 18IN A184H